# Patient Record
Sex: MALE | Race: OTHER | NOT HISPANIC OR LATINO
[De-identification: names, ages, dates, MRNs, and addresses within clinical notes are randomized per-mention and may not be internally consistent; named-entity substitution may affect disease eponyms.]

---

## 2017-01-12 ENCOUNTER — OTHER (OUTPATIENT)
Age: 57
End: 2017-01-12

## 2017-01-14 ENCOUNTER — LABORATORY RESULT (OUTPATIENT)
Age: 57
End: 2017-01-14

## 2017-01-18 ENCOUNTER — APPOINTMENT (OUTPATIENT)
Dept: NEPHROLOGY | Facility: CLINIC | Age: 57
End: 2017-01-18

## 2017-01-18 VITALS
HEART RATE: 72 BPM | DIASTOLIC BLOOD PRESSURE: 82 MMHG | SYSTOLIC BLOOD PRESSURE: 124 MMHG | BODY MASS INDEX: 30.42 KG/M2 | RESPIRATION RATE: 16 BRPM | WEIGHT: 206 LBS

## 2017-01-18 RX ORDER — LOSARTAN POTASSIUM 50 MG/1
50 TABLET, FILM COATED ORAL
Qty: 90 | Refills: 0 | Status: DISCONTINUED | COMMUNITY
Start: 2016-03-10

## 2017-01-30 ENCOUNTER — RX RENEWAL (OUTPATIENT)
Age: 57
End: 2017-01-30

## 2017-02-01 LAB
ALBUMIN SERPL ELPH-MCNC: 3.8 G/DL
ALP BLD-CCNC: 67 U/L
ALT SERPL-CCNC: 14 U/L
ANION GAP SERPL CALC-SCNC: 15 MMOL/L
APPEARANCE: CLEAR
AST SERPL-CCNC: 17 U/L
BASOPHILS # BLD AUTO: 0.02 K/UL
BASOPHILS NFR BLD AUTO: 0.2 %
BILIRUB SERPL-MCNC: 0.4 MG/DL
BILIRUBIN URINE: NEGATIVE
BLOOD URINE: ABNORMAL
BUN SERPL-MCNC: 77 MG/DL
CALCIUM SERPL-MCNC: 8.8 MG/DL
CALCIUM SERPL-MCNC: 8.8 MG/DL
CHLORIDE SERPL-SCNC: 106 MMOL/L
CHOLEST SERPL-MCNC: 126 MG/DL
CHOLEST/HDLC SERPL: 3 RATIO
CO2 SERPL-SCNC: 20 MMOL/L
COLOR: YELLOW
CREAT SERPL-MCNC: 3.42 MG/DL
EOSINOPHIL # BLD AUTO: 0.29 K/UL
EOSINOPHIL NFR BLD AUTO: 3.6 %
GLUCOSE QUALITATIVE U: NORMAL MG/DL
GLUCOSE SERPL-MCNC: 111 MG/DL
HBA1C MFR BLD HPLC: 6.1 %
HCT VFR BLD CALC: 35.5 %
HDLC SERPL-MCNC: 42 MG/DL
HGB BLD-MCNC: 11.5 G/DL
IMM GRANULOCYTES NFR BLD AUTO: 0.2 %
KETONES URINE: NEGATIVE
LDLC SERPL CALC-MCNC: 60 MG/DL
LEUKOCYTE ESTERASE URINE: NEGATIVE
LYMPHOCYTES # BLD AUTO: 1.94 K/UL
LYMPHOCYTES NFR BLD AUTO: 23.8 %
MAGNESIUM SERPL-MCNC: 2 MG/DL
MAN DIFF?: NORMAL
MCHC RBC-ENTMCNC: 29.8 PG
MCHC RBC-ENTMCNC: 32.4 GM/DL
MCV RBC AUTO: 92 FL
MONOCYTES # BLD AUTO: 0.66 K/UL
MONOCYTES NFR BLD AUTO: 8.1 %
NEUTROPHILS # BLD AUTO: 5.23 K/UL
NEUTROPHILS NFR BLD AUTO: 64.1 %
NITRITE URINE: NEGATIVE
PARATHYROID HORMONE INTACT: 172 PG/ML
PH URINE: 5.5
PHOSPHATE SERPL-MCNC: 4.1 MG/DL
PLATELET # BLD AUTO: 187 K/UL
POTASSIUM SERPL-SCNC: 4.6 MMOL/L
PROT SERPL-MCNC: 6.7 G/DL
PROTEIN URINE: 100 MG/DL
RBC # BLD: 3.86 M/UL
RBC # FLD: 13.6 %
SODIUM SERPL-SCNC: 141 MMOL/L
SPECIFIC GRAVITY URINE: 1.01
TRIGL SERPL-MCNC: 118 MG/DL
URATE SERPL-MCNC: 5.8 MG/DL
UROBILINOGEN URINE: NORMAL MG/DL
WBC # FLD AUTO: 8.16 K/UL

## 2017-02-21 ENCOUNTER — LABORATORY RESULT (OUTPATIENT)
Age: 57
End: 2017-02-21

## 2017-02-21 ENCOUNTER — APPOINTMENT (OUTPATIENT)
Dept: TRANSPLANT | Facility: CLINIC | Age: 57
End: 2017-02-21

## 2017-02-21 ENCOUNTER — APPOINTMENT (OUTPATIENT)
Dept: NEPHROLOGY | Facility: CLINIC | Age: 57
End: 2017-02-21

## 2017-02-21 ENCOUNTER — OUTPATIENT (OUTPATIENT)
Dept: OUTPATIENT SERVICES | Facility: HOSPITAL | Age: 57
LOS: 1 days | End: 2017-02-21
Payer: COMMERCIAL

## 2017-02-21 VITALS
HEART RATE: 77 BPM | HEIGHT: 67.25 IN | WEIGHT: 201.2 LBS | OXYGEN SATURATION: 98 % | TEMPERATURE: 98.1 F | BODY MASS INDEX: 31.21 KG/M2 | DIASTOLIC BLOOD PRESSURE: 72 MMHG | SYSTOLIC BLOOD PRESSURE: 118 MMHG | RESPIRATION RATE: 16 BRPM

## 2017-02-21 DIAGNOSIS — N18.6 END STAGE RENAL DISEASE: ICD-10-CM

## 2017-02-21 LAB
ALBUMIN SERPL ELPH-MCNC: 4.3 G/DL — SIGNIFICANT CHANGE UP (ref 3.3–5)
ALP SERPL-CCNC: 71 U/L — SIGNIFICANT CHANGE UP (ref 40–120)
ALT FLD-CCNC: 16 U/L — SIGNIFICANT CHANGE UP (ref 10–45)
ANION GAP SERPL CALC-SCNC: 17 MMOL/L — SIGNIFICANT CHANGE UP (ref 5–17)
APPEARANCE UR: CLEAR — SIGNIFICANT CHANGE UP
AST SERPL-CCNC: 23 U/L — SIGNIFICANT CHANGE UP (ref 10–40)
BACTERIA # UR AUTO: NEGATIVE — SIGNIFICANT CHANGE UP
BASOPHILS # BLD AUTO: 0.04 K/UL — SIGNIFICANT CHANGE UP (ref 0–0.2)
BASOPHILS NFR BLD AUTO: 0.5 % — SIGNIFICANT CHANGE UP (ref 0–2)
BILIRUB SERPL-MCNC: 0.3 MG/DL — SIGNIFICANT CHANGE UP (ref 0.2–1.2)
BILIRUB UR-MCNC: NEGATIVE — SIGNIFICANT CHANGE UP
BUN SERPL-MCNC: 79 MG/DL — HIGH (ref 7–23)
CALCIUM SERPL-MCNC: 9.5 MG/DL — SIGNIFICANT CHANGE UP (ref 8.4–10.5)
CHLORIDE SERPL-SCNC: 104 MMOL/L — SIGNIFICANT CHANGE UP (ref 96–108)
CMV IGG FLD QL: 7.3 U/ML — HIGH
CMV IGG SERPL-IMP: POSITIVE
CO2 SERPL-SCNC: 21 MMOL/L — LOW (ref 22–31)
COLOR SPEC: YELLOW — SIGNIFICANT CHANGE UP
CREAT SERPL-MCNC: 4.05 MG/DL — HIGH (ref 0.5–1.3)
DIFF PNL FLD: NEGATIVE — SIGNIFICANT CHANGE UP
EBV EA AB SER IA-ACNC: 93.2 U/ML — HIGH
EBV EA AB TITR SER IF: POSITIVE
EBV EA IGG SER-ACNC: POSITIVE
EBV NA IGG SER IA-ACNC: 110 U/ML — HIGH
EBV PATRN SPEC IB-IMP: SIGNIFICANT CHANGE UP
EBV VCA IGG AVIDITY SER QL IA: POSITIVE
EBV VCA IGM SER IA-ACNC: <10 U/ML — SIGNIFICANT CHANGE UP
EBV VCA IGM SER IA-ACNC: >750 U/ML — HIGH
EBV VCA IGM TITR FLD: NEGATIVE — SIGNIFICANT CHANGE UP
EOSINOPHIL # BLD AUTO: 0.23 K/UL — SIGNIFICANT CHANGE UP (ref 0–0.5)
EOSINOPHIL NFR BLD AUTO: 2.7 % — SIGNIFICANT CHANGE UP (ref 0–6)
EPI CELLS # UR: 0 /HPF — SIGNIFICANT CHANGE UP (ref 0–5)
GLUCOSE SERPL-MCNC: 96 MG/DL — SIGNIFICANT CHANGE UP (ref 70–99)
GLUCOSE UR QL: NEGATIVE MG/DL — SIGNIFICANT CHANGE UP
HAV IGG+IGM SER QL: REACTIVE
HBA1C BLD-MCNC: 6 % — HIGH (ref 4–5.6)
HBV CORE AB SER-ACNC: SIGNIFICANT CHANGE UP
HBV SURFACE AB SER-ACNC: 317.7 MIU/ML — SIGNIFICANT CHANGE UP
HBV SURFACE AB SER-ACNC: REACTIVE
HBV SURFACE AG SER-ACNC: SIGNIFICANT CHANGE UP
HCT VFR BLD CALC: 35.7 % — LOW (ref 39–50)
HCV AB S/CO SERPL IA: 0.12 S/CO — SIGNIFICANT CHANGE UP
HCV AB SERPL-IMP: SIGNIFICANT CHANGE UP
HGB BLD-MCNC: 11.9 G/DL — LOW (ref 13–17)
HIV 1+2 AB+HIV1 P24 AG SERPL QL IA: SIGNIFICANT CHANGE UP
HSV1 IGG SER-ACNC: 56.4 INDEX — HIGH
HSV1 IGG SERPL QL IA: POSITIVE
HSV2 IGG FLD-ACNC: 0.3 INDEX — SIGNIFICANT CHANGE UP
HSV2 IGG SERPL QL IA: NEGATIVE — SIGNIFICANT CHANGE UP
HYALINE CASTS # UR AUTO: 0 /LPF — SIGNIFICANT CHANGE UP (ref 0–7)
IMM GRANULOCYTES NFR BLD AUTO: 0.1 % — SIGNIFICANT CHANGE UP (ref 0–1.5)
KETONES UR-MCNC: NEGATIVE — SIGNIFICANT CHANGE UP
LDH SERPL L TO P-CCNC: 238 U/L — SIGNIFICANT CHANGE UP (ref 50–242)
LEUKOCYTE ESTERASE UR-ACNC: NEGATIVE — SIGNIFICANT CHANGE UP
LYMPHOCYTES # BLD AUTO: 1.82 K/UL — SIGNIFICANT CHANGE UP (ref 1–3.3)
LYMPHOCYTES # BLD AUTO: 21.3 % — SIGNIFICANT CHANGE UP (ref 13–44)
MAGNESIUM SERPL-MCNC: 1.9 MG/DL — SIGNIFICANT CHANGE UP (ref 1.6–2.6)
MCHC RBC-ENTMCNC: 30 PG — SIGNIFICANT CHANGE UP (ref 27–34)
MCHC RBC-ENTMCNC: 33.3 GM/DL — SIGNIFICANT CHANGE UP (ref 32–36)
MCV RBC AUTO: 89.9 FL — SIGNIFICANT CHANGE UP (ref 80–100)
MONOCYTES # BLD AUTO: 0.59 K/UL — SIGNIFICANT CHANGE UP (ref 0–0.9)
MONOCYTES NFR BLD AUTO: 6.9 % — SIGNIFICANT CHANGE UP (ref 2–14)
NEUTROPHILS # BLD AUTO: 5.86 K/UL — SIGNIFICANT CHANGE UP (ref 1.8–7.4)
NEUTROPHILS NFR BLD AUTO: 68.5 % — SIGNIFICANT CHANGE UP (ref 43–77)
NITRITE UR-MCNC: NEGATIVE — SIGNIFICANT CHANGE UP
PH UR: 6 — SIGNIFICANT CHANGE UP (ref 5–8)
PHOSPHATE SERPL-MCNC: 4.5 MG/DL — SIGNIFICANT CHANGE UP (ref 2.5–4.5)
PLATELET # BLD AUTO: 197 K/UL — SIGNIFICANT CHANGE UP (ref 150–400)
POTASSIUM SERPL-MCNC: 5.9 MMOL/L — HIGH (ref 3.5–5.3)
POTASSIUM SERPL-SCNC: 5.9 MMOL/L — HIGH (ref 3.5–5.3)
PROT SERPL-MCNC: 6.9 G/DL — SIGNIFICANT CHANGE UP (ref 6–8.3)
PROT UR-MCNC: 100 MG/DL
PSA FLD-MCNC: 0.92 NG/ML — SIGNIFICANT CHANGE UP (ref 0–4)
RBC # BLD: 3.97 M/UL — LOW (ref 4.2–5.8)
RBC # FLD: 13.5 % — SIGNIFICANT CHANGE UP (ref 10.3–14.5)
RBC CASTS # UR COMP ASSIST: 3 /HPF — SIGNIFICANT CHANGE UP (ref 0–4)
RUBV IGG SER-ACNC: 13.1 INDEX — SIGNIFICANT CHANGE UP
RUBV IGG SER-IMP: POSITIVE — SIGNIFICANT CHANGE UP
SODIUM SERPL-SCNC: 142 MMOL/L — SIGNIFICANT CHANGE UP (ref 135–145)
SP GR SPEC: 1.01 — SIGNIFICANT CHANGE UP (ref 1.01–1.02)
T GONDII IGG SER QL: 49.9 IU/ML — HIGH
T GONDII IGG SER QL: POSITIVE
UROBILINOGEN FLD QL: NEGATIVE MG/DL — SIGNIFICANT CHANGE UP
VZV IGG FLD QL IA: >4000 INDEX — SIGNIFICANT CHANGE UP
VZV IGG FLD QL IA: POSITIVE — SIGNIFICANT CHANGE UP
WBC # BLD: 8.55 K/UL — SIGNIFICANT CHANGE UP (ref 3.8–10.5)
WBC # FLD AUTO: 8.55 K/UL — SIGNIFICANT CHANGE UP (ref 3.8–10.5)
WBC UR QL: 1 /HPF — SIGNIFICANT CHANGE UP (ref 0–5)

## 2017-02-21 PROCEDURE — 84100 ASSAY OF PHOSPHORUS: CPT

## 2017-02-21 PROCEDURE — 83615 LACTATE (LD) (LDH) ENZYME: CPT

## 2017-02-21 PROCEDURE — 86664 EPSTEIN-BARR NUCLEAR ANTIGEN: CPT

## 2017-02-21 PROCEDURE — 86480 TB TEST CELL IMMUN MEASURE: CPT

## 2017-02-21 PROCEDURE — 86644 CMV ANTIBODY: CPT

## 2017-02-21 PROCEDURE — 87340 HEPATITIS B SURFACE AG IA: CPT

## 2017-02-21 PROCEDURE — 86696 HERPES SIMPLEX TYPE 2 TEST: CPT

## 2017-02-21 PROCEDURE — 86592 SYPHILIS TEST NON-TREP QUAL: CPT

## 2017-02-21 PROCEDURE — 86787 VARICELLA-ZOSTER ANTIBODY: CPT

## 2017-02-21 PROCEDURE — 85027 COMPLETE CBC AUTOMATED: CPT

## 2017-02-21 PROCEDURE — 86663 EPSTEIN-BARR ANTIBODY: CPT

## 2017-02-21 PROCEDURE — 86762 RUBELLA ANTIBODY: CPT

## 2017-02-21 PROCEDURE — 87389 HIV-1 AG W/HIV-1&-2 AB AG IA: CPT

## 2017-02-21 PROCEDURE — 86695 HERPES SIMPLEX TYPE 1 TEST: CPT

## 2017-02-21 PROCEDURE — 83735 ASSAY OF MAGNESIUM: CPT

## 2017-02-21 PROCEDURE — 81001 URINALYSIS AUTO W/SCOPE: CPT

## 2017-02-21 PROCEDURE — 80053 COMPREHEN METABOLIC PANEL: CPT

## 2017-02-21 PROCEDURE — 86704 HEP B CORE ANTIBODY TOTAL: CPT

## 2017-02-21 PROCEDURE — G0103: CPT

## 2017-02-21 PROCEDURE — 83036 HEMOGLOBIN GLYCOSYLATED A1C: CPT

## 2017-02-21 PROCEDURE — 86708 HEPATITIS A ANTIBODY: CPT

## 2017-02-21 PROCEDURE — 86665 EPSTEIN-BARR CAPSID VCA: CPT

## 2017-02-21 PROCEDURE — 86777 TOXOPLASMA ANTIBODY: CPT

## 2017-02-21 PROCEDURE — 86706 HEP B SURFACE ANTIBODY: CPT

## 2017-02-21 PROCEDURE — 86803 HEPATITIS C AB TEST: CPT

## 2017-02-22 VITALS — HEIGHT: 67.75 IN | BODY MASS INDEX: 30.85 KG/M2 | WEIGHT: 201.2 LBS

## 2017-02-22 LAB — RPR SERPL-ACNC: SIGNIFICANT CHANGE UP

## 2017-02-23 LAB
M TB TUBERC IFN-G BLD QL: 0.01 IU/ML — SIGNIFICANT CHANGE UP
M TB TUBERC IFN-G BLD QL: 0.03 IU/ML — SIGNIFICANT CHANGE UP
M TB TUBERC IFN-G BLD QL: NEGATIVE — SIGNIFICANT CHANGE UP
MITOGEN IGNF BCKGRD COR BLD-ACNC: >10 IU/ML — SIGNIFICANT CHANGE UP

## 2017-02-27 LAB
ALBUMIN SERPL ELPH-MCNC: 4 G/DL
ALP BLD-CCNC: 70 U/L
ALT SERPL-CCNC: 14 U/L
ANION GAP SERPL CALC-SCNC: 17 MMOL/L
AST SERPL-CCNC: 19 U/L
BILIRUB SERPL-MCNC: 0.4 MG/DL
BUN SERPL-MCNC: 71 MG/DL
CALCIUM SERPL-MCNC: 8.8 MG/DL
CHLORIDE SERPL-SCNC: 104 MMOL/L
CO2 SERPL-SCNC: 19 MMOL/L
CREAT SERPL-MCNC: 3.95 MG/DL
GLUCOSE SERPL-MCNC: 142 MG/DL
POTASSIUM SERPL-SCNC: 4.3 MMOL/L
PROT SERPL-MCNC: 6.4 G/DL
SODIUM SERPL-SCNC: 140 MMOL/L

## 2017-03-27 ENCOUNTER — APPOINTMENT (OUTPATIENT)
Dept: ULTRASOUND IMAGING | Facility: CLINIC | Age: 57
End: 2017-03-27

## 2017-05-03 ENCOUNTER — APPOINTMENT (OUTPATIENT)
Dept: NEPHROLOGY | Facility: CLINIC | Age: 57
End: 2017-05-03

## 2017-05-03 VITALS
HEART RATE: 72 BPM | BODY MASS INDEX: 30.94 KG/M2 | RESPIRATION RATE: 16 BRPM | WEIGHT: 202 LBS | SYSTOLIC BLOOD PRESSURE: 120 MMHG | DIASTOLIC BLOOD PRESSURE: 78 MMHG

## 2017-05-04 RX ORDER — SODIUM POLYSTYRENE SULFONATE 4.1 MEQ/G
POWDER, FOR SUSPENSION ORAL; RECTAL DAILY
Qty: 30 | Refills: 0 | Status: DISCONTINUED | COMMUNITY
Start: 2017-02-25 | End: 2017-05-04

## 2017-05-10 ENCOUNTER — APPOINTMENT (OUTPATIENT)
Dept: NEPHROLOGY | Facility: CLINIC | Age: 57
End: 2017-05-10

## 2017-05-10 VITALS
WEIGHT: 203.2 LBS | BODY MASS INDEX: 31.13 KG/M2 | HEART RATE: 72 BPM | RESPIRATION RATE: 16 BRPM | SYSTOLIC BLOOD PRESSURE: 130 MMHG | DIASTOLIC BLOOD PRESSURE: 78 MMHG

## 2017-05-19 ENCOUNTER — FORM ENCOUNTER (OUTPATIENT)
Age: 57
End: 2017-05-19

## 2017-05-20 ENCOUNTER — APPOINTMENT (OUTPATIENT)
Dept: ULTRASOUND IMAGING | Facility: IMAGING CENTER | Age: 57
End: 2017-05-20

## 2017-05-20 ENCOUNTER — APPOINTMENT (OUTPATIENT)
Dept: CT IMAGING | Facility: IMAGING CENTER | Age: 57
End: 2017-05-20

## 2017-05-20 ENCOUNTER — OUTPATIENT (OUTPATIENT)
Dept: OUTPATIENT SERVICES | Facility: HOSPITAL | Age: 57
LOS: 1 days | End: 2017-05-20
Payer: COMMERCIAL

## 2017-05-20 DIAGNOSIS — Z01.818 ENCOUNTER FOR OTHER PREPROCEDURAL EXAMINATION: ICD-10-CM

## 2017-05-20 PROCEDURE — 93979 VASCULAR STUDY: CPT

## 2017-05-20 PROCEDURE — 71250 CT THORAX DX C-: CPT

## 2017-05-20 PROCEDURE — 76700 US EXAM ABDOM COMPLETE: CPT

## 2017-06-14 ENCOUNTER — RX RENEWAL (OUTPATIENT)
Age: 57
End: 2017-06-14

## 2017-06-20 ENCOUNTER — RX RENEWAL (OUTPATIENT)
Age: 57
End: 2017-06-20

## 2017-07-05 ENCOUNTER — APPOINTMENT (OUTPATIENT)
Dept: ULTRASOUND IMAGING | Facility: CLINIC | Age: 57
End: 2017-07-05

## 2017-07-27 ENCOUNTER — RX RENEWAL (OUTPATIENT)
Age: 57
End: 2017-07-27

## 2017-08-12 ENCOUNTER — LABORATORY RESULT (OUTPATIENT)
Age: 57
End: 2017-08-12

## 2017-08-14 ENCOUNTER — APPOINTMENT (OUTPATIENT)
Dept: NEPHROLOGY | Facility: CLINIC | Age: 57
End: 2017-08-14
Payer: COMMERCIAL

## 2017-08-14 VITALS
HEIGHT: 67.75 IN | WEIGHT: 203 LBS | DIASTOLIC BLOOD PRESSURE: 84 MMHG | OXYGEN SATURATION: 99 % | BODY MASS INDEX: 31.12 KG/M2 | SYSTOLIC BLOOD PRESSURE: 143 MMHG | HEART RATE: 78 BPM

## 2017-08-14 VITALS
BODY MASS INDEX: 25.91 KG/M2 | OXYGEN SATURATION: 98 % | HEART RATE: 84 BPM | SYSTOLIC BLOOD PRESSURE: 190 MMHG | HEIGHT: 67.75 IN | DIASTOLIC BLOOD PRESSURE: 92 MMHG | WEIGHT: 169 LBS

## 2017-08-14 VITALS — DIASTOLIC BLOOD PRESSURE: 80 MMHG | SYSTOLIC BLOOD PRESSURE: 130 MMHG

## 2017-08-14 LAB
25(OH)D3 SERPL-MCNC: 31.4 NG/ML
ALBUMIN SERPL ELPH-MCNC: 3.9 G/DL
ALP BLD-CCNC: 67 U/L
ALT SERPL-CCNC: 16 U/L
ANION GAP SERPL CALC-SCNC: 15 MMOL/L
APPEARANCE: CLEAR
AST SERPL-CCNC: 21 U/L
BASOPHILS # BLD AUTO: 0.04 K/UL
BASOPHILS NFR BLD AUTO: 0.5 %
BILIRUB SERPL-MCNC: 0.4 MG/DL
BILIRUBIN URINE: NEGATIVE
BLOOD URINE: ABNORMAL
BUN SERPL-MCNC: 86 MG/DL
CALCIUM SERPL-MCNC: 9.1 MG/DL
CALCIUM SERPL-MCNC: 9.1 MG/DL
CHLORIDE SERPL-SCNC: 106 MMOL/L
CHOLEST SERPL-MCNC: 118 MG/DL
CHOLEST/HDLC SERPL: 3 RATIO
CO2 SERPL-SCNC: 20 MMOL/L
COLOR: YELLOW
CREAT SERPL-MCNC: 4.63 MG/DL
EOSINOPHIL # BLD AUTO: 0.24 K/UL
EOSINOPHIL NFR BLD AUTO: 3.2 %
GLUCOSE QUALITATIVE U: NORMAL MG/DL
GLUCOSE SERPL-MCNC: 107 MG/DL
HBA1C MFR BLD HPLC: 5.8 %
HCT VFR BLD CALC: 34.1 %
HDLC SERPL-MCNC: 39 MG/DL
HGB BLD-MCNC: 11 G/DL
IMM GRANULOCYTES NFR BLD AUTO: 0.4 %
KETONES URINE: NEGATIVE
LDLC SERPL CALC-MCNC: 55 MG/DL
LEUKOCYTE ESTERASE URINE: NEGATIVE
LYMPHOCYTES # BLD AUTO: 1.9 K/UL
LYMPHOCYTES NFR BLD AUTO: 25 %
MAGNESIUM SERPL-MCNC: 1.9 MG/DL
MAN DIFF?: NORMAL
MCHC RBC-ENTMCNC: 29.3 PG
MCHC RBC-ENTMCNC: 32.3 GM/DL
MCV RBC AUTO: 90.7 FL
MONOCYTES # BLD AUTO: 0.58 K/UL
MONOCYTES NFR BLD AUTO: 7.6 %
NEUTROPHILS # BLD AUTO: 4.81 K/UL
NEUTROPHILS NFR BLD AUTO: 63.3 %
NITRITE URINE: NEGATIVE
PARATHYROID HORMONE INTACT: 270 PG/ML
PH URINE: 6
PHOSPHATE SERPL-MCNC: 4.8 MG/DL
PLATELET # BLD AUTO: 168 K/UL
POTASSIUM SERPL-SCNC: 5.5 MMOL/L
PROT SERPL-MCNC: 6.8 G/DL
PROTEIN URINE: 100 MG/DL
RBC # BLD: 3.76 M/UL
RBC # FLD: 13.8 %
SODIUM SERPL-SCNC: 141 MMOL/L
SPECIFIC GRAVITY URINE: 1.01
TRIGL SERPL-MCNC: 118 MG/DL
URATE SERPL-MCNC: 6.1 MG/DL
UROBILINOGEN URINE: NORMAL MG/DL
WBC # FLD AUTO: 7.6 K/UL

## 2017-08-14 PROCEDURE — 99214 OFFICE O/P EST MOD 30 MIN: CPT

## 2017-08-16 LAB
CREAT SPEC-SCNC: 67 MG/DL
CREAT/PROT UR: 1.6 RATIO
PROT UR-MCNC: 108 MG/DL

## 2017-10-09 ENCOUNTER — RX RENEWAL (OUTPATIENT)
Age: 57
End: 2017-10-09

## 2017-11-15 ENCOUNTER — OTHER (OUTPATIENT)
Age: 57
End: 2017-11-15

## 2017-11-20 ENCOUNTER — APPOINTMENT (OUTPATIENT)
Dept: NEPHROLOGY | Facility: CLINIC | Age: 57
End: 2017-11-20
Payer: COMMERCIAL

## 2017-11-20 VITALS — DIASTOLIC BLOOD PRESSURE: 84 MMHG | SYSTOLIC BLOOD PRESSURE: 136 MMHG

## 2017-11-20 VITALS
DIASTOLIC BLOOD PRESSURE: 90 MMHG | WEIGHT: 211.64 LBS | HEART RATE: 80 BPM | OXYGEN SATURATION: 97 % | BODY MASS INDEX: 33.22 KG/M2 | SYSTOLIC BLOOD PRESSURE: 150 MMHG | HEIGHT: 67 IN

## 2017-11-20 LAB
25(OH)D3 SERPL-MCNC: 28.9 NG/ML
ALBUMIN SERPL ELPH-MCNC: 3.6 G/DL
ALP BLD-CCNC: 60 U/L
ALT SERPL-CCNC: 13 U/L
ANION GAP SERPL CALC-SCNC: 15 MMOL/L
AST SERPL-CCNC: 20 U/L
BASOPHILS # BLD AUTO: 0.03 K/UL
BASOPHILS NFR BLD AUTO: 0.4 %
BILIRUB SERPL-MCNC: 0.4 MG/DL
BUN SERPL-MCNC: 81 MG/DL
CALCIUM SERPL-MCNC: 8.9 MG/DL
CALCIUM SERPL-MCNC: 8.9 MG/DL
CHLORIDE SERPL-SCNC: 104 MMOL/L
CHOLEST SERPL-MCNC: 130 MG/DL
CHOLEST/HDLC SERPL: 2.7 RATIO
CO2 SERPL-SCNC: 19 MMOL/L
CREAT SERPL-MCNC: 4.99 MG/DL
EOSINOPHIL # BLD AUTO: 0.25 K/UL
EOSINOPHIL NFR BLD AUTO: 3.3 %
GLUCOSE SERPL-MCNC: 93 MG/DL
HBA1C MFR BLD HPLC: 5.7 %
HCT VFR BLD CALC: 34.6 %
HDLC SERPL-MCNC: 48 MG/DL
HGB BLD-MCNC: 11.2 G/DL
IMM GRANULOCYTES NFR BLD AUTO: 0.3 %
LDLC SERPL CALC-MCNC: 56 MG/DL
LYMPHOCYTES # BLD AUTO: 1.83 K/UL
LYMPHOCYTES NFR BLD AUTO: 24.2 %
MAGNESIUM SERPL-MCNC: 1.9 MG/DL
MAN DIFF?: NORMAL
MCHC RBC-ENTMCNC: 30.2 PG
MCHC RBC-ENTMCNC: 32.4 GM/DL
MCV RBC AUTO: 93.3 FL
MONOCYTES # BLD AUTO: 0.58 K/UL
MONOCYTES NFR BLD AUTO: 7.7 %
NEUTROPHILS # BLD AUTO: 4.85 K/UL
NEUTROPHILS NFR BLD AUTO: 64.1 %
PARATHYROID HORMONE INTACT: 418 PG/ML
PHOSPHATE SERPL-MCNC: 5.5 MG/DL
PLATELET # BLD AUTO: 177 K/UL
POTASSIUM SERPL-SCNC: 5.5 MMOL/L
PROT SERPL-MCNC: 6.7 G/DL
RBC # BLD: 3.71 M/UL
RBC # FLD: 13.4 %
SODIUM SERPL-SCNC: 138 MMOL/L
TRIGL SERPL-MCNC: 131 MG/DL
URATE SERPL-MCNC: 6.2 MG/DL
WBC # FLD AUTO: 7.56 K/UL

## 2017-11-20 PROCEDURE — 99214 OFFICE O/P EST MOD 30 MIN: CPT

## 2018-02-05 ENCOUNTER — APPOINTMENT (OUTPATIENT)
Dept: NEPHROLOGY | Facility: CLINIC | Age: 58
End: 2018-02-05
Payer: COMMERCIAL

## 2018-02-05 VITALS
DIASTOLIC BLOOD PRESSURE: 85 MMHG | SYSTOLIC BLOOD PRESSURE: 139 MMHG | HEIGHT: 67 IN | OXYGEN SATURATION: 99 % | WEIGHT: 207.23 LBS | HEART RATE: 91 BPM | BODY MASS INDEX: 32.53 KG/M2

## 2018-02-05 LAB
25(OH)D3 SERPL-MCNC: 45.4 NG/ML
ALBUMIN SERPL ELPH-MCNC: 4.1 G/DL
ALP BLD-CCNC: 52 U/L
ALT SERPL-CCNC: 16 U/L
ANION GAP SERPL CALC-SCNC: 15 MMOL/L
AST SERPL-CCNC: 21 U/L
BASOPHILS # BLD AUTO: 0.03 K/UL
BASOPHILS NFR BLD AUTO: 0.4 %
BILIRUB SERPL-MCNC: 0.5 MG/DL
BUN SERPL-MCNC: 80 MG/DL
CALCIUM SERPL-MCNC: 11.9 MG/DL
CALCIUM SERPL-MCNC: 11.9 MG/DL
CHLORIDE SERPL-SCNC: 101 MMOL/L
CHOLEST SERPL-MCNC: 126 MG/DL
CHOLEST/HDLC SERPL: 3.1 RATIO
CO2 SERPL-SCNC: 25 MMOL/L
CREAT SERPL-MCNC: 7.51 MG/DL
EOSINOPHIL # BLD AUTO: 0.28 K/UL
EOSINOPHIL NFR BLD AUTO: 3.8 %
ESTIMATED AVERAGE GLUCOSE: 114 MG/DL
GLUCOSE SERPL-MCNC: 125 MG/DL
HBA1C MFR BLD HPLC: 5.6 %
HCT VFR BLD CALC: 32 %
HDLC SERPL-MCNC: 41 MG/DL
HGB BLD-MCNC: 10.5 G/DL
IMM GRANULOCYTES NFR BLD AUTO: 0.1 %
LDLC SERPL CALC-MCNC: 60 MG/DL
LYMPHOCYTES # BLD AUTO: 1.56 K/UL
LYMPHOCYTES NFR BLD AUTO: 20.9 %
MAGNESIUM SERPL-MCNC: 2 MG/DL
MAN DIFF?: NORMAL
MCHC RBC-ENTMCNC: 30.1 PG
MCHC RBC-ENTMCNC: 32.8 GM/DL
MCV RBC AUTO: 91.7 FL
MONOCYTES # BLD AUTO: 0.63 K/UL
MONOCYTES NFR BLD AUTO: 8.4 %
NEUTROPHILS # BLD AUTO: 4.95 K/UL
NEUTROPHILS NFR BLD AUTO: 66.4 %
PARATHYROID HORMONE INTACT: 21 PG/ML
PHOSPHATE SERPL-MCNC: 6.3 MG/DL
PLATELET # BLD AUTO: 165 K/UL
POTASSIUM SERPL-SCNC: 5.3 MMOL/L
PROT SERPL-MCNC: 7 G/DL
RBC # BLD: 3.49 M/UL
RBC # FLD: 13.3 %
SODIUM SERPL-SCNC: 141 MMOL/L
TRIGL SERPL-MCNC: 125 MG/DL
URATE SERPL-MCNC: 6.4 MG/DL
WBC # FLD AUTO: 7.46 K/UL

## 2018-02-05 PROCEDURE — 99214 OFFICE O/P EST MOD 30 MIN: CPT

## 2018-02-11 ENCOUNTER — FORM ENCOUNTER (OUTPATIENT)
Age: 58
End: 2018-02-11

## 2018-02-12 ENCOUNTER — OUTPATIENT (OUTPATIENT)
Dept: OUTPATIENT SERVICES | Facility: HOSPITAL | Age: 58
LOS: 1 days | End: 2018-02-12
Payer: COMMERCIAL

## 2018-02-12 ENCOUNTER — APPOINTMENT (OUTPATIENT)
Dept: ULTRASOUND IMAGING | Facility: IMAGING CENTER | Age: 58
End: 2018-02-12

## 2018-02-12 DIAGNOSIS — N18.9 CHRONIC KIDNEY DISEASE, UNSPECIFIED: ICD-10-CM

## 2018-02-12 PROCEDURE — 76770 US EXAM ABDO BACK WALL COMP: CPT

## 2018-02-12 PROCEDURE — 76770 US EXAM ABDO BACK WALL COMP: CPT | Mod: 26

## 2018-03-24 ENCOUNTER — OTHER (OUTPATIENT)
Age: 58
End: 2018-03-24

## 2018-03-30 LAB
25(OH)D3 SERPL-MCNC: 57.7 NG/ML
ALBUMIN SERPL ELPH-MCNC: 3.8 G/DL
ALP BLD-CCNC: 51 U/L
ALT SERPL-CCNC: 12 U/L
ANION GAP SERPL CALC-SCNC: 17 MMOL/L
AST SERPL-CCNC: 20 U/L
BASOPHILS # BLD AUTO: 0.01 K/UL
BASOPHILS NFR BLD AUTO: 0.1 %
BILIRUB SERPL-MCNC: 0.4 MG/DL
BUN SERPL-MCNC: 74 MG/DL
CALCIUM SERPL-MCNC: 12.2 MG/DL
CHLORIDE SERPL-SCNC: 101 MMOL/L
CHOLEST SERPL-MCNC: 105 MG/DL
CO2 SERPL-SCNC: 22 MMOL/L
CREAT SERPL-MCNC: 7.59 MG/DL
EOSINOPHIL # BLD AUTO: 0.26 K/UL
EOSINOPHIL NFR BLD AUTO: 3.5 %
GLUCOSE SERPL-MCNC: 109 MG/DL
HCT VFR BLD CALC: 30.2 %
HGB BLD-MCNC: 9.5 G/DL
IMM GRANULOCYTES NFR BLD AUTO: 0.4 %
LYMPHOCYTES # BLD AUTO: 1.8 K/UL
LYMPHOCYTES NFR BLD AUTO: 24.3 %
MAGNESIUM SERPL-MCNC: 2.1 MG/DL
MAN DIFF?: NORMAL
MCHC RBC-ENTMCNC: 30 PG
MCHC RBC-ENTMCNC: 31.5 GM/DL
MCV RBC AUTO: 95.3 FL
MONOCYTES # BLD AUTO: 0.74 K/UL
MONOCYTES NFR BLD AUTO: 10 %
NEUTROPHILS # BLD AUTO: 4.57 K/UL
NEUTROPHILS NFR BLD AUTO: 61.7 %
PHOSPHATE SERPL-MCNC: 5.1 MG/DL
PLATELET # BLD AUTO: 181 K/UL
POTASSIUM SERPL-SCNC: 4.8 MMOL/L
PROT SERPL-MCNC: 6.4 G/DL
RBC # BLD: 3.17 M/UL
RBC # FLD: 13.2 %
SODIUM SERPL-SCNC: 140 MMOL/L
URATE SERPL-MCNC: 5.7 MG/DL
WBC # FLD AUTO: 7.41 K/UL

## 2018-04-07 ENCOUNTER — EMERGENCY (EMERGENCY)
Facility: HOSPITAL | Age: 58
LOS: 1 days | Discharge: HOME | End: 2018-04-07
Attending: EMERGENCY MEDICINE
Payer: COMMERCIAL

## 2018-04-07 VITALS
WEIGHT: 198.42 LBS | SYSTOLIC BLOOD PRESSURE: 135 MMHG | TEMPERATURE: 98 F | DIASTOLIC BLOOD PRESSURE: 78 MMHG | HEART RATE: 88 BPM | RESPIRATION RATE: 18 BRPM | OXYGEN SATURATION: 99 % | HEIGHT: 68 IN

## 2018-04-07 DIAGNOSIS — Z98.890 OTHER SPECIFIED POSTPROCEDURAL STATES: Chronic | ICD-10-CM

## 2018-04-07 LAB
ALBUMIN SERPL ELPH-MCNC: 4.3 G/DL — SIGNIFICANT CHANGE UP (ref 3.3–5)
ALP SERPL-CCNC: 46 U/L — SIGNIFICANT CHANGE UP (ref 40–120)
ALT FLD-CCNC: 12 U/L RC — SIGNIFICANT CHANGE UP (ref 10–45)
ANION GAP SERPL CALC-SCNC: 15 MMOL/L — SIGNIFICANT CHANGE UP (ref 5–17)
APTT BLD: 28 SEC — SIGNIFICANT CHANGE UP (ref 27.5–37.4)
AST SERPL-CCNC: 16 U/L — SIGNIFICANT CHANGE UP (ref 10–40)
BASOPHILS # BLD AUTO: 0 K/UL — SIGNIFICANT CHANGE UP (ref 0–0.2)
BASOPHILS NFR BLD AUTO: 0.4 % — SIGNIFICANT CHANGE UP (ref 0–2)
BILIRUB SERPL-MCNC: 0.4 MG/DL — SIGNIFICANT CHANGE UP (ref 0.2–1.2)
BUN SERPL-MCNC: 76 MG/DL — HIGH (ref 7–23)
CALCIUM SERPL-MCNC: 13.6 MG/DL — CRITICAL HIGH (ref 8.4–10.5)
CHLORIDE SERPL-SCNC: 97 MMOL/L — SIGNIFICANT CHANGE UP (ref 96–108)
CK MB BLD-MCNC: 1.1 % — SIGNIFICANT CHANGE UP (ref 0–3.5)
CK MB CFR SERPL CALC: 2.1 NG/ML — SIGNIFICANT CHANGE UP (ref 0–6.7)
CK SERPL-CCNC: 184 U/L — SIGNIFICANT CHANGE UP (ref 30–200)
CO2 SERPL-SCNC: 28 MMOL/L — SIGNIFICANT CHANGE UP (ref 22–31)
CREAT SERPL-MCNC: 8.08 MG/DL — HIGH (ref 0.5–1.3)
EOSINOPHIL # BLD AUTO: 0.2 K/UL — SIGNIFICANT CHANGE UP (ref 0–0.5)
EOSINOPHIL NFR BLD AUTO: 3 % — SIGNIFICANT CHANGE UP (ref 0–6)
GLUCOSE SERPL-MCNC: 109 MG/DL — HIGH (ref 70–99)
HCT VFR BLD CALC: 29.7 % — LOW (ref 39–50)
HGB BLD-MCNC: 10 G/DL — LOW (ref 13–17)
INR BLD: 0.97 RATIO — SIGNIFICANT CHANGE UP (ref 0.88–1.16)
LYMPHOCYTES # BLD AUTO: 1.8 K/UL — SIGNIFICANT CHANGE UP (ref 1–3.3)
LYMPHOCYTES # BLD AUTO: 22.4 % — SIGNIFICANT CHANGE UP (ref 13–44)
MCHC RBC-ENTMCNC: 31.9 PG — SIGNIFICANT CHANGE UP (ref 27–34)
MCHC RBC-ENTMCNC: 33.7 GM/DL — SIGNIFICANT CHANGE UP (ref 32–36)
MCV RBC AUTO: 94.8 FL — SIGNIFICANT CHANGE UP (ref 80–100)
MONOCYTES # BLD AUTO: 0.7 K/UL — SIGNIFICANT CHANGE UP (ref 0–0.9)
MONOCYTES NFR BLD AUTO: 9 % — SIGNIFICANT CHANGE UP (ref 2–14)
NEUTROPHILS # BLD AUTO: 5.2 K/UL — SIGNIFICANT CHANGE UP (ref 1.8–7.4)
NEUTROPHILS NFR BLD AUTO: 65.3 % — SIGNIFICANT CHANGE UP (ref 43–77)
PLATELET # BLD AUTO: 167 K/UL — SIGNIFICANT CHANGE UP (ref 150–400)
POTASSIUM SERPL-MCNC: 4.3 MMOL/L — SIGNIFICANT CHANGE UP (ref 3.5–5.3)
POTASSIUM SERPL-SCNC: 4.3 MMOL/L — SIGNIFICANT CHANGE UP (ref 3.5–5.3)
PROT SERPL-MCNC: 7.4 G/DL — SIGNIFICANT CHANGE UP (ref 6–8.3)
PROTHROM AB SERPL-ACNC: 10.5 SEC — SIGNIFICANT CHANGE UP (ref 9.8–12.7)
RBC # BLD: 3.13 M/UL — LOW (ref 4.2–5.8)
RBC # FLD: 11.8 % — SIGNIFICANT CHANGE UP (ref 10.3–14.5)
SODIUM SERPL-SCNC: 140 MMOL/L — SIGNIFICANT CHANGE UP (ref 135–145)
TROPONIN T SERPL-MCNC: <0.01 NG/ML — SIGNIFICANT CHANGE UP (ref 0–0.06)
TROPONIN T SERPL-MCNC: <0.01 NG/ML — SIGNIFICANT CHANGE UP (ref 0–0.06)
WBC # BLD: 7.9 K/UL — SIGNIFICANT CHANGE UP (ref 3.8–10.5)
WBC # FLD AUTO: 7.9 K/UL — SIGNIFICANT CHANGE UP (ref 3.8–10.5)

## 2018-04-07 PROCEDURE — 99220: CPT

## 2018-04-07 PROCEDURE — 71045 X-RAY EXAM CHEST 1 VIEW: CPT | Mod: 26

## 2018-04-07 RX ORDER — SIMVASTATIN 20 MG/1
20 TABLET, FILM COATED ORAL AT BEDTIME
Qty: 0 | Refills: 0 | Status: DISCONTINUED | OUTPATIENT
Start: 2018-04-07 | End: 2018-04-11

## 2018-04-07 RX ORDER — PARICALCITOL 2 UG/1
2 CAPSULE, GELATIN COATED ORAL ONCE
Qty: 0 | Refills: 0 | Status: COMPLETED | OUTPATIENT
Start: 2018-04-07 | End: 2018-04-08

## 2018-04-07 RX ORDER — LOSARTAN POTASSIUM 100 MG/1
25 TABLET, FILM COATED ORAL DAILY
Qty: 0 | Refills: 0 | Status: DISCONTINUED | OUTPATIENT
Start: 2018-04-07 | End: 2018-04-11

## 2018-04-07 RX ORDER — ASPIRIN/CALCIUM CARB/MAGNESIUM 324 MG
81 TABLET ORAL DAILY
Qty: 0 | Refills: 0 | Status: DISCONTINUED | OUTPATIENT
Start: 2018-04-07 | End: 2018-04-11

## 2018-04-07 RX ORDER — SODIUM CHLORIDE 9 MG/ML
3 INJECTION INTRAMUSCULAR; INTRAVENOUS; SUBCUTANEOUS EVERY 8 HOURS
Qty: 0 | Refills: 0 | Status: DISCONTINUED | OUTPATIENT
Start: 2018-04-07 | End: 2018-04-11

## 2018-04-07 RX ORDER — SODIUM CHLORIDE 9 MG/ML
1000 INJECTION INTRAMUSCULAR; INTRAVENOUS; SUBCUTANEOUS
Qty: 0 | Refills: 0 | Status: DISCONTINUED | OUTPATIENT
Start: 2018-04-07 | End: 2018-04-11

## 2018-04-07 RX ORDER — ALLOPURINOL 300 MG
200 TABLET ORAL DAILY
Qty: 0 | Refills: 0 | Status: DISCONTINUED | OUTPATIENT
Start: 2018-04-07 | End: 2018-04-11

## 2018-04-07 RX ORDER — TAMSULOSIN HYDROCHLORIDE 0.4 MG/1
0.4 CAPSULE ORAL AT BEDTIME
Qty: 0 | Refills: 0 | Status: DISCONTINUED | OUTPATIENT
Start: 2018-04-07 | End: 2018-04-11

## 2018-04-07 RX ORDER — SODIUM BICARBONATE 1 MEQ/ML
1000 SYRINGE (ML) INTRAVENOUS DAILY
Qty: 0 | Refills: 0 | Status: DISCONTINUED | OUTPATIENT
Start: 2018-04-07 | End: 2018-04-07

## 2018-04-07 RX ORDER — SODIUM BICARBONATE 1 MEQ/ML
650 SYRINGE (ML) INTRAVENOUS DAILY
Qty: 0 | Refills: 0 | Status: DISCONTINUED | OUTPATIENT
Start: 2018-04-07 | End: 2018-04-11

## 2018-04-07 RX ORDER — HYDRALAZINE HCL 50 MG
100 TABLET ORAL ONCE
Qty: 0 | Refills: 0 | Status: COMPLETED | OUTPATIENT
Start: 2018-04-07 | End: 2018-04-07

## 2018-04-07 RX ORDER — ASPIRIN/CALCIUM CARB/MAGNESIUM 324 MG
324 TABLET ORAL DAILY
Qty: 0 | Refills: 0 | Status: DISCONTINUED | OUTPATIENT
Start: 2018-04-07 | End: 2018-04-11

## 2018-04-07 RX ADMIN — SIMVASTATIN 20 MILLIGRAM(S): 20 TABLET, FILM COATED ORAL at 21:02

## 2018-04-07 RX ADMIN — Medication 650 MILLIGRAM(S): at 20:56

## 2018-04-07 RX ADMIN — Medication 5 MILLIGRAM(S): at 20:56

## 2018-04-07 RX ADMIN — LOSARTAN POTASSIUM 25 MILLIGRAM(S): 100 TABLET, FILM COATED ORAL at 20:56

## 2018-04-07 RX ADMIN — Medication 324 MILLIGRAM(S): at 16:15

## 2018-04-07 RX ADMIN — SODIUM CHLORIDE 3 MILLILITER(S): 9 INJECTION INTRAMUSCULAR; INTRAVENOUS; SUBCUTANEOUS at 21:00

## 2018-04-07 RX ADMIN — Medication 100 MILLIGRAM(S): at 20:56

## 2018-04-07 RX ADMIN — SODIUM CHLORIDE 125 MILLILITER(S): 9 INJECTION INTRAMUSCULAR; INTRAVENOUS; SUBCUTANEOUS at 22:33

## 2018-04-07 NOTE — ED PROVIDER NOTE - ATTENDING CONTRIBUTION TO CARE
57M PMH CKD (awaiting renal transplant), BPH, GERD, gout, HTN, HLD who presents with CP. Pt states onset of sx this AM initially substernal. Described as burning constant non radiating. States pain then traveled to his upper mid back where it remains. Describes as mild to mod severity. No associated nv, diaphoresis, sob. No weakness, numbness, tingling.   Gen: WNWD NAD  HEENT: NCAT PERRL EOMI normal pharynx  Neck: supple no jvd  CV: RRR, no murmur  Lung: CTA BL  Abd: +BS soft NTND  Ext: wwp, palp pulses, FROMx4, trace BL pedal edema  Neuro: A&Ox3, CN grossly intact, sensation intact, motor 5/5 throughout  AP: 57M PMH CKD (awaiting renal transplant), BPH, GERD, gout, HTN, HLD who presents with CP r/o ACS. Labs, CXR, EKG, lipase.

## 2018-04-07 NOTE — ED ADULT NURSE REASSESSMENT NOTE - NS ED NURSE REASSESS COMMENT FT1
Pt report received from Timo HARRIS. Pt transferred to cdu bed 3 for further cardiac evaluation. Pt a/ox3 denies respiratory distress, sob, dyspnea, C/P, palpitations, n/v/d at this time.  VSS, afebrile, IV clean/dry/intact. Pt aware of plan of care, call bell within reach ,safety maintained. Will monitor and assess.
Received pt from STEVEN Fairchild, received pt alert and responsive, oriented x4, denies any respiratory distress, SOB, or difficulty breathing. Pt transferred to CDU for chest pain, pt is currently chest pain free but c/o mild posterior neck pain, declined pain medication at this time, will reassess and monitor. Pt denies any other cardiac symptoms.  Pending stress test aware not to consume caffeine, verbalized understanding. 2nd CE at 2100 with EKG.  IV in place, patent and free of signs of infiltration, on continuous cardiac monitoring as ordered, NSR HR 79.  V/S stable, pt afebrile,  Pt educated on unit and unit rules, instructed patient to notify RN of any needed assistance, Pt verbalizes understanding, Call bell placed within reach. Safety maintained. Will continue to monitor. Meal and beverage provided. Family at bedside.

## 2018-04-07 NOTE — ED ADULT NURSE REASSESSMENT NOTE - COMFORT CARE
meal provided/po fluids offered/warm blanket provided/ambulated to bathroom/plan of care explained/darkened lights/repositioned

## 2018-04-07 NOTE — ED CDU PROVIDER INITIAL DAY NOTE - DETAILS
58 y/o M presenting with chest pain, complicated medical history   - continuous monitoring and frequent reevaluations   - telemetry monitoring   - serial EKGs and troponin  - exercise nuclear stress test   - d/w Dr. Montoya

## 2018-04-07 NOTE — ED ADULT NURSE NOTE - CHPI ED SYMPTOMS NEG
no fever/no syncope/no vomiting/no cough/no chills/no diaphoresis/no nausea/no dizziness/no shortness of breath

## 2018-04-07 NOTE — ED PROVIDER NOTE - PMH
HTN (hypertension)    Hypercholesteremia    Kidney disease BPH (benign prostatic hyperplasia)    GERD (gastroesophageal reflux disease)    Gout    HTN (hypertension)    Hypercholesteremia    Kidney disease    Solitary fibrous tumor

## 2018-04-07 NOTE — ED CDU PROVIDER INITIAL DAY NOTE - OBJECTIVE STATEMENT
57M PMH CKD (awaiting renal transplant), BPH, GERD, gout, HTN, HLD who presents with CP. Patient states that he notes sternal CP that feels like GERD this morning when he woke up. He had associated burping. It gradually got better throughout the day, but he notes it came back a bit higher near the throat later, and also noted pain at the back of his neck at that time. The neck pain comes and goes, not tender, associated with movement. He states he came in because his family was concerned and wanted to rule out cardiac cause. He took omeprazole daily until 3 months ago and stopped 2/2 insurance, re-started w/Nexium 3-4 days ago. Denies N/V, diaphoresis, SOB, palpitations, dizziness. Endorses slight LE swelling unchanged from baseline which he attributes to his renal disease.

## 2018-04-07 NOTE — ED CDU PROVIDER INITIAL DAY NOTE - PROGRESS NOTE DETAILS
Patient seen at bedside in NAD.  VSS.  Patient resting comfortably without complaints. No events on tele. Pt states he feels well currently. Denies any current cp, sob, dizziness, weakness, n/v. Appears well. Second trop/repeat EKG pending. Will continue to monitor. - Steven Lincoln PA-C Repeat EKG shows new TWI in lead III not on prior EKGs and same TWI in V1 from prior. ED FT attending Dr. Figueroa made aware. Pt asymptomatic currently and feels well. Will follow troponin. - Steven Lincoln PA-C twi  in IIIm flattening on prior, otherwise no new changes.  no contiguous findings.  patient asymptomatic.  delta troponin negative, awaiting exercise stress in AM, will continue to telemonitor.  additionally pt found to by hypercalcemic on initial ED workup.  apparent prior hx of hypercalcemia.  CKD awaiting renal transplant with b/l LE edema.  will given gently ivf, with frequent reassessment for fluid overload.  to be trended.

## 2018-04-07 NOTE — ED PROVIDER NOTE - OBJECTIVE STATEMENT
57M PMH CKD (awaiting renal transplant), BPH, GERD, gout, HTN, HLD who presents with CP. Patient states that he notes sternal CP that feels like GERD this morning when he woke up. It gradually got better throughout the day, but he notes it came back a bit higher near the throat later, and also noted pain at the back of his neck at that time. The neck pain comes and goes, not tender, associated with movement. He states he came in because his family was concerned and wanted to rule out cardiac cause. He took omeprazole daily until 3 months ago and stopped 2/2 insurance, re-started w/Nexium 3-4 days ago. Denies N/V, diaphoresis, SOB, palpitations, dizziness. Endorses slight LE swelling unchanged from baseline which he attributes to his renal disease. 57M PMH CKD (awaiting renal transplant), BPH, GERD, gout, HTN, HLD who presents with CP. Patient states that he notes sternal CP that feels like GERD this morning when he woke up. He had associated burping. It gradually got better throughout the day, but he notes it came back a bit higher near the throat later, and also noted pain at the back of his neck at that time. The neck pain comes and goes, not tender, associated with movement. He states he came in because his family was concerned and wanted to rule out cardiac cause. He took omeprazole daily until 3 months ago and stopped 2/2 insurance, re-started w/Nexium 3-4 days ago. Denies N/V, diaphoresis, SOB, palpitations, dizziness. Endorses slight LE swelling unchanged from baseline which he attributes to his renal disease.

## 2018-04-07 NOTE — ED CDU PROVIDER INITIAL DAY NOTE - PMH
BPH (benign prostatic hyperplasia)    GERD (gastroesophageal reflux disease)    Gout    HTN (hypertension)    Hypercholesteremia    Kidney disease    Solitary fibrous tumor

## 2018-04-07 NOTE — ED PROVIDER NOTE - PROGRESS NOTE DETAILS
Will gather blood work to r/o ACS. EKG unchanged from baseline. Patient did not take baby ASA today, will give 324 mg chewable ASA.  CMP demonstrates Ca 13.6, and Cr 8.08. Of note, last month per outpatient records Ca 12.2 and Cr 7.59.  Cardiac enzymes negative. Called office of Dr. Rosalino Bazan, patient's cardiologist. Reached answering service, with plan for return call.

## 2018-04-07 NOTE — ED ADULT NURSE NOTE - OBJECTIVE STATEMENT
Pt notes he felt a burning sensation over the upper sternal area.  He also c/o pain in the upper thoracic/lower cervical spine area which he feels is separate from the chest pain.  Denies n/v, dyspnea, or diaphoresis.  He had breakfast today and the chest pain subsided but returned.  No peripheral edema noted.

## 2018-04-07 NOTE — ED CDU PROVIDER INITIAL DAY NOTE - ST/T WAVE
No ST elevations. TWI only in V1, c/w old EKG No ST elevations. TWI noted in V1 c/w old EKG and now III not seen on old EKG

## 2018-04-07 NOTE — ED CDU PROVIDER INITIAL DAY NOTE - ATTENDING CONTRIBUTION TO CARE
57M PMH CKD (awaiting renal transplant), BPH, GERD, gout, HTN, HLD who presents with CP. Pt states onset of sx this AM initially substernal. Described as burning constant non radiating. States pain then traveled to his upper mid back where it remains. Describes as mild to mod severity. No associated nv, diaphoresis, sob. No weakness, numbness, tingling.   Gen: WNWD NAD  HEENT: NCAT PERRL EOMI normal pharynx  Neck: supple no jvd  CV: RRR, no murmur  Lung: CTA BL  Abd: +BS soft NTND  Ext: wwp, palp pulses, FROMx4, trace BL pedal edema  Neuro: A&Ox3, CN grossly intact, sensation intact, motor 5/5 throughout  AP: 57M PMH CKD (awaiting renal transplant), BPH, GERD, gout, HTN, HLD who presents with CP r/o ACS. Labs with elevated Ca which is chronic, trop neg x1. CXR WNL. EKG no evidence of STEMI. Plan for dispo to obs for stress testing, serial cardiac monitoring, delta trop.

## 2018-04-08 VITALS
SYSTOLIC BLOOD PRESSURE: 119 MMHG | OXYGEN SATURATION: 97 % | DIASTOLIC BLOOD PRESSURE: 71 MMHG | HEART RATE: 94 BPM | TEMPERATURE: 98 F | RESPIRATION RATE: 17 BRPM

## 2018-04-08 LAB
ANION GAP SERPL CALC-SCNC: 14 MMOL/L — SIGNIFICANT CHANGE UP (ref 5–17)
BUN SERPL-MCNC: 75 MG/DL — HIGH (ref 7–23)
CALCIUM SERPL-MCNC: 12.9 MG/DL — HIGH (ref 8.4–10.5)
CHLORIDE SERPL-SCNC: 102 MMOL/L — SIGNIFICANT CHANGE UP (ref 96–108)
CHOLEST SERPL-MCNC: 94 MG/DL — SIGNIFICANT CHANGE UP (ref 10–199)
CO2 SERPL-SCNC: 24 MMOL/L — SIGNIFICANT CHANGE UP (ref 22–31)
CREAT SERPL-MCNC: 7.66 MG/DL — HIGH (ref 0.5–1.3)
GLUCOSE SERPL-MCNC: 127 MG/DL — HIGH (ref 70–99)
HBA1C BLD-MCNC: 5.9 % — HIGH (ref 4–5.6)
HDLC SERPL-MCNC: 29 MG/DL — LOW (ref 40–125)
LIPID PNL WITH DIRECT LDL SERPL: 48 MG/DL — SIGNIFICANT CHANGE UP
POTASSIUM SERPL-MCNC: 4.8 MMOL/L — SIGNIFICANT CHANGE UP (ref 3.5–5.3)
POTASSIUM SERPL-SCNC: 4.8 MMOL/L — SIGNIFICANT CHANGE UP (ref 3.5–5.3)
SODIUM SERPL-SCNC: 140 MMOL/L — SIGNIFICANT CHANGE UP (ref 135–145)
TOTAL CHOLESTEROL/HDL RATIO MEASUREMENT: 3.2 RATIO — LOW (ref 3.4–9.6)
TRIGL SERPL-MCNC: 84 MG/DL — SIGNIFICANT CHANGE UP (ref 10–149)

## 2018-04-08 PROCEDURE — 78452 HT MUSCLE IMAGE SPECT MULT: CPT | Mod: 26

## 2018-04-08 PROCEDURE — G0378: CPT

## 2018-04-08 PROCEDURE — 85610 PROTHROMBIN TIME: CPT

## 2018-04-08 PROCEDURE — 85027 COMPLETE CBC AUTOMATED: CPT

## 2018-04-08 PROCEDURE — 80061 LIPID PANEL: CPT

## 2018-04-08 PROCEDURE — 80048 BASIC METABOLIC PNL TOTAL CA: CPT

## 2018-04-08 PROCEDURE — 71045 X-RAY EXAM CHEST 1 VIEW: CPT

## 2018-04-08 PROCEDURE — 82553 CREATINE MB FRACTION: CPT

## 2018-04-08 PROCEDURE — 99284 EMERGENCY DEPT VISIT MOD MDM: CPT | Mod: 25

## 2018-04-08 PROCEDURE — 93018 CV STRESS TEST I&R ONLY: CPT

## 2018-04-08 PROCEDURE — 83690 ASSAY OF LIPASE: CPT

## 2018-04-08 PROCEDURE — 78452 HT MUSCLE IMAGE SPECT MULT: CPT

## 2018-04-08 PROCEDURE — 99217: CPT

## 2018-04-08 PROCEDURE — 84484 ASSAY OF TROPONIN QUANT: CPT

## 2018-04-08 PROCEDURE — A9500: CPT

## 2018-04-08 PROCEDURE — 93005 ELECTROCARDIOGRAM TRACING: CPT

## 2018-04-08 PROCEDURE — 93017 CV STRESS TEST TRACING ONLY: CPT

## 2018-04-08 PROCEDURE — 85730 THROMBOPLASTIN TIME PARTIAL: CPT

## 2018-04-08 PROCEDURE — 93016 CV STRESS TEST SUPVJ ONLY: CPT

## 2018-04-08 PROCEDURE — 83036 HEMOGLOBIN GLYCOSYLATED A1C: CPT

## 2018-04-08 PROCEDURE — 80053 COMPREHEN METABOLIC PANEL: CPT

## 2018-04-08 PROCEDURE — 96374 THER/PROPH/DIAG INJ IV PUSH: CPT | Mod: XU

## 2018-04-08 PROCEDURE — 82550 ASSAY OF CK (CPK): CPT

## 2018-04-08 RX ORDER — HYDRALAZINE HCL 50 MG
100 TABLET ORAL ONCE
Qty: 0 | Refills: 0 | Status: COMPLETED | OUTPATIENT
Start: 2018-04-08 | End: 2018-04-08

## 2018-04-08 RX ORDER — FAMOTIDINE 10 MG/ML
20 INJECTION INTRAVENOUS ONCE
Qty: 0 | Refills: 0 | Status: COMPLETED | OUTPATIENT
Start: 2018-04-08 | End: 2018-04-08

## 2018-04-08 RX ORDER — ACETAMINOPHEN 500 MG
975 TABLET ORAL ONCE
Qty: 0 | Refills: 0 | Status: COMPLETED | OUTPATIENT
Start: 2018-04-08 | End: 2018-04-08

## 2018-04-08 RX ADMIN — FAMOTIDINE 20 MILLIGRAM(S): 10 INJECTION INTRAVENOUS at 07:51

## 2018-04-08 RX ADMIN — Medication 200 MILLIGRAM(S): at 05:24

## 2018-04-08 RX ADMIN — TAMSULOSIN HYDROCHLORIDE 0.4 MILLIGRAM(S): 0.4 CAPSULE ORAL at 05:24

## 2018-04-08 RX ADMIN — SODIUM CHLORIDE 3 MILLILITER(S): 9 INJECTION INTRAMUSCULAR; INTRAVENOUS; SUBCUTANEOUS at 05:11

## 2018-04-08 RX ADMIN — Medication 650 MILLIGRAM(S): at 12:40

## 2018-04-08 RX ADMIN — PARICALCITOL 2 MICROGRAM(S): 2 CAPSULE, GELATIN COATED ORAL at 05:24

## 2018-04-08 RX ADMIN — Medication 100 MILLIGRAM(S): at 10:55

## 2018-04-08 RX ADMIN — Medication 81 MILLIGRAM(S): at 12:40

## 2018-04-08 RX ADMIN — Medication 5 MILLIGRAM(S): at 12:40

## 2018-04-08 RX ADMIN — SODIUM CHLORIDE 125 MILLILITER(S): 9 INJECTION INTRAMUSCULAR; INTRAVENOUS; SUBCUTANEOUS at 05:57

## 2018-04-08 RX ADMIN — Medication 30 MILLILITER(S): at 07:51

## 2018-04-08 NOTE — ED CDU PROVIDER DISPOSITION NOTE - CLINICAL COURSE
56 y/o male pmhx of CKD (awaiting renal transplant), BPH, GERD, gout, HTN/HLD presented with sternal burning cp that began in AM when woke up w/ associated burping, and felt like GERD with some neck pain noted as well. Patient was sent to CDU for continuous telemetry monitoring, repeat cardiac enzymes and EKGs and a exercise nuc stress test. Trop was negative x2. EKG initially showed twi in lead V1 noted on prior EKG. Repeat EKG showed new twi in lead III now and flattening on prior. Pt was started on maintenance IVF 2/2 hypercalcemia noted on labwork. Patient did not have any acute events on telemetry overnight while in the CDU. Stress test resulted as _________________. 56 y/o male pmhx of CKD (awaiting renal transplant), BPH, GERD, gout, HTN/HLD presented with sternal burning cp that began in AM when woke up w/ associated burping, and felt like GERD with some neck pain noted as well. Patient was sent to CDU for continuous telemetry monitoring, repeat cardiac enzymes and EKGs and a exercise nuc stress test. Trop was negative x2. EKG initially showed twi in lead V1 noted on prior EKG. Repeat EKG showed new twi in lead III now and flattening on prior. Pt was started on maintenance IVF 2/2 hypercalcemia noted on labwork. Hypercalcemia improved from 13.6 to 12.9 and Cr improved from 8.08 to 7.66 on repeat AM labs. Patient did not have any acute events on telemetry overnight while in the CDU. Stress test resulted as _________________. 58 y/o male pmhx of CKD (awaiting renal transplant), BPH, GERD, gout, HTN/HLD presented with sternal burning cp that began in AM when woke up w/ associated burping, and felt like GERD with some neck pain noted as well. Patient was sent to CDU for continuous telemetry monitoring, repeat cardiac enzymes and EKGs and a exercise nuc stress test. Trop was negative x2. EKG initially showed twi in lead V1 noted on prior EKG. Repeat EKG showed new twi in lead III now and flattening on prior. Pt was started on maintenance IVF 2/2 hypercalcemia noted on labwork. Hypercalcemia improved from 13.6 to 12.9 and Cr improved from 8.08 to 7.66 on repeat AM labs. Patient did not have any acute events on telemetry overnight while in the CDU. Stress test resulted with EKG changes on exertion with no evidence of ischemia or infarct with nuclear imaging. Patient has been chest pain free and understands importance of follow-up with cardiology, continuing home medications and return precautions. PAtient discharged stable home.

## 2018-04-08 NOTE — ED CDU PROVIDER DISPOSITION NOTE - PLAN OF CARE
1. Follow up with your PMD in next 1-2 days. Additionally please follow up with your cardiologist Dr. Rosalino Bazan in next 2-3 days for further management/evaluation.  2. Show copies of your reports given to you. Recommend Aspirin 81mg over the counter daily until further evaluation.  Take all of your other medications as previously prescribed.   3. If you develop any worsening or continued chest pain, shortness of breath, weakness, return to ED.

## 2018-04-08 NOTE — ED CDU PROVIDER SUBSEQUENT DAY NOTE - ATTENDING CONTRIBUTION TO CARE
Patient in CDU for workup of chest pains.  Serial cardiac enzymes negative, renal function at baseline, exercise nuclear stress testing with EKG changes but no ischemia noted on nuclear testing.  Patient comfortable with outpatient follow up with his cardiologist.  James Galindo M.D.

## 2018-04-08 NOTE — ED CDU PROVIDER SUBSEQUENT DAY NOTE - PROGRESS NOTE DETAILS
Patient seen at bedside in NAD.  VSS.  Patient resting comfortably without complaints. No events on tele Patient sleeping. NAD. No complaints. VSS. No events on tele. Will continue to monitor. - Steven Lincoln PA-C CDU PROGRESS NOTE SUMMER GONZALES: Pt resting comfortably, stating he still has some posterior neck pain that is unchanged and baseline for him, as well as burning in his chest that feels like his GERD. NAD, VSS. No events on telemetry. Pending stress test at this time. CDU PROGRESS NOTE SUMMER GONZALES: Pt resting comfortably, feeling well without complaint. NAD, VSS. No events on telemetry. Pending stress test results. Stress test results show some EKG changes with exertion but no ischemic or infarct with nuclear imaging. Results discussed with patient and family with Dr. Galindo. Patient understands importance of follow-up with his cardiologist, continuing medication as directed and return precautions were provided. Patient discharged stable home at this time. -Maria Fernanda Sevilla PA-C

## 2018-04-08 NOTE — ED CDU PROVIDER SUBSEQUENT DAY NOTE - HISTORY
Patient seen at bedside in NAD.  VSS.  Patient resting comfortably without complaints. no events on tele. Pt reports he feels well currently. No return of cp. No sob, no dizziness, no weakness, no numbness/tingling. He appears well has been ambulating around the unit without difficulty. IVF currently hanging for hypercalcemia on labwork. No signs of fluid over load on exam. Lungs CTA bilaterally, no wheezing, rales, rhonchi. Unchanged LE edema. Repeat troponin negative. Will continue to monitor frequently.

## 2018-04-16 ENCOUNTER — APPOINTMENT (OUTPATIENT)
Dept: NEPHROLOGY | Facility: CLINIC | Age: 58
End: 2018-04-16
Payer: COMMERCIAL

## 2018-04-16 ENCOUNTER — APPOINTMENT (OUTPATIENT)
Dept: ULTRASOUND IMAGING | Facility: IMAGING CENTER | Age: 58
End: 2018-04-16
Payer: COMMERCIAL

## 2018-04-16 ENCOUNTER — OUTPATIENT (OUTPATIENT)
Dept: OUTPATIENT SERVICES | Facility: HOSPITAL | Age: 58
LOS: 1 days | End: 2018-04-16
Payer: COMMERCIAL

## 2018-04-16 VITALS
DIASTOLIC BLOOD PRESSURE: 70 MMHG | SYSTOLIC BLOOD PRESSURE: 117 MMHG | BODY MASS INDEX: 32.49 KG/M2 | OXYGEN SATURATION: 98 % | WEIGHT: 207 LBS | HEIGHT: 67 IN | HEART RATE: 88 BPM

## 2018-04-16 DIAGNOSIS — N25.81 SECONDARY HYPERPARATHYROIDISM OF RENAL ORIGIN: ICD-10-CM

## 2018-04-16 DIAGNOSIS — Z98.890 OTHER SPECIFIED POSTPROCEDURAL STATES: Chronic | ICD-10-CM

## 2018-04-16 DIAGNOSIS — N40.0 BENIGN PROSTATIC HYPERPLASIA WITHOUT LOWER URINARY TRACT SYMPMS: ICD-10-CM

## 2018-04-16 DIAGNOSIS — Z00.8 ENCOUNTER FOR OTHER GENERAL EXAMINATION: ICD-10-CM

## 2018-04-16 PROCEDURE — 76536 US EXAM OF HEAD AND NECK: CPT

## 2018-04-16 PROCEDURE — 99214 OFFICE O/P EST MOD 30 MIN: CPT

## 2018-04-16 PROCEDURE — 76536 US EXAM OF HEAD AND NECK: CPT | Mod: 26

## 2018-05-09 ENCOUNTER — NON-APPOINTMENT (OUTPATIENT)
Age: 58
End: 2018-05-09

## 2018-05-09 ENCOUNTER — APPOINTMENT (OUTPATIENT)
Dept: CARDIOLOGY | Facility: CLINIC | Age: 58
End: 2018-05-09
Payer: COMMERCIAL

## 2018-05-09 ENCOUNTER — OTHER (OUTPATIENT)
Age: 58
End: 2018-05-09

## 2018-05-09 VITALS
HEIGHT: 67 IN | SYSTOLIC BLOOD PRESSURE: 120 MMHG | DIASTOLIC BLOOD PRESSURE: 74 MMHG | WEIGHT: 202 LBS | BODY MASS INDEX: 31.71 KG/M2 | OXYGEN SATURATION: 99 % | HEART RATE: 85 BPM

## 2018-05-09 PROCEDURE — 99214 OFFICE O/P EST MOD 30 MIN: CPT

## 2018-05-09 PROCEDURE — 93000 ELECTROCARDIOGRAM COMPLETE: CPT

## 2018-05-14 ENCOUNTER — APPOINTMENT (OUTPATIENT)
Dept: NEPHROLOGY | Facility: CLINIC | Age: 58
End: 2018-05-14
Payer: COMMERCIAL

## 2018-05-14 VITALS
OXYGEN SATURATION: 97 % | SYSTOLIC BLOOD PRESSURE: 134 MMHG | WEIGHT: 206.13 LBS | DIASTOLIC BLOOD PRESSURE: 81 MMHG | HEART RATE: 78 BPM | BODY MASS INDEX: 32.35 KG/M2 | HEIGHT: 67 IN

## 2018-05-14 LAB
25(OH)D3 SERPL-MCNC: 48.2 NG/ML
ALBUMIN SERPL ELPH-MCNC: 4 G/DL
ALP BLD-CCNC: 53 U/L
ALT SERPL-CCNC: 13 U/L
ANION GAP SERPL CALC-SCNC: 17 MMOL/L
AST SERPL-CCNC: 15 U/L
BASOPHILS # BLD AUTO: 0.02 K/UL
BASOPHILS NFR BLD AUTO: 0.3 %
BILIRUB SERPL-MCNC: 0.4 MG/DL
BUN SERPL-MCNC: 72 MG/DL
CALCIUM SERPL-MCNC: 8.5 MG/DL
CHLORIDE SERPL-SCNC: 110 MMOL/L
CO2 SERPL-SCNC: 18 MMOL/L
CREAT SERPL-MCNC: 6.01 MG/DL
EOSINOPHIL # BLD AUTO: 0.2 K/UL
EOSINOPHIL NFR BLD AUTO: 2.9 %
GLUCOSE SERPL-MCNC: 95 MG/DL
HCT VFR BLD CALC: 29.1 %
HGB BLD-MCNC: 9.5 G/DL
IMM GRANULOCYTES NFR BLD AUTO: 0.1 %
LYMPHOCYTES # BLD AUTO: 1.49 K/UL
LYMPHOCYTES NFR BLD AUTO: 21.4 %
MAGNESIUM SERPL-MCNC: 2 MG/DL
MAN DIFF?: NORMAL
MCHC RBC-ENTMCNC: 30.2 PG
MCHC RBC-ENTMCNC: 32.6 GM/DL
MCV RBC AUTO: 92.4 FL
MONOCYTES # BLD AUTO: 0.63 K/UL
MONOCYTES NFR BLD AUTO: 9 %
NEUTROPHILS # BLD AUTO: 4.62 K/UL
NEUTROPHILS NFR BLD AUTO: 66.3 %
PHOSPHATE SERPL-MCNC: 5.9 MG/DL
PLATELET # BLD AUTO: 172 K/UL
POTASSIUM SERPL-SCNC: 5.8 MMOL/L
PROT SERPL-MCNC: 6.6 G/DL
RBC # BLD: 3.15 M/UL
RBC # FLD: 13.9 %
SODIUM SERPL-SCNC: 145 MMOL/L
URATE SERPL-MCNC: 7.3 MG/DL
WBC # FLD AUTO: 6.97 K/UL

## 2018-05-14 PROCEDURE — 99214 OFFICE O/P EST MOD 30 MIN: CPT

## 2018-06-14 ENCOUNTER — OTHER (OUTPATIENT)
Age: 58
End: 2018-06-14

## 2018-06-25 ENCOUNTER — APPOINTMENT (OUTPATIENT)
Dept: NEPHROLOGY | Facility: CLINIC | Age: 58
End: 2018-06-25
Payer: COMMERCIAL

## 2018-06-25 VITALS
OXYGEN SATURATION: 98 % | WEIGHT: 204.37 LBS | HEART RATE: 80 BPM | HEIGHT: 67 IN | BODY MASS INDEX: 32.08 KG/M2 | DIASTOLIC BLOOD PRESSURE: 80 MMHG | SYSTOLIC BLOOD PRESSURE: 136 MMHG

## 2018-06-25 DIAGNOSIS — M25.569 PAIN IN UNSPECIFIED KNEE: ICD-10-CM

## 2018-06-25 PROCEDURE — 99214 OFFICE O/P EST MOD 30 MIN: CPT

## 2018-07-30 ENCOUNTER — OTHER (OUTPATIENT)
Age: 58
End: 2018-07-30

## 2018-08-03 ENCOUNTER — RX RENEWAL (OUTPATIENT)
Age: 58
End: 2018-08-03

## 2018-09-19 LAB
25(OH)D3 SERPL-MCNC: 43.1 NG/ML
ALBUMIN SERPL ELPH-MCNC: 4.1 G/DL
ALP BLD-CCNC: 60 U/L
ALT SERPL-CCNC: 12 U/L
ANION GAP SERPL CALC-SCNC: 17 MMOL/L
AST SERPL-CCNC: 21 U/L
BASOPHILS # BLD AUTO: 0.02 K/UL
BASOPHILS NFR BLD AUTO: 0.3 %
BILIRUB SERPL-MCNC: 0.4 MG/DL
BUN SERPL-MCNC: 97 MG/DL
CALCIUM SERPL-MCNC: 9 MG/DL
CALCIUM SERPL-MCNC: 9 MG/DL
CHLORIDE SERPL-SCNC: 105 MMOL/L
CHOLEST SERPL-MCNC: 105 MG/DL
CHOLEST/HDLC SERPL: 2.4 RATIO
CO2 SERPL-SCNC: 18 MMOL/L
CREAT SERPL-MCNC: 7.18 MG/DL
EOSINOPHIL # BLD AUTO: 0.2 K/UL
EOSINOPHIL NFR BLD AUTO: 3.1 %
GLUCOSE SERPL-MCNC: 100 MG/DL
HBA1C MFR BLD HPLC: 5.6 %
HBV SURFACE AB SERPL IA-ACNC: 292.6 MIU/ML
HBV SURFACE AG SER QL: NONREACTIVE
HCT VFR BLD CALC: 29.9 %
HDLC SERPL-MCNC: 43 MG/DL
HGB BLD-MCNC: 9.8 G/DL
IMM GRANULOCYTES NFR BLD AUTO: 0.3 %
LDLC SERPL CALC-MCNC: 44 MG/DL
LYMPHOCYTES # BLD AUTO: 1.41 K/UL
LYMPHOCYTES NFR BLD AUTO: 21.8 %
MAGNESIUM SERPL-MCNC: 2.1 MG/DL
MAN DIFF?: NORMAL
MCHC RBC-ENTMCNC: 30.2 PG
MCHC RBC-ENTMCNC: 32.8 GM/DL
MCV RBC AUTO: 92.3 FL
MONOCYTES # BLD AUTO: 0.47 K/UL
MONOCYTES NFR BLD AUTO: 7.3 %
NEUTROPHILS # BLD AUTO: 4.35 K/UL
NEUTROPHILS NFR BLD AUTO: 67.2 %
PARATHYROID HORMONE INTACT: 337 PG/ML
PHOSPHATE SERPL-MCNC: 6 MG/DL
PLATELET # BLD AUTO: 154 K/UL
POTASSIUM SERPL-SCNC: 5.5 MMOL/L
PROT SERPL-MCNC: 7 G/DL
RBC # BLD: 3.24 M/UL
RBC # FLD: 13.3 %
SODIUM SERPL-SCNC: 140 MMOL/L
TRIGL SERPL-MCNC: 89 MG/DL
URATE SERPL-MCNC: 5.9 MG/DL
WBC # FLD AUTO: 6.47 K/UL

## 2018-10-05 ENCOUNTER — OTHER (OUTPATIENT)
Age: 58
End: 2018-10-05

## 2018-10-08 ENCOUNTER — APPOINTMENT (OUTPATIENT)
Dept: NEPHROLOGY | Facility: CLINIC | Age: 58
End: 2018-10-08
Payer: COMMERCIAL

## 2018-10-08 VITALS
HEIGHT: 67 IN | WEIGHT: 207.23 LBS | HEART RATE: 82 BPM | BODY MASS INDEX: 32.53 KG/M2 | SYSTOLIC BLOOD PRESSURE: 137 MMHG | DIASTOLIC BLOOD PRESSURE: 82 MMHG | OXYGEN SATURATION: 98 %

## 2018-10-08 PROBLEM — N40.0 BENIGN PROSTATIC HYPERPLASIA WITHOUT LOWER URINARY TRACT SYMPTOMS: Chronic | Status: ACTIVE | Noted: 2018-04-07

## 2018-10-08 PROBLEM — N28.9 DISORDER OF KIDNEY AND URETER, UNSPECIFIED: Chronic | Status: ACTIVE | Noted: 2018-04-07

## 2018-10-08 PROBLEM — I10 ESSENTIAL (PRIMARY) HYPERTENSION: Chronic | Status: ACTIVE | Noted: 2018-04-07

## 2018-10-08 PROBLEM — M10.9 GOUT, UNSPECIFIED: Chronic | Status: ACTIVE | Noted: 2018-04-07

## 2018-10-08 PROBLEM — E78.00 PURE HYPERCHOLESTEROLEMIA, UNSPECIFIED: Chronic | Status: ACTIVE | Noted: 2018-04-07

## 2018-10-08 PROBLEM — K21.9 GASTRO-ESOPHAGEAL REFLUX DISEASE WITHOUT ESOPHAGITIS: Chronic | Status: ACTIVE | Noted: 2018-04-07

## 2018-10-08 PROCEDURE — 99214 OFFICE O/P EST MOD 30 MIN: CPT

## 2018-11-02 ENCOUNTER — RX RENEWAL (OUTPATIENT)
Age: 58
End: 2018-11-02

## 2018-11-16 ENCOUNTER — OTHER (OUTPATIENT)
Age: 58
End: 2018-11-16

## 2018-11-19 ENCOUNTER — APPOINTMENT (OUTPATIENT)
Dept: NEPHROLOGY | Facility: CLINIC | Age: 58
End: 2018-11-19
Payer: COMMERCIAL

## 2018-11-19 VITALS
OXYGEN SATURATION: 98 % | BODY MASS INDEX: 32.35 KG/M2 | SYSTOLIC BLOOD PRESSURE: 151 MMHG | HEART RATE: 79 BPM | HEIGHT: 67 IN | WEIGHT: 206.13 LBS | DIASTOLIC BLOOD PRESSURE: 84 MMHG

## 2018-11-19 VITALS — DIASTOLIC BLOOD PRESSURE: 70 MMHG | SYSTOLIC BLOOD PRESSURE: 130 MMHG

## 2018-11-19 LAB
25(OH)D3 SERPL-MCNC: 45 NG/ML
25(OH)D3 SERPL-MCNC: 46.5 NG/ML
ALBUMIN SERPL ELPH-MCNC: 3.9 G/DL
ALBUMIN SERPL ELPH-MCNC: 4.1 G/DL
ALP BLD-CCNC: 72 U/L
ALP BLD-CCNC: 76 U/L
ALT SERPL-CCNC: 12 U/L
ALT SERPL-CCNC: 9 U/L
ANION GAP SERPL CALC-SCNC: 14 MMOL/L
ANION GAP SERPL CALC-SCNC: 14 MMOL/L
AST SERPL-CCNC: 14 U/L
AST SERPL-CCNC: 16 U/L
BASOPHILS # BLD AUTO: 0.03 K/UL
BASOPHILS # BLD AUTO: 0.04 K/UL
BASOPHILS NFR BLD AUTO: 0.5 %
BASOPHILS NFR BLD AUTO: 0.6 %
BILIRUB SERPL-MCNC: 0.3 MG/DL
BILIRUB SERPL-MCNC: 0.3 MG/DL
BUN SERPL-MCNC: 104 MG/DL
BUN SERPL-MCNC: 81 MG/DL
CALCIUM SERPL-MCNC: 8.6 MG/DL
CALCIUM SERPL-MCNC: 8.6 MG/DL
CALCIUM SERPL-MCNC: 8.8 MG/DL
CALCIUM SERPL-MCNC: 8.8 MG/DL
CHLORIDE SERPL-SCNC: 110 MMOL/L
CHLORIDE SERPL-SCNC: 111 MMOL/L
CHOLEST SERPL-MCNC: 100 MG/DL
CHOLEST SERPL-MCNC: 102 MG/DL
CHOLEST/HDLC SERPL: 2.4 RATIO
CHOLEST/HDLC SERPL: 2.5 RATIO
CO2 SERPL-SCNC: 17 MMOL/L
CO2 SERPL-SCNC: 20 MMOL/L
CREAT SERPL-MCNC: 6.68 MG/DL
CREAT SERPL-MCNC: 7.22 MG/DL
EOSINOPHIL # BLD AUTO: 0.22 K/UL
EOSINOPHIL # BLD AUTO: 0.26 K/UL
EOSINOPHIL NFR BLD AUTO: 3.6 %
EOSINOPHIL NFR BLD AUTO: 3.9 %
GLUCOSE SERPL-MCNC: 105 MG/DL
GLUCOSE SERPL-MCNC: 92 MG/DL
HBA1C MFR BLD HPLC: 5.2 %
HBA1C MFR BLD HPLC: 5.5 %
HCT VFR BLD CALC: 28.3 %
HCT VFR BLD CALC: 30.2 %
HDLC SERPL-MCNC: 41 MG/DL
HDLC SERPL-MCNC: 41 MG/DL
HGB BLD-MCNC: 9.2 G/DL
HGB BLD-MCNC: 9.3 G/DL
IMM GRANULOCYTES NFR BLD AUTO: 0.2 %
IMM GRANULOCYTES NFR BLD AUTO: 0.3 %
LDLC SERPL CALC-MCNC: 43 MG/DL
LDLC SERPL CALC-MCNC: 44 MG/DL
LYMPHOCYTES # BLD AUTO: 1.23 K/UL
LYMPHOCYTES # BLD AUTO: 1.56 K/UL
LYMPHOCYTES NFR BLD AUTO: 21.5 %
LYMPHOCYTES NFR BLD AUTO: 22 %
MAGNESIUM SERPL-MCNC: 2 MG/DL
MAGNESIUM SERPL-MCNC: 2.1 MG/DL
MAN DIFF?: NORMAL
MAN DIFF?: NORMAL
MCHC RBC-ENTMCNC: 28.8 PG
MCHC RBC-ENTMCNC: 29.9 PG
MCHC RBC-ENTMCNC: 30.8 GM/DL
MCHC RBC-ENTMCNC: 32.5 GM/DL
MCV RBC AUTO: 91.9 FL
MCV RBC AUTO: 93.5 FL
MONOCYTES # BLD AUTO: 0.42 K/UL
MONOCYTES # BLD AUTO: 0.59 K/UL
MONOCYTES NFR BLD AUTO: 7.5 %
MONOCYTES NFR BLD AUTO: 8.1 %
NEUTROPHILS # BLD AUTO: 3.69 K/UL
NEUTROPHILS # BLD AUTO: 4.77 K/UL
NEUTROPHILS NFR BLD AUTO: 65.9 %
NEUTROPHILS NFR BLD AUTO: 65.9 %
PARATHYROID HORMONE INTACT: 428 PG/ML
PARATHYROID HORMONE INTACT: 487 PG/ML
PHOSPHATE SERPL-MCNC: 6.3 MG/DL
PHOSPHATE SERPL-MCNC: 7.3 MG/DL
PLATELET # BLD AUTO: 136 K/UL
PLATELET # BLD AUTO: 159 K/UL
POTASSIUM SERPL-SCNC: 5.7 MMOL/L
POTASSIUM SERPL-SCNC: 5.8 MMOL/L
PROT SERPL-MCNC: 6.5 G/DL
PROT SERPL-MCNC: 6.7 G/DL
RBC # BLD: 3.08 M/UL
RBC # BLD: 3.23 M/UL
RBC # FLD: 13.9 %
RBC # FLD: 14.3 %
SODIUM SERPL-SCNC: 141 MMOL/L
SODIUM SERPL-SCNC: 145 MMOL/L
TRIGL SERPL-MCNC: 82 MG/DL
TRIGL SERPL-MCNC: 87 MG/DL
URATE SERPL-MCNC: 4.8 MG/DL
URATE SERPL-MCNC: 5.2 MG/DL
WBC # FLD AUTO: 5.6 K/UL
WBC # FLD AUTO: 7.24 K/UL

## 2018-11-19 PROCEDURE — 99214 OFFICE O/P EST MOD 30 MIN: CPT

## 2018-12-22 ENCOUNTER — INPATIENT (INPATIENT)
Facility: HOSPITAL | Age: 58
LOS: 3 days | Discharge: HOME | DRG: 392 | End: 2018-12-26
Attending: SURGERY | Admitting: SURGERY
Payer: COMMERCIAL

## 2018-12-22 VITALS
RESPIRATION RATE: 20 BRPM | TEMPERATURE: 101 F | DIASTOLIC BLOOD PRESSURE: 67 MMHG | WEIGHT: 190.04 LBS | SYSTOLIC BLOOD PRESSURE: 116 MMHG | HEIGHT: 68 IN | HEART RATE: 96 BPM | OXYGEN SATURATION: 99 %

## 2018-12-22 DIAGNOSIS — K57.20 DIVERTICULITIS OF LARGE INTESTINE WITH PERFORATION AND ABSCESS WITHOUT BLEEDING: ICD-10-CM

## 2018-12-22 DIAGNOSIS — Z98.890 OTHER SPECIFIED POSTPROCEDURAL STATES: Chronic | ICD-10-CM

## 2018-12-22 DIAGNOSIS — R10.9 UNSPECIFIED ABDOMINAL PAIN: ICD-10-CM

## 2018-12-22 DIAGNOSIS — D64.9 ANEMIA, UNSPECIFIED: ICD-10-CM

## 2018-12-22 DIAGNOSIS — I10 ESSENTIAL (PRIMARY) HYPERTENSION: ICD-10-CM

## 2018-12-22 DIAGNOSIS — N18.5 CHRONIC KIDNEY DISEASE, STAGE 5: ICD-10-CM

## 2018-12-22 LAB
ALBUMIN SERPL ELPH-MCNC: 3.7 G/DL — SIGNIFICANT CHANGE UP (ref 3.3–5)
ALP SERPL-CCNC: 71 U/L — SIGNIFICANT CHANGE UP (ref 40–120)
ALT FLD-CCNC: 8 U/L — LOW (ref 10–45)
ANION GAP SERPL CALC-SCNC: 15 MMOL/L — SIGNIFICANT CHANGE UP (ref 5–17)
APPEARANCE UR: CLEAR — SIGNIFICANT CHANGE UP
AST SERPL-CCNC: 15 U/L — SIGNIFICANT CHANGE UP (ref 10–40)
BACTERIA # UR AUTO: NEGATIVE — SIGNIFICANT CHANGE UP
BASE EXCESS BLDV CALC-SCNC: -4.7 MMOL/L — LOW (ref -2–2)
BASOPHILS # BLD AUTO: 0 K/UL — SIGNIFICANT CHANGE UP (ref 0–0.2)
BASOPHILS NFR BLD AUTO: 0.1 % — SIGNIFICANT CHANGE UP (ref 0–2)
BILIRUB SERPL-MCNC: 0.4 MG/DL — SIGNIFICANT CHANGE UP (ref 0.2–1.2)
BILIRUB UR-MCNC: NEGATIVE — SIGNIFICANT CHANGE UP
BUN SERPL-MCNC: 82 MG/DL — HIGH (ref 7–23)
CA-I SERPL-SCNC: 1.11 MMOL/L — LOW (ref 1.12–1.3)
CALCIUM SERPL-MCNC: 8.8 MG/DL — SIGNIFICANT CHANGE UP (ref 8.4–10.5)
CHLORIDE BLDV-SCNC: 110 MMOL/L — HIGH (ref 96–108)
CHLORIDE SERPL-SCNC: 101 MMOL/L — SIGNIFICANT CHANGE UP (ref 96–108)
CO2 BLDV-SCNC: 21 MMOL/L — LOW (ref 22–30)
CO2 SERPL-SCNC: 17 MMOL/L — LOW (ref 22–31)
COLOR SPEC: COLORLESS — SIGNIFICANT CHANGE UP
CREAT SERPL-MCNC: 7.56 MG/DL — HIGH (ref 0.5–1.3)
DIFF PNL FLD: ABNORMAL
EOSINOPHIL # BLD AUTO: 0.1 K/UL — SIGNIFICANT CHANGE UP (ref 0–0.5)
EOSINOPHIL NFR BLD AUTO: 1.2 % — SIGNIFICANT CHANGE UP (ref 0–6)
EPI CELLS # UR: 0 /HPF — SIGNIFICANT CHANGE UP
GAS PNL BLDV: 129 MMOL/L — LOW (ref 136–145)
GAS PNL BLDV: SIGNIFICANT CHANGE UP
GAS PNL BLDV: SIGNIFICANT CHANGE UP
GLUCOSE BLDV-MCNC: 156 MG/DL — HIGH (ref 70–99)
GLUCOSE SERPL-MCNC: 171 MG/DL — HIGH (ref 70–99)
GLUCOSE UR QL: ABNORMAL
HCO3 BLDV-SCNC: 20 MMOL/L — LOW (ref 21–29)
HCT VFR BLD CALC: 26.8 % — LOW (ref 39–50)
HCT VFR BLDA CALC: 38 % — LOW (ref 39–50)
HGB BLD CALC-MCNC: 12.4 G/DL — LOW (ref 13–17)
HGB BLD-MCNC: 9.1 G/DL — LOW (ref 13–17)
HYALINE CASTS # UR AUTO: 0 /LPF — SIGNIFICANT CHANGE UP (ref 0–2)
KETONES UR-MCNC: NEGATIVE — SIGNIFICANT CHANGE UP
LACTATE BLDV-MCNC: 1.7 MMOL/L — SIGNIFICANT CHANGE UP (ref 0.7–2)
LEUKOCYTE ESTERASE UR-ACNC: NEGATIVE — SIGNIFICANT CHANGE UP
LYMPHOCYTES # BLD AUTO: 0.6 K/UL — LOW (ref 1–3.3)
LYMPHOCYTES # BLD AUTO: 7.1 % — LOW (ref 13–44)
MAGNESIUM SERPL-MCNC: 1.7 MG/DL — SIGNIFICANT CHANGE UP (ref 1.6–2.6)
MCHC RBC-ENTMCNC: 31 PG — SIGNIFICANT CHANGE UP (ref 27–34)
MCHC RBC-ENTMCNC: 34 GM/DL — SIGNIFICANT CHANGE UP (ref 32–36)
MCV RBC AUTO: 91.1 FL — SIGNIFICANT CHANGE UP (ref 80–100)
MONOCYTES # BLD AUTO: 0.7 K/UL — SIGNIFICANT CHANGE UP (ref 0–0.9)
MONOCYTES NFR BLD AUTO: 8.3 % — SIGNIFICANT CHANGE UP (ref 2–14)
NEUTROPHILS # BLD AUTO: 6.6 K/UL — SIGNIFICANT CHANGE UP (ref 1.8–7.4)
NEUTROPHILS NFR BLD AUTO: 83.2 % — HIGH (ref 43–77)
NITRITE UR-MCNC: NEGATIVE — SIGNIFICANT CHANGE UP
PCO2 BLDV: 36 MMHG — SIGNIFICANT CHANGE UP (ref 35–50)
PH BLDV: 7.36 — SIGNIFICANT CHANGE UP (ref 7.35–7.45)
PH UR: 6 — SIGNIFICANT CHANGE UP (ref 5–8)
PHOSPHATE SERPL-MCNC: 3.1 MG/DL — SIGNIFICANT CHANGE UP (ref 2.5–4.5)
PLATELET # BLD AUTO: 109 K/UL — LOW (ref 150–400)
PO2 BLDV: 33 MMHG — SIGNIFICANT CHANGE UP (ref 25–45)
POTASSIUM BLDV-SCNC: 5.3 MMOL/L — SIGNIFICANT CHANGE UP (ref 3.5–5.3)
POTASSIUM SERPL-MCNC: 4.6 MMOL/L — SIGNIFICANT CHANGE UP (ref 3.5–5.3)
POTASSIUM SERPL-SCNC: 4.6 MMOL/L — SIGNIFICANT CHANGE UP (ref 3.5–5.3)
PROT SERPL-MCNC: 6.6 G/DL — SIGNIFICANT CHANGE UP (ref 6–8.3)
PROT UR-MCNC: ABNORMAL
RBC # BLD: 2.94 M/UL — LOW (ref 4.2–5.8)
RBC # FLD: 12.1 % — SIGNIFICANT CHANGE UP (ref 10.3–14.5)
RBC CASTS # UR COMP ASSIST: 29 /HPF — HIGH (ref 0–4)
SAO2 % BLDV: 56 % — LOW (ref 67–88)
SODIUM SERPL-SCNC: 133 MMOL/L — LOW (ref 135–145)
SP GR SPEC: 1.01 — SIGNIFICANT CHANGE UP (ref 1.01–1.02)
UROBILINOGEN FLD QL: NEGATIVE — SIGNIFICANT CHANGE UP
WBC # BLD: 8 K/UL — SIGNIFICANT CHANGE UP (ref 3.8–10.5)
WBC # FLD AUTO: 8 K/UL — SIGNIFICANT CHANGE UP (ref 3.8–10.5)
WBC UR QL: 1 /HPF — SIGNIFICANT CHANGE UP (ref 0–5)

## 2018-12-22 PROCEDURE — 99285 EMERGENCY DEPT VISIT HI MDM: CPT | Mod: 25

## 2018-12-22 PROCEDURE — 93010 ELECTROCARDIOGRAM REPORT: CPT

## 2018-12-22 PROCEDURE — 99254 IP/OBS CNSLTJ NEW/EST MOD 60: CPT | Mod: GC

## 2018-12-22 PROCEDURE — 74176 CT ABD & PELVIS W/O CONTRAST: CPT | Mod: 26

## 2018-12-22 PROCEDURE — 71046 X-RAY EXAM CHEST 2 VIEWS: CPT | Mod: 26

## 2018-12-22 RX ORDER — CIPROFLOXACIN LACTATE 400MG/40ML
VIAL (ML) INTRAVENOUS
Qty: 0 | Refills: 0 | Status: DISCONTINUED | OUTPATIENT
Start: 2018-12-22 | End: 2018-12-26

## 2018-12-22 RX ORDER — CALCIUM ACETATE 667 MG
1334 TABLET ORAL
Qty: 0 | Refills: 0 | Status: DISCONTINUED | OUTPATIENT
Start: 2018-12-22 | End: 2018-12-26

## 2018-12-22 RX ORDER — EZETIMIBE AND SIMVASTATIN 10; 80 MG/1; MG/1
0 TABLET, FILM COATED ORAL
Qty: 0 | Refills: 0 | COMMUNITY

## 2018-12-22 RX ORDER — LOSARTAN POTASSIUM 100 MG/1
25 TABLET, FILM COATED ORAL DAILY
Qty: 0 | Refills: 0 | Status: DISCONTINUED | OUTPATIENT
Start: 2018-12-22 | End: 2018-12-25

## 2018-12-22 RX ORDER — PARICALCITOL 2 UG/1
0 CAPSULE, GELATIN COATED ORAL
Qty: 0 | Refills: 0 | COMMUNITY

## 2018-12-22 RX ORDER — ALLOPURINOL 300 MG
0 TABLET ORAL
Qty: 0 | Refills: 0 | COMMUNITY

## 2018-12-22 RX ORDER — TAMSULOSIN HYDROCHLORIDE 0.4 MG/1
0 CAPSULE ORAL
Qty: 0 | Refills: 0 | COMMUNITY

## 2018-12-22 RX ORDER — HYDRALAZINE HCL 50 MG
0 TABLET ORAL
Qty: 0 | Refills: 0 | COMMUNITY

## 2018-12-22 RX ORDER — ACETAMINOPHEN 500 MG
1000 TABLET ORAL ONCE
Qty: 0 | Refills: 0 | Status: COMPLETED | OUTPATIENT
Start: 2018-12-22 | End: 2018-12-22

## 2018-12-22 RX ORDER — SODIUM BICARBONATE 1 MEQ/ML
0 SYRINGE (ML) INTRAVENOUS
Qty: 0 | Refills: 0 | COMMUNITY

## 2018-12-22 RX ORDER — INFLUENZA VIRUS VACCINE 15; 15; 15; 15 UG/.5ML; UG/.5ML; UG/.5ML; UG/.5ML
0.5 SUSPENSION INTRAMUSCULAR ONCE
Qty: 0 | Refills: 0 | Status: COMPLETED | OUTPATIENT
Start: 2018-12-22 | End: 2018-12-22

## 2018-12-22 RX ORDER — CIPROFLOXACIN LACTATE 400MG/40ML
400 VIAL (ML) INTRAVENOUS EVERY 24 HOURS
Qty: 0 | Refills: 0 | Status: DISCONTINUED | OUTPATIENT
Start: 2018-12-23 | End: 2018-12-26

## 2018-12-22 RX ORDER — SODIUM CHLORIDE 9 MG/ML
1000 INJECTION, SOLUTION INTRAVENOUS
Qty: 0 | Refills: 0 | Status: DISCONTINUED | OUTPATIENT
Start: 2018-12-22 | End: 2018-12-23

## 2018-12-22 RX ORDER — SODIUM BICARBONATE 1 MEQ/ML
1950 SYRINGE (ML) INTRAVENOUS
Qty: 0 | Refills: 0 | Status: DISCONTINUED | OUTPATIENT
Start: 2018-12-22 | End: 2018-12-26

## 2018-12-22 RX ORDER — SIMVASTATIN 20 MG/1
20 TABLET, FILM COATED ORAL AT BEDTIME
Qty: 0 | Refills: 0 | Status: DISCONTINUED | OUTPATIENT
Start: 2018-12-22 | End: 2018-12-26

## 2018-12-22 RX ORDER — METRONIDAZOLE 500 MG
TABLET ORAL
Qty: 0 | Refills: 0 | Status: DISCONTINUED | OUTPATIENT
Start: 2018-12-22 | End: 2018-12-26

## 2018-12-22 RX ORDER — HEPARIN SODIUM 5000 [USP'U]/ML
5000 INJECTION INTRAVENOUS; SUBCUTANEOUS EVERY 8 HOURS
Qty: 0 | Refills: 0 | Status: DISCONTINUED | OUTPATIENT
Start: 2018-12-22 | End: 2018-12-26

## 2018-12-22 RX ORDER — METOPROLOL TARTRATE 50 MG
50 TABLET ORAL DAILY
Qty: 0 | Refills: 0 | Status: DISCONTINUED | OUTPATIENT
Start: 2018-12-22 | End: 2018-12-26

## 2018-12-22 RX ORDER — TAMSULOSIN HYDROCHLORIDE 0.4 MG/1
0.4 CAPSULE ORAL AT BEDTIME
Qty: 0 | Refills: 0 | Status: DISCONTINUED | OUTPATIENT
Start: 2018-12-22 | End: 2018-12-26

## 2018-12-22 RX ORDER — CIPROFLOXACIN LACTATE 400MG/40ML
400 VIAL (ML) INTRAVENOUS ONCE
Qty: 0 | Refills: 0 | Status: COMPLETED | OUTPATIENT
Start: 2018-12-22 | End: 2018-12-22

## 2018-12-22 RX ORDER — LOSARTAN POTASSIUM 100 MG/1
0 TABLET, FILM COATED ORAL
Qty: 0 | Refills: 0 | COMMUNITY

## 2018-12-22 RX ORDER — METRONIDAZOLE 500 MG
500 TABLET ORAL EVERY 8 HOURS
Qty: 0 | Refills: 0 | Status: DISCONTINUED | OUTPATIENT
Start: 2018-12-23 | End: 2018-12-26

## 2018-12-22 RX ORDER — HYDRALAZINE HCL 50 MG
100 TABLET ORAL
Qty: 0 | Refills: 0 | Status: DISCONTINUED | OUTPATIENT
Start: 2018-12-22 | End: 2018-12-23

## 2018-12-22 RX ORDER — METRONIDAZOLE 500 MG
500 TABLET ORAL ONCE
Qty: 0 | Refills: 0 | Status: COMPLETED | OUTPATIENT
Start: 2018-12-22 | End: 2018-12-22

## 2018-12-22 RX ORDER — METRONIDAZOLE 500 MG
500 TABLET ORAL ONCE
Qty: 0 | Refills: 0 | Status: DISCONTINUED | OUTPATIENT
Start: 2018-12-22 | End: 2018-12-22

## 2018-12-22 RX ORDER — CIPROFLOXACIN LACTATE 400MG/40ML
400 VIAL (ML) INTRAVENOUS ONCE
Qty: 0 | Refills: 0 | Status: DISCONTINUED | OUTPATIENT
Start: 2018-12-22 | End: 2018-12-22

## 2018-12-22 RX ORDER — METOPROLOL TARTRATE 50 MG
0 TABLET ORAL
Qty: 0 | Refills: 0 | COMMUNITY

## 2018-12-22 RX ADMIN — Medication 200 MILLIGRAM(S): at 19:21

## 2018-12-22 RX ADMIN — Medication 400 MILLIGRAM(S): at 15:07

## 2018-12-22 RX ADMIN — SODIUM CHLORIDE 75 MILLILITER(S): 9 INJECTION, SOLUTION INTRAVENOUS at 18:53

## 2018-12-22 RX ADMIN — Medication 1000 MILLIGRAM(S): at 15:38

## 2018-12-22 RX ADMIN — SIMVASTATIN 20 MILLIGRAM(S): 20 TABLET, FILM COATED ORAL at 21:56

## 2018-12-22 RX ADMIN — Medication 100 MILLIGRAM(S): at 18:53

## 2018-12-22 RX ADMIN — TAMSULOSIN HYDROCHLORIDE 0.4 MILLIGRAM(S): 0.4 CAPSULE ORAL at 21:56

## 2018-12-22 RX ADMIN — HEPARIN SODIUM 5000 UNIT(S): 5000 INJECTION INTRAVENOUS; SUBCUTANEOUS at 21:55

## 2018-12-22 NOTE — CONSULT NOTE ADULT - SUBJECTIVE AND OBJECTIVE BOX
[facility] DIVISION OF KIDNEY DISEASES AND HYPERTENSION -- INITIAL CONSULT NOTE  --------------------------------------------------------------------------------  Estephanie Arnold  [id number]  --------------------------------------------------------------------------------  HPI: 58 Y O M with PMH of BPH, HTN, CKD on transplant list presents to ED with  c/o fevers, chills associated w/ abdominal pain and decreased urinary output. States fever w/ tmax 102 at home for which he was taking tylenol. Abdominal pain reported as suprapubic radiating to testicles. He reports that he always has trouble urinating but has much decreased output from his baseline and has also had pain w/ urinating.   Nephrology consulted for CKD   Seen and evaluated at the bedside. c/o pain in right lumbar region. He mentions that his urine out put has gone down for last two days feels feverish with chills.  Denies nausea vomiting, SOB, chest pain, diarrhea, constipation, dysuria, hematuria nocturia, skin rash, leg swelling.  He advanced CKD and follows with Dr Downs(nephrologist)    PAST HISTORY  --------------------------------------------------------------------------------  PAST MEDICAL & SURGICAL HISTORY:  Solitary fibrous tumor  BPH (benign prostatic hyperplasia)  Gout  GERD (gastroesophageal reflux disease)  Hypercholesteremia  Kidney disease  HTN (hypertension)  Lung abnormality  GERD (gastroesophageal reflux disease)  Gout  BPH (benign prostatic hypertrophy)  CKD (chronic kidney disease), stage III  Nephritis  Hypercholesterolemia  Hypertension  History of lung surgery  Vocal cord anomaly: S/P polyp removal 2004    FAMILY HISTORY:  No pertinent family history in first degree relatives    PAST SOCIAL HISTORY:    ALLERGIES & MEDICATIONS  --------------------------------------------------------------------------------  Allergies    No Known Allergies    Intolerances      Standing Inpatient Medications    PRN Inpatient Medications      REVIEW OF SYSTEMS  --------------------------------------------------------------------------------  Constitutional: No Fever,Chills ,  HEENT:No Blurred vision,Eye Pain ,Headache, Runny nose,Sore Throat   Respiratory: No Cough, Wheezing ,Shortness of breath  Cardiovascular: No Chest Pain, Palpitations, POTTER, PND, Orthopnea  Gastrointestinal:c/o Abdominal Pain, No Diarrhea, Constipation, Hemorrhoids, Nausea,  Vomiting  Genitourinary: No Nocturia, Dysuria, Incontinence  Extremities: No Swelling, Joint Pain  Neurologic:No Focal deficit, Paresthesia,  Syncope  Lymphatic: No Swelling, Lymphadenopathy   Skin: No Rash, Ecchymoses, Wounds, Lesions  Psychiatry: No Depression ,  Suicidal/Homicidal Ideation, Anxiety, Sleep Disturbances    All other systems were reviewed and are negative, except as noted.    VITALS/PHYSICAL EXAM  --------------------------------------------------------------------------------  T(C): 37.8 (12-22-18 @ 14:20), Max: 38.1 (12-22-18 @ 09:57)  HR: 104 (12-22-18 @ 14:20) (92 - 104)  BP: 124/67 (12-22-18 @ 14:20) (116/67 - 144/70)  RR: 17 (12-22-18 @ 14:20) (16 - 20)  SpO2: 99% (12-22-18 @ 14:20) (99% - 100%)  Wt(kg): --    Height (cm): 172.72 (12-22-18 @ 09:57)  Weight (kg): 86.2 (12-22-18 @ 09:57)  BMI (kg/m2): 28.9 (12-22-18 @ 09:57)  BSA (m2): 2 (12-22-18 @ 09:57)  Daily Height in cm: 172.72 (22 Dec 2018 09:57)    Daily   I&O's Summary          Physical Exam:  Gen: NAD   HEENT: anicteric  Pulm: CTA B/L  CV: RRR, Normal S1 S2  Abd: tenderness in RIF  CNS: AAO x 3, No asterixis  : No Subhash  LE: Warm, no edema  Skin: Warm, without rashes  Vascular access: none        LABS/STUDIES  --------------------------------------------------------------------------------              9.1    8.0   >-----------<  109      [12-22-18 @ 11:31]              26.8     133  |  101  |  82  ----------------------------<  171      [12-22-18 @ 11:31]  4.6   |  17  |  7.56        Ca     8.8     [12-22-18 @ 11:31]      Mg     1.7     [12-22-18 @ 11:31]      Phos  3.1     [12-22-18 @ 11:31]    TPro  6.6  /  Alb  3.7  /  TBili  0.4  /  DBili  x   /  AST  15  /  ALT  8   /  AlkPhos  71  [12-22-18 @ 11:31]          Creatinine Trend:  SCr 7.56 [12-22 @ 11:31]    Urinalysis - [12-22-18 @ 12:38]      Color Colorless / Appearance Clear / SG 1.011 / pH 6.0      Gluc Trace / Ketone Negative  / Bili Negative / Urobili Negative       Blood Trace / Protein 100 mg/dL / Leuk Est Negative / Nitrite Negative      RBC 29 / WBC 1 / Hyaline 0 / Gran  / Sq Epi  / Non Sq Epi 0 / Bacteria Negative      HbA1c 5.9      [04-08-18 @ 07:47]  Lipid: chol 94, TG 84, HDL 29, LDL 48      [04-08-18 @ 07:50]    HBsAb 317.7      [02-21-17 @ 17:48]  HBsAb Reactive      [02-21-17 @ 17:48]  HBsAg Nonreact      [02-21-17 @ 17:48]  HBcAb Nonreact      [02-21-17 @ 17:48]  HCV 0.12, Nonreact      [02-21-17 @ 17:48]  HIV Nonreact      [02-21-17 @ 17:48]        Radiology  --------------------------------------------------------------------------------    --------------------------------------------------------------------------------  Estephanie Arnold  [id number]

## 2018-12-22 NOTE — ED PROVIDER NOTE - CARE PLAN
Principal Discharge DX:	Diverticulitis of large intestine with perforation without abscess or bleeding

## 2018-12-22 NOTE — H&P ADULT - NSHPPHYSICALEXAM_GEN_ALL_CORE
Gen: AAOx3, NAD, mentating normally  Neuro: Cranial nerves II-XII grossly intact  HEENT: Atraumatic, normocephalic  CV: RRR, normal S1/S2, no audible m/r/g  Pulm: Breathing comfortably on RA. Equal chest rise b/l. Lung fields CTAB  Abd: Soft, non-distended, tender to palpation in the RLQ. No rebound or guarding.  Back/flank: No CVA tenderness  Extremities: WWP. Moving all 4 extremities spontaneously. Strength 5/5. Sensation intact  Skin: No rashes or suspicious lesions

## 2018-12-22 NOTE — ED ADULT NURSE REASSESSMENT NOTE - NS ED NURSE REASSESS COMMENT FT1
CT pending. Pt drank PO contrast. Bladder scan showed >56 cc. MD performed US at bedside.
Pt remains NPO. Close monitoring in place. Pending disposition.
Pt back from CT scan, close monitoring in place. Denies severe pain at this time.

## 2018-12-22 NOTE — H&P ADULT - HISTORY OF PRESENT ILLNESS
58M pmhx HTN, HLD, BPH, CKD 5 (not on HD; on transplant list), who is presenting with 1-2 days of lower abdominal pain and fevers. The patient reports that that his pain began early yesterday morning, relatively sudden in onset, and spread throughout the lower abdomen. He says it became severe quickly, and remained constant. He reports associated fevers and some loose stool. Denies any nausea or vomiting. He denies blood in his stool, and any changes in his urine. His last colonoscopy was 4 years ago (GI - Dr. Balderrama), which reportedly was normal.     In the ED the patient had a CT that is demonstrating perforated sigmoid diverticulitis.

## 2018-12-22 NOTE — ED CLERICAL - NS ED CLERK NOTE PRE-ARRIVAL INFORMATION; ADDITIONAL PRE-ARRIVAL INFORMATION
CC/Reason For referral:  fever  Preferred Consultant(if applicable):  Who admits for you (if needed):  DR CAMEJO/ Dr ROBLES on call for neurology  Do you have documents you would like to fax over?  NO  Would you still like to speak to an ED attending?  YES    pt with history of advanced kidney disease - awaiting kidney transplant

## 2018-12-22 NOTE — ED ADULT NURSE NOTE - NSIMPLEMENTINTERV_GEN_ALL_ED
Implemented All Universal Safety Interventions:  [place] to call system. Call bell, personal items and telephone within reach. Instruct patient to call for assistance. Room bathroom lighting operational. Non-slip footwear when patient is off stretcher. Physically safe environment: no spills, clutter or unnecessary equipment. Stretcher in lowest position, wheels locked, appropriate side rails in place.

## 2018-12-22 NOTE — H&P ADULT - NSHPLABSRESULTS_GEN_ALL_CORE
CBC (12-22 @ 11:31)                              9.1<L>                         8.0     )----------------(  109<L>     83.2<H>% Neutrophils, 7.1<L>% Lymphocytes, ANC: 6.6                                 26.8<L>    BMP (12-22 @ 11:31)             133<L>  |  101     |  82<H> 		Ca++ --      Ca 8.8                ---------------------------------( 171<H>		Mg 1.7                4.6     |  17<L>   |  7.56<H>			Ph 3.1       LFTs (12-22 @ 11:31)      TPro 6.6 / Alb 3.7 / TBili 0.4 / DBili -- / AST 15 / ALT 8<L> / AlkPhos 71          VBG (12-22 @ 11:31)     7.36 / 36 / 33 / 20<L> / -4.7<L> / 56<L>%     Lactate: 1.7      < from: CT Abdomen and Pelvis w/ Oral Cont (12.22.18 @ 15:38) >    FINDINGS:    LOWER CHEST: Within normal limits.    LIVER: Within normal limits.  BILE DUCTS: Normal caliber.  GALLBLADDER: Within normal limits.  SPLEEN: Within normal limits.  PANCREAS: Within normal limits.  ADRENALS: Within normal limits.  KIDNEYS/URETERS: Bilateral renal cysts.     BLADDER: Within normal limits.  REPRODUCTIVE ORGANS: The prostate is within normal limits.    BOWEL: Wall thickening of the sigmoid colon with inflammatory fat   stranding surrounding sigmoid diverticula compatible with diverticulitis.   Adjacent focus of extraluminal gas (2:99). No fluid collections. No bowel   obstruction. Appendix is normal.  PERITONEUM: No ascites.  VESSELS:  Atheromatous disease of the thoracic aorta.  RETROPERITONEUM: Scattered subcentimeter retroperitoneal lymph nodes,   unchanged compared to prior CT chest of 5/20/2017.    ABDOMINAL WALL: Small bilateral fat-containing inguinal hernia.  BONES: Within normal limits.    IMPRESSION:  Perforated sigmoid diverticulitis. No fluid collection.    < end of copied text >

## 2018-12-22 NOTE — CONSULT NOTE ADULT - PROBLEM SELECTOR RECOMMENDATION 3
? UTI/Pyelonephritis vs Intraabdominal pathology  ER planning to do CT abdomen  Abx as per Primary team, please dose all meds as per eGFR

## 2018-12-22 NOTE — ED ADULT NURSE NOTE - OBJECTIVE STATEMENT
58 male c c/o urinary retention/ suprapubic pain/ fever x 2 days. Fever/ chills. Small amounts of urine when attempting to urinate. Pt has renal disease currently not on dialysis but on donor list. Hx of HTN on antihypertensives. Denies CP/SOB. Currently on Flomax. Denies hematuria. family at bedside.

## 2018-12-22 NOTE — ED PROVIDER NOTE - OBJECTIVE STATEMENT
58 year old male w/ pmhx BPH, HTN, CKD on transplant list presents to ED c/o fevers, chills associated w/ abdominal pain and decreased urinary output. States fever w/ tmax 102 at home for which he was taking tylenol. Abdominal pain reported as suprapubic radiating to testicles. He reports that he always has trouble urinating but has much decreased output from his baseline and has also had pain w/ urinating. Spoke to nephrologist this am who advised him to come to ED. Denies ha, dizziness, uri symptoms, chest pain, sob, edema, n/v/d.

## 2018-12-22 NOTE — ED PROVIDER NOTE - MEDICAL DECISION MAKING DETAILS
DDx: UTI, viral illness. Will order labs, pain medication, reassess.  ATTG: Dr. Varela DDx: UTI, viral illness, appendicitis. Will order labs, A/P CT, pain medication, reassess.  ATTG: Dr. Varela

## 2018-12-22 NOTE — CONSULT NOTE ADULT - PROBLEM SELECTOR RECOMMENDATION 9
CKD V, not on hemodialysis, Scr at baseline today  reduced u/o in the setting of Fever and possible infection  Monitor i/o, BMP  Avoid nephrotoxic meds and contrast study CKD V, not on hemodialysis, Scr at baseline today  reduced u/o in the setting of Fever, check UA and urine culture to rule out UTI  Not volume overloaded, with stable electrolytes, no urgent need for HD at this time  Monitor i/o, BMP  Avoid nephrotoxic meds and contrast study

## 2018-12-22 NOTE — H&P ADULT - ASSESSMENT
ASSESSMENT:  58M pmhx HTN, HLD, BPH, CKD5 (not on HD) who presents with 1 day of abd pain, found to have first episode of sigmoid diverticulitis, perforated.     PLAN:  - Admit to Red team surgery under Dr. LYDIA Little  - NPO / IVF  - Cipro & flagyl  - Serial abdominal exams    Discussed with Dr. Anabel Rajan, PGY-2  Red Surgery [id number] ASSESSMENT:  58M pmhx HTN, HLD, BPH, CKD5 (not on HD) who presents with 1 day of abd pain, found to have first episode of sigmoid diverticulitis, perforated.     PLAN:  - Admit to Red team surgery under Dr. LYDIA Little  - NPO / IVF  - Cipro & flagyl  - Serial abdominal exams  - appreciate renal input    Discussed with Dr. Anabel Rajan, PGY-2  Red Surgery [id number]

## 2018-12-22 NOTE — ED ADULT NURSE NOTE - PMH
BPH (benign prostatic hyperplasia)    BPH (benign prostatic hypertrophy)    CKD (chronic kidney disease), stage III    GERD (gastroesophageal reflux disease)    GERD (gastroesophageal reflux disease)    Gout    Gout    HTN (hypertension)    Hypercholesteremia    Hypercholesterolemia    Hypertension    Kidney disease    Lung abnormality    Nephritis    Solitary fibrous tumor

## 2018-12-22 NOTE — ED ADULT TRIAGE NOTE - CHIEF COMPLAINT QUOTE
Urinary dribbling, urgency, suprapubic pain, fevers, chills since yesterday.  Pt has CKD, no dialysis, awaiting transplant.  No flank pain.

## 2018-12-22 NOTE — CONSULT NOTE ADULT - ASSESSMENT
8 Y O M with PMH of BPH, HTN, CKD on transplant list presents to ED with  c/o fevers, chills associated w/ abdominal pain and decreased urinary output. Suspected UTI, Pyelonephritis, appendicitis. Nephrology consulted for CKD.

## 2018-12-23 DIAGNOSIS — K57.92 DIVERTICULITIS OF INTESTINE, PART UNSPECIFIED, WITHOUT PERFORATION OR ABSCESS WITHOUT BLEEDING: ICD-10-CM

## 2018-12-23 DIAGNOSIS — K57.20 DIVERTICULITIS OF LARGE INTESTINE WITH PERFORATION AND ABSCESS WITHOUT BLEEDING: ICD-10-CM

## 2018-12-23 DIAGNOSIS — E78.00 PURE HYPERCHOLESTEROLEMIA, UNSPECIFIED: ICD-10-CM

## 2018-12-23 DIAGNOSIS — M10.9 GOUT, UNSPECIFIED: ICD-10-CM

## 2018-12-23 LAB
ANION GAP SERPL CALC-SCNC: 18 MMOL/L — HIGH (ref 5–17)
BUN SERPL-MCNC: 87 MG/DL — HIGH (ref 7–23)
CALCIUM SERPL-MCNC: 8.9 MG/DL — SIGNIFICANT CHANGE UP (ref 8.4–10.5)
CHLORIDE SERPL-SCNC: 102 MMOL/L — SIGNIFICANT CHANGE UP (ref 96–108)
CO2 SERPL-SCNC: 16 MMOL/L — LOW (ref 22–31)
CREAT SERPL-MCNC: 7.83 MG/DL — HIGH (ref 0.5–1.3)
CULTURE RESULTS: NO GROWTH — SIGNIFICANT CHANGE UP
GLUCOSE SERPL-MCNC: 93 MG/DL — SIGNIFICANT CHANGE UP (ref 70–99)
HCT VFR BLD CALC: 28.3 % — LOW (ref 39–50)
HGB BLD-MCNC: 8.7 G/DL — LOW (ref 13–17)
MAGNESIUM SERPL-MCNC: 1.8 MG/DL — SIGNIFICANT CHANGE UP (ref 1.6–2.6)
MCHC RBC-ENTMCNC: 29.1 PG — SIGNIFICANT CHANGE UP (ref 27–34)
MCHC RBC-ENTMCNC: 30.7 GM/DL — LOW (ref 32–36)
MCV RBC AUTO: 94.6 FL — SIGNIFICANT CHANGE UP (ref 80–100)
PHOSPHATE SERPL-MCNC: 4.6 MG/DL — HIGH (ref 2.5–4.5)
PLATELET # BLD AUTO: 129 K/UL — LOW (ref 150–400)
POTASSIUM SERPL-MCNC: 4.9 MMOL/L — SIGNIFICANT CHANGE UP (ref 3.5–5.3)
POTASSIUM SERPL-SCNC: 4.9 MMOL/L — SIGNIFICANT CHANGE UP (ref 3.5–5.3)
RBC # BLD: 2.99 M/UL — LOW (ref 4.2–5.8)
RBC # FLD: 13.3 % — SIGNIFICANT CHANGE UP (ref 10.3–14.5)
SODIUM SERPL-SCNC: 136 MMOL/L — SIGNIFICANT CHANGE UP (ref 135–145)
SPECIMEN SOURCE: SIGNIFICANT CHANGE UP
WBC # BLD: 7.65 K/UL — SIGNIFICANT CHANGE UP (ref 3.8–10.5)
WBC # FLD AUTO: 7.65 K/UL — SIGNIFICANT CHANGE UP (ref 3.8–10.5)

## 2018-12-23 RX ORDER — DEXTROSE MONOHYDRATE, SODIUM CHLORIDE, AND POTASSIUM CHLORIDE 50; .745; 4.5 G/1000ML; G/1000ML; G/1000ML
1000 INJECTION, SOLUTION INTRAVENOUS
Qty: 0 | Refills: 0 | Status: DISCONTINUED | OUTPATIENT
Start: 2018-12-23 | End: 2018-12-23

## 2018-12-23 RX ORDER — SODIUM CHLORIDE 9 MG/ML
1000 INJECTION, SOLUTION INTRAVENOUS
Qty: 0 | Refills: 0 | Status: DISCONTINUED | OUTPATIENT
Start: 2018-12-23 | End: 2018-12-26

## 2018-12-23 RX ORDER — HYDRALAZINE HCL 50 MG
100 TABLET ORAL
Qty: 0 | Refills: 0 | Status: DISCONTINUED | OUTPATIENT
Start: 2018-12-23 | End: 2018-12-26

## 2018-12-23 RX ADMIN — Medication 1334 MILLIGRAM(S): at 09:59

## 2018-12-23 RX ADMIN — TAMSULOSIN HYDROCHLORIDE 0.4 MILLIGRAM(S): 0.4 CAPSULE ORAL at 21:29

## 2018-12-23 RX ADMIN — Medication 5 MILLIGRAM(S): at 05:51

## 2018-12-23 RX ADMIN — Medication 100 MILLIGRAM(S): at 21:29

## 2018-12-23 RX ADMIN — SIMVASTATIN 20 MILLIGRAM(S): 20 TABLET, FILM COATED ORAL at 21:29

## 2018-12-23 RX ADMIN — Medication 5 MILLIGRAM(S): at 17:48

## 2018-12-23 RX ADMIN — Medication 1950 MILLIGRAM(S): at 17:48

## 2018-12-23 RX ADMIN — DEXTROSE MONOHYDRATE, SODIUM CHLORIDE, AND POTASSIUM CHLORIDE 125 MILLILITER(S): 50; .745; 4.5 INJECTION, SOLUTION INTRAVENOUS at 09:59

## 2018-12-23 RX ADMIN — Medication 100 MILLIGRAM(S): at 17:55

## 2018-12-23 RX ADMIN — HEPARIN SODIUM 5000 UNIT(S): 5000 INJECTION INTRAVENOUS; SUBCUTANEOUS at 21:29

## 2018-12-23 RX ADMIN — Medication 50 MILLIGRAM(S): at 05:53

## 2018-12-23 RX ADMIN — Medication 200 MILLIGRAM(S): at 17:47

## 2018-12-23 RX ADMIN — SODIUM CHLORIDE 75 MILLILITER(S): 9 INJECTION, SOLUTION INTRAVENOUS at 05:53

## 2018-12-23 RX ADMIN — LOSARTAN POTASSIUM 25 MILLIGRAM(S): 100 TABLET, FILM COATED ORAL at 05:53

## 2018-12-23 RX ADMIN — Medication 100 MILLIGRAM(S): at 05:52

## 2018-12-23 RX ADMIN — Medication 1950 MILLIGRAM(S): at 05:51

## 2018-12-23 RX ADMIN — SODIUM CHLORIDE 125 MILLILITER(S): 9 INJECTION, SOLUTION INTRAVENOUS at 10:49

## 2018-12-23 RX ADMIN — HEPARIN SODIUM 5000 UNIT(S): 5000 INJECTION INTRAVENOUS; SUBCUTANEOUS at 13:59

## 2018-12-23 RX ADMIN — Medication 100 MILLIGRAM(S): at 13:57

## 2018-12-23 RX ADMIN — HEPARIN SODIUM 5000 UNIT(S): 5000 INJECTION INTRAVENOUS; SUBCUTANEOUS at 05:51

## 2018-12-23 RX ADMIN — Medication 100 MILLIGRAM(S): at 05:51

## 2018-12-23 NOTE — CONSULT NOTE ADULT - SUBJECTIVE AND OBJECTIVE BOX
[facility]  Gigi Kearns PA-C  [address] [place] [address]  [phone]      Chief Complaint:  Patient is a 58y old  Male who presents with a chief complaint of Pain Abdomen (22 Dec 2018 14:28)      HPI: 58M pmhx HTN, HLD, BPH, CKD 5 (not on HD; on transplant list), who is presenting with 1-2 days of lower abdominal pain and fevers. The patient reports that that his pain began early yesterday morning, relatively sudden in onset, and spread throughout the lower abdomen. He says it became severe quickly, and remained constant. He reports associated fevers and some loose stool. Denies any nausea or vomiting. He denies blood in his stool, and any changes in his urine. His last colonoscopy was 4 years ago (GI - Dr. Balderrama), which reportedly was normal.     Allergies:  No Known Allergies      Medications:  calcium acetate 1334 milliGRAM(s) Oral three times a day with meals  ciprofloxacin   IVPB      ciprofloxacin   IVPB 400 milliGRAM(s) IV Intermittent every 24 hours  dextrose 5% + sodium chloride 0.45%. 1000 milliLiter(s) IV Continuous <Continuous>  heparin  Injectable 5000 Unit(s) SubCutaneous every 8 hours  hydrALAZINE 100 milliGRAM(s) Oral two times a day  influenza   Vaccine 0.5 milliLiter(s) IntraMuscular once  losartan 25 milliGRAM(s) Oral daily  metoprolol succinate ER 50 milliGRAM(s) Oral daily  metroNIDAZOLE  IVPB      metroNIDAZOLE  IVPB 500 milliGRAM(s) IV Intermittent every 8 hours  simvastatin 20 milliGRAM(s) Oral at bedtime  sodium bicarbonate 1950 milliGRAM(s) Oral two times a day  tamsulosin 0.4 milliGRAM(s) Oral at bedtime  torsemide 5 milliGRAM(s) Oral two times a day      PMHX/PSHX:  Solitary fibrous tumor  BPH (benign prostatic hyperplasia)  Gout  GERD (gastroesophageal reflux disease)  Hypercholesteremia  Kidney disease  HTN (hypertension)  Lung abnormality  GERD (gastroesophageal reflux disease)  Gout  BPH (benign prostatic hypertrophy)  CKD (chronic kidney disease), stage III  Nephritis  Hypercholesterolemia  Hypertension  Nephritis  Hypercholesterolemia  Hypertension  History of lung surgery  Vocal cord anomaly  No Past Surgical History      Family history:  No pertinent family history in first degree relatives      Social History: no toxic habits     ROS:     General:  No wt loss,+ fevers, chills, night sweats, fatigue,   Eyes:  Good vision, no reported pain  ENT:  No sore throat, pain, runny nose, dysphagia  CV:  No pain, palpitations, hypo/hypertension  Resp:  No dyspnea, cough, tachypnea, wheezing  GI:  No pain, No nausea, No vomiting, No diarrhea, No constipation, No weight loss, No fever, No pruritis, No rectal bleeding, No tarry stools, No dysphagia,  :  No pain, bleeding, incontinence, nocturia  Muscle:  No pain, weakness  Neuro:  No weakness, tingling, memory problems  Psych:  No fatigue, insomnia, mood problems, depression  Endocrine:  No polyuria, polydipsia, cold/heat intolerance  Heme:  No petechiae, ecchymosis, easy bruisability  Skin:  No rash, tattoos, scars, edema      PHYSICAL EXAM:   Vital Signs:  Vital Signs Last 24 Hrs  T(C): 36.8 (23 Dec 2018 13:34), Max: 38.3 (22 Dec 2018 21:27)  T(F): 98.3 (23 Dec 2018 13:34), Max: 100.9 (22 Dec 2018 21:27)  HR: 96 (23 Dec 2018 13:34) (88 - 96)  BP: 143/78 (23 Dec 2018 13:34) (110/67 - 143/78)  BP(mean): --  RR: 18 (23 Dec 2018 13:34) (17 - 20)  SpO2: 96% (23 Dec 2018 13:34) (95% - 100%)  Daily     Daily     GENERAL:  Appears stated age, well-groomed, well-nourished, no distress  HEENT:  NC/AT,  conjunctivae clear and pink, no thyromegaly, nodules, adenopathy, no JVD, sclera -anicteric  CHEST:  Full & symmetric excursion, no increased effort, breath sounds clear  HEART:  Regular rhythm, S1, S2, no murmur/rub/S3/S4, no abdominal bruit, no edema  ABDOMEN:  Soft, non-tender, non-distended, normoactive bowel sounds,  no masses ,no hepato-splenomegaly, no signs of chronic liver disease  EXTEREMITIES:  no cyanosis,clubbing or edema  SKIN:  No rash/erythema/ecchymoses/petechiae/wounds/abscess/warm/dry  NEURO:  Alert, oriented, no asterixis, no tremor, no encephalopathy    LABS:                        8.7    7.65  )-----------( 129      ( 23 Dec 2018 12:50 )             28.3         136  |  102  |  87<H>  ----------------------------<  93  4.9   |  16<L>  |  7.83<H>    Ca    8.9      23 Dec 2018 09:04  Phos  4.6       Mg     1.8         TPro  6.6  /  Alb  3.7  /  TBili  0.4  /  DBili  x   /  AST  15  /  ALT  8<L>  /  AlkPhos  71      LIVER FUNCTIONS - ( 22 Dec 2018 11:31 )  Alb: 3.7 g/dL / Pro: 6.6 g/dL / ALK PHOS: 71 U/L / ALT: 8 U/L / AST: 15 U/L / GGT: x             Urinalysis Basic - ( 22 Dec 2018 12:38 )    Color: Colorless / Appearance: Clear / S.011 / pH: x  Gluc: x / Ketone: Negative  / Bili: Negative / Urobili: Negative   Blood: x / Protein: 100 mg/dL / Nitrite: Negative   Leuk Esterase: Negative / RBC: 29 /hpf / WBC 1 /hpf   Sq Epi: x / Non Sq Epi: 0 /hpf / Bacteria: Negative          Imaging:

## 2018-12-23 NOTE — CONSULT NOTE ADULT - PROBLEM SELECTOR RECOMMENDATION 9
npo  cont iv antibiotics  pain control  iv fluids  cultures negative so far  will need outpatient colonoscopy in 8 weeks  d/w patient and family

## 2018-12-23 NOTE — PROGRESS NOTE ADULT - SUBJECTIVE AND OBJECTIVE BOX
General Surgery Progress Note     Subjective: Patient was seen and examined during morning rounds. +/+ bowel function.  Had two episodes of non-bloody diarrhea. Pt denies f/c, n/v, SOB, CP/ABP, lightheadedness.      Objective:    Physical Exam:  Gen: NAD, awake and alert  Head/Neck: NC/AT  Resp: CTABL, nonlabored  Abdomen: soft, TTP in R christopher-abdomen, ND  Extremeties: no edema, gross sensation intact, gross motor function intact      MEDICATIONS  (STANDING):  calcium acetate 1334 milliGRAM(s) Oral three times a day with meals  ciprofloxacin   IVPB      ciprofloxacin   IVPB 400 milliGRAM(s) IV Intermittent every 24 hours  heparin  Injectable 5000 Unit(s) SubCutaneous every 8 hours  hydrALAZINE 100 milliGRAM(s) Oral two times a day  influenza   Vaccine 0.5 milliLiter(s) IntraMuscular once  lactated ringers. 1000 milliLiter(s) (75 mL/Hr) IV Continuous <Continuous>  losartan 25 milliGRAM(s) Oral daily  metoprolol succinate ER 50 milliGRAM(s) Oral daily  metroNIDAZOLE  IVPB      metroNIDAZOLE  IVPB 500 milliGRAM(s) IV Intermittent every 8 hours  simvastatin 20 milliGRAM(s) Oral at bedtime  sodium bicarbonate 1950 milliGRAM(s) Oral two times a day  tamsulosin 0.4 milliGRAM(s) Oral at bedtime  torsemide 5 milliGRAM(s) Oral two times a day    MEDICATIONS  (PRN):      Vital Signs Last 24 Hrs  T(C): 37.2 (23 Dec 2018 05:44), Max: 38.3 (22 Dec 2018 21:27)  T(F): 98.9 (23 Dec 2018 05:44), Max: 100.9 (22 Dec 2018 21:27)  HR: 96 (23 Dec 2018 05:44) (88 - 104)  BP: 117/74 (23 Dec 2018 05:44) (110/67 - 144/70)  BP(mean): --  RR: 18 (23 Dec 2018 05:44) (16 - 20)  SpO2: 97% (23 Dec 2018 05:44) (96% - 100%)      12-22-18 @ 07:01  -  12-23-18 @ 07:00  --------------------------------------------------------  IN: 1000 mL / OUT: 800 mL / NET: 200 mL        LABS:                        9.1    8.0   )-----------( 109      ( 22 Dec 2018 11:31 )             26.8     12-22    133<L>  |  101  |  82<H>  ----------------------------<  171<H>  4.6   |  17<L>  |  7.56<H>    Ca    8.8      22 Dec 2018 11:31  Phos  3.1     12-22  Mg     1.7     12-22    TPro  6.6  /  Alb  3.7  /  TBili  0.4  /  DBili  x   /  AST  15  /  ALT  8<L>  /  AlkPhos  71  12-22 General Surgery Progress Note     Subjective: Patient was seen and examined during morning rounds. +/+ bowel function.  Had two episodes of non-bloody diarrhea. Pt denies f/c, n/v, SOB, CP/ABP, lightheadedness.      Objective:    Physical Exam:  Gen: NAD, awake and alert  Head/Neck: NC/AT  Resp: CTABL, nonlabored  Abdomen: soft, TTP in RLQ, ND  Extremeties: no edema, gross sensation intact, gross motor function intact      MEDICATIONS  (STANDING):  calcium acetate 1334 milliGRAM(s) Oral three times a day with meals  ciprofloxacin   IVPB      ciprofloxacin   IVPB 400 milliGRAM(s) IV Intermittent every 24 hours  heparin  Injectable 5000 Unit(s) SubCutaneous every 8 hours  hydrALAZINE 100 milliGRAM(s) Oral two times a day  influenza   Vaccine 0.5 milliLiter(s) IntraMuscular once  lactated ringers. 1000 milliLiter(s) (75 mL/Hr) IV Continuous <Continuous>  losartan 25 milliGRAM(s) Oral daily  metoprolol succinate ER 50 milliGRAM(s) Oral daily  metroNIDAZOLE  IVPB      metroNIDAZOLE  IVPB 500 milliGRAM(s) IV Intermittent every 8 hours  simvastatin 20 milliGRAM(s) Oral at bedtime  sodium bicarbonate 1950 milliGRAM(s) Oral two times a day  tamsulosin 0.4 milliGRAM(s) Oral at bedtime  torsemide 5 milliGRAM(s) Oral two times a day    MEDICATIONS  (PRN):      Vital Signs Last 24 Hrs  T(C): 37.2 (23 Dec 2018 05:44), Max: 38.3 (22 Dec 2018 21:27)  T(F): 98.9 (23 Dec 2018 05:44), Max: 100.9 (22 Dec 2018 21:27)  HR: 96 (23 Dec 2018 05:44) (88 - 104)  BP: 117/74 (23 Dec 2018 05:44) (110/67 - 144/70)  BP(mean): --  RR: 18 (23 Dec 2018 05:44) (16 - 20)  SpO2: 97% (23 Dec 2018 05:44) (96% - 100%)      12-22-18 @ 07:01  -  12-23-18 @ 07:00  --------------------------------------------------------  IN: 1000 mL / OUT: 800 mL / NET: 200 mL        LABS:                        9.1    8.0   )-----------( 109      ( 22 Dec 2018 11:31 )             26.8     12-22    133<L>  |  101  |  82<H>  ----------------------------<  171<H>  4.6   |  17<L>  |  7.56<H>    Ca    8.8      22 Dec 2018 11:31  Phos  3.1     12-22  Mg     1.7     12-22    TPro  6.6  /  Alb  3.7  /  TBili  0.4  /  DBili  x   /  AST  15  /  ALT  8<L>  /  AlkPhos  71  12-22 General Surgery Progress Note     Subjective: Patient was seen and examined during morning rounds. +/+ bowel function.  Had two episodes of non-bloody diarrhea. C/o of pain in the lower abdomen. Pt denies f/c, n/v, SOB, CP/ABP, lightheadedness.      Objective:    Physical Exam:  Gen: NAD, awake and alert  Head/Neck: NC/AT  Resp: CTABL, nonlabored  Abdomen: soft, TTP in RLQ, ND  Extremeties: no edema, gross sensation intact, gross motor function intact      MEDICATIONS  (STANDING):  calcium acetate 1334 milliGRAM(s) Oral three times a day with meals  ciprofloxacin   IVPB      ciprofloxacin   IVPB 400 milliGRAM(s) IV Intermittent every 24 hours  heparin  Injectable 5000 Unit(s) SubCutaneous every 8 hours  hydrALAZINE 100 milliGRAM(s) Oral two times a day  influenza   Vaccine 0.5 milliLiter(s) IntraMuscular once  lactated ringers. 1000 milliLiter(s) (75 mL/Hr) IV Continuous <Continuous>  losartan 25 milliGRAM(s) Oral daily  metoprolol succinate ER 50 milliGRAM(s) Oral daily  metroNIDAZOLE  IVPB      metroNIDAZOLE  IVPB 500 milliGRAM(s) IV Intermittent every 8 hours  simvastatin 20 milliGRAM(s) Oral at bedtime  sodium bicarbonate 1950 milliGRAM(s) Oral two times a day  tamsulosin 0.4 milliGRAM(s) Oral at bedtime  torsemide 5 milliGRAM(s) Oral two times a day    MEDICATIONS  (PRN):      Vital Signs Last 24 Hrs  T(C): 37.2 (23 Dec 2018 05:44), Max: 38.3 (22 Dec 2018 21:27)  T(F): 98.9 (23 Dec 2018 05:44), Max: 100.9 (22 Dec 2018 21:27)  HR: 96 (23 Dec 2018 05:44) (88 - 104)  BP: 117/74 (23 Dec 2018 05:44) (110/67 - 144/70)  BP(mean): --  RR: 18 (23 Dec 2018 05:44) (16 - 20)  SpO2: 97% (23 Dec 2018 05:44) (96% - 100%)      12-22-18 @ 07:01  -  12-23-18 @ 07:00  --------------------------------------------------------  IN: 1000 mL / OUT: 800 mL / NET: 200 mL        LABS:                        9.1    8.0   )-----------( 109      ( 22 Dec 2018 11:31 )             26.8     12-22    133<L>  |  101  |  82<H>  ----------------------------<  171<H>  4.6   |  17<L>  |  7.56<H>    Ca    8.8      22 Dec 2018 11:31  Phos  3.1     12-22  Mg     1.7     12-22    TPro  6.6  /  Alb  3.7  /  TBili  0.4  /  DBili  x   /  AST  15  /  ALT  8<L>  /  AlkPhos  71  12-22

## 2018-12-23 NOTE — PROGRESS NOTE ADULT - ASSESSMENT
58M pmhx HTN, HLD, BPH, CKD5 (not on HD) who presents with 1 day of abd pain, found to have first episode of sigmoid diverticulitis, perforated.     PLAN:  - NPO / IVF  - Cipro & flagyl  - Serial abdominal exams  - appreciate renal input 58M pmhx HTN, HLD, BPH, CKD5 (not on HD) who presents with 1 day of abd pain, found to have first episode of sigmoid diverticulitis, perforated.     PLAN:  - NPO / IVF  - Cipro & flagyl (renally dosed)  - Serial abdominal exams  - appreciate renal input 58M pmhx HTN, HLD, BPH, CKD5 (not on HD) who presents with 1 day of abd pain, found to have first episode of sigmoid diverticulitis, perforated.     PLAN:  - NPO / IVF  - Cipro & flagyl (renally dosed)  - Serial abdominal exams  - appreciate renal/GI input 58M pmhx HTN, HLD, BPH, CKD5 (not on HD) who presents with 1 day of abd pain, found to have first episode of sigmoid diverticulitis, perforated.     PLAN:  - Advance to [facility]  - Cipro & flagyl (renally dosed)  - Serial abdominal exams  - appreciate renal/GI input

## 2018-12-23 NOTE — CONSULT NOTE ADULT - PROBLEM SELECTOR RECOMMENDATION 4
BP medications with holding parameters.
in the setting of CKD  Check iron studies  Will consider TIARA if iron repleted  Monitor CBC

## 2018-12-23 NOTE — CONSULT NOTE ADULT - SUBJECTIVE AND OBJECTIVE BOX
Patient is a 58y old  Male who presents with a chief complaint of Pain Abdomen      HPI:  58M pmhx HTN, HLD, BPH, CKD 5 (not on HD; on transplant list), who is presenting with 1-2 days of lower abdominal pain and fevers. The patient reports that that his pain began early yesterday morning, relatively sudden in onset, and spread throughout the lower abdomen. He says it became severe quickly, and remained constant. He reports associated fevers and some loose stool. Denies any nausea or vomiting. He denies blood in his stool, and any changes in his urine. His last colonoscopy was 4 years ago (GI - Dr. Balderrama), which reportedly was normal.     In the ED the patient had a CT that is demonstrating perforated sigmoid diverticulitis.      PAST MEDICAL & SURGICAL HISTORY:  Solitary fibrous tumor  BPH (benign prostatic hyperplasia)  Gout  GERD (gastroesophageal reflux disease)  Hypercholesteremia  Kidney disease  HTN (hypertension)  Lung abnormality  GERD (gastroesophageal reflux disease)  Gout  BPH (benign prostatic hypertrophy)  CKD (chronic kidney disease), stage III  Nephritis  Hypercholesterolemia  Hypertension  History of lung surgery  Vocal cord anomaly: S/P polyp removal       Social History: Quit smoking 12 years back and drinking also same time     FAMILY HISTORY:  No pertinent family history in first degree relatives      Allergies    No Known Allergies    Intolerances        REVIEW OF SYSTEMS:    CONSTITUTIONAL: No fever, weight loss, or fatigue  EYES: No eye pain, visual disturbances, or discharge  RESPIRATORY: No cough, wheezing, chills or hemoptysis; No shortness of breath  CARDIOVASCULAR: No chest pain, palpitations, dizziness, or leg swelling  GASTROINTESTINAL:  abdominal  pain. No nausea, vomiting, or hematemesis; No diarrhea or constipation. No melena or hematochezia.  GENITOURINARY: No dysuria, frequency, hematuria, or incontinence  NEUROLOGICAL: No headaches, memory loss, loss of strength, numbness, or tremors  SKIN: No itching, burning, rashes, or lesions   ENDOCRINE: No heat or cold intolerance; No hair loss  MUSCULOSKELETAL: No joint pain or swelling; No muscle, back, or extremity pain  PSYCHIATRIC: No depression, anxiety, mood swings, or difficulty sleeping      MEDICATIONS  (STANDING):  calcium acetate 1334 milliGRAM(s) Oral three times a day with meals  ciprofloxacin   IVPB      ciprofloxacin   IVPB 400 milliGRAM(s) IV Intermittent every 24 hours  dextrose 5% + sodium chloride 0.45%. 1000 milliLiter(s) (125 mL/Hr) IV Continuous <Continuous>  heparin  Injectable 5000 Unit(s) SubCutaneous every 8 hours  hydrALAZINE 100 milliGRAM(s) Oral two times a day  influenza   Vaccine 0.5 milliLiter(s) IntraMuscular once  losartan 25 milliGRAM(s) Oral daily  metoprolol succinate ER 50 milliGRAM(s) Oral daily  metroNIDAZOLE  IVPB      metroNIDAZOLE  IVPB 500 milliGRAM(s) IV Intermittent every 8 hours  simvastatin 20 milliGRAM(s) Oral at bedtime  sodium bicarbonate 1950 milliGRAM(s) Oral two times a day  tamsulosin 0.4 milliGRAM(s) Oral at bedtime  torsemide 5 milliGRAM(s) Oral two times a day    MEDICATIONS  (PRN):      Vital Signs Last 24 Hrs  T(C): 36.8 (23 Dec 2018 17:20), Max: 38.3 (22 Dec 2018 21:27)  T(F): 98.2 (23 Dec 2018 17:20), Max: 100.9 (22 Dec 2018 21:27)  HR: 86 (23 Dec 2018 17:20) (86 - 96)  BP: 128/70 (23 Dec 2018 17:20) (110/67 - 143/78)  BP(mean): --  RR: 18 (23 Dec 2018 17:20) (17 - 20)  SpO2: 96% (23 Dec 2018 17:20) (95% - 100%)    PHYSICAL EXAM:    GENERAL: NAD, well-groomed, well-developed  HEAD:  Atraumatic, Normocephalic  EYES: EOMI, PERRLA, conjunctiva and sclera clear  ENMT: No tonsillar erythema, exudates, or enlargement; Moist mucous membranes, Good dentition, No lesions  NECK: Supple, No JVD, Normal thyroid  NERVOUS SYSTEM:  Alert & Oriented X3, Good concentration; Motor Strength 5/5 B/L upper and lower extremities; DTRs 2+ intact and symmetric  CHEST/LUNG: Clear to percussion bilaterally; No rales, rhonchi, wheezing, or rubs  HEART: Regular rate and rhythm; No murmurs, rubs, or gallops  ABDOMEN: Soft, Nontender, Nondistended; Bowel sounds present, tenderness LLQ  EXTREMITIES:  2+ Peripheral Pulses, No clubbing, cyanosis, or edema  LABS:                        8.7    7.65  )-----------( 129      ( 23 Dec 2018 12:50 )             28.3         136  |  102  |  87<H>  ----------------------------<  93  4.9   |  16<L>  |  7.83<H>    Ca    8.9      23 Dec 2018 09:04  Phos  4.6       Mg     1.8         TPro  6.6  /  Alb  3.7  /  TBili  0.4  /  DBili  x   /  AST  15  /  ALT  8<L>  /  AlkPhos  71        Urinalysis Basic - ( 22 Dec 2018 12:38 )    Color: Colorless / Appearance: Clear / S.011 / pH: x  Gluc: x / Ketone: Negative  / Bili: Negative / Urobili: Negative   Blood: x / Protein: 100 mg/dL / Nitrite: Negative   Leuk Esterase: Negative / RBC: 29 /hpf / WBC 1 /hpf   Sq Epi: x / Non Sq Epi: 0 /hpf / Bacteria: Negative          RADIOLOGY & ADDITIONAL STUDIES:

## 2018-12-24 ENCOUNTER — OTHER (OUTPATIENT)
Age: 58
End: 2018-12-24

## 2018-12-24 DIAGNOSIS — Z71.89 OTHER SPECIFIED COUNSELING: ICD-10-CM

## 2018-12-24 LAB
ANION GAP SERPL CALC-SCNC: 15 MMOL/L — SIGNIFICANT CHANGE UP (ref 5–17)
BUN SERPL-MCNC: 84 MG/DL — HIGH (ref 7–23)
CALCIUM SERPL-MCNC: 8.4 MG/DL — SIGNIFICANT CHANGE UP (ref 8.4–10.5)
CHLORIDE SERPL-SCNC: 103 MMOL/L — SIGNIFICANT CHANGE UP (ref 96–108)
CO2 SERPL-SCNC: 20 MMOL/L — LOW (ref 22–31)
CREAT SERPL-MCNC: 7.72 MG/DL — HIGH (ref 0.5–1.3)
GLUCOSE SERPL-MCNC: 105 MG/DL — HIGH (ref 70–99)
HCT VFR BLD CALC: 25.8 % — LOW (ref 39–50)
HGB BLD-MCNC: 8.1 G/DL — LOW (ref 13–17)
IRON SATN MFR SERPL: 11 % — LOW (ref 16–55)
IRON SATN MFR SERPL: 23 UG/DL — LOW (ref 45–165)
MAGNESIUM SERPL-MCNC: 1.8 MG/DL — SIGNIFICANT CHANGE UP (ref 1.6–2.6)
MCHC RBC-ENTMCNC: 28.8 PG — SIGNIFICANT CHANGE UP (ref 27–34)
MCHC RBC-ENTMCNC: 31.4 GM/DL — LOW (ref 32–36)
MCV RBC AUTO: 91.8 FL — SIGNIFICANT CHANGE UP (ref 80–100)
PHOSPHATE SERPL-MCNC: 4.8 MG/DL — HIGH (ref 2.5–4.5)
PLATELET # BLD AUTO: 118 K/UL — LOW (ref 150–400)
POTASSIUM SERPL-MCNC: 4.7 MMOL/L — SIGNIFICANT CHANGE UP (ref 3.5–5.3)
POTASSIUM SERPL-SCNC: 4.7 MMOL/L — SIGNIFICANT CHANGE UP (ref 3.5–5.3)
RBC # BLD: 2.81 M/UL — LOW (ref 4.2–5.8)
RBC # FLD: 13.3 % — SIGNIFICANT CHANGE UP (ref 10.3–14.5)
SODIUM SERPL-SCNC: 138 MMOL/L — SIGNIFICANT CHANGE UP (ref 135–145)
TIBC SERPL-MCNC: 219 UG/DL — LOW (ref 220–430)
UIBC SERPL-MCNC: 196 UG/DL — SIGNIFICANT CHANGE UP (ref 110–370)
WBC # BLD: 4.93 K/UL — SIGNIFICANT CHANGE UP (ref 3.8–10.5)
WBC # FLD AUTO: 4.93 K/UL — SIGNIFICANT CHANGE UP (ref 3.8–10.5)

## 2018-12-24 PROCEDURE — 99232 SBSQ HOSP IP/OBS MODERATE 35: CPT | Mod: GC

## 2018-12-24 RX ADMIN — Medication 100 MILLIGRAM(S): at 17:17

## 2018-12-24 RX ADMIN — Medication 1334 MILLIGRAM(S): at 09:19

## 2018-12-24 RX ADMIN — Medication 100 MILLIGRAM(S): at 13:30

## 2018-12-24 RX ADMIN — HEPARIN SODIUM 5000 UNIT(S): 5000 INJECTION INTRAVENOUS; SUBCUTANEOUS at 13:30

## 2018-12-24 RX ADMIN — HEPARIN SODIUM 5000 UNIT(S): 5000 INJECTION INTRAVENOUS; SUBCUTANEOUS at 21:30

## 2018-12-24 RX ADMIN — HEPARIN SODIUM 5000 UNIT(S): 5000 INJECTION INTRAVENOUS; SUBCUTANEOUS at 05:29

## 2018-12-24 RX ADMIN — SODIUM CHLORIDE 125 MILLILITER(S): 9 INJECTION, SOLUTION INTRAVENOUS at 13:31

## 2018-12-24 RX ADMIN — Medication 1950 MILLIGRAM(S): at 17:17

## 2018-12-24 RX ADMIN — Medication 100 MILLIGRAM(S): at 21:30

## 2018-12-24 RX ADMIN — Medication 5 MILLIGRAM(S): at 17:23

## 2018-12-24 RX ADMIN — Medication 50 MILLIGRAM(S): at 05:27

## 2018-12-24 RX ADMIN — Medication 1950 MILLIGRAM(S): at 06:01

## 2018-12-24 RX ADMIN — SODIUM CHLORIDE 125 MILLILITER(S): 9 INJECTION, SOLUTION INTRAVENOUS at 21:30

## 2018-12-24 RX ADMIN — SIMVASTATIN 20 MILLIGRAM(S): 20 TABLET, FILM COATED ORAL at 21:30

## 2018-12-24 RX ADMIN — SODIUM CHLORIDE 125 MILLILITER(S): 9 INJECTION, SOLUTION INTRAVENOUS at 12:14

## 2018-12-24 RX ADMIN — Medication 100 MILLIGRAM(S): at 05:38

## 2018-12-24 RX ADMIN — Medication 200 MILLIGRAM(S): at 17:23

## 2018-12-24 RX ADMIN — Medication 5 MILLIGRAM(S): at 06:01

## 2018-12-24 RX ADMIN — Medication 100 MILLIGRAM(S): at 05:29

## 2018-12-24 RX ADMIN — TAMSULOSIN HYDROCHLORIDE 0.4 MILLIGRAM(S): 0.4 CAPSULE ORAL at 21:30

## 2018-12-24 RX ADMIN — LOSARTAN POTASSIUM 25 MILLIGRAM(S): 100 TABLET, FILM COATED ORAL at 21:30

## 2018-12-24 RX ADMIN — SODIUM CHLORIDE 125 MILLILITER(S): 9 INJECTION, SOLUTION INTRAVENOUS at 05:30

## 2018-12-24 RX ADMIN — Medication 1334 MILLIGRAM(S): at 17:17

## 2018-12-24 RX ADMIN — Medication 1334 MILLIGRAM(S): at 13:30

## 2018-12-24 NOTE — CONSULT NOTE ADULT - SUBJECTIVE AND OBJECTIVE BOX
CHIEF COMPLAINT: Abdominal pain     HISTORY OF PRESENT ILLNESS:  58M pmhx HTN, HLD, BPH, CKD 5 (not on HD; on transplant list), who is presenting with 1-2 days of lower abdominal pain and fevers. The patient reports that that his pain began early yesterday morning, relatively sudden in onset, and spread throughout the lower abdomen. He says it became severe quickly, and remained constant. He reports associated fevers and some loose stool. Denies any nausea or vomiting. He denies blood in his stool, and any changes in his urine. His last colonoscopy was 4 years ago  previously had some chest discomfort and underwent stress test which was apparently positive but did not have a follow up angiogram due t o unclear reasons.   Currently denies chest pain, shortness of breath or palpitations.   PAST MEDICAL & SURGICAL HISTORY:  Solitary fibrous tumor  BPH (benign prostatic hyperplasia)  Gout  GERD (gastroesophageal reflux disease)  Hypercholesteremia  Kidney disease  HTN (hypertension)  Lung abnormality  GERD (gastroesophageal reflux disease)  Gout  BPH (benign prostatic hypertrophy)  CKD (chronic kidney disease), stage III  Nephritis  Hypercholesterolemia  Hypertension  History of lung surgery  Vocal cord anomaly: S/P polyp removal 2004          MEDICATIONS:  heparin  Injectable 5000 Unit(s) SubCutaneous every 8 hours  hydrALAZINE 100 milliGRAM(s) Oral two times a day  losartan 25 milliGRAM(s) Oral daily  metoprolol succinate ER 50 milliGRAM(s) Oral daily  tamsulosin 0.4 milliGRAM(s) Oral at bedtime  torsemide 5 milliGRAM(s) Oral two times a day    ciprofloxacin   IVPB      ciprofloxacin   IVPB 400 milliGRAM(s) IV Intermittent every 24 hours  metroNIDAZOLE  IVPB      metroNIDAZOLE  IVPB 500 milliGRAM(s) IV Intermittent every 8 hours          simvastatin 20 milliGRAM(s) Oral at bedtime    calcium acetate 1334 milliGRAM(s) Oral three times a day with meals  dextrose 5% + sodium chloride 0.45%. 1000 milliLiter(s) IV Continuous <Continuous>  influenza   Vaccine 0.5 milliLiter(s) IntraMuscular once  sodium bicarbonate 1950 milliGRAM(s) Oral two times a day      FAMILY HISTORY:  No pertinent family history in first degree relatives      SOCIAL HISTORY:    [ ] Non-smoker  [ ] Smoker  [ ] Alcohol    Allergies    No Known Allergies    Intolerances    	    REVIEW OF SYSTEMS:  CONSTITUTIONAL: No fever, weight loss, + fatigue  EYES: No eye pain, visual disturbances, or discharge  ENMT:  No difficulty hearing, tinnitus, vertigo; No sinus or throat pain  NECK: No pain or stiffness  RESPIRATORY: No cough, wheezing, chills or hemoptysis; No Shortness of Breath  CARDIOVASCULAR: No chest pain, palpitations, passing out, dizziness, or leg swelling  GASTROINTESTINAL: + abdominal or epigastric pain. No nausea, vomiting, or hematemesis; No diarrhea or constipation. No melena or hematochezia.  GENITOURINARY: No dysuria, frequency, hematuria, or incontinence  NEUROLOGICAL: No headaches, memory loss, loss of strength, numbness, or tremors  SKIN: No itching, burning, rashes, or lesions   LYMPH Nodes: No enlarged glands  ENDOCRINE: No heat or cold intolerance; No hair loss  MUSCULOSKELETAL: No joint pain or swelling; No muscle, back, or extremity pain  PSYCHIATRIC: No depression, anxiety, mood swings, or difficulty sleeping  HEME/LYMPH: No easy bruising, or bleeding gums  ALLERY AND IMMUNOLOGIC: No hives or eczema	    [ ] All others negative	  [ ] Unable to obtain    PHYSICAL EXAM:  T(C): 36.3 (12-24-18 @ 09:59), Max: 37.3 (12-23-18 @ 20:43)  HR: 87 (12-24-18 @ 09:59) (80 - 96)  BP: 128/70 (12-24-18 @ 09:59) (110/62 - 143/78)  RR: 18 (12-24-18 @ 09:59) (18 - 18)  SpO2: 98% (12-24-18 @ 09:59) (95% - 98%)  Wt(kg): --  I&O's Summary    23 Dec 2018 07:01  -  24 Dec 2018 07:00  --------------------------------------------------------  IN: 2650 mL / OUT: 0 mL / NET: 2650 mL    24 Dec 2018 07:01  -  24 Dec 2018 11:08  --------------------------------------------------------  IN: 480 mL / OUT: 0 mL / NET: 480 mL        Appearance: Normal	  HEENT:   Normal oral mucosa, PERRL, EOMI	  Lymphatic: No lymphadenopathy  Cardiovascular: Normal S1 S2, No JVD, No murmurs, No edema  Respiratory: Lungs clear to auscultation	  Psychiatry: A & O x 3, Mood & affect appropriate  Gastrointestinal:  Soft, RLE quadrant tenderness, + BS	  Skin: No rashes, No ecchymoses, No cyanosis	  Neurologic: Non-focal  Extremities: Normal range of motion, No clubbing, cyanosis or edema  Vascular: Peripheral pulses palpable 2+ bilaterally    TELEMETRY: 	    ECG:  	NSR, no acute ischemic stt changes   RADIOLOGY:  < from: CT Abdomen and Pelvis w/ Oral Cont (12.22.18 @ 15:38) >    EXAM:  CT ABDOMEN AND PELVIS OC                            PROCEDURE DATE:  12/22/2018            INTERPRETATION:  CLINICAL INFORMATION: Right lower quadrant abdominal   pain.    COMPARISON: Renal sonogram 2/12/2018. CT chest 5/20/2017    PROCEDURE:   CT of the Abdomen and Pelvis was performed without intravenous contrast.   Intravenous contrast: None.  Oral contrast: positive contrast was administered.  Sagittal and coronal reformats were performed.    FINDINGS:    LOWER CHEST: Within normal limits.    LIVER: Within normal limits.  BILE DUCTS: Normal caliber.  GALLBLADDER: Within normal limits.  SPLEEN: Within normal limits.  PANCREAS: Within normal limits.  ADRENALS: Within normal limits.  KIDNEYS/URETERS: Bilateral renal cysts.     BLADDER: Within normal limits.  REPRODUCTIVE ORGANS: The prostate is within normal limits.    BOWEL: Wall thickening of the sigmoid colon with inflammatory fat   stranding surrounding sigmoid diverticula compatible with diverticulitis.   Adjacent focus of extraluminal gas (2:99). No fluid collections. No bowel   obstruction. Appendix is normal.  PERITONEUM: No ascites.  VESSELS:  Atheromatous disease of the thoracic aorta.  RETROPERITONEUM: Scattered subcentimeter retroperitoneal lymph nodes,   unchanged compared to prior CT chest of 5/20/2017.    ABDOMINAL WALL: Small bilateral fat-containing inguinal hernia.  BONES: Within normal limits.    IMPRESSION:  Perforated sigmoid diverticulitis. No fluid collection.    These findings were discussed with SUMMER IBRAHIM  at 12/22/2018 3:54 PM by   Dr. Fontenot with read back confirmation.                        ARNIE FONTENOT M.D., RADIOLOGY RESIDENT  This document has been electronically signed.  SANTOSH MULTANI M.D., ATTENDING RADIOLOGIST  This document has been electronically signed. Dec 22 2018  4:06PM                < end of copied text >    OTHER: 	  	  LABS:	 	    CARDIAC MARKERS:                                  8.1    4.93  )-----------( 118      ( 24 Dec 2018 08:20 )             25.8     12-24    138  |  103  |  84<H>  ----------------------------<  105<H>  4.7   |  20<L>  |  7.72<H>    Ca    8.4      24 Dec 2018 07:25  Phos  4.8     12-24  Mg     1.8     12-24    TPro  6.6  /  Alb  3.7  /  TBili  0.4  /  DBili  x   /  AST  15  /  ALT  8<L>  /  AlkPhos  71  12-22    proBNP:   Lipid Profile:   HgA1c:   TSH:

## 2018-12-24 NOTE — PROGRESS NOTE ADULT - ASSESSMENT
58M pmhx HTN, HLD, BPH, CKD 5 (not on HD; on transplant list), who is presenting with 1-2 days of lower abdominal pain and fevers. The patient reports that that his pain began early yesterday morning, relatively sudden in onset, and spread throughout the lower abdomen. He says it became severe quickly, and remained constant. He reports associated fevers and some loose stool. Denies any nausea or vomiting. He denies blood in his stool, and any changes in his urine. His last colonoscopy was 4 years ago (GI - Dr. Balderrama), which reportedly was normal.     In the ED the patient had a CT that is demonstrating perforated sigmoid diverticulitis.     Problem/Recommendation - 1:  Problem: Diverticulitis of large intestine with perforation without abscess or bleeding. Recommendation: IV Abxs,CLD  and Pain meds. Surgery and GI following . Afebrile since 24 hrs.     Problem/Recommendation - 2:  ·  Problem: CKD (chronic kidney disease) stage 5, GFR less than 15 ml/min.  Recommendation: Not on HD but on transplant List . Renal helping.      Problem/Recommendation - 3:  ·  Problem: Anemia, unspecified type.  Recommendation: Sec top CKD .. Epogen??. Drop in Hgb Dilutional      Problem/Recommendation - 4:  ·  Problem: HTN (hypertension).  Recommendation: BP medications with holding parameters.      Problem/Recommendation - 5:  ·  Problem: Hypercholesteremia.  Recommendation: Statin.      Problem/Recommendation - 6:  Problem: Gout. Recommendation: Asymptomatic.

## 2018-12-24 NOTE — PROGRESS NOTE ADULT - SUBJECTIVE AND OBJECTIVE BOX
General Surgery Progress Note    SUBJECTIVE:  The patient was seen and examined. No acute events overnight. Pain well controlled.  Denies n/v, cp, sob.    OBJECTIVE:     ** VITAL SIGNS / I&O's **    Vital Signs Last 24 Hrs  T(C): 37.1 (24 Dec 2018 05:18), Max: 37.3 (23 Dec 2018 20:43)  T(F): 98.7 (24 Dec 2018 05:18), Max: 99.2 (23 Dec 2018 20:43)  HR: 80 (24 Dec 2018 05:18) (80 - 96)  BP: 128/69 (24 Dec 2018 05:18) (110/62 - 143/78)  BP(mean): --  RR: 18 (24 Dec 2018 05:18) (18 - 18)  SpO2: 95% (24 Dec 2018 05:18) (95% - 96%)      23 Dec 2018 07:01  -  24 Dec 2018 07:00  --------------------------------------------------------  IN:    dextrose 5% + sodium chloride 0.45%.: 2150 mL    IV PiggyBack: 200 mL    Solution: 300 mL  Total IN: 2650 mL    OUT:  Total OUT: 0 mL    Total NET: 2650 mL          ** PHYSICAL EXAM **    -- CONSTITUTIONAL: Alert, NAD  -- PULMONARY: non-labored respirations  -- ABDOMEN: soft, non-distended, mildly tender  -- NEURO: A&Ox3    ** LABS **                          8.7    7.65  )-----------( 129      ( 23 Dec 2018 12:50 )             28.3     23 Dec 2018 09:04    136    |  102    |  87     ----------------------------<  93     4.9     |  16     |  7.83     Ca    8.9        23 Dec 2018 09:04  Phos  4.6       23 Dec 2018 09:04  Mg     1.8       23 Dec 2018 09:04    TPro  6.6    /  Alb  3.7    /  TBili  0.4    /  DBili  x      /  AST  15     /  ALT  8      /  AlkPhos  71     22 Dec 2018 11:31      CAPILLARY BLOOD GLUCOSE            LIVER FUNCTIONS - ( 22 Dec 2018 11:31 )  Alb: 3.7 g/dL / Pro: 6.6 g/dL / ALK PHOS: 71 U/L / ALT: 8 U/L / AST: 15 U/L / GGT: x             Culture - Urine (collected 22 Dec 2018 15:06)  Source: .Urine Clean Catch (Midstream)  Final Report (23 Dec 2018 11:44):    No growth    Culture - Blood (collected 22 Dec 2018 14:56)  Source: .Blood Blood  Preliminary Report (23 Dec 2018 15:01):    No growth to date.    Culture - Blood (collected 22 Dec 2018 14:56)  Source: .Blood Blood-Peripheral  Preliminary Report (23 Dec 2018 15:01):    No growth to date.      Urinalysis Basic - ( 22 Dec 2018 12:38 )    Color: Colorless / Appearance: Clear / S.011 / pH: x  Gluc: x / Ketone: Negative  / Bili: Negative / Urobili: Negative   Blood: x / Protein: 100 mg/dL / Nitrite: Negative   Leuk Esterase: Negative / RBC: 29 /hpf / WBC 1 /hpf   Sq Epi: x / Non Sq Epi: 0 /hpf / Bacteria: Negative        MEDICATIONS  (STANDING):  calcium acetate 1334 milliGRAM(s) Oral three times a day with meals  ciprofloxacin   IVPB      ciprofloxacin   IVPB 400 milliGRAM(s) IV Intermittent every 24 hours  dextrose 5% + sodium chloride 0.45%. 1000 milliLiter(s) (125 mL/Hr) IV Continuous <Continuous>  heparin  Injectable 5000 Unit(s) SubCutaneous every 8 hours  hydrALAZINE 100 milliGRAM(s) Oral two times a day  influenza   Vaccine 0.5 milliLiter(s) IntraMuscular once  losartan 25 milliGRAM(s) Oral daily  metoprolol succinate ER 50 milliGRAM(s) Oral daily  metroNIDAZOLE  IVPB      metroNIDAZOLE  IVPB 500 milliGRAM(s) IV Intermittent every 8 hours  simvastatin 20 milliGRAM(s) Oral at bedtime  sodium bicarbonate 1950 milliGRAM(s) Oral two times a day  tamsulosin 0.4 milliGRAM(s) Oral at bedtime  torsemide 5 milliGRAM(s) Oral two times a day    MEDICATIONS  (PRN):

## 2018-12-24 NOTE — CONSULT NOTE ADULT - PROBLEM SELECTOR PROBLEM 1
Diverticulitis
CKD (chronic kidney disease) stage 5, GFR less than 15 ml/min
Diverticulitis of large intestine with perforation without abscess or bleeding
Diverticulitis of large intestine with perforation without abscess or bleeding

## 2018-12-24 NOTE — CONSULT NOTE ADULT - ASSESSMENT
59 yo male with advanced CKD awaiting renal transplant admitted with perforated sigmoid diverticulitis

## 2018-12-24 NOTE — PROGRESS NOTE ADULT - SUBJECTIVE AND OBJECTIVE BOX
[facility] DIVISION OF KIDNEY DISEASES AND HYPERTENSION -- FOLLOW UP NOTE  --------------------------------------------------------------------------------  Chief Complaint:  CKD    24 hour events/subjective:  Abdominal pain still present but improved. Diarrhea improved. No n/v.       PAST HISTORY  --------------------------------------------------------------------------------  No significant changes to PMH, PSH, FHx, SHx, unless otherwise noted    ALLERGIES & MEDICATIONS  --------------------------------------------------------------------------------  Allergies    No Known Allergies    Intolerances      Standing Inpatient Medications  calcium acetate 1334 milliGRAM(s) Oral three times a day with meals  ciprofloxacin   IVPB      ciprofloxacin   IVPB 400 milliGRAM(s) IV Intermittent every 24 hours  dextrose 5% + sodium chloride 0.45%. 1000 milliLiter(s) IV Continuous <Continuous>  heparin  Injectable 5000 Unit(s) SubCutaneous every 8 hours  hydrALAZINE 100 milliGRAM(s) Oral two times a day  influenza   Vaccine 0.5 milliLiter(s) IntraMuscular once  losartan 25 milliGRAM(s) Oral daily  metoprolol succinate ER 50 milliGRAM(s) Oral daily  metroNIDAZOLE  IVPB      metroNIDAZOLE  IVPB 500 milliGRAM(s) IV Intermittent every 8 hours  simvastatin 20 milliGRAM(s) Oral at bedtime  sodium bicarbonate 1950 milliGRAM(s) Oral two times a day  tamsulosin 0.4 milliGRAM(s) Oral at bedtime  torsemide 5 milliGRAM(s) Oral two times a day    PRN Inpatient Medications      REVIEW OF SYSTEMS  --------------------------------------------------------------------------------  Gen: no fatigue  Respiratory: No dyspnea, cough  CV: No chest pain  GI: + abdominal pain  MSK: No edema  Neuro: no confusion    VITALS/PHYSICAL EXAM  --------------------------------------------------------------------------------  T(C): 36.4 (12-24-18 @ 13:42), Max: 37.3 (12-23-18 @ 20:43)  HR: 86 (12-24-18 @ 13:42) (80 - 90)  BP: 114/67 (12-24-18 @ 13:42) (110/62 - 128/70)  RR: 18 (12-24-18 @ 13:42) (18 - 18)  SpO2: 97% (12-24-18 @ 13:42) (95% - 98%)  Wt(kg): --        12-23-18 @ 07:01  -  12-24-18 @ 07:00  --------------------------------------------------------  IN: 2650 mL / OUT: 0 mL / NET: 2650 mL    12-24-18 @ 07:01  -  12-24-18 @ 15:11  --------------------------------------------------------  IN: 1000 mL / OUT: 0 mL / NET: 1000 mL      Physical Exam:  	Gen: NAD, well-appearing  	Pulm: CTA B/L  	CV: RRR, S1S2; no rub  	Abd: +BS, soft, non-distended  	LE: Warm, no edema  	Neuro: follows commands  	Psych: Normal affect and mood  	Skin: Warm, without rashes    LABS/STUDIES  --------------------------------------------------------------------------------              8.1    4.93  >-----------<  118      [12-24-18 @ 08:20]              25.8     138  |  103  |  84  ----------------------------<  105      [12-24-18 @ 07:25]  4.7   |  20  |  7.72        Ca     8.4     [12-24-18 @ 07:25]      Mg     1.8     [12-24-18 @ 07:25]      Phos  4.8     [12-24-18 @ 07:25]            Creatinine Trend:  SCr 7.72 [12-24 @ 07:25]  SCr 7.83 [12-23 @ 09:04]  SCr 7.56 [12-22 @ 11:31]    Urinalysis - [12-22-18 @ 12:38]      Color Colorless / Appearance Clear / SG 1.011 / pH 6.0      Gluc Trace / Ketone Negative  / Bili Negative / Urobili Negative       Blood Trace / Protein 100 mg/dL / Leuk Est Negative / Nitrite Negative      RBC 29 / WBC 1 / Hyaline 0 / Gran  / Sq Epi  / Non Sq Epi 0 / Bacteria Negative      HbA1c 5.9      [04-08-18 @ 07:47]  Lipid: chol 94, TG 84, HDL 29, LDL 48      [04-08-18 @ 07:50]

## 2018-12-24 NOTE — PROGRESS NOTE ADULT - PROBLEM SELECTOR PLAN 3
in the setting of CKD.  - Check iron studies  - Will consider TIARA if iron repleted  - Monitor CBC.

## 2018-12-24 NOTE — PROGRESS NOTE ADULT - SUBJECTIVE AND OBJECTIVE BOX
Interval Events: Pt seen and examined.   he admits to mild abdominal discomfort, however it is overall improving  diet advanced to clears    MEDICATIONS  (STANDING):  calcium acetate 1334 milliGRAM(s) Oral three times a day with meals  ciprofloxacin   IVPB      ciprofloxacin   IVPB 400 milliGRAM(s) IV Intermittent every 24 hours  dextrose 5% + sodium chloride 0.45%. 1000 milliLiter(s) (125 mL/Hr) IV Continuous <Continuous>  heparin  Injectable 5000 Unit(s) SubCutaneous every 8 hours  hydrALAZINE 100 milliGRAM(s) Oral two times a day  influenza   Vaccine 0.5 milliLiter(s) IntraMuscular once  losartan 25 milliGRAM(s) Oral daily  metoprolol succinate ER 50 milliGRAM(s) Oral daily  metroNIDAZOLE  IVPB      metroNIDAZOLE  IVPB 500 milliGRAM(s) IV Intermittent every 8 hours  simvastatin 20 milliGRAM(s) Oral at bedtime  sodium bicarbonate 1950 milliGRAM(s) Oral two times a day  tamsulosin 0.4 milliGRAM(s) Oral at bedtime  torsemide 5 milliGRAM(s) Oral two times a day    MEDICATIONS  (PRN):      Allergies    No Known Allergies    Intolerances        Review of Systems:    General:  No wt loss, fevers, chills, night sweats,fatigue,   Eyes:  Good vision, no reported pain  ENT:  No sore throat, pain, runny nose, dysphagia  CV:  No pain, palpitations, hypo/hypertension  Resp:  No dyspnea, cough, tachypnea, wheezing  GI:  No pain, No nausea, No vomiting, No diarrhea, No constipation, No weight loss, No fever, No pruritis, No rectal bleeding, No melena, No dysphagia  :  No pain, bleeding, incontinence, nocturia  Muscle:  No pain, weakness  Neuro:  No weakness, tingling, memory problems  Psych:  No fatigue, insomnia, mood problems, depression  Endocrine:  No polyuria, polydypsia, cold/heat intolerance  Heme:  No petechiae, ecchymosis, easy bruisability  Skin:  No rash, tattoos, scars, edema      Vital Signs Last 24 Hrs  T(C): 36.3 (24 Dec 2018 09:59), Max: 37.3 (23 Dec 2018 20:43)  T(F): 97.3 (24 Dec 2018 09:59), Max: 99.2 (23 Dec 2018 20:43)  HR: 87 (24 Dec 2018 09:59) (80 - 96)  BP: 128/70 (24 Dec 2018 09:59) (110/62 - 143/78)  BP(mean): --  RR: 18 (24 Dec 2018 09:59) (18 - 18)  SpO2: 98% (24 Dec 2018 09:59) (95% - 98%)    PHYSICAL EXAM:    Constitutional: NAD, well-developed  HEENT: EOMI, throat clear  Neck: No LAD, supple  Respiratory: CTA and P  Cardiovascular: S1 and S2, RRR, no M  Gastrointestinal: BS+, soft, NT/ND, neg HSM,  Extremities: No peripheral edema, neg clubing, cyanosis  Vascular: 2+ peripheral pulses  Neurological: A/O x 3, no focal deficits  Psychiatric: Normal mood, normal affect  Skin: No rashes      LABS:                        8.1    4.93  )-----------( 118      ( 24 Dec 2018 08:20 )             25.8     12-24    138  |  103  |  84<H>  ----------------------------<  105<H>  4.7   |  20<L>  |  7.72<H>    Ca    8.4      24 Dec 2018 07:25  Phos  4.8     12-24  Mg     1.8     12-24        Urinalysis Basic - ( 22 Dec 2018 12:38 )    Color: Colorless / Appearance: Clear / S.011 / pH: x  Gluc: x / Ketone: Negative  / Bili: Negative / Urobili: Negative   Blood: x / Protein: 100 mg/dL / Nitrite: Negative   Leuk Esterase: Negative / RBC: 29 /hpf / WBC 1 /hpf   Sq Epi: x / Non Sq Epi: 0 /hpf / Bacteria: Negative        RADIOLOGY & ADDITIONAL TESTS:

## 2018-12-24 NOTE — PROGRESS NOTE ADULT - ASSESSMENT
58yoM w/ BPH, HTN, CKDV on transplant list, p/w fevers, chills and abdominal pain. Found to have perforated diverticulitis. Nephrology consulted for CKD.

## 2018-12-24 NOTE — PROGRESS NOTE ADULT - SUBJECTIVE AND OBJECTIVE BOX
INTERVAL HPI/OVERNIGHT EVENTS: I feel better .   Vital Signs Last 24 Hrs  T(C): 36.3 (24 Dec 2018 09:59), Max: 37.3 (23 Dec 2018 20:43)  T(F): 97.3 (24 Dec 2018 09:59), Max: 99.2 (23 Dec 2018 20:43)  HR: 87 (24 Dec 2018 09:59) (80 - 96)  BP: 128/70 (24 Dec 2018 09:59) (110/62 - 143/78)  BP(mean): --  RR: 18 (24 Dec 2018 09:59) (18 - 18)  SpO2: 98% (24 Dec 2018 09:59) (95% - 98%)  I&O's Summary    23 Dec 2018 07:01  -  24 Dec 2018 07:00  --------------------------------------------------------  IN: 2650 mL / OUT: 0 mL / NET: 2650 mL    24 Dec 2018 07:01  -  24 Dec 2018 11:47  --------------------------------------------------------  IN: 480 mL / OUT: 0 mL / NET: 480 mL      MEDICATIONS  (STANDING):  calcium acetate 1334 milliGRAM(s) Oral three times a day with meals  ciprofloxacin   IVPB      ciprofloxacin   IVPB 400 milliGRAM(s) IV Intermittent every 24 hours  dextrose 5% + sodium chloride 0.45%. 1000 milliLiter(s) (125 mL/Hr) IV Continuous <Continuous>  heparin  Injectable 5000 Unit(s) SubCutaneous every 8 hours  hydrALAZINE 100 milliGRAM(s) Oral two times a day  influenza   Vaccine 0.5 milliLiter(s) IntraMuscular once  losartan 25 milliGRAM(s) Oral daily  metoprolol succinate ER 50 milliGRAM(s) Oral daily  metroNIDAZOLE  IVPB      metroNIDAZOLE  IVPB 500 milliGRAM(s) IV Intermittent every 8 hours  simvastatin 20 milliGRAM(s) Oral at bedtime  sodium bicarbonate 1950 milliGRAM(s) Oral two times a day  tamsulosin 0.4 milliGRAM(s) Oral at bedtime  torsemide 5 milliGRAM(s) Oral two times a day    MEDICATIONS  (PRN):    LABS:                        8.1    4.93  )-----------( 118      ( 24 Dec 2018 08:20 )             25.8     12-24    138  |  103  |  84<H>  ----------------------------<  105<H>  4.7   |  20<L>  |  7.72<H>    Ca    8.4      24 Dec 2018 07:25  Phos  4.8     12-24  Mg     1.8     12-24        Urinalysis Basic - ( 22 Dec 2018 12:38 )    Color: Colorless / Appearance: Clear / S.011 / pH: x  Gluc: x / Ketone: Negative  / Bili: Negative / Urobili: Negative   Blood: x / Protein: 100 mg/dL / Nitrite: Negative   Leuk Esterase: Negative / RBC: 29 /hpf / WBC 1 /hpf   Sq Epi: x / Non Sq Epi: 0 /hpf / Bacteria: Negative      CAPILLARY BLOOD GLUCOSE            Urinalysis Basic - ( 22 Dec 2018 12:38 )    Color: Colorless / Appearance: Clear / S.011 / pH: x  Gluc: x / Ketone: Negative  / Bili: Negative / Urobili: Negative   Blood: x / Protein: 100 mg/dL / Nitrite: Negative   Leuk Esterase: Negative / RBC: 29 /hpf / WBC 1 /hpf   Sq Epi: x / Non Sq Epi: 0 /hpf / Bacteria: Negative      REVIEW OF SYSTEMS:  CONSTITUTIONAL: No fever, weight loss, or fatigue  EYES: No eye pain, visual disturbances, or discharge  ENMT:  No difficulty hearing, tinnitus, vertigo; No sinus or throat pain    RESPIRATORY: No cough, wheezing, chills or hemoptysis; No shortness of breath  CARDIOVASCULAR: No chest pain, palpitations, dizziness, or leg swelling  GASTROINTESTINAL: No abdominal or epigastric pain. No nausea, vomiting, or hematemesis; No diarrhea or constipation. No melena or hematochezia.      Consultant(s) Notes Reviewed:  [x ] YES  [ ] NO    PHYSICAL EXAM:  GENERAL: NAD, well-groomed, well-developed ,not in any distress ,  HEAD:  Atraumatic, Normocephalic  EYES: EOMI, PERRLA, conjunctiva and sclera clear  ENMT: No tonsillar erythema, exudates, or enlargement; Moist mucous membranes, Good dentition, No lesions  NECK: Supple, No JVD, Normal thyroid  NERVOUS SYSTEM:  Alert & Oriented X3, No focal deficit   CHEST/LUNG: Good air entry bilateral with no  rales, rhonchi, wheezing, or rubs  HEART: Regular rate and rhythm; No murmurs, rubs, or gallops  ABDOMEN: Soft, mild tenderness lower abdomen with deep palpation , Nondistended; Bowel sounds present  EXTREMITIES:  2+ Peripheral Pulses, No clubbing, cyanosis, or edema  =    Care Discussed with Consultants/Other Providers [ x] YES  [ ] NO

## 2018-12-25 ENCOUNTER — TRANSCRIPTION ENCOUNTER (OUTPATIENT)
Age: 58
End: 2018-12-25

## 2018-12-25 VITALS
OXYGEN SATURATION: 98 % | DIASTOLIC BLOOD PRESSURE: 74 MMHG | TEMPERATURE: 98 F | HEART RATE: 86 BPM | SYSTOLIC BLOOD PRESSURE: 131 MMHG | RESPIRATION RATE: 18 BRPM

## 2018-12-25 LAB
ANION GAP SERPL CALC-SCNC: 14 MMOL/L — SIGNIFICANT CHANGE UP (ref 5–17)
BUN SERPL-MCNC: 71 MG/DL — HIGH (ref 7–23)
CALCIUM SERPL-MCNC: 8.3 MG/DL — LOW (ref 8.4–10.5)
CHLORIDE SERPL-SCNC: 103 MMOL/L — SIGNIFICANT CHANGE UP (ref 96–108)
CO2 SERPL-SCNC: 21 MMOL/L — LOW (ref 22–31)
CREAT SERPL-MCNC: 7.25 MG/DL — HIGH (ref 0.5–1.3)
GLUCOSE SERPL-MCNC: 132 MG/DL — HIGH (ref 70–99)
HCT VFR BLD CALC: 25.7 % — LOW (ref 39–50)
HGB BLD-MCNC: 8.3 G/DL — LOW (ref 13–17)
MAGNESIUM SERPL-MCNC: 1.6 MG/DL — SIGNIFICANT CHANGE UP (ref 1.6–2.6)
MCHC RBC-ENTMCNC: 29 PG — SIGNIFICANT CHANGE UP (ref 27–34)
MCHC RBC-ENTMCNC: 32.3 GM/DL — SIGNIFICANT CHANGE UP (ref 32–36)
MCV RBC AUTO: 89.9 FL — SIGNIFICANT CHANGE UP (ref 80–100)
PHOSPHATE SERPL-MCNC: 4.7 MG/DL — HIGH (ref 2.5–4.5)
PLATELET # BLD AUTO: 134 K/UL — LOW (ref 150–400)
POTASSIUM SERPL-MCNC: 4.5 MMOL/L — SIGNIFICANT CHANGE UP (ref 3.5–5.3)
POTASSIUM SERPL-SCNC: 4.5 MMOL/L — SIGNIFICANT CHANGE UP (ref 3.5–5.3)
RBC # BLD: 2.86 M/UL — LOW (ref 4.2–5.8)
RBC # FLD: 12.8 % — SIGNIFICANT CHANGE UP (ref 10.3–14.5)
SODIUM SERPL-SCNC: 138 MMOL/L — SIGNIFICANT CHANGE UP (ref 135–145)
WBC # BLD: 4.93 K/UL — SIGNIFICANT CHANGE UP (ref 3.8–10.5)
WBC # FLD AUTO: 4.93 K/UL — SIGNIFICANT CHANGE UP (ref 3.8–10.5)

## 2018-12-25 PROCEDURE — 83735 ASSAY OF MAGNESIUM: CPT

## 2018-12-25 PROCEDURE — 74176 CT ABD & PELVIS W/O CONTRAST: CPT

## 2018-12-25 PROCEDURE — 81001 URINALYSIS AUTO W/SCOPE: CPT

## 2018-12-25 PROCEDURE — 80053 COMPREHEN METABOLIC PANEL: CPT

## 2018-12-25 PROCEDURE — 82330 ASSAY OF CALCIUM: CPT

## 2018-12-25 PROCEDURE — 85027 COMPLETE CBC AUTOMATED: CPT

## 2018-12-25 PROCEDURE — 80048 BASIC METABOLIC PNL TOTAL CA: CPT

## 2018-12-25 PROCEDURE — 84132 ASSAY OF SERUM POTASSIUM: CPT

## 2018-12-25 PROCEDURE — 83540 ASSAY OF IRON: CPT

## 2018-12-25 PROCEDURE — 84100 ASSAY OF PHOSPHORUS: CPT

## 2018-12-25 PROCEDURE — 71046 X-RAY EXAM CHEST 2 VIEWS: CPT

## 2018-12-25 PROCEDURE — 82435 ASSAY OF BLOOD CHLORIDE: CPT

## 2018-12-25 PROCEDURE — 87086 URINE CULTURE/COLONY COUNT: CPT

## 2018-12-25 PROCEDURE — 83605 ASSAY OF LACTIC ACID: CPT

## 2018-12-25 PROCEDURE — 85014 HEMATOCRIT: CPT

## 2018-12-25 PROCEDURE — 87040 BLOOD CULTURE FOR BACTERIA: CPT

## 2018-12-25 PROCEDURE — 96374 THER/PROPH/DIAG INJ IV PUSH: CPT

## 2018-12-25 PROCEDURE — 99285 EMERGENCY DEPT VISIT HI MDM: CPT | Mod: 25

## 2018-12-25 PROCEDURE — 82803 BLOOD GASES ANY COMBINATION: CPT

## 2018-12-25 PROCEDURE — 93005 ELECTROCARDIOGRAM TRACING: CPT

## 2018-12-25 PROCEDURE — 83550 IRON BINDING TEST: CPT

## 2018-12-25 PROCEDURE — 84295 ASSAY OF SERUM SODIUM: CPT

## 2018-12-25 PROCEDURE — 82947 ASSAY GLUCOSE BLOOD QUANT: CPT

## 2018-12-25 RX ORDER — METRONIDAZOLE 500 MG
500 TABLET ORAL ONCE
Qty: 0 | Refills: 0 | Status: COMPLETED | OUTPATIENT
Start: 2018-12-25 | End: 2018-12-25

## 2018-12-25 RX ORDER — LOSARTAN POTASSIUM 100 MG/1
50 TABLET, FILM COATED ORAL DAILY
Qty: 0 | Refills: 0 | Status: DISCONTINUED | OUTPATIENT
Start: 2018-12-25 | End: 2018-12-26

## 2018-12-25 RX ORDER — METRONIDAZOLE 500 MG
1 TABLET ORAL
Qty: 21 | Refills: 0
Start: 2018-12-25 | End: 2018-12-31

## 2018-12-25 RX ORDER — LOSARTAN POTASSIUM 100 MG/1
1 TABLET, FILM COATED ORAL
Qty: 30 | Refills: 0
Start: 2018-12-25 | End: 2019-01-23

## 2018-12-25 RX ORDER — CIPROFLOXACIN LACTATE 400MG/40ML
500 VIAL (ML) INTRAVENOUS ONCE
Qty: 0 | Refills: 0 | Status: DISCONTINUED | OUTPATIENT
Start: 2018-12-25 | End: 2018-12-25

## 2018-12-25 RX ORDER — CIPROFLOXACIN LACTATE 400MG/40ML
1 VIAL (ML) INTRAVENOUS
Qty: 7 | Refills: 0
Start: 2018-12-25 | End: 2018-12-31

## 2018-12-25 RX ORDER — LOSARTAN POTASSIUM 100 MG/1
1 TABLET, FILM COATED ORAL
Qty: 0 | Refills: 0 | COMMUNITY

## 2018-12-25 RX ORDER — LOSARTAN POTASSIUM 100 MG/1
1 TABLET, FILM COATED ORAL
Qty: 0 | Refills: 0 | COMMUNITY
Start: 2018-12-25

## 2018-12-25 RX ORDER — MAGNESIUM SULFATE 500 MG/ML
2 VIAL (ML) INJECTION ONCE
Qty: 0 | Refills: 0 | Status: COMPLETED | OUTPATIENT
Start: 2018-12-25 | End: 2018-12-25

## 2018-12-25 RX ADMIN — Medication 100 MILLIGRAM(S): at 13:35

## 2018-12-25 RX ADMIN — Medication 5 MILLIGRAM(S): at 06:31

## 2018-12-25 RX ADMIN — Medication 100 MILLIGRAM(S): at 06:30

## 2018-12-25 RX ADMIN — Medication 1950 MILLIGRAM(S): at 06:31

## 2018-12-25 RX ADMIN — Medication 1334 MILLIGRAM(S): at 08:59

## 2018-12-25 RX ADMIN — HEPARIN SODIUM 5000 UNIT(S): 5000 INJECTION INTRAVENOUS; SUBCUTANEOUS at 06:29

## 2018-12-25 RX ADMIN — Medication 100 MILLIGRAM(S): at 06:29

## 2018-12-25 RX ADMIN — Medication 50 GRAM(S): at 13:35

## 2018-12-25 RX ADMIN — Medication 1950 MILLIGRAM(S): at 17:26

## 2018-12-25 RX ADMIN — Medication 200 MILLIGRAM(S): at 17:34

## 2018-12-25 RX ADMIN — HEPARIN SODIUM 5000 UNIT(S): 5000 INJECTION INTRAVENOUS; SUBCUTANEOUS at 13:24

## 2018-12-25 RX ADMIN — Medication 5 MILLIGRAM(S): at 17:26

## 2018-12-25 RX ADMIN — SODIUM CHLORIDE 125 MILLILITER(S): 9 INJECTION, SOLUTION INTRAVENOUS at 13:24

## 2018-12-25 RX ADMIN — LOSARTAN POTASSIUM 50 MILLIGRAM(S): 100 TABLET, FILM COATED ORAL at 13:25

## 2018-12-25 RX ADMIN — Medication 50 MILLIGRAM(S): at 06:31

## 2018-12-25 RX ADMIN — Medication 500 MILLIGRAM(S): at 21:29

## 2018-12-25 RX ADMIN — Medication 1334 MILLIGRAM(S): at 13:24

## 2018-12-25 RX ADMIN — Medication 1334 MILLIGRAM(S): at 17:26

## 2018-12-25 RX ADMIN — Medication 100 MILLIGRAM(S): at 17:26

## 2018-12-25 NOTE — PROGRESS NOTE ADULT - ASSESSMENT
58M pmhx HTN, HLD, BPH, CKD5 (not on HD) who presents with 1 day of abd pain, found to have first episode of sigmoid diverticulitis, perforated.     PLAN:  - cont clears; advance diet as appropriate; IVF  - cont IV Abx: Cipro & flagyl (renally dosed)  - Serial abdominal exams; pain control after abdominal exams  - cont to appreciate renal/GI recs  - dvt ppx: heparin gtt; oob/ambulate as tolerated  - home meds 58M pmhx HTN, HLD, BPH, CKD5 (not on HD) who presents with 1 day of abd pain, found to have first episode of sigmoid diverticulitis, perforated.     PLAN:  - advance to LRD; IVF  - cont IV Abx: Cipro & flagyl (renally dosed)  - Serial abdominal exams; pain control after abdominal exams  - cont to appreciate renal/GI recs  - dvt ppx: heparin gtt; oob/ambulate as tolerated  - home meds  - dc planning for later today vs lisette

## 2018-12-25 NOTE — DISCHARGE NOTE ADULT - HOSPITAL COURSE
58M pmhx HTN, HLD, BPH, CKD 5 (not on HD; on transplant list), who presented on 12/22 with 1-2 days of lower abdominal pain and fevers of relatively sudden onset which was associated fevers and some loose stool. He denied any nausea or vomiting. bloody stool and urinary changes. His last colonoscopy was 4 years ago (GI - Dr. Balderrama), which reportedly was normal. A CT obtained in the ED revealed sigmoid diverticulitis. He was managed conservatively with bowel rest, IV fluids, and IV antibiotics (Cipro/Flagyl) with serial abdominal examination. He continued to remain stable and recover appropriately. His diet was advanced appropriately with patient tolerating diet. On day of discharge, patient remained hemodynamically stable with pain well controlled, tolerating low fiber diet without nausea or vomiting, ambulating and voiding appropriately, with positive bowel function.  He is expected to follow up with Dr. Little in office in 1-2 weeks. He is also expected to follow up with GI in 8 weeks for colonoscopy, vascular surgery (Dr. Chapin- [phone]) for AVF placement planning, and nephrology.

## 2018-12-25 NOTE — PROGRESS NOTE ADULT - PROBLEM SELECTOR PROBLEM 1
Diverticulitis
CKD (chronic kidney disease) stage 5, GFR less than 15 ml/min
Diverticulitis of large intestine with perforation without abscess or bleeding
Diverticulitis
(0) No drift; leg holds 30 degree position for full 5 secs

## 2018-12-25 NOTE — PROGRESS NOTE ADULT - SUBJECTIVE AND OBJECTIVE BOX
Patient is a 58y old  Male who presents with a chief complaint of ABD PAIN (24 Dec 2018 11:47)      SUBJECTIVE / OVERNIGHT EVENTS:    Events noted.  Ambulates  CONSTITUTIONAL: No fever,  or fatigue  NECK: No pain or stiffness  RESPIRATORY: No cough, wheezing, chills or hemoptysis; No shortness of breath  CARDIOVASCULAR: No chest pain, palpitations, dizziness, or leg swelling  GASTROINTESTINAL: Mild abd discomfort/ No N/V  NEUROLOGICAL: No headaches,     MEDICATIONS  (STANDING):  calcium acetate 1334 milliGRAM(s) Oral three times a day with meals  ciprofloxacin   IVPB      ciprofloxacin   IVPB 400 milliGRAM(s) IV Intermittent every 24 hours  dextrose 5% + sodium chloride 0.45%. 1000 milliLiter(s) (125 mL/Hr) IV Continuous <Continuous>  heparin  Injectable 5000 Unit(s) SubCutaneous every 8 hours  hydrALAZINE 100 milliGRAM(s) Oral two times a day  losartan 50 milliGRAM(s) Oral daily  metoprolol succinate ER 50 milliGRAM(s) Oral daily  metroNIDAZOLE  IVPB      metroNIDAZOLE  IVPB 500 milliGRAM(s) IV Intermittent every 8 hours  simvastatin 20 milliGRAM(s) Oral at bedtime  sodium bicarbonate 1950 milliGRAM(s) Oral two times a day  tamsulosin 0.4 milliGRAM(s) Oral at bedtime  torsemide 5 milliGRAM(s) Oral two times a day    MEDICATIONS  (PRN):        CAPILLARY BLOOD GLUCOSE        I&O's Summary    24 Dec 2018 07:01  -  25 Dec 2018 07:00  --------------------------------------------------------  IN: 4720 mL / OUT: 0 mL / NET: 4720 mL    25 Dec 2018 07:01  -  25 Dec 2018 15:32  --------------------------------------------------------  IN: 1670 mL / OUT: 600 mL / NET: 1070 mL        PHYSICAL EXAM:  GENERAL: NAD  NECK: Supple, No JVD  CHEST/LUNG: Clear to auscultation bilaterally; No wheezing.  HEART: Regular rate and rhythm; No murmurs, rubs, or gallops  ABDOMEN: Soft, Mild tenderness in LLQ  EXTREMITIES:   No clubbing, cyanosis, or edema  NEUROLOGY: AAO X 3      LABS:                        8.3    4.93  )-----------( 134      ( 25 Dec 2018 08:08 )             25.7     12-25    138  |  103  |  71<H>  ----------------------------<  132<H>  4.5   |  21<L>  |  7.25<H>    Ca    8.3<L>      25 Dec 2018 07:18  Phos  4.7     12-25  Mg     1.6     12-25              CAPILLARY BLOOD GLUCOSE        12-22 @ 15:06  Culture-urine --  Culture results   No growth  method type --  Organism --  Organism Identification --  Specimen source .Urine Clean Catch (Midstream)  12-22 @ 14:56  Culture-urine --  Culture results   No growth to date.  method type --  Organism --  Organism Identification --  Specimen source .Blood Blood-Peripheral           12-22 @ 15:06  Culture blood --  Culture results   No growth  Gram stain --  Gram stain blood --  Method type --  Organism --  Organism identification --  Specimen source .Urine Clean Catch (Midstream)   12-22 @ 14:56  Culture blood --  Culture results   No growth to date.  Gram stain --  Gram stain blood --  Method type --  Organism --  Organism identification --  Specimen source .Blood Blood-Peripheral      RADIOLOGY & ADDITIONAL TESTS:    Imaging Personally Reviewed:    Consultant(s) Notes Reviewed:      Care Discussed with Consultants/Other Providers:

## 2018-12-25 NOTE — PROGRESS NOTE ADULT - ASSESSMENT
58M pmhx HTN, HLD, BPH, CKD5 (not on HD) who presents with 1 day of abd pain, found to have first episode of sigmoid diverticulitis, perforated.     Acute Diverticulitis:    IV Cipro/Flagyl (Renal Dose)  Sx/GI f/up noted.  Tolerates po at present    CKD V:  Pt will f/up with vascular as OP for AVF.  Nephro f/up noted.  No need for HD at present    HTN:  BP stable  Cont hydralazine/Losartan/Metoprolol/Torsemida    Dw family   DC planning  if tolerates po.

## 2018-12-25 NOTE — PROGRESS NOTE ADULT - PROBLEM SELECTOR PLAN 1
diet advanced to clears, tolerating it well  cont iv antibiotics  pain control  iv fluids  cultures negative so far  will need outpatient colonoscopy in 8 weeks  d/w patient and family
CKD V, not on hemodialysis. SCr reamins in 7 range- pts baseline. No uremic symptoms.   - No need for HD at this time.  - Monitor BMP.  - Dose Abx per GFR.   - F/U Vascular for AVF placement for eventual need for dialysis.   - Avoid nephrotoxic meds and contrast studies  - C/w bicarb tabs for metabolic acidosis.  - C/w phos binders- monitor phos and calcium levels.
On IV abx   Surgical team follow up
tolerating clears, diet advanced to low fiber  cont iv antibiotics  pain control  iv fluids  cultures negative so far  will need outpatient colonoscopy in 8 weeks  d/w patient and family

## 2018-12-25 NOTE — DISCHARGE NOTE ADULT - PATIENT PORTAL LINK FT
You can access the [facility] Patient Portal, offered by [facility], by registering with the following website: [email or web address]

## 2018-12-25 NOTE — PROGRESS NOTE ADULT - PROBLEM SELECTOR PLAN 2
Advanced care planning was discussed with patient and family.  Advanced care planning forms were reviewed and discussed.  Risks, benefits and alternatives of gastroenterologic procedures were discussed in detail and all questions were answered.    30 minutes spent.
Advanced care planning was discussed with patient and family.  Advanced care planning forms were reviewed and discussed.  Risks, benefits and alternatives of gastroenterologic procedures were discussed in detail and all questions were answered.    30 minutes spent.
Controlled.  - Monitor BP.
nephrology following

## 2018-12-25 NOTE — PROGRESS NOTE ADULT - SUBJECTIVE AND OBJECTIVE BOX
General Surgery Progress Note    SUBJECTIVE:  The patient was seen and examined. No acute events overnight. Pain well controlled.  Denies n/v, cp, sob. Tolerating clears.    OBJECTIVE:     ** VITAL SIGNS / I&O's **    Vital Signs Last 24 Hrs  T(C): 36.6 (25 Dec 2018 06:20), Max: 36.9 (25 Dec 2018 00:41)  T(F): 97.8 (25 Dec 2018 06:20), Max: 98.5 (25 Dec 2018 00:41)  HR: 84 (25 Dec 2018 06:20) (78 - 89)  BP: 141/76 (25 Dec 2018 06:20) (114/67 - 141/76)  BP(mean): --  RR: 18 (25 Dec 2018 06:20) (18 - 18)  SpO2: 98% (25 Dec 2018 06:20) (96% - 98%)      24 Dec 2018 07:01  -  25 Dec 2018 07:00  --------------------------------------------------------  IN:    dextrose 5% + sodium chloride 0.45%.: 3000 mL    IV PiggyBack: 200 mL    Oral Fluid: 1220 mL    Solution: 300 mL  Total IN: 4720 mL    OUT:  Total OUT: 0 mL    Total NET: 4720 mL          ** PHYSICAL EXAM **    -- CONSTITUTIONAL: Alert, NAD  -- PULMONARY: non-labored respirations  -- ABDOMEN: soft, non-distended, mildly tender  -- NEURO: A&Ox3    ** LABS **                          8.1    4.93  )-----------( 118      ( 24 Dec 2018 08:20 )             25.8     25 Dec 2018 07:18    138    |  103    |  71     ----------------------------<  132    4.5     |  21     |  7.25     Ca    8.3        25 Dec 2018 07:18  Phos  4.7       25 Dec 2018 07:18  Mg     1.6       25 Dec 2018 07:18        CAPILLARY BLOOD GLUCOSE                Culture - Urine (collected 22 Dec 2018 15:06)  Source: .Urine Clean Catch (Midstream)  Final Report (23 Dec 2018 11:44):    No growth    Culture - Blood (collected 22 Dec 2018 14:56)  Source: .Blood Blood  Preliminary Report (23 Dec 2018 15:01):    No growth to date.    Culture - Blood (collected 22 Dec 2018 14:56)  Source: .Blood Blood-Peripheral  Preliminary Report (23 Dec 2018 15:01):    No growth to date.          MEDICATIONS  (STANDING):  calcium acetate 1334 milliGRAM(s) Oral three times a day with meals  ciprofloxacin   IVPB      ciprofloxacin   IVPB 400 milliGRAM(s) IV Intermittent every 24 hours  dextrose 5% + sodium chloride 0.45%. 1000 milliLiter(s) (125 mL/Hr) IV Continuous <Continuous>  heparin  Injectable 5000 Unit(s) SubCutaneous every 8 hours  hydrALAZINE 100 milliGRAM(s) Oral two times a day  influenza   Vaccine 0.5 milliLiter(s) IntraMuscular once  losartan 25 milliGRAM(s) Oral daily  metoprolol succinate ER 50 milliGRAM(s) Oral daily  metroNIDAZOLE  IVPB      metroNIDAZOLE  IVPB 500 milliGRAM(s) IV Intermittent every 8 hours  simvastatin 20 milliGRAM(s) Oral at bedtime  sodium bicarbonate 1950 milliGRAM(s) Oral two times a day  tamsulosin 0.4 milliGRAM(s) Oral at bedtime  torsemide 5 milliGRAM(s) Oral two times a day    MEDICATIONS  (PRN): General Surgery Progress Note    SUBJECTIVE:  The patient was seen and examined. No acute events overnight. Pain well controlled.  Denies n/v, cp, sob. Tolerating clears.    OBJECTIVE:     ** VITAL SIGNS / I&O's **    Vital Signs Last 24 Hrs  T(C): 36.6 (25 Dec 2018 06:20), Max: 36.9 (25 Dec 2018 00:41)  T(F): 97.8 (25 Dec 2018 06:20), Max: 98.5 (25 Dec 2018 00:41)  HR: 84 (25 Dec 2018 06:20) (78 - 89)  BP: 141/76 (25 Dec 2018 06:20) (114/67 - 141/76)  BP(mean): --  RR: 18 (25 Dec 2018 06:20) (18 - 18)  SpO2: 98% (25 Dec 2018 06:20) (96% - 98%)      24 Dec 2018 07:01  -  25 Dec 2018 07:00  --------------------------------------------------------  IN:    dextrose 5% + sodium chloride 0.45%.: 3000 mL    IV PiggyBack: 200 mL    Oral Fluid: 1220 mL    Solution: 300 mL  Total IN: 4720 mL    OUT:  Total OUT: 0 mL    Total NET: 4720 mL          ** PHYSICAL EXAM **    -- CONSTITUTIONAL: Alert, NAD  -- PULMONARY: non-labored respirations  -- ABDOMEN: soft, non-distended, decreased tenderness RLQ  -- NEURO: A&Ox3    ** LABS **                          8.1    4.93  )-----------( 118      ( 24 Dec 2018 08:20 )             25.8     25 Dec 2018 07:18    138    |  103    |  71     ----------------------------<  132    4.5     |  21     |  7.25     Ca    8.3        25 Dec 2018 07:18  Phos  4.7       25 Dec 2018 07:18  Mg     1.6       25 Dec 2018 07:18        CAPILLARY BLOOD GLUCOSE                Culture - Urine (collected 22 Dec 2018 15:06)  Source: .Urine Clean Catch (Midstream)  Final Report (23 Dec 2018 11:44):    No growth    Culture - Blood (collected 22 Dec 2018 14:56)  Source: .Blood Blood  Preliminary Report (23 Dec 2018 15:01):    No growth to date.    Culture - Blood (collected 22 Dec 2018 14:56)  Source: .Blood Blood-Peripheral  Preliminary Report (23 Dec 2018 15:01):    No growth to date.          MEDICATIONS  (STANDING):  calcium acetate 1334 milliGRAM(s) Oral three times a day with meals  ciprofloxacin   IVPB      ciprofloxacin   IVPB 400 milliGRAM(s) IV Intermittent every 24 hours  dextrose 5% + sodium chloride 0.45%. 1000 milliLiter(s) (125 mL/Hr) IV Continuous <Continuous>  heparin  Injectable 5000 Unit(s) SubCutaneous every 8 hours  hydrALAZINE 100 milliGRAM(s) Oral two times a day  influenza   Vaccine 0.5 milliLiter(s) IntraMuscular once  losartan 25 milliGRAM(s) Oral daily  metoprolol succinate ER 50 milliGRAM(s) Oral daily  metroNIDAZOLE  IVPB      metroNIDAZOLE  IVPB 500 milliGRAM(s) IV Intermittent every 8 hours  simvastatin 20 milliGRAM(s) Oral at bedtime  sodium bicarbonate 1950 milliGRAM(s) Oral two times a day  tamsulosin 0.4 milliGRAM(s) Oral at bedtime  torsemide 5 milliGRAM(s) Oral two times a day    MEDICATIONS  (PRN):

## 2018-12-25 NOTE — DISCHARGE NOTE ADULT - ADDITIONAL INSTRUCTIONS
ACTIVITY: No heavy lifting or straining. Otherwise, you may return to your usual level of physical activity.   DIET: low fiber diet  NOTIFY YOUR SURGEON IF: Fever (over 101 F), persistent nausea/vomiting, persistent diarrhea.  FOLLOW-UP VISITS:   Please follow up with Dr. Little in office in 1-2 weeks   Please follow up with Dr. Chapin (vascular surgeon) for AVF placement planning. Please call [phone] to set up appointment.  Please follow up with GI in 8 weeks for colonoscopy  Please follow up with outpatient nephrology  Please follow up with your primary care physician in one week regarding your hospitalization

## 2018-12-25 NOTE — PROGRESS NOTE ADULT - SUBJECTIVE AND OBJECTIVE BOX
Subjective: Patient seen and examined. No new events except as noted.   feeling better     REVIEW OF SYSTEMS:    CONSTITUTIONAL: No weakness, fevers or chills  EYES/ENT: No visual changes;  No vertigo or throat pain   NECK: No pain or stiffness  RESPIRATORY: No cough, wheezing, hemoptysis; No shortness of breath  CARDIOVASCULAR: No chest pain or palpitations  GASTROINTESTINAL: No abdominal or epigastric pain. No nausea, vomiting, or hematemesis; No diarrhea or constipation. No melena or hematochezia.  GENITOURINARY: No dysuria, frequency or hematuria  NEUROLOGICAL: No numbness or weakness  SKIN: No itching, burning, rashes, or lesions   All other review of systems is negative unless indicated above.    MEDICATIONS:  MEDICATIONS  (STANDING):  calcium acetate 1334 milliGRAM(s) Oral three times a day with meals  ciprofloxacin   IVPB      ciprofloxacin   IVPB 400 milliGRAM(s) IV Intermittent every 24 hours  dextrose 5% + sodium chloride 0.45%. 1000 milliLiter(s) (125 mL/Hr) IV Continuous <Continuous>  heparin  Injectable 5000 Unit(s) SubCutaneous every 8 hours  hydrALAZINE 100 milliGRAM(s) Oral two times a day  losartan 25 milliGRAM(s) Oral daily  metoprolol succinate ER 50 milliGRAM(s) Oral daily  metroNIDAZOLE  IVPB      metroNIDAZOLE  IVPB 500 milliGRAM(s) IV Intermittent every 8 hours  simvastatin 20 milliGRAM(s) Oral at bedtime  sodium bicarbonate 1950 milliGRAM(s) Oral two times a day  tamsulosin 0.4 milliGRAM(s) Oral at bedtime  torsemide 5 milliGRAM(s) Oral two times a day      PHYSICAL EXAM:  T(C): 36.5 (12-25-18 @ 08:40), Max: 36.9 (12-25-18 @ 00:41)  HR: 81 (12-25-18 @ 08:40) (78 - 89)  BP: 151/77 (12-25-18 @ 08:40) (114/67 - 151/77)  RR: 18 (12-25-18 @ 08:40) (18 - 18)  SpO2: 98% (12-25-18 @ 08:40) (96% - 98%)  Wt(kg): --  I&O's Summary    24 Dec 2018 07:01  -  25 Dec 2018 07:00  --------------------------------------------------------  IN: 4720 mL / OUT: 0 mL / NET: 4720 mL    25 Dec 2018 07:01  -  25 Dec 2018 12:37  --------------------------------------------------------  IN: 400 mL / OUT: 0 mL / NET: 400 mL          Appearance: Normal	  HEENT:   Normal oral mucosa, PERRL, EOMI	  Lymphatic: No lymphadenopathy , no edema  Cardiovascular: Normal S1 S2, No JVD, No murmurs , Peripheral pulses palpable 2+ bilaterally  Respiratory: Lungs clear to auscultation, normal effort 	  Gastrointestinal:  Soft, Non-tender, + BS	  Skin: No rashes, No ecchymoses, No cyanosis, warm to touch  Musculoskeletal: Normal range of motion, normal strength  Psychiatry:  Mood & affect appropriate  Ext: No edema      LABS:    CARDIAC MARKERS:                                8.3    4.93  )-----------( 134      ( 25 Dec 2018 08:08 )             25.7     12-25    138  |  103  |  71<H>  ----------------------------<  132<H>  4.5   |  21<L>  |  7.25<H>    Ca    8.3<L>      25 Dec 2018 07:18  Phos  4.7     12-25  Mg     1.6     12-25      proBNP:   Lipid Profile:   HgA1c:   TSH:     0          TELEMETRY: 	    ECG:  	  RADIOLOGY:   DIAGNOSTIC TESTING:  [ ] Echocardiogram:  [ ]  Catheterization:  [ ] Stress Test:    OTHER:

## 2018-12-25 NOTE — DISCHARGE NOTE ADULT - PLAN OF CARE
treat diverticulitis managed medically on Cipro/flagyl, bowel rest with diet advanced as tolerated. now recovering appropriately and stable for discharge.

## 2018-12-25 NOTE — PROGRESS NOTE ADULT - PROBLEM SELECTOR PROBLEM 2
ACP (advance care planning)
CKD (chronic kidney disease) stage 5, GFR less than 15 ml/min
HTN (hypertension)
ACP (advance care planning)

## 2018-12-25 NOTE — PROGRESS NOTE ADULT - PROVIDER SPECIALTY LIST ADULT
Cardiology
Gastroenterology
Internal Medicine
Internal Medicine
Nephrology
Surgery
Gastroenterology

## 2018-12-25 NOTE — DISCHARGE NOTE ADULT - MEDICATION SUMMARY - MEDICATIONS TO TAKE
I will START or STAY ON the medications listed below when I get home from the hospital:    Flagyl 500 mg oral tablet  -- 1 tab(s) by mouth every 8 hours   -- Do not drink alcoholic beverages when taking this medication.  Finish all this medication unless otherwise directed by prescriber.  May discolor urine or feces.    -- Indication: For Antibiotic    losartan 50 mg oral tablet  -- 1 tab(s) by mouth once a day  -- Indication: For Home med/AntiHTN    sodium bicarbonate  -- 2 gram(s) by mouth 2 times a day  -- Indication: For Home med    tamsulosin 0.4 mg oral capsule  -- 1 cap(s) by mouth once a day  -- Indication: For Home med    simvastatin 20 mg oral tablet  -- 1 tab(s) by mouth once a day (at bedtime)  -- Indication: For Home med    ezetimibe 10 mg oral tablet  -- 1 tab(s) by mouth once a day  -- Indication: For Home med    metoprolol succinate 50 mg oral tablet, extended release  -- 1 tab(s) by mouth once a day  -- Indication: For Home med    torsemide 5 mg oral tablet  -- 1 tab(s) by mouth 2 times a day  -- Indication: For Home med    calcium acetate 667 mg oral tablet  -- 2 tab(s) by mouth 3 times a day  -- Indication: For Home med    ciprofloxacin 500 mg oral tablet  -- 1 tab(s) by mouth once a day   -- Avoid prolonged or excessive exposure to direct and/or artificial sunlight while taking this medication.  Check with your doctor before becoming pregnant.  Do not take dairy products, antacids, or iron preparations within one hour of this medication.  Finish all this medication unless otherwise directed by prescriber.  Medication should be taken with plenty of water.    -- Indication: For Antibiotic    hydrALAZINE 100 mg oral tablet  -- 1 tab(s) by mouth 2 times a day  -- Indication: For Home med

## 2018-12-25 NOTE — DISCHARGE NOTE ADULT - CARE PROVIDER_API CALL
Manny Little (MD), Surgery  [address] [facility] [facility] [address]  [address]  [place] [address]  Phone: [phone]  Fax: [phone]

## 2018-12-25 NOTE — PROGRESS NOTE ADULT - SUBJECTIVE AND OBJECTIVE BOX
Interval Events: pt seen and examined with family at bedside, overall feeling better. pt denies abdominal pain, n/v  passing flatus and stool  tolerating clears, diet advanced to low residue     MEDICATIONS  (STANDING):  calcium acetate 1334 milliGRAM(s) Oral three times a day with meals  ciprofloxacin   IVPB      ciprofloxacin   IVPB 400 milliGRAM(s) IV Intermittent every 24 hours  dextrose 5% + sodium chloride 0.45%. 1000 milliLiter(s) (125 mL/Hr) IV Continuous <Continuous>  heparin  Injectable 5000 Unit(s) SubCutaneous every 8 hours  hydrALAZINE 100 milliGRAM(s) Oral two times a day  losartan 50 milliGRAM(s) Oral daily  metoprolol succinate ER 50 milliGRAM(s) Oral daily  metroNIDAZOLE  IVPB      metroNIDAZOLE  IVPB 500 milliGRAM(s) IV Intermittent every 8 hours  simvastatin 20 milliGRAM(s) Oral at bedtime  sodium bicarbonate 1950 milliGRAM(s) Oral two times a day  tamsulosin 0.4 milliGRAM(s) Oral at bedtime  torsemide 5 milliGRAM(s) Oral two times a day    MEDICATIONS  (PRN):      Allergies    No Known Allergies    Intolerances        Review of Systems:    General:  No wt loss, fevers, chills, night sweats,fatigue,   Eyes:  Good vision, no reported pain  ENT:  No sore throat, pain, runny nose, dysphagia  CV:  No pain, palpitations, hypo/hypertension  Resp:  No dyspnea, cough, tachypnea, wheezing  GI:  No pain, No nausea, No vomiting, No diarrhea, No constipation, No weight loss, No fever, No pruritis, No rectal bleeding, No melena, No dysphagia  :  No pain, bleeding, incontinence, nocturia  Muscle:  No pain, weakness  Neuro:  No weakness, tingling, memory problems  Psych:  No fatigue, insomnia, mood problems, depression  Endocrine:  No polyuria, polydypsia, cold/heat intolerance  Heme:  No petechiae, ecchymosis, easy bruisability  Skin:  No rash, tattoos, scars, edema      Vital Signs Last 24 Hrs  T(C): 36.9 (25 Dec 2018 14:07), Max: 36.9 (25 Dec 2018 00:41)  T(F): 98.5 (25 Dec 2018 14:07), Max: 98.5 (25 Dec 2018 00:41)  HR: 82 (25 Dec 2018 14:07) (78 - 89)  BP: 138/78 (25 Dec 2018 14:07) (123/71 - 151/77)  BP(mean): --  RR: 18 (25 Dec 2018 14:07) (18 - 18)  SpO2: 98% (25 Dec 2018 14:07) (96% - 98%)    PHYSICAL EXAM:    Constitutional: NAD, well-developed  HEENT: EOMI, throat clear  Neck: No LAD, supple  Respiratory: CTA and P  Cardiovascular: S1 and S2, RRR, no M  Gastrointestinal: BS+, soft, NT/ND, neg HSM,  Extremities: No peripheral edema, neg clubing, cyanosis  Vascular: 2+ peripheral pulses  Neurological: A/O x 3, no focal deficits  Psychiatric: Normal mood, normal affect  Skin: No rashes      LABS:                        8.3    4.93  )-----------( 134      ( 25 Dec 2018 08:08 )             25.7     12-25    138  |  103  |  71<H>  ----------------------------<  132<H>  4.5   |  21<L>  |  7.25<H>    Ca    8.3<L>      25 Dec 2018 07:18  Phos  4.7     12-25  Mg     1.6     12-25            RADIOLOGY & ADDITIONAL TESTS:

## 2018-12-27 LAB
CULTURE RESULTS: SIGNIFICANT CHANGE UP
CULTURE RESULTS: SIGNIFICANT CHANGE UP
SPECIMEN SOURCE: SIGNIFICANT CHANGE UP
SPECIMEN SOURCE: SIGNIFICANT CHANGE UP

## 2018-12-31 ENCOUNTER — APPOINTMENT (OUTPATIENT)
Dept: NEPHROLOGY | Facility: CLINIC | Age: 58
End: 2018-12-31

## 2019-01-19 ENCOUNTER — APPOINTMENT (OUTPATIENT)
Dept: CT IMAGING | Facility: IMAGING CENTER | Age: 59
End: 2019-01-19

## 2019-02-02 ENCOUNTER — APPOINTMENT (OUTPATIENT)
Dept: CT IMAGING | Facility: IMAGING CENTER | Age: 59
End: 2019-02-02
Payer: COMMERCIAL

## 2019-02-02 ENCOUNTER — OUTPATIENT (OUTPATIENT)
Dept: OUTPATIENT SERVICES | Facility: HOSPITAL | Age: 59
LOS: 1 days | End: 2019-02-02
Payer: COMMERCIAL

## 2019-02-02 DIAGNOSIS — Z98.890 OTHER SPECIFIED POSTPROCEDURAL STATES: Chronic | ICD-10-CM

## 2019-02-02 DIAGNOSIS — Z00.8 ENCOUNTER FOR OTHER GENERAL EXAMINATION: ICD-10-CM

## 2019-02-02 PROCEDURE — 74176 CT ABD & PELVIS W/O CONTRAST: CPT

## 2019-02-02 PROCEDURE — 74176 CT ABD & PELVIS W/O CONTRAST: CPT | Mod: 26

## 2019-03-06 ENCOUNTER — OTHER (OUTPATIENT)
Age: 59
End: 2019-03-06

## 2019-03-15 ENCOUNTER — APPOINTMENT (OUTPATIENT)
Dept: ULTRASOUND IMAGING | Facility: IMAGING CENTER | Age: 59
End: 2019-03-15
Payer: COMMERCIAL

## 2019-03-15 ENCOUNTER — APPOINTMENT (OUTPATIENT)
Dept: MAMMOGRAPHY | Facility: IMAGING CENTER | Age: 59
End: 2019-03-15
Payer: COMMERCIAL

## 2019-03-15 ENCOUNTER — OUTPATIENT (OUTPATIENT)
Dept: OUTPATIENT SERVICES | Facility: HOSPITAL | Age: 59
LOS: 1 days | End: 2019-03-15
Payer: COMMERCIAL

## 2019-03-15 DIAGNOSIS — Z00.8 ENCOUNTER FOR OTHER GENERAL EXAMINATION: ICD-10-CM

## 2019-03-15 DIAGNOSIS — Z98.890 OTHER SPECIFIED POSTPROCEDURAL STATES: Chronic | ICD-10-CM

## 2019-03-15 PROCEDURE — 76641 ULTRASOUND BREAST COMPLETE: CPT | Mod: 26,50

## 2019-03-15 PROCEDURE — 77066 DX MAMMO INCL CAD BI: CPT | Mod: 26

## 2019-03-15 PROCEDURE — G0279: CPT

## 2019-03-15 PROCEDURE — 77066 DX MAMMO INCL CAD BI: CPT

## 2019-03-15 PROCEDURE — G0279: CPT | Mod: 26

## 2019-03-15 PROCEDURE — 76641 ULTRASOUND BREAST COMPLETE: CPT

## 2019-03-23 ENCOUNTER — MEDICATION RENEWAL (OUTPATIENT)
Age: 59
End: 2019-03-23

## 2019-03-25 ENCOUNTER — APPOINTMENT (OUTPATIENT)
Dept: NEPHROLOGY | Facility: CLINIC | Age: 59
End: 2019-03-25
Payer: COMMERCIAL

## 2019-03-25 VITALS
BODY MASS INDEX: 31.39 KG/M2 | HEIGHT: 67 IN | SYSTOLIC BLOOD PRESSURE: 138 MMHG | HEART RATE: 68 BPM | WEIGHT: 200 LBS | OXYGEN SATURATION: 98 % | DIASTOLIC BLOOD PRESSURE: 83 MMHG

## 2019-03-25 PROCEDURE — 99214 OFFICE O/P EST MOD 30 MIN: CPT

## 2019-03-25 RX ORDER — CALCIUM ACETATE 667 MG
667 TABLET ORAL
Qty: 270 | Refills: 3 | Status: DISCONTINUED | COMMUNITY
Start: 2018-05-14 | End: 2019-03-25

## 2019-03-25 NOTE — HISTORY OF PRESENT ILLNESS
[FreeTextEntry1] : Patient is here for follow up. \par Reviewed lab data\par GFR 7 ml/min\par Noted elevated phosphorus.\par Had admission at [facility] in Dec 2018 for diverticulitis. CT scan from 2/2019- resolution of sigmoid diverticulitis.

## 2019-03-25 NOTE — REASON FOR VISIT
[Follow-Up] : a follow-up visit [Family Member] : family member [FreeTextEntry1] : chronic kidney disease,came for follow up and  lab review, took Kayexalate for for elevated K

## 2019-03-25 NOTE — END OF VISIT
[>50% of Time Spent on Counseling for ____] : Greater than 50% of the encounter time was spent on counseling for [unfilled] [Time Spent: ___ minutes] : I have spent [unfilled] minutes of face to face time with the patient [FreeTextEntry1] : AURELIANO Downs

## 2019-03-25 NOTE — ASSESSMENT
[FreeTextEntry1] : Advanced CKD: Discussed home dialysis, already talked to home dialysis program at [facility].\par He will also look into multi listing.\par Has no live donors.\par Follow up monthly/prn\par Hyperphosphatemia, advised Phoslo 2 tablets three times daily\par Hyperkalemia: Low K diet, Kayexalate now on. On Torsemide. Change to 10 mg once daily.\par BPH: Advised Gu follow up. Dr. Bhatia.\par He is listed for  donor kidney at [facility] transplant program. No potential live donors at present. Discussed multi listing and variability in wait times again.\par Also discussed decreased GFR and possible dialysis option- home hemodialysis with catheter access. He will consider seeing the home program . Discussed consequences of untreated azotemia \par Hypertension: Fairly controlled. Will continue current medications. Advised sodium restriction and daily physical activity.\par Hyperparathyroidism, elevated phosphorus: On Ca acetate.\par Health Maintenance: Discussed. advised to try and increase physical activity. Monitor weight. \par Continue sodium bicarbonate 1300 mg three times daily. (now taking only twice daily)\par I explained that without a live donor he is more likely to get a preemptive transplant if he goes to centers with less wait times.\par He has seen Dr. Bazan to update his cardiac evaluation and planning to make follow up appointment at transplant office for follow up visit.\par Discussed again Hep C pos organ/multi listing as  options.\par Anemia: referred to Dr. Pedraza

## 2019-03-27 ENCOUNTER — APPOINTMENT (OUTPATIENT)
Dept: PHYSICAL MEDICINE AND REHAB | Facility: CLINIC | Age: 59
End: 2019-03-27
Payer: COMMERCIAL

## 2019-03-27 VITALS
DIASTOLIC BLOOD PRESSURE: 77 MMHG | SYSTOLIC BLOOD PRESSURE: 133 MMHG | OXYGEN SATURATION: 100 % | TEMPERATURE: 97.9 F | HEART RATE: 79 BPM

## 2019-03-27 DIAGNOSIS — M25.552 PAIN IN RIGHT HIP: ICD-10-CM

## 2019-03-27 DIAGNOSIS — M25.551 PAIN IN RIGHT HIP: ICD-10-CM

## 2019-03-27 PROCEDURE — 99203 OFFICE O/P NEW LOW 30 MIN: CPT

## 2019-03-27 NOTE — PHYSICAL EXAM
[Normal] : Oriented to person, place, and time, insight and judgement were intact and the affect was normal [de-identified] : trace edema [de-identified] : mild effusion bl knees, tenderness w palpation of bl thighs; full ROM bl hips and knees; Neg Antr/Postr drawer and neg [place] bl.  [de-identified] : nml motor

## 2019-03-27 NOTE — ASSESSMENT
[FreeTextEntry1] : - X-rays of hips and knees to r/o OA\par - PT\par - Unable to treat w meds due to renal failure\par - f/u 6 wks- if no improvement will consider MRI of knee to r/o meniscal tear.

## 2019-03-27 NOTE — HISTORY OF PRESENT ILLNESS
[FreeTextEntry1] : Patient with history of CRF- on wait list for kidney- last creatinine 7.7\par Has had knee pain for several years.\par Of late has developed pain in the thighs as well as the knees.\par Achy in nature.\par Worse when sitting for a long time and gets up and feels knee pain and click when he descends stairs. \par Denies numbness or tingling.\par Non-traumatic onset.\par Pain 7/10 in severity.\par X-ray of knee in August 2015 reviewed and negative.

## 2019-04-01 LAB
25(OH)D3 SERPL-MCNC: 47.2 NG/ML
ALBUMIN SERPL ELPH-MCNC: 3.9 G/DL
ALBUMIN SERPL ELPH-MCNC: 4.1 G/DL
ALP BLD-CCNC: 84 U/L
ALT SERPL-CCNC: 13 U/L
ANION GAP SERPL CALC-SCNC: 15 MMOL/L
ANION GAP SERPL CALC-SCNC: 15 MMOL/L
APPEARANCE: CLEAR
AST SERPL-CCNC: 13 U/L
BACTERIA: NEGATIVE
BASOPHILS # BLD AUTO: 0.01 K/UL
BASOPHILS NFR BLD AUTO: 0.1 %
BILIRUB SERPL-MCNC: 0.2 MG/DL
BILIRUBIN URINE: NEGATIVE
BLOOD URINE: NORMAL
BUN SERPL-MCNC: 102 MG/DL
BUN SERPL-MCNC: 87 MG/DL
CALCIUM SERPL-MCNC: 8.1 MG/DL
CALCIUM SERPL-MCNC: 8.8 MG/DL
CALCIUM SERPL-MCNC: 8.8 MG/DL
CHLORIDE SERPL-SCNC: 108 MMOL/L
CHLORIDE SERPL-SCNC: 109 MMOL/L
CHOLEST SERPL-MCNC: 105 MG/DL
CHOLEST/HDLC SERPL: 2.2 RATIO
CO2 SERPL-SCNC: 17 MMOL/L
CO2 SERPL-SCNC: 20 MMOL/L
COLOR: NORMAL
CREAT SERPL-MCNC: 7.75 MG/DL
CREAT SERPL-MCNC: 7.92 MG/DL
CREAT SPEC-SCNC: 74 MG/DL
CREAT/PROT UR: 1.5 RATIO
EOSINOPHIL # BLD AUTO: 0.27 K/UL
EOSINOPHIL NFR BLD AUTO: 3.7 %
GLUCOSE QUALITATIVE U: NEGATIVE
GLUCOSE SERPL-MCNC: 105 MG/DL
GLUCOSE SERPL-MCNC: 105 MG/DL
HBA1C MFR BLD HPLC: 5.4 %
HBV SURFACE AG SER QL: NONREACTIVE
HCT VFR BLD CALC: 30.1 %
HDLC SERPL-MCNC: 47 MG/DL
HGB BLD-MCNC: 8.8 G/DL
HYALINE CASTS: 1 /LPF
IMM GRANULOCYTES NFR BLD AUTO: 0.3 %
IRON SATN MFR SERPL: 17 %
IRON SERPL-MCNC: 47 UG/DL
KETONES URINE: NEGATIVE
LDLC SERPL CALC-MCNC: 41 MG/DL
LEUKOCYTE ESTERASE URINE: NEGATIVE
LYMPHOCYTES # BLD AUTO: 1.72 K/UL
LYMPHOCYTES NFR BLD AUTO: 23.4 %
MAGNESIUM SERPL-MCNC: 2 MG/DL
MAN DIFF?: NORMAL
MCHC RBC-ENTMCNC: 28.9 PG
MCHC RBC-ENTMCNC: 29.2 GM/DL
MCV RBC AUTO: 98.7 FL
MICROSCOPIC-UA: NORMAL
MONOCYTES # BLD AUTO: 0.55 K/UL
MONOCYTES NFR BLD AUTO: 7.5 %
NEUTROPHILS # BLD AUTO: 4.77 K/UL
NEUTROPHILS NFR BLD AUTO: 65 %
NITRITE URINE: NEGATIVE
PARATHYROID HORMONE INTACT: 559 PG/ML
PH URINE: 6
PHOSPHATE SERPL-MCNC: 7.1 MG/DL
PHOSPHATE SERPL-MCNC: 7.4 MG/DL
PLATELET # BLD AUTO: 140 K/UL
POTASSIUM SERPL-SCNC: 4.8 MMOL/L
POTASSIUM SERPL-SCNC: 6.4 MMOL/L
PROT SERPL-MCNC: 6.6 G/DL
PROT UR-MCNC: 112 MG/DL
PROTEIN URINE: ABNORMAL
RBC # BLD: 3.05 M/UL
RBC # FLD: 13.8 %
RED BLOOD CELLS URINE: 5 /HPF
SODIUM SERPL-SCNC: 141 MMOL/L
SODIUM SERPL-SCNC: 143 MMOL/L
SPECIFIC GRAVITY URINE: 1.01
SQUAMOUS EPITHELIAL CELLS: 1 /HPF
TIBC SERPL-MCNC: 281 UG/DL
TRIGL SERPL-MCNC: 84 MG/DL
UIBC SERPL-MCNC: 234 UG/DL
URATE SERPL-MCNC: 5.3 MG/DL
UROBILINOGEN URINE: NORMAL
WBC # FLD AUTO: 7.34 K/UL
WHITE BLOOD CELLS URINE: 2 /HPF

## 2019-04-03 ENCOUNTER — TRANSCRIPTION ENCOUNTER (OUTPATIENT)
Age: 59
End: 2019-04-03

## 2019-04-12 ENCOUNTER — FORM ENCOUNTER (OUTPATIENT)
Age: 59
End: 2019-04-12

## 2019-04-13 ENCOUNTER — APPOINTMENT (OUTPATIENT)
Dept: RADIOLOGY | Facility: IMAGING CENTER | Age: 59
End: 2019-04-13
Payer: COMMERCIAL

## 2019-04-13 ENCOUNTER — OUTPATIENT (OUTPATIENT)
Dept: OUTPATIENT SERVICES | Facility: HOSPITAL | Age: 59
LOS: 1 days | End: 2019-04-13
Payer: COMMERCIAL

## 2019-04-13 DIAGNOSIS — Z98.890 OTHER SPECIFIED POSTPROCEDURAL STATES: Chronic | ICD-10-CM

## 2019-04-13 DIAGNOSIS — M25.561 PAIN IN RIGHT KNEE: ICD-10-CM

## 2019-04-13 PROCEDURE — 73502 X-RAY EXAM HIP UNI 2-3 VIEWS: CPT

## 2019-04-13 PROCEDURE — 73562 X-RAY EXAM OF KNEE 3: CPT | Mod: 26,50

## 2019-04-13 PROCEDURE — 73522 X-RAY EXAM HIPS BI 3-4 VIEWS: CPT | Mod: 26

## 2019-04-13 PROCEDURE — 73562 X-RAY EXAM OF KNEE 3: CPT

## 2019-05-20 ENCOUNTER — APPOINTMENT (OUTPATIENT)
Dept: NEPHROLOGY | Facility: CLINIC | Age: 59
End: 2019-05-20
Payer: COMMERCIAL

## 2019-05-20 VITALS
DIASTOLIC BLOOD PRESSURE: 72 MMHG | OXYGEN SATURATION: 98 % | HEART RATE: 78 BPM | BODY MASS INDEX: 30.8 KG/M2 | SYSTOLIC BLOOD PRESSURE: 127 MMHG | HEIGHT: 67 IN | WEIGHT: 196.21 LBS

## 2019-05-20 LAB
25(OH)D3 SERPL-MCNC: 52.3 NG/ML
ALBUMIN SERPL ELPH-MCNC: 3.9 G/DL
ALP BLD-CCNC: 87 U/L
ALT SERPL-CCNC: 8 U/L
ANION GAP SERPL CALC-SCNC: 17 MMOL/L
APPEARANCE: CLEAR
AST SERPL-CCNC: 8 U/L
BACTERIA: NEGATIVE
BASOPHILS # BLD AUTO: 0.04 K/UL
BASOPHILS NFR BLD AUTO: 0.7 %
BILIRUB SERPL-MCNC: 0.4 MG/DL
BILIRUBIN URINE: NEGATIVE
BLOOD URINE: NORMAL
BUN SERPL-MCNC: 94 MG/DL
CALCIUM SERPL-MCNC: 8.6 MG/DL
CALCIUM SERPL-MCNC: 8.6 MG/DL
CHLORIDE SERPL-SCNC: 109 MMOL/L
CHOLEST SERPL-MCNC: 107 MG/DL
CHOLEST/HDLC SERPL: 2.6 RATIO
CO2 SERPL-SCNC: 16 MMOL/L
COLOR: NORMAL
CREAT SERPL-MCNC: 9.08 MG/DL
EOSINOPHIL # BLD AUTO: 0.19 K/UL
EOSINOPHIL NFR BLD AUTO: 3.2 %
ESTIMATED AVERAGE GLUCOSE: 105 MG/DL
GLUCOSE QUALITATIVE U: NEGATIVE
GLUCOSE SERPL-MCNC: 106 MG/DL
HBA1C MFR BLD HPLC: 5.3 %
HCT VFR BLD CALC: 29.3 %
HDLC SERPL-MCNC: 41 MG/DL
HGB BLD-MCNC: 8.9 G/DL
HYALINE CASTS: 0 /LPF
IMM GRANULOCYTES NFR BLD AUTO: 0.5 %
KETONES URINE: NEGATIVE
LDLC SERPL CALC-MCNC: 52 MG/DL
LEUKOCYTE ESTERASE URINE: NEGATIVE
LYMPHOCYTES # BLD AUTO: 1.55 K/UL
LYMPHOCYTES NFR BLD AUTO: 26.4 %
MAGNESIUM SERPL-MCNC: 2.4 MG/DL
MAN DIFF?: NORMAL
MCHC RBC-ENTMCNC: 29.4 PG
MCHC RBC-ENTMCNC: 30.4 GM/DL
MCV RBC AUTO: 96.7 FL
MICROSCOPIC-UA: NORMAL
MONOCYTES # BLD AUTO: 0.53 K/UL
MONOCYTES NFR BLD AUTO: 9 %
NEUTROPHILS # BLD AUTO: 3.53 K/UL
NEUTROPHILS NFR BLD AUTO: 60.2 %
NITRITE URINE: NEGATIVE
PARATHYROID HORMONE INTACT: 555 PG/ML
PH URINE: 6
PHOSPHATE SERPL-MCNC: 8.1 MG/DL
PLATELET # BLD AUTO: 134 K/UL
POTASSIUM SERPL-SCNC: 6.1 MMOL/L
PROT SERPL-MCNC: 6.3 G/DL
PROTEIN URINE: ABNORMAL
RBC # BLD: 3.03 M/UL
RBC # FLD: 13 %
RED BLOOD CELLS URINE: 4 /HPF
SODIUM SERPL-SCNC: 142 MMOL/L
SPECIFIC GRAVITY URINE: 1.01
SQUAMOUS EPITHELIAL CELLS: 1 /HPF
TRIGL SERPL-MCNC: 72 MG/DL
URATE SERPL-MCNC: 5.1 MG/DL
UROBILINOGEN URINE: NORMAL
WBC # FLD AUTO: 5.87 K/UL
WHITE BLOOD CELLS URINE: 2 /HPF

## 2019-05-20 PROCEDURE — 99214 OFFICE O/P EST MOD 30 MIN: CPT

## 2019-05-20 NOTE — HISTORY OF PRESENT ILLNESS
[FreeTextEntry1] : Patient is here for follow up. \par Reviewed lab data\par GFR 8 ml/min\par Noted elevated phosphorus and potassium\par Appetite normal. reports leg cramps and easy fatigue.

## 2019-05-20 NOTE — PHYSICAL EXAM
[General Appearance - Alert] : alert [General Appearance - In No Acute Distress] : in no acute distress [Sclera] : the sclera and conjunctiva were normal [PERRL With Normal Accommodation] : pupils were equal in size, round, and reactive to light [Extraocular Movements] : extraocular movements were intact [Outer Ear] : the ears and nose were normal in appearance [Oropharynx] : the oropharynx was normal [Neck Appearance] : the appearance of the neck was normal [Neck Cervical Mass (___cm)] : no neck mass was observed [Jugular Venous Distention Increased] : there was no jugular-venous distention [Thyroid Diffuse Enlargement] : the thyroid was not enlarged [Thyroid Nodule] : there were no palpable thyroid nodules [Auscultation Breath Sounds / Voice Sounds] : lungs were clear to auscultation bilaterally [Heart Rate And Rhythm] : heart rate was normal and rhythm regular [Heart Sounds] : normal S1 and S2 [Heart Sounds Gallop] : no gallops [Heart Sounds Pericardial Friction Rub] : no pericardial rub [Full Pulse] : the pedal pulses are present [Edema] : there was no peripheral edema [Bowel Sounds] : normal bowel sounds [Abdomen Soft] : soft [Abdomen Tenderness] : non-tender [Abdomen Mass (___ Cm)] : no abdominal mass palpated [Normal Sphincter Tone] : normal sphincter tone [No Rectal Mass] : no rectal mass [Cervical Lymph Nodes Enlarged Posterior Bilaterally] : posterior cervical [Cervical Lymph Nodes Enlarged Anterior Bilaterally] : anterior cervical [Supraclavicular Lymph Nodes Enlarged Bilaterally] : supraclavicular [Axillary Lymph Nodes Enlarged Bilaterally] : axillary [Abnormal Walk] : normal gait [Nail Clubbing] : no clubbing  or cyanosis of the fingernails [Musculoskeletal - Swelling] : no joint swelling seen [Motor Tone] : muscle strength and tone were normal [Skin Color & Pigmentation] : normal skin color and pigmentation [Skin Turgor] : normal skin turgor [] : no rash [Deep Tendon Reflexes (DTR)] : deep tendon reflexes were 2+ and symmetric [Sensation] : the sensory exam was normal to light touch and pinprick [No Focal Deficits] : no focal deficits [Oriented To Time, Place, And Person] : oriented to person, place, and time [Impaired Insight] : insight and judgment were intact [Affect] : the affect was normal [FreeTextEntry1] : No rub [Occult Blood Positive] : stool was negative for occult blood

## 2019-05-20 NOTE — ASSESSMENT
[FreeTextEntry1] : Advanced CKD: Discussed home dialysis, already talked to home dialysis program at [facility].\par He is also explained variable wait times and earlier transplant with Hep C kidneys.\par Has no live donors.\par Follow up monthly/prn\par Hyperphosphatemia, advised to continue Phoslo 2 tablets three times daily- has been taking only one.\par Hyperkalemia: Low K diet, Kayexalate now on X 3 days. On Torsemide. Change to 10 mg twice daily.\par BPH: Advised  follow up. Dr. Bhatia.\par He is listed for  donor kidney at [facility] transplant program. No potential live donors at present. Discussed multi listing and variability in wait times again.\par Also discussed decreased GFR and possible dialysis option- home hemodialysis with catheter access. He will consider seeing the home program . Discussed consequences of untreated azotemia \par Hypertension: Fairly controlled. Will continue current medications. Advised sodium restriction and daily physical activity.\par Hyperparathyroidism, elevated phosphorus: On Ca acetate.\par Health Maintenance: Discussed. advised to try and increase physical activity. Monitor weight. \par Continue sodium bicarbonate 1300 mg three times daily. (now taking only twice daily)\par I explained that without a live donor he is more likely to get a preemptive transplant if he goes to centers with less wait times or signs up for Hep c kidney.\par He has seen Dr. Bazan to update his cardiac evaluation and planning to make follow up appointment at transplant office for follow up visit.\par Discussed again Hep C pos organ/multi listing as  options.\par Anemia: referred to Dr. Pedraza, has had one injection. Has follow up appointment.

## 2019-05-20 NOTE — REASON FOR VISIT
[Follow-Up] : a follow-up visit [Family Member] : family member [FreeTextEntry1] : chronic kidney disease,came for follow up and  lab review

## 2019-05-30 ENCOUNTER — APPOINTMENT (OUTPATIENT)
Dept: TRANSPLANT | Facility: CLINIC | Age: 59
End: 2019-05-30
Payer: COMMERCIAL

## 2019-05-30 PROCEDURE — 99215 OFFICE O/P EST HI 40 MIN: CPT

## 2019-05-31 LAB
ABO + RH PNL BLD: NORMAL
ALBUMIN SERPL ELPH-MCNC: 4.4 G/DL
ALP BLD-CCNC: 92 U/L
ALT SERPL-CCNC: 10 U/L
ANION GAP SERPL CALC-SCNC: 11 MMOL/L
APPEARANCE: CLEAR
AST SERPL-CCNC: 10 U/L
BACTERIA: NEGATIVE
BASOPHILS # BLD AUTO: 0.03 K/UL
BASOPHILS NFR BLD AUTO: 0.5 %
BILIRUB SERPL-MCNC: 0.3 MG/DL
BILIRUBIN URINE: NEGATIVE
BLOOD URINE: NEGATIVE
BUN SERPL-MCNC: 73 MG/DL
C PEPTIDE SERPL-MCNC: 10.2 NG/ML
CALCIUM SERPL-MCNC: 9 MG/DL
CHLORIDE SERPL-SCNC: 106 MMOL/L
CO2 SERPL-SCNC: 22 MMOL/L
COLOR: COLORLESS
CREAT SERPL-MCNC: 8.26 MG/DL
CREAT SPEC-SCNC: 55 MG/DL
CREAT/PROT UR: 2.2 RATIO
EBV DNA SERPL NAA+PROBE-ACNC: NOT DETECTED
EBVPCR LOG: NOT DETECTED LOGIU/ML
EOSINOPHIL # BLD AUTO: 0.17 K/UL
EOSINOPHIL NFR BLD AUTO: 2.8 %
ESTIMATED AVERAGE GLUCOSE: 103 MG/DL
GLUCOSE QUALITATIVE U: NEGATIVE
GLUCOSE SERPL-MCNC: 80 MG/DL
HAV IGM SER QL: NONREACTIVE
HBA1C MFR BLD HPLC: 5.2 %
HBV CORE IGG+IGM SER QL: NONREACTIVE
HBV SURFACE AB SER QL: REACTIVE
HBV SURFACE AG SER QL: NONREACTIVE
HCT VFR BLD CALC: 32.4 %
HCV AB SER QL: NONREACTIVE
HCV S/CO RATIO: 0.12 S/CO
HGB BLD-MCNC: 9.7 G/DL
HIV1+2 AB SPEC QL IA.RAPID: NONREACTIVE
HYALINE CASTS: 0 /LPF
IMM GRANULOCYTES NFR BLD AUTO: 0.3 %
KETONES URINE: NEGATIVE
LEUKOCYTE ESTERASE URINE: NEGATIVE
LYMPHOCYTES # BLD AUTO: 1.29 K/UL
LYMPHOCYTES NFR BLD AUTO: 20.9 %
MAGNESIUM SERPL-MCNC: 2.3 MG/DL
MAN DIFF?: NORMAL
MCHC RBC-ENTMCNC: 29.9 GM/DL
MCHC RBC-ENTMCNC: 29.9 PG
MCV RBC AUTO: 100 FL
MICROSCOPIC-UA: NORMAL
MONOCYTES # BLD AUTO: 0.44 K/UL
MONOCYTES NFR BLD AUTO: 7.1 %
NEUTROPHILS # BLD AUTO: 4.22 K/UL
NEUTROPHILS NFR BLD AUTO: 68.4 %
NITRITE URINE: NEGATIVE
PH URINE: 7
PHOSPHATE SERPL-MCNC: 5.3 MG/DL
PLATELET # BLD AUTO: 158 K/UL
POTASSIUM SERPL-SCNC: 6 MMOL/L
PROT SERPL-MCNC: 6.8 G/DL
PROT UR-MCNC: 118 MG/DL
PROTEIN URINE: ABNORMAL
PSA SERPL-MCNC: 0.87 NG/ML
RBC # BLD: 3.24 M/UL
RBC # FLD: 12.9 %
RED BLOOD CELLS URINE: 3 /HPF
SODIUM SERPL-SCNC: 139 MMOL/L
SPECIFIC GRAVITY URINE: 1.01
SQUAMOUS EPITHELIAL CELLS: 0 /HPF
T PALLIDUM AB SER QL IA: NEGATIVE
URATE SERPL-MCNC: 4.4 MG/DL
UROBILINOGEN URINE: NORMAL
WBC # FLD AUTO: 6.17 K/UL
WHITE BLOOD CELLS URINE: 1 /HPF

## 2019-06-02 ENCOUNTER — MOBILE ON CALL (OUTPATIENT)
Age: 59
End: 2019-06-02

## 2019-06-03 LAB
CMV IGG SERPL QL: 9.7 U/ML
CMV IGG SERPL-IMP: POSITIVE
EBV EA AB SER IA-ACNC: 86.1 U/ML
EBV EA AB TITR SER IF: POSITIVE
EBV EA IGG SER QL IA: 150 U/ML
EBV EA IGG SER-ACNC: POSITIVE
EBV EA IGM SER IA-ACNC: NEGATIVE
EBV PATRN SPEC IB-IMP: NORMAL
EBV VCA IGG SER IA-ACNC: >750 U/ML
EBV VCA IGM SER QL IA: <10 U/ML
EPSTEIN-BARR VIRUS CAPSID ANTIGEN IGG: POSITIVE
HSV 1+2 IGG SER IA-IMP: NEGATIVE
HSV 1+2 IGG SER IA-IMP: POSITIVE
HSV1 IGG SER QL: >62.2 INDEX
HSV2 IGG SER QL: 0.22 INDEX
M TB IFN-G BLD-IMP: NEGATIVE
QUANTIFERON TB PLUS MITOGEN MINUS NIL: 9.98 IU/ML
QUANTIFERON TB PLUS NIL: 0.03 IU/ML
QUANTIFERON TB PLUS TB1 MINUS NIL: 0.03 IU/ML
QUANTIFERON TB PLUS TB2 MINUS NIL: 0.02 IU/ML
RUBV IGG FLD-ACNC: 24.4 INDEX
RUBV IGG SER-IMP: POSITIVE
T GONDII AB SER-IMP: POSITIVE
T GONDII IGG SER QL: 63.8 IU/ML
VZV AB TITR SER: POSITIVE
VZV IGG SER IF-ACNC: >4000 INDEX

## 2019-06-05 ENCOUNTER — APPOINTMENT (OUTPATIENT)
Dept: OPHTHALMOLOGY | Facility: CLINIC | Age: 59
End: 2019-06-05
Payer: COMMERCIAL

## 2019-06-05 DIAGNOSIS — H40.003 PREGLAUCOMA, UNSPECIFIED, BILATERAL: ICD-10-CM

## 2019-06-05 PROCEDURE — 92133 CPTRZD OPH DX IMG PST SGM ON: CPT

## 2019-06-05 PROCEDURE — 92004 COMPRE OPH EXAM NEW PT 1/>: CPT

## 2019-06-17 ENCOUNTER — APPOINTMENT (OUTPATIENT)
Dept: RADIOLOGY | Facility: CLINIC | Age: 59
End: 2019-06-17

## 2019-06-17 ENCOUNTER — APPOINTMENT (OUTPATIENT)
Dept: ULTRASOUND IMAGING | Facility: CLINIC | Age: 59
End: 2019-06-17

## 2019-06-19 ENCOUNTER — APPOINTMENT (OUTPATIENT)
Dept: CARDIOLOGY | Facility: CLINIC | Age: 59
End: 2019-06-19
Payer: SUBSIDIZED

## 2019-06-19 ENCOUNTER — NON-APPOINTMENT (OUTPATIENT)
Age: 59
End: 2019-06-19

## 2019-06-19 VITALS
BODY MASS INDEX: 30.31 KG/M2 | DIASTOLIC BLOOD PRESSURE: 87 MMHG | RESPIRATION RATE: 16 BRPM | SYSTOLIC BLOOD PRESSURE: 147 MMHG | HEIGHT: 68 IN | WEIGHT: 200 LBS | OXYGEN SATURATION: 100 % | HEART RATE: 70 BPM

## 2019-06-19 PROCEDURE — 99213 OFFICE O/P EST LOW 20 MIN: CPT

## 2019-06-19 PROCEDURE — 93000 ELECTROCARDIOGRAM COMPLETE: CPT

## 2019-06-21 NOTE — PHYSICAL EXAM
[General Appearance - Well Developed] : well developed [Normal Appearance] : normal appearance [Well Groomed] : well groomed [General Appearance - Well Nourished] : well nourished [General Appearance - In No Acute Distress] : no acute distress [No Deformities] : no deformities [Normal Conjunctiva] : the conjunctiva exhibited no abnormalities [Eyelids - No Xanthelasma] : the eyelids demonstrated no xanthelasmas [No Oral Pallor] : no oral pallor [No Oral Cyanosis] : no oral cyanosis [Normal Oral Mucosa] : normal oral mucosa [Respiration, Rhythm And Depth] : normal respiratory rhythm and effort [Exaggerated Use Of Accessory Muscles For Inspiration] : no accessory muscle use [Auscultation Breath Sounds / Voice Sounds] : lungs were clear to auscultation bilaterally [Heart Rate And Rhythm] : heart rate and rhythm were normal [Murmurs] : no murmurs present [Heart Sounds] : normal S1 and S2 [Edema] : no peripheral edema present [Abdomen Soft] : soft [Abdomen Tenderness] : non-tender [Gait - Sufficient For Exercise Testing] : the gait was sufficient for exercise testing [Abnormal Walk] : normal gait [Cyanosis, Localized] : no localized cyanosis [Skin Color & Pigmentation] : normal skin color and pigmentation [] : no rash [No Venous Stasis] : no venous stasis [Affect] : the affect was normal [Oriented To Time, Place, And Person] : oriented to person, place, and time [No Anxiety] : not feeling anxious [Mood] : the mood was normal [FreeTextEntry1] : no carotid bruits or JVD

## 2019-06-21 NOTE — HISTORY OF PRESENT ILLNESS
[FreeTextEntry1] : Doing okay. Denies chest pain, shortness of breath or palpitations. Complains of fatigue. Can only walk about 1-2 blocks before getting tired.

## 2019-06-21 NOTE — DISCUSSION/SUMMARY
[Hypertension] : hypertension [FreeTextEntry1] : \par Currently stable from a cardiovascular standpoint. Hypertensive. Appears relatively euvolemic. No ischemic or CHF symptoms. ECG completed today and reviewed. Continue current medications. Will schedule a pharmacologic nuclear stress test to rule out any significant CAD. In addition, will schedule an echocardiogram to reassess his cardiac structures and function. At this time, patient is considered an acceptable risk from a cardiac standpoint for renal transplant. Follow up annually pre-transplant.

## 2019-06-24 ENCOUNTER — OTHER (OUTPATIENT)
Age: 59
End: 2019-06-24

## 2019-07-01 ENCOUNTER — APPOINTMENT (OUTPATIENT)
Dept: NEPHROLOGY | Facility: CLINIC | Age: 59
End: 2019-07-01
Payer: COMMERCIAL

## 2019-07-01 VITALS
HEART RATE: 74 BPM | OXYGEN SATURATION: 98 % | DIASTOLIC BLOOD PRESSURE: 87 MMHG | HEIGHT: 68 IN | SYSTOLIC BLOOD PRESSURE: 152 MMHG | WEIGHT: 200.62 LBS | BODY MASS INDEX: 30.41 KG/M2

## 2019-07-01 PROCEDURE — 99214 OFFICE O/P EST MOD 30 MIN: CPT

## 2019-07-02 LAB
ALBUMIN SERPL ELPH-MCNC: 4.2 G/DL
ANION GAP SERPL CALC-SCNC: 16 MMOL/L
ANION GAP SERPL CALC-SCNC: 18 MMOL/L
BASOPHILS # BLD AUTO: 0.03 K/UL
BASOPHILS NFR BLD AUTO: 0.5 %
BUN SERPL-MCNC: 101 MG/DL
BUN SERPL-MCNC: 105 MG/DL
CALCIUM SERPL-MCNC: 7.7 MG/DL
CALCIUM SERPL-MCNC: 8.1 MG/DL
CALCIUM SERPL-MCNC: 8.1 MG/DL
CHLORIDE SERPL-SCNC: 103 MMOL/L
CHLORIDE SERPL-SCNC: 109 MMOL/L
CO2 SERPL-SCNC: 16 MMOL/L
CO2 SERPL-SCNC: 18 MMOL/L
CREAT SERPL-MCNC: 10.15 MG/DL
CREAT SERPL-MCNC: 10.3 MG/DL
EOSINOPHIL # BLD AUTO: 0.27 K/UL
EOSINOPHIL NFR BLD AUTO: 4.1 %
GLUCOSE SERPL-MCNC: 96 MG/DL
GLUCOSE SERPL-MCNC: 99 MG/DL
HCT VFR BLD CALC: 31.2 %
HGB BLD-MCNC: 9.5 G/DL
IMM GRANULOCYTES NFR BLD AUTO: 0.3 %
LYMPHOCYTES # BLD AUTO: 1.53 K/UL
LYMPHOCYTES NFR BLD AUTO: 23.4 %
MAN DIFF?: NORMAL
MCHC RBC-ENTMCNC: 30.2 PG
MCHC RBC-ENTMCNC: 30.4 GM/DL
MCV RBC AUTO: 99 FL
MONOCYTES # BLD AUTO: 0.51 K/UL
MONOCYTES NFR BLD AUTO: 7.8 %
NEUTROPHILS # BLD AUTO: 4.17 K/UL
NEUTROPHILS NFR BLD AUTO: 63.9 %
PARATHYROID HORMONE INTACT: 652 PG/ML
PHOSPHATE SERPL-MCNC: 8.1 MG/DL
PLATELET # BLD AUTO: 156 K/UL
POTASSIUM SERPL-SCNC: 4.9 MMOL/L
POTASSIUM SERPL-SCNC: 6.6 MMOL/L
RBC # BLD: 3.15 M/UL
RBC # FLD: 13.6 %
SODIUM SERPL-SCNC: 139 MMOL/L
SODIUM SERPL-SCNC: 141 MMOL/L
WBC # FLD AUTO: 6.53 K/UL

## 2019-07-02 NOTE — HISTORY OF PRESENT ILLNESS
[FreeTextEntry1] : Patient is here for follow up. Has been updating work up for transplant.\par Reviewed lab data\par Noted elevated phosphorus and potassium\par Appetite normal. reports leg cramps and easy fatigue.\par

## 2019-07-02 NOTE — PHYSICAL EXAM
[General Appearance - In No Acute Distress] : in no acute distress [General Appearance - Alert] : alert [PERRL With Normal Accommodation] : pupils were equal in size, round, and reactive to light [Extraocular Movements] : extraocular movements were intact [Sclera] : the sclera and conjunctiva were normal [Outer Ear] : the ears and nose were normal in appearance [Oropharynx] : the oropharynx was normal [Neck Appearance] : the appearance of the neck was normal [Jugular Venous Distention Increased] : there was no jugular-venous distention [Neck Cervical Mass (___cm)] : no neck mass was observed [Thyroid Nodule] : there were no palpable thyroid nodules [Thyroid Diffuse Enlargement] : the thyroid was not enlarged [Heart Rate And Rhythm] : heart rate was normal and rhythm regular [Auscultation Breath Sounds / Voice Sounds] : lungs were clear to auscultation bilaterally [Heart Sounds] : normal S1 and S2 [Heart Sounds Gallop] : no gallops [FreeTextEntry1] : No rub [Heart Sounds Pericardial Friction Rub] : no pericardial rub [Full Pulse] : the pedal pulses are present [Edema] : there was no peripheral edema [Abdomen Soft] : soft [Bowel Sounds] : normal bowel sounds [Abdomen Tenderness] : non-tender [Abdomen Mass (___ Cm)] : no abdominal mass palpated [No Rectal Mass] : no rectal mass [Normal Sphincter Tone] : normal sphincter tone [Occult Blood Positive] : stool was negative for occult blood [Cervical Lymph Nodes Enlarged Posterior Bilaterally] : posterior cervical [Cervical Lymph Nodes Enlarged Anterior Bilaterally] : anterior cervical [Supraclavicular Lymph Nodes Enlarged Bilaterally] : supraclavicular [Axillary Lymph Nodes Enlarged Bilaterally] : axillary [Musculoskeletal - Swelling] : no joint swelling seen [Abnormal Walk] : normal gait [Nail Clubbing] : no clubbing  or cyanosis of the fingernails [Motor Tone] : muscle strength and tone were normal [Skin Color & Pigmentation] : normal skin color and pigmentation [] : no rash [Skin Turgor] : normal skin turgor [Deep Tendon Reflexes (DTR)] : deep tendon reflexes were 2+ and symmetric [No Focal Deficits] : no focal deficits [Sensation] : the sensory exam was normal to light touch and pinprick [Impaired Insight] : insight and judgment were intact [Oriented To Time, Place, And Person] : oriented to person, place, and time [Affect] : the affect was normal

## 2019-07-02 NOTE — ASSESSMENT
[FreeTextEntry1] : Advanced CKD: Discussed home dialysis, already talked to home dialysis program at [facility].\par He is also explained variable wait times and earlier transplant with Hep C kidneys. He does not feel comfortable with [facility] high risk of Hep C kidney donors\par Has no live donors.\par Hyperphosphatemia, advised to continue Phoslo 2 tablets three times daily- has been taking only one.\par Hyperkalemia: Low K diet, Kayexalate now on X 2 days. On Torsemide. Change to 10 mg twice daily.\par BPH: Advised  follow up. Dr. Bhatia.\par He is listed for  donor kidney at [facility] transplant program. No potential live donors at present. Discussed multi listing and variability in wait times again.\par Also discussed decreased GFR and possible dialysis option- home hemodialysis with catheter access. He will consider seeing the home program . Discussed consequences of untreated azotemia \par Hypertension: Fairly controlled. Will continue current medications. Advised sodium restriction and daily physical activity.\par Hyperparathyroidism, elevated phosphorus: On Ca acetate.\par Health Maintenance: Discussed. advised to try and increase physical activity. Monitor weight. \par Continue sodium bicarbonate 1300 mg three times daily. (now taking only twice daily)\par I explained that without a live donor he is more likely to get a preemptive transplant if he goes to centers with less wait times or signs up for Hep c kidney.\par He has seen Dr. Bazan to update his cardiac evaluation and planning to make follow up appointment at transplant office for follow up visit.\par Discussed again Hep C pos organ/multi listing as  options.\par Anemia: referred to Dr. Pedraza, has had one injection. Has follow up appointment.\par Discussed multi listing and low wait times.\par Explained the life threatening dangers of not initiating dialysis with very low GFR. Discussed uremic symptoms and need for emergency dialysis if uremic symptoms start with temporary access.

## 2019-07-02 NOTE — END OF VISIT
[>50% of Time Spent on Counseling for ____] : Greater than 50% of the encounter time was spent on counseling for [unfilled] [FreeTextEntry1] : AURELIANO Downs [Time Spent: ___ minutes] : I have spent [unfilled] minutes of face to face time with the patient

## 2019-07-02 NOTE — ADDENDUM
[FreeTextEntry1] : Noted repeat labs K 4.9. Continue low K diet. Discontinue losartan. Initiate dialysis when patient is willing to have access and start dialysis.

## 2019-07-02 NOTE — REASON FOR VISIT
[Family Member] : family member [Follow-Up] : a follow-up visit [FreeTextEntry1] : chronic kidney disease,came for follow up and  lab review

## 2019-08-02 ENCOUNTER — APPOINTMENT (OUTPATIENT)
Dept: CV DIAGNOSITCS | Facility: HOSPITAL | Age: 59
End: 2019-08-02

## 2019-08-02 ENCOUNTER — OUTPATIENT (OUTPATIENT)
Dept: OUTPATIENT SERVICES | Facility: HOSPITAL | Age: 59
LOS: 1 days | End: 2019-08-02
Payer: COMMERCIAL

## 2019-08-02 ENCOUNTER — APPOINTMENT (OUTPATIENT)
Dept: CV DIAGNOSTICS | Facility: HOSPITAL | Age: 59
End: 2019-08-02

## 2019-08-02 DIAGNOSIS — Z98.890 OTHER SPECIFIED POSTPROCEDURAL STATES: Chronic | ICD-10-CM

## 2019-08-02 DIAGNOSIS — I10 ESSENTIAL (PRIMARY) HYPERTENSION: ICD-10-CM

## 2019-08-02 DIAGNOSIS — N18.5 CHRONIC KIDNEY DISEASE, STAGE 5: ICD-10-CM

## 2019-08-02 PROCEDURE — 93016 CV STRESS TEST SUPVJ ONLY: CPT | Mod: GC

## 2019-08-02 PROCEDURE — 93306 TTE W/DOPPLER COMPLETE: CPT | Mod: 26

## 2019-08-02 PROCEDURE — 93018 CV STRESS TEST I&R ONLY: CPT | Mod: GC

## 2019-08-02 PROCEDURE — 78452 HT MUSCLE IMAGE SPECT MULT: CPT | Mod: 26

## 2019-08-06 ENCOUNTER — RX RENEWAL (OUTPATIENT)
Age: 59
End: 2019-08-06

## 2019-08-12 ENCOUNTER — APPOINTMENT (OUTPATIENT)
Dept: NEPHROLOGY | Facility: CLINIC | Age: 59
End: 2019-08-12
Payer: COMMERCIAL

## 2019-08-12 VITALS
SYSTOLIC BLOOD PRESSURE: 153 MMHG | DIASTOLIC BLOOD PRESSURE: 93 MMHG | HEIGHT: 68 IN | HEART RATE: 74 BPM | OXYGEN SATURATION: 97 % | BODY MASS INDEX: 30.07 KG/M2 | WEIGHT: 198.41 LBS

## 2019-08-12 PROCEDURE — 99214 OFFICE O/P EST MOD 30 MIN: CPT

## 2019-08-15 NOTE — END OF VISIT
[Time Spent: ___ minutes] : I have spent [unfilled] minutes of face to face time with the patient [>50% of Time Spent on Counseling for ____] : Greater than 50% of the encounter time was spent on counseling for [unfilled] [FreeTextEntry1] : AURELIANO Downs

## 2019-08-15 NOTE — HISTORY OF PRESENT ILLNESS
[FreeTextEntry1] : Patient is here for follow up. Has been updating work up for transplant. Considering multi listing with [place] [facility]. No potential live donor at present.\par Reviewed lab data. He is working full time.\par Noted elevated phosphorus and potassium.\par Appetite normal. reports leg cramps and easy fatigue.\par

## 2019-08-15 NOTE — ASSESSMENT
[FreeTextEntry1] : Advanced CKD: Discussed home dialysis, already talked to home dialysis program at [facility]. Discussed need for AV access, referred to vascular surgeon for evaluation, Dr. Chapin\par Has no potential live donors.\par Hyperphosphatemia, advised to continue Phoslo.\par Hyperkalemia: Low K diet, Kayexalate . On Torsemide. 10 mg twice daily.\par BPH: Advised  follow up. Dr. Bhatia.\par He is listed for  donor kidney at [facility] transplant program. No potential live donors at present. Discussed multi listing and variability in wait times again.\par Also discussed decreased GFR and possible dialysis option- home hemodialysis with catheter access. He has met with the home program  team . Discussed consequences of untreated azotemia \par Hypertension: Fairly controlled. Will continue current medications. Advised sodium restriction and daily physical activity.\par Hyperparathyroidism, elevated phosphorus: On Ca acetate.\par Health Maintenance: Discussed. advised to try and increase physical activity. Monitor weight. \par Continue sodium bicarbonate 1300 mg three times daily. (now taking only twice daily)\par I explained that without a live donor he is more likely to get a preemptive transplant if he goes to centers with less wait times or signs up for Hep c kidney.\par He has seen Dr. Bazan to update his cardiac evaluation and planning to make follow up appointment at transplant office for follow up visit.\par Anemia: referred to Dr. Pedraza, has had Epo injections. Advised follow up appointment.\par Explained the life threatening dangers of not initiating dialysis with very low GFR. Discussed uremic symptoms and need for emergency dialysis if uremic symptoms start with temporary access.\par Advised to consult one of my colleagues, Dr. Martha Varela or Dr. Fabi Richey who will follow him for dialysis care.

## 2019-08-15 NOTE — PHYSICAL EXAM
[General Appearance - Alert] : alert [General Appearance - In No Acute Distress] : in no acute distress [Sclera] : the sclera and conjunctiva were normal [PERRL With Normal Accommodation] : pupils were equal in size, round, and reactive to light [Extraocular Movements] : extraocular movements were intact [Outer Ear] : the ears and nose were normal in appearance [Oropharynx] : the oropharynx was normal [Neck Appearance] : the appearance of the neck was normal [Neck Cervical Mass (___cm)] : no neck mass was observed [Jugular Venous Distention Increased] : there was no jugular-venous distention [Thyroid Diffuse Enlargement] : the thyroid was not enlarged [Thyroid Nodule] : there were no palpable thyroid nodules [Auscultation Breath Sounds / Voice Sounds] : lungs were clear to auscultation bilaterally [Heart Rate And Rhythm] : heart rate was normal and rhythm regular [Heart Sounds] : normal S1 and S2 [Heart Sounds Gallop] : no gallops [Heart Sounds Pericardial Friction Rub] : no pericardial rub [Full Pulse] : the pedal pulses are present [Bowel Sounds] : normal bowel sounds [Edema] : there was no peripheral edema [Abdomen Soft] : soft [Abdomen Tenderness] : non-tender [Abdomen Mass (___ Cm)] : no abdominal mass palpated [Normal Sphincter Tone] : normal sphincter tone [No Rectal Mass] : no rectal mass [Cervical Lymph Nodes Enlarged Posterior Bilaterally] : posterior cervical [Cervical Lymph Nodes Enlarged Anterior Bilaterally] : anterior cervical [Axillary Lymph Nodes Enlarged Bilaterally] : axillary [Supraclavicular Lymph Nodes Enlarged Bilaterally] : supraclavicular [Abnormal Walk] : normal gait [Nail Clubbing] : no clubbing  or cyanosis of the fingernails [Skin Color & Pigmentation] : normal skin color and pigmentation [Musculoskeletal - Swelling] : no joint swelling seen [Motor Tone] : muscle strength and tone were normal [] : no rash [Skin Turgor] : normal skin turgor [Sensation] : the sensory exam was normal to light touch and pinprick [Deep Tendon Reflexes (DTR)] : deep tendon reflexes were 2+ and symmetric [No Focal Deficits] : no focal deficits [Impaired Insight] : insight and judgment were intact [Oriented To Time, Place, And Person] : oriented to person, place, and time [Affect] : the affect was normal [FreeTextEntry1] : No rub [Occult Blood Positive] : stool was negative for occult blood

## 2019-09-06 ENCOUNTER — APPOINTMENT (OUTPATIENT)
Dept: NEPHROLOGY | Facility: CLINIC | Age: 59
End: 2019-09-06
Payer: COMMERCIAL

## 2019-09-06 VITALS — RESPIRATION RATE: 14 BRPM | DIASTOLIC BLOOD PRESSURE: 80 MMHG | SYSTOLIC BLOOD PRESSURE: 138 MMHG | HEART RATE: 76 BPM

## 2019-09-06 DIAGNOSIS — E83.39 OTHER DISORDERS OF PHOSPHORUS METABOLISM: ICD-10-CM

## 2019-09-06 DIAGNOSIS — E87.5 HYPERKALEMIA: ICD-10-CM

## 2019-09-06 PROCEDURE — 99215 OFFICE O/P EST HI 40 MIN: CPT

## 2019-09-06 NOTE — HISTORY OF PRESENT ILLNESS
[FreeTextEntry1] : CKD Stage V- here for follow up for dialysis care\par c/o feeling tired, susan LE weakness/sometimes cramps\par denies weight changes; has same appetite\par urinating same \par last week once he had abnormal taste in mouth ?metallic but that has gone away   \par reports a big bruise 3 days ago to his left arm\par denies SOB, POTTER, no LE edema\par denies nausea, vomiting\par Other ROS neg\par Meds:\par torsemide 10mg bid\par phoslo- forgets lunch dose bc he eats with friends\par takes kayexalate as needed\par has not been getting TIARA injections\par on sodium bicarbonate tablets ( 2tablets bid) supposed to be tid

## 2019-09-06 NOTE — PHYSICAL EXAM
[General Appearance - In No Acute Distress] : in no acute distress [General Appearance - Alert] : alert [General Appearance - Well Nourished] : well nourished [General Appearance - Well Developed] : well developed [Sclera] : the sclera and conjunctiva were normal [Outer Ear] : the ears and nose were normal in appearance [Neck Appearance] : the appearance of the neck was normal [Neck Cervical Mass (___cm)] : no neck mass was observed [Jugular Venous Distention Increased] : there was no jugular-venous distention [Respiration, Rhythm And Depth] : normal respiratory rhythm and effort [Auscultation Breath Sounds / Voice Sounds] : lungs were clear to auscultation bilaterally [Apical Impulse] : the apical impulse was normal [Heart Rate And Rhythm] : heart rate was normal and rhythm regular [Heart Sounds] : normal S1 and S2 [Edema] : there was no peripheral edema [FreeTextEntry1] : large ecchymosis left upper arm [Abdomen Soft] : soft [Bowel Sounds] : normal bowel sounds [Abnormal Walk] : normal gait [Skin Color & Pigmentation] : normal skin color and pigmentation [No CVA Tenderness] : no ~M costovertebral angle tenderness [Skin Turgor] : normal skin turgor [] : no rash [Oriented To Time, Place, And Person] : oriented to person, place, and time [Impaired Insight] : insight and judgment were intact [Cranial Nerves] : cranial nerves 2-12 were intact

## 2019-09-06 NOTE — REASON FOR VISIT
[Follow-Up] : a follow-up visit [Family Member] : family member [Other: _____] : [unfilled] [FreeTextEntry1] : ckd stage V

## 2019-09-06 NOTE — ASSESSMENT
[FreeTextEntry1] : 59 year old Here for follow up for CKD- dialysis initiation/ follow up care\par \par #CKD STage V-  Chronic GN ?hereditary nephritis; progressing- in need to initiate dialysis; pt has been putting it off for a long time; advised by Dr Downs to start HD and to get AVF but he has also put this off for awhile; Went over with him his creatinine trends and the need to make a decision and start dialysis as soon as possible. Explained to him that the hyperkalemia can be detrimental and he can have a cardiac arrest.  He understands.  He is mildly uremic at this time. \par Went over in detail options which include PD, in center HD, and home hemodialysis.  He works with [employer] and wants to continue to work.  He has an active lifestyle.  He prefers a home modality.  Went over PD in detail and home hemo.  He seems very interested in home hemodialysis or PD.  I explained that his options at this time are PD or catheter and AVF and start in center hemodialysis and convert to home hemodialysis once the AVF is mature.  His family members (daughter and wife) who were also present had questions which were all answered.\par  He and his family met with Nikkie Laboy RN for PD who educated him.  He seems to be interested in PD.  I have provided brochures and videos for him to watch this weekend. He will speak to his family and make a decision with Dr Downs by Monday\par \par #HTN- BP stable; continue hydralazine 100bid and metoprolol, 50mg daily \par \par #Hyperkalemia- takes kayexalate prn; check k today\par \par #Hyperphosphatemia- cont phoslo; check phos/ca today\par \par #HM- check HBV, quantiferon for dialysis admission; \par Dec 2018 = perforated sigmoid diverticulitis, colonic diverticulosis; Feb 2019 CT scan resolved\par 2013 He is s/p left VATS, resection of pleural based mass with wedge resections of left upper and left lower lobe. Pathology consistent with solitary fibrous tumor. \par \par #transplant- listed six years at [facility]; no live donors; will multilist; rehected from [facility] they don’t take his insurance

## 2019-09-09 ENCOUNTER — CHART COPY (OUTPATIENT)
Age: 59
End: 2019-09-09

## 2019-09-09 ENCOUNTER — APPOINTMENT (OUTPATIENT)
Dept: NEPHROLOGY | Facility: CLINIC | Age: 59
End: 2019-09-09
Payer: COMMERCIAL

## 2019-09-09 VITALS
BODY MASS INDEX: 29.74 KG/M2 | HEART RATE: 83 BPM | SYSTOLIC BLOOD PRESSURE: 152 MMHG | WEIGHT: 196.21 LBS | DIASTOLIC BLOOD PRESSURE: 82 MMHG | OXYGEN SATURATION: 97 % | HEIGHT: 68 IN

## 2019-09-09 DIAGNOSIS — E87.8 OTHER DISORDERS OF ELECTROLYTE AND FLUID BALANCE, NOT ELSEWHERE CLASSIFIED: ICD-10-CM

## 2019-09-09 PROCEDURE — 99214 OFFICE O/P EST MOD 30 MIN: CPT

## 2019-09-09 NOTE — ASSESSMENT
[FreeTextEntry1] : Advanced CKD: Discussed home dialysis, already talked to home dialysis program at [facility] and with PD program. Discussed need for   access, has met Dr. Richey\par Has no potential live donors.\par Hyperphosphatemia, advised to continue Phoslo.\par Ecchymosis: advised to hold aspirin.\par He is listed for  donor kidney at [facility] transplant program. No potential live donors at present. \par  Discussed again consequences of untreated azotemia \par Hypertension: Fairly controlled. Will continue current medications.\par Hyperparathyroidism, elevated phosphorus: On Ca acetate.\par Health Maintenance: Discussed. advised to try and increase physical activity. Monitor weight. \par Continue sodium bicarbonate 1300 mg three times daily. (now taking only twice daily)\par I explained that without a live donor he is more likely to get a preemptive transplant if he goes to centers with less wait times or signs up for Hep c kidney.\par He has seen Dr. Bazan to update his cardiac evaluation and planning to make follow up appointment at transplant office for follow up visit.\par Anemia: Has been referred to Dr. Pedraza, has had Epo injections. Advised follow up  \par Explained the life threatening dangers of not initiating dialysis with very low GFR. Discussed uremic symptoms and need for emergency dialysis if uremic symptoms start with temporary access.\par Advised to continue follow up with Dr. Fabi Richey who will follow him for peritoneal dialysis care.

## 2019-09-09 NOTE — PHYSICAL EXAM
[General Appearance - Alert] : alert [General Appearance - In No Acute Distress] : in no acute distress [Sclera] : the sclera and conjunctiva were normal [PERRL With Normal Accommodation] : pupils were equal in size, round, and reactive to light [Extraocular Movements] : extraocular movements were intact [Outer Ear] : the ears and nose were normal in appearance [Oropharynx] : the oropharynx was normal [Neck Cervical Mass (___cm)] : no neck mass was observed [Neck Appearance] : the appearance of the neck was normal [Jugular Venous Distention Increased] : there was no jugular-venous distention [Thyroid Diffuse Enlargement] : the thyroid was not enlarged [Thyroid Nodule] : there were no palpable thyroid nodules [Auscultation Breath Sounds / Voice Sounds] : lungs were clear to auscultation bilaterally [Heart Rate And Rhythm] : heart rate was normal and rhythm regular [Heart Sounds] : normal S1 and S2 [Heart Sounds Gallop] : no gallops [Heart Sounds Pericardial Friction Rub] : no pericardial rub [Edema] : there was no peripheral edema [Full Pulse] : the pedal pulses are present [Abdomen Soft] : soft [Bowel Sounds] : normal bowel sounds [Abdomen Tenderness] : non-tender [] : no hepato-splenomegaly [Abdomen Mass (___ Cm)] : no abdominal mass palpated [Normal Sphincter Tone] : normal sphincter tone [No Rectal Mass] : no rectal mass [Occult Blood Positive] : stool was negative for occult blood [Cervical Lymph Nodes Enlarged Posterior Bilaterally] : posterior cervical [Cervical Lymph Nodes Enlarged Anterior Bilaterally] : anterior cervical [Supraclavicular Lymph Nodes Enlarged Bilaterally] : supraclavicular [Axillary Lymph Nodes Enlarged Bilaterally] : axillary [Abnormal Walk] : normal gait [Nail Clubbing] : no clubbing  or cyanosis of the fingernails [Musculoskeletal - Swelling] : no joint swelling seen [Motor Tone] : muscle strength and tone were normal [Skin Color & Pigmentation] : normal skin color and pigmentation [Skin Turgor] : normal skin turgor [Deep Tendon Reflexes (DTR)] : deep tendon reflexes were 2+ and symmetric [FreeTextEntry1] : noted echymosis over forearm [Sensation] : the sensory exam was normal to light touch and pinprick [No Focal Deficits] : no focal deficits [Impaired Insight] : insight and judgment were intact [Oriented To Time, Place, And Person] : oriented to person, place, and time [Affect] : the affect was normal

## 2019-09-09 NOTE — HISTORY OF PRESENT ILLNESS
[FreeTextEntry1] : Patient is here for follow up. Has been updating work up for transplant.Could not list at [place] due to insurance reasons\par Reviewed lab data. He is working full time.\par Appetite normal. reports leg cramps and easy fatigue.\par H/o bruising over forearm.\sachin Has met with Dr. Richey and Narda Fontana\par

## 2019-09-09 NOTE — REASON FOR VISIT
[Follow-Up] : a follow-up visit [Family Member] : family member [FreeTextEntry1] : chronic kidney disease, came for follow up and  lab review, has met Dr. Richey

## 2019-09-10 LAB
ALBUMIN SERPL ELPH-MCNC: 4.5 G/DL
ANION GAP SERPL CALC-SCNC: 17 MMOL/L
BASOPHILS # BLD AUTO: 0.04 K/UL
BASOPHILS NFR BLD AUTO: 0.7 %
BUN SERPL-MCNC: 96 MG/DL
CALCIUM SERPL-MCNC: 8.8 MG/DL
CHLORIDE SERPL-SCNC: 97 MMOL/L
CO2 SERPL-SCNC: 22 MMOL/L
CREAT SERPL-MCNC: 11.76 MG/DL
EOSINOPHIL # BLD AUTO: 0.17 K/UL
EOSINOPHIL NFR BLD AUTO: 2.8 %
FERRITIN SERPL-MCNC: 144 NG/ML
GLUCOSE SERPL-MCNC: 128 MG/DL
HBV CORE IGG+IGM SER QL: NONREACTIVE
HBV CORE IGM SER QL: NONREACTIVE
HBV SURFACE AB SER QL: REACTIVE
HBV SURFACE AB SERPL IA-ACNC: 284.9 MIU/ML
HBV SURFACE AG SER QL: NONREACTIVE
HCT VFR BLD CALC: 30 %
HCV AB SER QL: NONREACTIVE
HCV S/CO RATIO: 0.13 S/CO
HGB BLD-MCNC: 9.1 G/DL
IMM GRANULOCYTES NFR BLD AUTO: 0.3 %
IRON SATN MFR SERPL: 30 %
IRON SERPL-MCNC: 91 UG/DL
LYMPHOCYTES # BLD AUTO: 0.96 K/UL
LYMPHOCYTES NFR BLD AUTO: 15.7 %
M TB IFN-G BLD-IMP: NEGATIVE
MAN DIFF?: NORMAL
MCHC RBC-ENTMCNC: 29.4 PG
MCHC RBC-ENTMCNC: 30.3 GM/DL
MCV RBC AUTO: 96.8 FL
MONOCYTES # BLD AUTO: 0.41 K/UL
MONOCYTES NFR BLD AUTO: 6.7 %
NEUTROPHILS # BLD AUTO: 4.5 K/UL
NEUTROPHILS NFR BLD AUTO: 73.8 %
PHOSPHATE SERPL-MCNC: 8.1 MG/DL
PLATELET # BLD AUTO: 143 K/UL
POTASSIUM SERPL-SCNC: 5.3 MMOL/L
QUANTIFERON TB PLUS MITOGEN MINUS NIL: 1.38 IU/ML
QUANTIFERON TB PLUS NIL: 0.03 IU/ML
QUANTIFERON TB PLUS TB1 MINUS NIL: 0.01 IU/ML
QUANTIFERON TB PLUS TB2 MINUS NIL: 0.02 IU/ML
RBC # BLD: 3.1 M/UL
RBC # FLD: 13.8 %
SODIUM SERPL-SCNC: 136 MMOL/L
TIBC SERPL-MCNC: 302 UG/DL
UIBC SERPL-MCNC: 211 UG/DL
WBC # FLD AUTO: 6.1 K/UL

## 2019-09-11 ENCOUNTER — APPOINTMENT (OUTPATIENT)
Dept: SURGERY | Facility: CLINIC | Age: 59
End: 2019-09-11
Payer: COMMERCIAL

## 2019-09-11 VITALS
TEMPERATURE: 98.2 F | HEART RATE: 80 BPM | OXYGEN SATURATION: 98 % | WEIGHT: 196 LBS | BODY MASS INDEX: 29.7 KG/M2 | SYSTOLIC BLOOD PRESSURE: 137 MMHG | DIASTOLIC BLOOD PRESSURE: 79 MMHG | HEIGHT: 68 IN

## 2019-09-11 PROCEDURE — 99243 OFF/OP CNSLTJ NEW/EST LOW 30: CPT

## 2019-09-13 ENCOUNTER — OUTPATIENT (OUTPATIENT)
Dept: OUTPATIENT SERVICES | Facility: HOSPITAL | Age: 59
LOS: 1 days | Discharge: HOME | End: 2019-09-13
Payer: COMMERCIAL

## 2019-09-13 VITALS
TEMPERATURE: 98 F | HEART RATE: 75 BPM | OXYGEN SATURATION: 99 % | WEIGHT: 196.65 LBS | DIASTOLIC BLOOD PRESSURE: 89 MMHG | RESPIRATION RATE: 17 BRPM | HEIGHT: 69 IN | SYSTOLIC BLOOD PRESSURE: 148 MMHG

## 2019-09-13 DIAGNOSIS — Z98.890 OTHER SPECIFIED POSTPROCEDURAL STATES: Chronic | ICD-10-CM

## 2019-09-13 DIAGNOSIS — Z01.818 ENCOUNTER FOR OTHER PREPROCEDURAL EXAMINATION: ICD-10-CM

## 2019-09-13 DIAGNOSIS — I10 ESSENTIAL (PRIMARY) HYPERTENSION: ICD-10-CM

## 2019-09-13 DIAGNOSIS — E78.5 HYPERLIPIDEMIA, UNSPECIFIED: ICD-10-CM

## 2019-09-13 DIAGNOSIS — N18.9 CHRONIC KIDNEY DISEASE, UNSPECIFIED: ICD-10-CM

## 2019-09-13 LAB
ALBUMIN SERPL ELPH-MCNC: 3.6 G/DL — SIGNIFICANT CHANGE UP (ref 3.3–5)
ALP SERPL-CCNC: 76 U/L — SIGNIFICANT CHANGE UP (ref 40–120)
ALT FLD-CCNC: 14 U/L — SIGNIFICANT CHANGE UP (ref 12–78)
ANION GAP SERPL CALC-SCNC: 12 MMOL/L — SIGNIFICANT CHANGE UP (ref 5–17)
APTT BLD: 30.9 SEC — SIGNIFICANT CHANGE UP (ref 27.5–36.3)
AST SERPL-CCNC: 13 U/L — LOW (ref 15–37)
BASOPHILS # BLD AUTO: 0.03 K/UL — SIGNIFICANT CHANGE UP (ref 0–0.2)
BASOPHILS NFR BLD AUTO: 0.5 % — SIGNIFICANT CHANGE UP (ref 0–2)
BILIRUB SERPL-MCNC: 0.4 MG/DL — SIGNIFICANT CHANGE UP (ref 0.2–1.2)
BLD GP AB SCN SERPL QL: SIGNIFICANT CHANGE UP
BUN SERPL-MCNC: 108 MG/DL — HIGH (ref 7–23)
CALCIUM SERPL-MCNC: 8.3 MG/DL — LOW (ref 8.5–10.1)
CHLORIDE SERPL-SCNC: 103 MMOL/L — SIGNIFICANT CHANGE UP (ref 96–108)
CO2 SERPL-SCNC: 22 MMOL/L — SIGNIFICANT CHANGE UP (ref 22–31)
CREAT SERPL-MCNC: 12.7 MG/DL — HIGH (ref 0.5–1.3)
EOSINOPHIL # BLD AUTO: 0.21 K/UL — SIGNIFICANT CHANGE UP (ref 0–0.5)
EOSINOPHIL NFR BLD AUTO: 3.5 % — SIGNIFICANT CHANGE UP (ref 0–6)
GLUCOSE SERPL-MCNC: 110 MG/DL — HIGH (ref 70–99)
HCT VFR BLD CALC: 28.4 % — LOW (ref 39–50)
HGB BLD-MCNC: 8.9 G/DL — LOW (ref 13–17)
IMM GRANULOCYTES NFR BLD AUTO: 0.3 % — SIGNIFICANT CHANGE UP (ref 0–1.5)
INR BLD: 0.91 RATIO — SIGNIFICANT CHANGE UP (ref 0.88–1.16)
LYMPHOCYTES # BLD AUTO: 1.41 K/UL — SIGNIFICANT CHANGE UP (ref 1–3.3)
LYMPHOCYTES # BLD AUTO: 23.2 % — SIGNIFICANT CHANGE UP (ref 13–44)
MCHC RBC-ENTMCNC: 29.8 PG — SIGNIFICANT CHANGE UP (ref 27–34)
MCHC RBC-ENTMCNC: 31.3 GM/DL — LOW (ref 32–36)
MCV RBC AUTO: 95 FL — SIGNIFICANT CHANGE UP (ref 80–100)
MONOCYTES # BLD AUTO: 0.48 K/UL — SIGNIFICANT CHANGE UP (ref 0–0.9)
MONOCYTES NFR BLD AUTO: 7.9 % — SIGNIFICANT CHANGE UP (ref 2–14)
NEUTROPHILS # BLD AUTO: 3.92 K/UL — SIGNIFICANT CHANGE UP (ref 1.8–7.4)
NEUTROPHILS NFR BLD AUTO: 64.6 % — SIGNIFICANT CHANGE UP (ref 43–77)
NRBC # BLD: 0 /100 WBCS — SIGNIFICANT CHANGE UP (ref 0–0)
PLATELET # BLD AUTO: 143 K/UL — LOW (ref 150–400)
POTASSIUM SERPL-MCNC: 5.3 MMOL/L — SIGNIFICANT CHANGE UP (ref 3.5–5.3)
POTASSIUM SERPL-SCNC: 5.3 MMOL/L — SIGNIFICANT CHANGE UP (ref 3.5–5.3)
PROT SERPL-MCNC: 7.2 GM/DL — SIGNIFICANT CHANGE UP (ref 6–8.3)
PROTHROM AB SERPL-ACNC: 10.2 SEC — SIGNIFICANT CHANGE UP (ref 10–12.9)
RBC # BLD: 2.99 M/UL — LOW (ref 4.2–5.8)
RBC # FLD: 13.9 % — SIGNIFICANT CHANGE UP (ref 10.3–14.5)
SODIUM SERPL-SCNC: 137 MMOL/L — SIGNIFICANT CHANGE UP (ref 135–145)
WBC # BLD: 6.07 K/UL — SIGNIFICANT CHANGE UP (ref 3.8–10.5)
WBC # FLD AUTO: 6.07 K/UL — SIGNIFICANT CHANGE UP (ref 3.8–10.5)

## 2019-09-13 PROCEDURE — 93010 ELECTROCARDIOGRAM REPORT: CPT

## 2019-09-13 RX ORDER — SODIUM CHLORIDE 9 MG/ML
3 INJECTION INTRAMUSCULAR; INTRAVENOUS; SUBCUTANEOUS EVERY 8 HOURS
Refills: 0 | Status: DISCONTINUED | OUTPATIENT
Start: 2019-09-19 | End: 2019-09-19

## 2019-09-13 RX ORDER — SIMVASTATIN 20 MG/1
1 TABLET, FILM COATED ORAL
Qty: 0 | Refills: 0 | DISCHARGE

## 2019-09-13 RX ORDER — EZETIMIBE 10 MG/1
1 TABLET ORAL
Qty: 0 | Refills: 0 | DISCHARGE

## 2019-09-13 NOTE — H&P PST ADULT - NSICDXPROBLEM_GEN_ALL_CORE_FT
PROBLEM DIAGNOSES  Problem: Chronic kidney disease, unspecified  Assessment and Plan: Laparoscopic insertion of peritoneal dialysis catheter  Pre-op instructions given patient verbalized understanding  Chlorhexidine wash instructions given   Pending: Medical clearance    Problem: HLD (hyperlipidemia)  Assessment and Plan: Continue current regimen and medications.     Problem: HTN (hypertension)  Assessment and Plan: Continue current regimen and medications.

## 2019-09-13 NOTE — H&P PST ADULT - ASSESSMENT
59M pmh htn, hl, diverticulosis, bph, chronic kidney disease here for PST for scheduled Laparoscopic insertion of peritoneal dialysis catheter  CAPRINI SCORE    AGE RELATED RISK FACTORS                                                       MOBILITY RELATED FACTORS  [ x] Age 41-60 years                                            (1 Point)                  [ ] Bed rest                                                        (1 Point)  [ ] Age: 61-74 years                                           (2 Points)                [ ] Plaster cast                                                   (2 Points)  [ ] Age= 75 years                                              (3 Points)                 [ ] Bed bound for more than 72 hours                   (2 Points)    DISEASE RELATED RISK FACTORS                                               GENDER SPECIFIC FACTORS  [x ] Edema in the lower extremities                       (1 Point)                  [ ] Pregnancy                                                     (1 Point)  [ ] Varicose veins                                               (1 Point)                  [ ] Post-partum < 6 weeks                                   (1 Point)             [ x] BMI > 25 Kg/m2                                            (1 Point)                  [ ] Hormonal therapy  or oral contraception            (1 Point)                 [ ] Sepsis (in the previous month)                        (1 Point)                  [ ] History of pregnancy complications  [ ] Pneumonia or serious lung disease                                               [ ] Unexplained or recurrent                       (1 Point)           (in the previous month)                               (1 Point)  [ ] Abnormal pulmonary function test                     (1 Point)                 SURGERY RELATED RISK FACTORS  [ ] Acute myocardial infarction                              (1 Point)                 [ ]  Section                                            (1 Point)  [ ] Congestive heart failure (in the previous month)  (1 Point)                 [ ] Minor surgery                                                 (1 Point)   [ ] Inflammatory bowel disease                             (1 Point)                 [ ] Arthroscopic surgery                                        (2 Points)  [ ] Central venous access                                    (2 Points)                [x ] General surgery lasting more than 45 minutes   (2 Points)       [ ] Stroke (in the previous month)                          (5 Points)               [ ] Elective arthroplasty                                        (5 Points)                                                                                                                                               HEMATOLOGY RELATED FACTORS                                                 TRAUMA RELATED RISK FACTORS  [ ] Prior episodes of VTE                                     (3 Points)                 [ ] Fracture of the hip, pelvis, or leg                       (5 Points)  [ ] Positive family history for VTE                         (3 Points)                 [ ] Acute spinal cord injury (in the previous month)  (5 Points)  [ ] Prothrombin 91178 A                                      (3 Points)                 [ ] Paralysis  (less than 1 month)                          (5 Points)  [ ] Factor V Leiden                                             (3 Points)                 [ ] Multiple Trauma within 1 month                         (5 Points)  [ ] Lupus anticoagulants                                     (3 Points)                                                           [ ] Anticardiolipin antibodies                                (3 Points)                                                       [ ] High homocysteine in the blood                      (3 Points)                                             [ ] Other congenital or acquired thrombophilia       (3 Points)                                                [ ] Heparin induced thrombocytopenia                  (3 Points)                                          Total Score [    5      ]

## 2019-09-13 NOTE — H&P PST ADULT - NSANTHOSAYNRD_GEN_A_CORE
No. DEVON screening performed.  STOP BANG Legend: 0-2 = LOW Risk; 3-4 = INTERMEDIATE Risk; 5-8 = HIGH Risk

## 2019-09-13 NOTE — H&P PST ADULT - NSICDXPASTMEDICALHX_GEN_ALL_CORE_FT
PAST MEDICAL HISTORY:  BPH (benign prostatic hyperplasia)     BPH (benign prostatic hypertrophy)     CKD (chronic kidney disease), stage III     GERD (gastroesophageal reflux disease)     GERD (gastroesophageal reflux disease)     Gout     Gout     HTN (hypertension)     Hypercholesteremia     Hypercholesterolemia     Hypertension     Kidney disease     Lung abnormality     Nephritis     Solitary fibrous tumor removed in 2013 PAST MEDICAL HISTORY:  BPH (benign prostatic hyperplasia)     BPH (benign prostatic hypertrophy)     CKD (chronic kidney disease), stage III     GERD (gastroesophageal reflux disease)     Gout     HTN (hypertension)     Hypercholesteremia     Kidney disease     Lung abnormality     Nephritis     Solitary fibrous tumor removed in 2013

## 2019-09-13 NOTE — H&P PST ADULT - HISTORY OF PRESENT ILLNESS
59M pmh htn, hl, diverticulosis, bph, chronic kidney disease here for PST for scheduled Laparoscopic insertion of peritoneal dialysis catheter

## 2019-09-16 ENCOUNTER — APPOINTMENT (OUTPATIENT)
Dept: SURGERY | Facility: CLINIC | Age: 59
End: 2019-09-16

## 2019-09-16 PROBLEM — D49.2 NEOPLASM OF UNSPECIFIED BEHAVIOR OF BONE, SOFT TISSUE, AND SKIN: Chronic | Status: ACTIVE | Noted: 2018-04-07

## 2019-09-16 LAB
HBV E AB SER QL: NEGATIVE
HBV E AG SER QL: NEGATIVE

## 2019-09-18 ENCOUNTER — TRANSCRIPTION ENCOUNTER (OUTPATIENT)
Age: 59
End: 2019-09-18

## 2019-09-19 ENCOUNTER — OUTPATIENT (OUTPATIENT)
Dept: OUTPATIENT SERVICES | Facility: HOSPITAL | Age: 59
LOS: 1 days | Discharge: HOME | End: 2019-09-19
Payer: COMMERCIAL

## 2019-09-19 ENCOUNTER — APPOINTMENT (OUTPATIENT)
Dept: SURGERY | Facility: HOSPITAL | Age: 59
End: 2019-09-19

## 2019-09-19 VITALS
OXYGEN SATURATION: 98 % | HEART RATE: 80 BPM | RESPIRATION RATE: 14 BRPM | DIASTOLIC BLOOD PRESSURE: 82 MMHG | SYSTOLIC BLOOD PRESSURE: 140 MMHG

## 2019-09-19 VITALS
OXYGEN SATURATION: 99 % | DIASTOLIC BLOOD PRESSURE: 69 MMHG | SYSTOLIC BLOOD PRESSURE: 137 MMHG | TEMPERATURE: 98 F | HEART RATE: 69 BPM | HEIGHT: 69 IN | WEIGHT: 195.11 LBS | RESPIRATION RATE: 18 BRPM

## 2019-09-19 DIAGNOSIS — Z98.890 OTHER SPECIFIED POSTPROCEDURAL STATES: Chronic | ICD-10-CM

## 2019-09-19 PROCEDURE — 49324 LAP INSERT TUNNEL IP CATH: CPT | Mod: AS

## 2019-09-19 PROCEDURE — 49324 LAP INSERT TUNNEL IP CATH: CPT

## 2019-09-19 RX ORDER — SODIUM CHLORIDE 9 MG/ML
1000 INJECTION INTRAMUSCULAR; INTRAVENOUS; SUBCUTANEOUS
Refills: 0 | Status: DISCONTINUED | OUTPATIENT
Start: 2019-09-19 | End: 2019-09-19

## 2019-09-19 RX ORDER — FENTANYL CITRATE 50 UG/ML
50 INJECTION INTRAVENOUS ONCE
Refills: 0 | Status: DISCONTINUED | OUTPATIENT
Start: 2019-09-19 | End: 2019-09-19

## 2019-09-19 RX ORDER — ACETAMINOPHEN 500 MG
1000 TABLET ORAL ONCE
Refills: 0 | Status: COMPLETED | OUTPATIENT
Start: 2019-09-19 | End: 2019-09-19

## 2019-09-19 RX ORDER — FENTANYL CITRATE 50 UG/ML
25 INJECTION INTRAVENOUS
Refills: 0 | Status: DISCONTINUED | OUTPATIENT
Start: 2019-09-19 | End: 2019-09-19

## 2019-09-19 RX ADMIN — SODIUM CHLORIDE 75 MILLILITER(S): 9 INJECTION INTRAMUSCULAR; INTRAVENOUS; SUBCUTANEOUS at 18:05

## 2019-09-19 RX ADMIN — Medication 400 MILLIGRAM(S): at 18:15

## 2019-09-19 RX ADMIN — Medication 1000 MILLIGRAM(S): at 18:35

## 2019-09-19 RX ADMIN — FENTANYL CITRATE 50 MICROGRAM(S): 50 INJECTION INTRAVENOUS at 18:15

## 2019-09-19 RX ADMIN — FENTANYL CITRATE 50 MICROGRAM(S): 50 INJECTION INTRAVENOUS at 18:35

## 2019-09-19 NOTE — ASU PATIENT PROFILE, ADULT - PMH
BPH (benign prostatic hyperplasia)    BPH (benign prostatic hypertrophy)    CKD (chronic kidney disease), stage III    GERD (gastroesophageal reflux disease)    Gout    HTN (hypertension)    Hypercholesteremia    Kidney disease    Lung abnormality    Nephritis    Solitary fibrous tumor  removed in 2013

## 2019-09-19 NOTE — ASU DISCHARGE PLAN (ADULT/PEDIATRIC) - CARE PROVIDER_API CALL
Hayder Eisenberg)  ColonRectal Surgery; Surgery  [address] [place] [address]  Phone: [phone]  Fax: [phone]  Follow Up Time:

## 2019-09-19 NOTE — ASU DISCHARGE PLAN (ADULT/PEDIATRIC) - CALL YOUR DOCTOR IF YOU HAVE ANY OF THE FOLLOWING:
Inability to tolerate liquids or foods/Pain not relieved by Medications/Numbness, tingling, color or temperature change to extremity/Fever greater than (need to indicate Fahrenheit or Celsius)/Bleeding that does not stop/Swelling that gets worse/Unable to urinate/Nausea and vomiting that does not stop/Wound/Surgical Site with redness, or foul smelling discharge or pus/Excessive diarrhea/Increased irritability or sluggishness

## 2019-09-19 NOTE — BRIEF OPERATIVE NOTE - NSICDXBRIEFPROCEDURE_GEN_ALL_CORE_FT
PROCEDURES:  Laparoscopic insertion of peritoneal dialysis catheter 19-Sep-2019 18:07:20  Bruce Mjeia

## 2019-09-24 DIAGNOSIS — N40.0 BENIGN PROSTATIC HYPERPLASIA WITHOUT LOWER URINARY TRACT SYMPTOMS: ICD-10-CM

## 2019-09-24 DIAGNOSIS — K21.9 GASTRO-ESOPHAGEAL REFLUX DISEASE WITHOUT ESOPHAGITIS: ICD-10-CM

## 2019-09-24 DIAGNOSIS — N18.3 CHRONIC KIDNEY DISEASE, STAGE 3 (MODERATE): ICD-10-CM

## 2019-09-24 DIAGNOSIS — M10.9 GOUT, UNSPECIFIED: ICD-10-CM

## 2019-09-24 DIAGNOSIS — Z87.891 PERSONAL HISTORY OF NICOTINE DEPENDENCE: ICD-10-CM

## 2019-09-24 DIAGNOSIS — I12.9 HYPERTENSIVE CHRONIC KIDNEY DISEASE WITH STAGE 1 THROUGH STAGE 4 CHRONIC KIDNEY DISEASE, OR UNSPECIFIED CHRONIC KIDNEY DISEASE: ICD-10-CM

## 2019-09-24 DIAGNOSIS — E78.00 PURE HYPERCHOLESTEROLEMIA, UNSPECIFIED: ICD-10-CM

## 2019-10-01 PROCEDURE — 90966 ESRD HOME PT SERV P MO 20+: CPT

## 2019-10-08 ENCOUNTER — APPOINTMENT (OUTPATIENT)
Dept: SURGERY | Facility: HOSPITAL | Age: 59
End: 2019-10-08

## 2019-10-10 ENCOUNTER — OUTPATIENT (OUTPATIENT)
Dept: OUTPATIENT SERVICES | Facility: HOSPITAL | Age: 59
LOS: 1 days | End: 2019-10-10
Payer: COMMERCIAL

## 2019-10-10 ENCOUNTER — FORM ENCOUNTER (OUTPATIENT)
Age: 59
End: 2019-10-10

## 2019-10-10 ENCOUNTER — APPOINTMENT (OUTPATIENT)
Dept: ULTRASOUND IMAGING | Facility: HOSPITAL | Age: 59
End: 2019-10-10
Payer: COMMERCIAL

## 2019-10-10 ENCOUNTER — RX RENEWAL (OUTPATIENT)
Age: 59
End: 2019-10-10

## 2019-10-10 DIAGNOSIS — N18.5 CHRONIC KIDNEY DISEASE, STAGE 5: ICD-10-CM

## 2019-10-10 DIAGNOSIS — Z98.890 OTHER SPECIFIED POSTPROCEDURAL STATES: Chronic | ICD-10-CM

## 2019-10-10 PROCEDURE — 76770 US EXAM ABDO BACK WALL COMP: CPT

## 2019-10-10 PROCEDURE — 76770 US EXAM ABDO BACK WALL COMP: CPT | Mod: 26

## 2019-10-11 ENCOUNTER — OUTPATIENT (OUTPATIENT)
Dept: OUTPATIENT SERVICES | Facility: HOSPITAL | Age: 59
LOS: 1 days | End: 2019-10-11
Payer: COMMERCIAL

## 2019-10-11 ENCOUNTER — APPOINTMENT (OUTPATIENT)
Dept: RADIOLOGY | Facility: IMAGING CENTER | Age: 59
End: 2019-10-11
Payer: COMMERCIAL

## 2019-10-11 DIAGNOSIS — Z98.890 OTHER SPECIFIED POSTPROCEDURAL STATES: Chronic | ICD-10-CM

## 2019-10-11 DIAGNOSIS — N18.5 CHRONIC KIDNEY DISEASE, STAGE 5: ICD-10-CM

## 2019-10-11 PROCEDURE — 74019 RADEX ABDOMEN 2 VIEWS: CPT

## 2019-10-11 PROCEDURE — 74019 RADEX ABDOMEN 2 VIEWS: CPT | Mod: 26

## 2019-10-17 ENCOUNTER — RX CHANGE (OUTPATIENT)
Age: 59
End: 2019-10-17

## 2019-10-17 LAB
APPEARANCE: CLEAR
BACTERIA: NEGATIVE
BILIRUBIN URINE: NEGATIVE
BLOOD URINE: ABNORMAL
COLOR: NORMAL
GLUCOSE QUALITATIVE U: ABNORMAL
HYALINE CASTS: 0 /LPF
KETONES URINE: NEGATIVE
LEUKOCYTE ESTERASE URINE: NEGATIVE
MICROSCOPIC-UA: NORMAL
NITRITE URINE: NEGATIVE
PH URINE: 7
PROTEIN URINE: ABNORMAL
RED BLOOD CELLS URINE: 10 /HPF
SPECIFIC GRAVITY URINE: 1.01
SQUAMOUS EPITHELIAL CELLS: 1 /HPF
UROBILINOGEN URINE: NORMAL
WHITE BLOOD CELLS URINE: 3 /HPF

## 2019-10-18 ENCOUNTER — OTHER (OUTPATIENT)
Age: 59
End: 2019-10-18

## 2019-10-21 ENCOUNTER — APPOINTMENT (OUTPATIENT)
Dept: NEPHROLOGY | Facility: CLINIC | Age: 59
End: 2019-10-21
Payer: COMMERCIAL

## 2019-11-01 ENCOUNTER — FORM ENCOUNTER (OUTPATIENT)
Age: 59
End: 2019-11-01

## 2019-11-01 PROCEDURE — 90966 ESRD HOME PT SERV P MO 20+: CPT

## 2019-11-02 ENCOUNTER — OUTPATIENT (OUTPATIENT)
Dept: OUTPATIENT SERVICES | Facility: HOSPITAL | Age: 59
LOS: 1 days | End: 2019-11-02
Payer: COMMERCIAL

## 2019-11-02 ENCOUNTER — APPOINTMENT (OUTPATIENT)
Dept: RADIOLOGY | Facility: IMAGING CENTER | Age: 59
End: 2019-11-02
Payer: COMMERCIAL

## 2019-11-02 DIAGNOSIS — N18.5 CHRONIC KIDNEY DISEASE, STAGE 5: ICD-10-CM

## 2019-11-02 DIAGNOSIS — Z98.890 OTHER SPECIFIED POSTPROCEDURAL STATES: Chronic | ICD-10-CM

## 2019-11-02 PROCEDURE — 74019 RADEX ABDOMEN 2 VIEWS: CPT

## 2019-11-02 PROCEDURE — 74019 RADEX ABDOMEN 2 VIEWS: CPT | Mod: 26

## 2019-11-12 ENCOUNTER — APPOINTMENT (OUTPATIENT)
Dept: NEPHROLOGY | Facility: CLINIC | Age: 59
End: 2019-11-12

## 2019-11-12 LAB
ALBUMIN SERPL ELPH-MCNC: 3.4 G/DL
ANION GAP SERPL CALC-SCNC: 18 MMOL/L
BUN SERPL-MCNC: 66 MG/DL
CALCIUM SERPL-MCNC: 7.8 MG/DL
CHLORIDE SERPL-SCNC: 91 MMOL/L
CO2 SERPL-SCNC: 21 MMOL/L
CREAT SERPL-MCNC: 9.25 MG/DL
GLUCOSE SERPL-MCNC: 134 MG/DL
PHOSPHATE SERPL-MCNC: 4.9 MG/DL
POTASSIUM SERPL-SCNC: 4 MMOL/L
SODIUM SERPL-SCNC: 130 MMOL/L

## 2019-11-21 ENCOUNTER — RX RENEWAL (OUTPATIENT)
Age: 59
End: 2019-11-21

## 2019-11-26 ENCOUNTER — RX RENEWAL (OUTPATIENT)
Age: 59
End: 2019-11-26

## 2019-12-01 PROCEDURE — 90966 ESRD HOME PT SERV P MO 20+: CPT

## 2019-12-09 ENCOUNTER — FORM ENCOUNTER (OUTPATIENT)
Age: 59
End: 2019-12-09

## 2019-12-10 ENCOUNTER — OUTPATIENT (OUTPATIENT)
Dept: OUTPATIENT SERVICES | Facility: HOSPITAL | Age: 59
LOS: 1 days | End: 2019-12-10
Payer: COMMERCIAL

## 2019-12-10 ENCOUNTER — APPOINTMENT (OUTPATIENT)
Dept: RADIOLOGY | Facility: IMAGING CENTER | Age: 59
End: 2019-12-10
Payer: COMMERCIAL

## 2019-12-10 ENCOUNTER — APPOINTMENT (OUTPATIENT)
Dept: NEPHROLOGY | Facility: CLINIC | Age: 59
End: 2019-12-10
Payer: COMMERCIAL

## 2019-12-10 DIAGNOSIS — Z98.890 OTHER SPECIFIED POSTPROCEDURAL STATES: Chronic | ICD-10-CM

## 2019-12-10 DIAGNOSIS — R93.89 ABNORMAL FINDINGS ON DIAGNOSTIC IMAGING OF OTHER SPECIFIED BODY STRUCTURES: ICD-10-CM

## 2019-12-10 DIAGNOSIS — Z99.2 DEPENDENCE ON RENAL DIALYSIS: ICD-10-CM

## 2019-12-10 PROCEDURE — 71046 X-RAY EXAM CHEST 2 VIEWS: CPT

## 2019-12-10 PROCEDURE — 74019 RADEX ABDOMEN 2 VIEWS: CPT | Mod: 26

## 2019-12-10 PROCEDURE — 71046 X-RAY EXAM CHEST 2 VIEWS: CPT | Mod: 26

## 2019-12-10 PROCEDURE — 74019 RADEX ABDOMEN 2 VIEWS: CPT

## 2019-12-12 ENCOUNTER — INPATIENT (INPATIENT)
Facility: HOSPITAL | Age: 59
LOS: 7 days | Discharge: HOME | DRG: 919 | End: 2019-12-20
Attending: INTERNAL MEDICINE | Admitting: INTERNAL MEDICINE
Payer: COMMERCIAL

## 2019-12-12 VITALS
WEIGHT: 191.8 LBS | RESPIRATION RATE: 18 BRPM | HEIGHT: 68 IN | OXYGEN SATURATION: 97 % | TEMPERATURE: 98 F | SYSTOLIC BLOOD PRESSURE: 159 MMHG | HEART RATE: 83 BPM | DIASTOLIC BLOOD PRESSURE: 89 MMHG

## 2019-12-12 DIAGNOSIS — E78.00 PURE HYPERCHOLESTEROLEMIA, UNSPECIFIED: ICD-10-CM

## 2019-12-12 DIAGNOSIS — J90 PLEURAL EFFUSION, NOT ELSEWHERE CLASSIFIED: ICD-10-CM

## 2019-12-12 DIAGNOSIS — N25.81 SECONDARY HYPERPARATHYROIDISM OF RENAL ORIGIN: ICD-10-CM

## 2019-12-12 DIAGNOSIS — N40.0 BENIGN PROSTATIC HYPERPLASIA WITHOUT LOWER URINARY TRACT SYMPTOMS: ICD-10-CM

## 2019-12-12 DIAGNOSIS — N18.6 END STAGE RENAL DISEASE: ICD-10-CM

## 2019-12-12 DIAGNOSIS — E04.9 NONTOXIC GOITER, UNSPECIFIED: ICD-10-CM

## 2019-12-12 DIAGNOSIS — K21.9 GASTRO-ESOPHAGEAL REFLUX DISEASE WITHOUT ESOPHAGITIS: ICD-10-CM

## 2019-12-12 DIAGNOSIS — I10 ESSENTIAL (PRIMARY) HYPERTENSION: ICD-10-CM

## 2019-12-12 DIAGNOSIS — Z98.890 OTHER SPECIFIED POSTPROCEDURAL STATES: Chronic | ICD-10-CM

## 2019-12-12 LAB
ALBUMIN SERPL ELPH-MCNC: 3.8 G/DL — SIGNIFICANT CHANGE UP (ref 3.3–5)
ALP SERPL-CCNC: 113 U/L — SIGNIFICANT CHANGE UP (ref 40–120)
ALT FLD-CCNC: 11 U/L — SIGNIFICANT CHANGE UP (ref 10–45)
ANION GAP SERPL CALC-SCNC: 19 MMOL/L — HIGH (ref 5–17)
AST SERPL-CCNC: 13 U/L — SIGNIFICANT CHANGE UP (ref 10–40)
BILIRUB SERPL-MCNC: 0.2 MG/DL — SIGNIFICANT CHANGE UP (ref 0.2–1.2)
BUN SERPL-MCNC: 69 MG/DL — HIGH (ref 7–23)
CALCIUM SERPL-MCNC: 8.7 MG/DL — SIGNIFICANT CHANGE UP (ref 8.4–10.5)
CHLORIDE SERPL-SCNC: 98 MMOL/L — SIGNIFICANT CHANGE UP (ref 96–108)
CO2 SERPL-SCNC: 20 MMOL/L — LOW (ref 22–31)
CREAT SERPL-MCNC: 11.8 MG/DL — HIGH (ref 0.5–1.3)
GLUCOSE SERPL-MCNC: 129 MG/DL — HIGH (ref 70–99)
HCT VFR BLD CALC: 44.8 % — SIGNIFICANT CHANGE UP (ref 39–50)
HGB BLD-MCNC: 14 G/DL — SIGNIFICANT CHANGE UP (ref 13–17)
MAGNESIUM SERPL-MCNC: 2.7 MG/DL — HIGH (ref 1.6–2.6)
MCHC RBC-ENTMCNC: 30.7 PG — SIGNIFICANT CHANGE UP (ref 27–34)
MCHC RBC-ENTMCNC: 31.3 GM/DL — LOW (ref 32–36)
MCV RBC AUTO: 98.2 FL — SIGNIFICANT CHANGE UP (ref 80–100)
NRBC # BLD: 0 /100 WBCS — SIGNIFICANT CHANGE UP (ref 0–0)
NT-PROBNP SERPL-SCNC: 539 PG/ML — HIGH (ref 0–300)
PLATELET # BLD AUTO: 182 K/UL — SIGNIFICANT CHANGE UP (ref 150–400)
POTASSIUM SERPL-MCNC: 4.3 MMOL/L — SIGNIFICANT CHANGE UP (ref 3.5–5.3)
POTASSIUM SERPL-SCNC: 4.3 MMOL/L — SIGNIFICANT CHANGE UP (ref 3.5–5.3)
PROT SERPL-MCNC: 6.8 G/DL — SIGNIFICANT CHANGE UP (ref 6–8.3)
RBC # BLD: 4.56 M/UL — SIGNIFICANT CHANGE UP (ref 4.2–5.8)
RBC # FLD: 15.4 % — HIGH (ref 10.3–14.5)
SODIUM SERPL-SCNC: 137 MMOL/L — SIGNIFICANT CHANGE UP (ref 135–145)
WBC # BLD: 9.62 K/UL — SIGNIFICANT CHANGE UP (ref 3.8–10.5)
WBC # FLD AUTO: 9.62 K/UL — SIGNIFICANT CHANGE UP (ref 3.8–10.5)

## 2019-12-12 PROCEDURE — 71250 CT THORAX DX C-: CPT | Mod: 26

## 2019-12-12 PROCEDURE — 93010 ELECTROCARDIOGRAM REPORT: CPT

## 2019-12-12 PROCEDURE — 99285 EMERGENCY DEPT VISIT HI MDM: CPT

## 2019-12-12 PROCEDURE — 99223 1ST HOSP IP/OBS HIGH 75: CPT | Mod: GC

## 2019-12-12 PROCEDURE — 74019 RADEX ABDOMEN 2 VIEWS: CPT | Mod: 26

## 2019-12-12 RX ORDER — TAMSULOSIN HYDROCHLORIDE 0.4 MG/1
0.4 CAPSULE ORAL AT BEDTIME
Refills: 0 | Status: DISCONTINUED | OUTPATIENT
Start: 2019-12-12 | End: 2019-12-20

## 2019-12-12 RX ORDER — ALLOPURINOL 300 MG
100 TABLET ORAL
Refills: 0 | Status: DISCONTINUED | OUTPATIENT
Start: 2019-12-12 | End: 2019-12-20

## 2019-12-12 RX ORDER — PANTOPRAZOLE SODIUM 20 MG/1
40 TABLET, DELAYED RELEASE ORAL
Refills: 0 | Status: DISCONTINUED | OUTPATIENT
Start: 2019-12-12 | End: 2019-12-20

## 2019-12-12 RX ORDER — ALLOPURINOL 300 MG
200 TABLET ORAL
Qty: 0 | Refills: 0 | DISCHARGE

## 2019-12-12 RX ORDER — SODIUM BICARBONATE 1 MEQ/ML
2 SYRINGE (ML) INTRAVENOUS
Qty: 0 | Refills: 0 | DISCHARGE

## 2019-12-12 RX ORDER — CINACALCET 30 MG/1
30 TABLET, FILM COATED ORAL DAILY
Refills: 0 | Status: DISCONTINUED | OUTPATIENT
Start: 2019-12-12 | End: 2019-12-20

## 2019-12-12 RX ORDER — METOPROLOL TARTRATE 50 MG
50 TABLET ORAL DAILY
Refills: 0 | Status: DISCONTINUED | OUTPATIENT
Start: 2019-12-12 | End: 2019-12-20

## 2019-12-12 RX ORDER — CALCIUM ACETATE 667 MG
2 TABLET ORAL
Qty: 0 | Refills: 0 | DISCHARGE

## 2019-12-12 RX ORDER — HYDRALAZINE HCL 50 MG
100 TABLET ORAL
Refills: 0 | Status: DISCONTINUED | OUTPATIENT
Start: 2019-12-12 | End: 2019-12-20

## 2019-12-12 RX ORDER — CHOLECALCIFEROL (VITAMIN D3) 125 MCG
1000 CAPSULE ORAL DAILY
Refills: 0 | Status: DISCONTINUED | OUTPATIENT
Start: 2019-12-12 | End: 2019-12-20

## 2019-12-12 RX ORDER — EZETIMIBE AND SIMVASTATIN 10; 80 MG/1; MG/1
1 TABLET, FILM COATED ORAL
Qty: 0 | Refills: 0 | DISCHARGE

## 2019-12-12 RX ORDER — ASPIRIN/CALCIUM CARB/MAGNESIUM 324 MG
81 TABLET ORAL DAILY
Refills: 0 | Status: DISCONTINUED | OUTPATIENT
Start: 2019-12-12 | End: 2019-12-20

## 2019-12-12 RX ORDER — CALCIUM ACETATE 667 MG
667 TABLET ORAL
Refills: 0 | Status: DISCONTINUED | OUTPATIENT
Start: 2019-12-12 | End: 2019-12-20

## 2019-12-12 RX ORDER — CALCITRIOL 0.5 UG/1
0.25 CAPSULE ORAL DAILY
Refills: 0 | Status: DISCONTINUED | OUTPATIENT
Start: 2019-12-12 | End: 2019-12-20

## 2019-12-12 RX ORDER — TAMSULOSIN HYDROCHLORIDE 0.4 MG/1
1 CAPSULE ORAL
Qty: 0 | Refills: 0 | DISCHARGE

## 2019-12-12 RX ADMIN — Medication 40 MILLIGRAM(S): at 23:11

## 2019-12-12 RX ADMIN — CALCITRIOL 0.25 MICROGRAM(S): 0.5 CAPSULE ORAL at 23:11

## 2019-12-12 RX ADMIN — Medication 100 MILLIGRAM(S): at 23:11

## 2019-12-12 RX ADMIN — TAMSULOSIN HYDROCHLORIDE 0.4 MILLIGRAM(S): 0.4 CAPSULE ORAL at 23:11

## 2019-12-12 RX ADMIN — CINACALCET 30 MILLIGRAM(S): 30 TABLET, FILM COATED ORAL at 23:11

## 2019-12-12 NOTE — ED PROVIDER NOTE - OBJECTIVE STATEMENT
59 year old male with pmhx HTN, CKD (on peritoneal dialysis) presents to the ED c/o new right-sided pleural effusion found on CXR x2 days ago. States he had a peritoneal dialysis catheter placed in October, at which time his CXR was clear. Went to his nephrologist x2 days ago, CXR showed that there may be communication between his abdominal and chest cavities, subsequently referred to [facility] ED for further evaluation. Denies CP, SOB, fever, cough, abdominal pain, n/v, worsening pedal edema.

## 2019-12-12 NOTE — ED PROVIDER NOTE - CLINICAL SUMMARY MEDICAL DECISION MAKING FREE TEXT BOX
59 year old male presents to the ED with concern for new moderate sized pleural effusion found on CXR x2 days ago. will contact pulmonology for consult. Pt in no respiratory distress and vitals stable, will hold off on treatments or drainage at present time. Will conduct labs, likely conduct CT then reassess.

## 2019-12-12 NOTE — ED PROVIDER NOTE - ATTENDING CONTRIBUTION TO CARE
59 M w/ PMH of peritoneal dialysis presents to the ED w/ a R sided pleural effusion seen on outpt cxr. Pts nephrologist, Dr. Marcelo- sent pt here for pulmonary.   has diminished breath sounds on the R lung w/ no abdominal tenderness, last PD session was today. not hypoxic on ra  plan for CT chest and reassessment. 59 M w/ PMH of peritoneal dialysis presents to the ED w/ a R sided pleural effusion seen on outpt cxr. Pts nephrologist, Dr. Marcelo- sent pt here for pulmonary.   has diminished breath sounds on the R lung w/ no abdominal tenderness, last PD session was today. not hypoxic on ra  plan for imaging and admission for thoracentesis, pt has no signs of respiratory collapse in the ER as a result decision to defer thoracentesis as inpatient. seen by nephrology in the department in agreement w/ plan

## 2019-12-12 NOTE — ED ADULT NURSE REASSESSMENT NOTE - NS ED NURSE REASSESS COMMENT FT1
Patient seen resting comfortably in bed. Has ambulated to the bathroom. Awaiting bed assignment. Safety and comfort provided. Family at bedside.

## 2019-12-12 NOTE — ED ADULT NURSE NOTE - OBJECTIVE STATEMENT
60 yo male presents to the ED ambulatory, via waiting room from home complaining of possible pleural effusion. Patient is a peritoneal dialysis patient and performs dialysis everyday at home. Reports he had a CXR done on Monday after his session, and was contacted by his PD doctor that he a new onset of pleural effusion to the R was found. Last peritoneal dialysis session was this morning, was referred to ED for further evaluation. Dialysate was normal and clear per patient. PMH of CKD, HTN, GERD, nephritis. Denies headache, dizziness, vision changes, chest pain, shortness of breath, abdominal pain, nausea, vomiting, diarrhea, fevers, chills, dysuria, hematuria, recent illness travel or fall. Family is at bedside.

## 2019-12-12 NOTE — CONSULT NOTE ADULT - SUBJECTIVE AND OBJECTIVE BOX
[facility] DIVISION OF KIDNEY DISEASES AND HYPERTENSION -- INITIAL CONSULT NOTE  --------------------------------------------------------------------------------  HPI: Patient is a 59y old M with pmhx of BPH, HTN, ESRD (on peritoneal dialysis) presents to [facility]  ED  for right-sided pleural effusion found on CXR x2 days ago. Patient's outpt Nephrologist is Dr. Richey. He has been on PD since Oct 2019, tolerating well up until recently. At the start of December he began to notice that he was getting less UF with his current PD prescription. Generally he had a UF of ~1L a day. Around Dec 2nd his UF dropped to 300-400 range. Weight began to increase during the same time period. He was seen by Dr. Richey and sent for abdominal xray on 12/10 which revealed a ?malpositioned PD catheter and a CXR with a new R pleural effusion. His regular PD prescription is CCPD, 9 hours, 4 Fills, each 2L, alternating 1.5% & 2.5% Dextrose. For the last two days he has used 1 2L Fill of 4.25% Dextrose with a UF of ~600cc. He denies any CP/SOB/ abdominal pain/fever/chills/nausea/vomiting. States he has been going to bathroom regularly, 2-3 times a day using his bowel regimen. Today he noted diarrhea in the setting of using excessive bowel medications. Nephrology was consulted for ESRD management.     In the ED patient's vital signs were stable. Labs reviewed and notable for BUN of 68, Scr of 11.8. Stable electrolytes        PAST HISTORY  --------------------------------------------------------------------------------  PAST MEDICAL & SURGICAL HISTORY:  Solitary fibrous tumor: removed in 2013  BPH (benign prostatic hyperplasia)  Gout  GERD (gastroesophageal reflux disease)  Hypercholesteremia  Kidney disease  HTN (hypertension)  Lung abnormality  BPH (benign prostatic hypertrophy)  CKD (chronic kidney disease), stage III  Nephritis  History of lung surgery  Vocal cord anomaly: S/P polyp removal 2004    FAMILY HISTORY:  No pertinent family history in first degree relatives    PAST SOCIAL HISTORY: No smoking or ETOH use    ALLERGIES & MEDICATIONS  --------------------------------------------------------------------------------  Allergies    No Known Allergies    Intolerances      Standing Inpatient Medications    PRN Inpatient Medications      REVIEW OF SYSTEMS  --------------------------------------------------------------------------------  Gen: No lethargy  Respiratory: No dyspnea  CV: No chest pain  GI: No abdominal pain/ diarrhea   MSK: + LE edema  Neuro: No dizziness  Heme: No bleeding    All other systems were reviewed and are negative, except as noted.      VITALS/PHYSICAL EXAM  --------------------------------------------------------------------------------  T(C): 36.6 (12-12-19 @ 17:03), Max: 36.8 (12-12-19 @ 13:24)  HR: 68 (12-12-19 @ 17:03) (68 - 86)  BP: 153/91 (12-12-19 @ 17:03) (149/93 - 159/89)  RR: 18 (12-12-19 @ 17:03) (18 - 18)  SpO2: 97% (12-12-19 @ 17:03) (97% - 99%)  Wt(kg): --  Height (cm): 172.72 (12-12-19 @ 13:24)  Weight (kg): 87 (12-12-19 @ 13:24)  BMI (kg/m2): 29.2 (12-12-19 @ 13:24)  BSA (m2): 2.01 (12-12-19 @ 13:24)      Physical Exam:    	Gen: NAD  	HEENT: Anicteric   	Pulm: CTA on L side, diminished BS on R side up to the scapula  	CV: RRR, S1S2; no rub  	Abd: +BS, soft, nontender/nondistended, + PD catheter        	: No suprapubic tenderness  	MSK: Warm, 2+ Edema  	Neuro: No focal deficits, AOX3      	Vascular Access: + PD catheter       LABS/STUDIES  --------------------------------------------------------------------------------              14.0   9.62  >-----------<  182      [12-12-19 @ 15:53]              44.8     137  |  98  |  69  ----------------------------<  129      [12-12-19 @ 15:53]  4.3   |  20  |  11.80        Ca     8.7     [12-12-19 @ 15:53]      Mg     2.7     [12-12-19 @ 15:53]    TPro  6.8  /  Alb  3.8  /  TBili  0.2  /  DBili  x   /  AST  13  /  ALT  11  /  AlkPhos  113  [12-12-19 @ 15:53]          Creatinine Trend:  SCr 11.80 [12-12 @ 15:53]    Urinalysis - [12-22-18 @ 12:38]      Color Colorless / Appearance Clear / SG 1.011 / pH 6.0      Gluc Trace / Ketone Negative  / Bili Negative / Urobili Negative       Blood Trace / Protein 100 mg/dL / Leuk Est Negative / Nitrite Negative      RBC 29 / WBC 1 / Hyaline 0 / Gran  / Sq Epi  / Non Sq Epi 0 / Bacteria Negative      Iron 23, TIBC 219, %sat 11      [12-24-18 @ 18:16]  HbA1c 5.9      [04-08-18 @ 07:47]    HBsAb 317.7      [02-21-17 @ 17:48]  HBsAb Reactive      [02-21-17 @ 17:48]  HBsAg Nonreact      [02-21-17 @ 17:48]  HBcAb Nonreact      [02-21-17 @ 17:48]  HCV 0.12, Nonreact      [02-21-17 @ 17:48]  HIV Nonreact      [02-21-17 @ 17:48]

## 2019-12-12 NOTE — H&P ADULT - HISTORY OF PRESENT ILLNESS
59 year old male with pmhx HTN, CKD (on peritoneal dialysis) presents to the ED c/o new right-sided pleural effusion found on CXR x2 days ago. States he had a peritoneal dialysis catheter placed in October, at which time his CXR was clear. Went to his nephrologist x2 days ago, CXR showed that there may be communication between his abdominal and chest cavities, subsequently referred to [facility] ED for further evaluation. Denies CP, SOB, fever, cough, abdominal pain, n/v, worsening pedal edema. 59 year old male with pmhx HTN, HLD ,ESRD (on peritoneal dialysis) presents to the ED c/o new right-sided pleural effusion found on CXR x2 days ago. States he had a peritoneal dialysis catheter placed in October, at which time his CXR was clear. Went to his nephrologist x2 days ago, CXR showed that there may be communication between his abdominal and chest cavities, subsequently referred to [facility] ED for further evaluation. Only Saturday he felt SOB . Denies CP,  fever, cough, abdominal pain, n/v, worsening pedal edema.

## 2019-12-12 NOTE — ED PROVIDER NOTE - PHYSICAL EXAMINATION
General: No acute distress.  Heart: Regular rate and rhythm. No murmurs, rubs, or gallops.  Lungs: Diminished breath sounds to right base of lungs.  Abdomen: Abdominal catheter site clean, dry, and intact. No discharge or erythema. Abdomen soft, non-tender.   Eyes: PERRL, EOMI.  Neuro: AAOx4, no focal motor or sensory deficits. CN II-XII intact.  Extremities: No lower extremity swelling, 2+ DP and radial pulses.  Skin: No rashes or lesions.

## 2019-12-12 NOTE — H&P ADULT - PROBLEM/PLAN-2
6/5/19 PA submitted thru [employer] for Trulicity 7.08 RR/4.8 ML. Claim pending decision,office should receive outcome within 5 days. Preferred drugs per plan is Byetta,Bydureon or Victoza. DISPLAY PLAN FREE TEXT

## 2019-12-12 NOTE — H&P ADULT - ASSESSMENT
59 year old male with pmhx HTN, CKD (on peritoneal dialysis) presents to the ED c/o new right-sided pleural effusion found on CXR x2 days ago. States he had a peritoneal dialysis catheter placed in October, at which time his CXR was clear. Went to his nephrologist x2 days ago, CXR showed that there may be communication between his abdominal and chest cavities, subsequently referred to [facility] ED for further evaluation. Denies CP, SOB, fever, cough, abdominal pain, n/v, worsening pedal edema. 59 year old male with pmhx HTN, HLD ,ESRD (on peritoneal dialysis) presents to the ED c/o new right-sided pleural effusion found on CXR x2 days ago. States he had a peritoneal dialysis catheter placed in October, at which time his CXR was clear. Went to his nephrologist x2 days ago, CXR showed that there may be communication between his abdominal and chest cavities, subsequently referred to [facility] ED for further evaluation. Only Saturday he felt SOB . Denies CP,  fever, cough, abdominal pain, n/v, worsening pedal edema.

## 2019-12-12 NOTE — H&P ADULT - NSICDXPASTMEDICALHX_GEN_ALL_CORE_FT
PAST MEDICAL HISTORY:  BPH (benign prostatic hyperplasia)     BPH (benign prostatic hypertrophy)     CKD (chronic kidney disease), stage III     GERD (gastroesophageal reflux disease)     Gout     HTN (hypertension)     Hypercholesteremia     Kidney disease     Lung abnormality     Nephritis     Solitary fibrous tumor removed in 2013

## 2019-12-12 NOTE — CONSULT NOTE ADULT - ASSESSMENT
59y old M with pmhx of BPH, HTN, ESRD (on peritoneal dialysis) presents to [facility]  ED  for right-sided pleural effusion, Nephrology consulted for ESRD on PD    #ESRD       -Pt. with ESRD on CCPD, regimen is 9 hours, 4 Fills, each 2L, alternating 1.5% & 2.5% Dextrose.  - Given the concern for a peritoneal leak and R pleural effusion will hold off on further PD at this time  - Electrolytes stable  - Recommend Pulm eval for thoracentesis and fluid sampling  - If pursuing further imaging with CT chest would recommend CT A/P non-contrast as well to evaluate PD catheter location as on abdominal xray it appears to be too high up in the pelvic cavity  - While holding PD would increase torsemide to 40mg BID  - Maintain good bowel regimen    #R Pleural effusion  - Concerning for peritoneal leak   - Would consult pulm for thoracentesis and fluid sampling  - Increase torsemide to 40mg BID for now to aid volume removal    #HTN  BP at target range. Monitor BP on current BP medication. Low salt diet.  Increase torsemide to 40mg BID    #Secondary hyperparathyroidism  - C/w vitamin D 5000U QD  - C/w Sensipar 30mg QD  - C/w Calcitrol .25mcg QD  - C/w Phoslo 667mg TID with meals. Low phosphorus diet and monitor serum phos QD

## 2019-12-12 NOTE — H&P ADULT - PROBLEM SELECTOR PLAN 1
CT Chest .< from: CT Chest No Cont (12.12.19 @ 17:55) >    INTERPRETATION:  Large right pleural effusion with near complete   atelectasis of the right lower lobe. Redemonstrated enlarged   heterogeneous thyroid with tracheal deviation to the right and tracheal   narrowing.    Follow up final report.  House Pulmonary consulted by ED . Awaiting Thoracentesis .

## 2019-12-12 NOTE — ED ADULT NURSE REASSESSMENT NOTE - NS ED NURSE REASSESS COMMENT FT1
Received report from Enrike HARRIS. Patient resting comfortably in stretcher. A&Ox4. Vital signs are stable. Patient in no acute distress. Reports feeling hungry, will speak with MD to ensure patient is able to eat. Denies chest pain, SOB, headache, N/V/D, abdominal pain, fever/chills, burning upon urination or difficulty urinating, hematuria. Patient pending inpatient bed assignment. Plan of care discussed. Safety and comfort measures maintained. Will continue to monitor.

## 2019-12-13 ENCOUNTER — RESULT REVIEW (OUTPATIENT)
Age: 59
End: 2019-12-13

## 2019-12-13 DIAGNOSIS — K57.20 DIVERTICULITIS OF LARGE INTESTINE WITH PERFORATION AND ABSCESS WITHOUT BLEEDING: ICD-10-CM

## 2019-12-13 LAB
ALBUMIN FLD-MCNC: <0.3 G/DL — SIGNIFICANT CHANGE UP
ANION GAP SERPL CALC-SCNC: 19 MMOL/L — HIGH (ref 5–17)
B PERT IGG+IGM PNL SER: CLEAR — SIGNIFICANT CHANGE UP
BUN SERPL-MCNC: 74 MG/DL — HIGH (ref 7–23)
CALCIUM SERPL-MCNC: 7.8 MG/DL — LOW (ref 8.4–10.5)
CHLORIDE SERPL-SCNC: 101 MMOL/L — SIGNIFICANT CHANGE UP (ref 96–108)
CO2 SERPL-SCNC: 17 MMOL/L — LOW (ref 22–31)
COLOR FLD: SIGNIFICANT CHANGE UP
CREAT SERPL-MCNC: 12.03 MG/DL — HIGH (ref 0.5–1.3)
EOSINOPHIL # FLD: 3 % — SIGNIFICANT CHANGE UP
FLUID INTAKE SUBSTANCE CLASS: SIGNIFICANT CHANGE UP
FLUID SEGMENTED GRANULOCYTES: 12 % — SIGNIFICANT CHANGE UP
GLUCOSE FLD-MCNC: 140 MG/DL — SIGNIFICANT CHANGE UP
GLUCOSE SERPL-MCNC: 97 MG/DL — SIGNIFICANT CHANGE UP (ref 70–99)
HCT VFR BLD CALC: 40.1 % — SIGNIFICANT CHANGE UP (ref 39–50)
HGB BLD-MCNC: 12.7 G/DL — LOW (ref 13–17)
LDH SERPL L TO P-CCNC: 35 U/L — SIGNIFICANT CHANGE UP
LYMPHOCYTES # FLD: 48 % — SIGNIFICANT CHANGE UP
MCHC RBC-ENTMCNC: 30.9 PG — SIGNIFICANT CHANGE UP (ref 27–34)
MCHC RBC-ENTMCNC: 31.7 GM/DL — LOW (ref 32–36)
MCV RBC AUTO: 97.6 FL — SIGNIFICANT CHANGE UP (ref 80–100)
MESOTHL CELL # FLD: 11 % — SIGNIFICANT CHANGE UP
MONOS+MACROS # FLD: 24 % — SIGNIFICANT CHANGE UP
OTHER CELLS FLD MANUAL: 2 % — SIGNIFICANT CHANGE UP
PH FLD: 7.81 — SIGNIFICANT CHANGE UP
PLATELET # BLD AUTO: 160 K/UL — SIGNIFICANT CHANGE UP (ref 150–400)
POTASSIUM SERPL-MCNC: 4.4 MMOL/L — SIGNIFICANT CHANGE UP (ref 3.5–5.3)
POTASSIUM SERPL-SCNC: 4.4 MMOL/L — SIGNIFICANT CHANGE UP (ref 3.5–5.3)
PROT FLD-MCNC: <0.6 G/DL — SIGNIFICANT CHANGE UP
RBC # BLD: 4.11 M/UL — LOW (ref 4.2–5.8)
RBC # FLD: 15.1 % — HIGH (ref 10.3–14.5)
RCV VOL RI: 440 /UL — HIGH (ref 0–0)
SODIUM SERPL-SCNC: 137 MMOL/L — SIGNIFICANT CHANGE UP (ref 135–145)
TOTAL NUCLEATED CELL COUNT, BODY FLUID: 47 /UL — SIGNIFICANT CHANGE UP
TUBE TYPE: SIGNIFICANT CHANGE UP
WBC # BLD: 7.93 K/UL — SIGNIFICANT CHANGE UP (ref 3.8–10.5)
WBC # FLD AUTO: 7.93 K/UL — SIGNIFICANT CHANGE UP (ref 3.8–10.5)

## 2019-12-13 PROCEDURE — 99223 1ST HOSP IP/OBS HIGH 75: CPT | Mod: GC,25

## 2019-12-13 PROCEDURE — 32557 INSERT CATH PLEURA W/ IMAGE: CPT | Mod: GC

## 2019-12-13 PROCEDURE — 88108 CYTOPATH CONCENTRATE TECH: CPT | Mod: 26,59

## 2019-12-13 PROCEDURE — 99233 SBSQ HOSP IP/OBS HIGH 50: CPT | Mod: GC

## 2019-12-13 PROCEDURE — 88305 TISSUE EXAM BY PATHOLOGIST: CPT | Mod: 26

## 2019-12-13 PROCEDURE — 88112 CYTOPATH CELL ENHANCE TECH: CPT | Mod: 26

## 2019-12-13 RX ORDER — GENTAMICIN SULFATE 0.1 %
1 OINTMENT (GRAM) TOPICAL DAILY
Refills: 0 | Status: DISCONTINUED | OUTPATIENT
Start: 2019-12-13 | End: 2019-12-20

## 2019-12-13 RX ADMIN — Medication 100 MILLIGRAM(S): at 06:53

## 2019-12-13 RX ADMIN — PANTOPRAZOLE SODIUM 40 MILLIGRAM(S): 20 TABLET, DELAYED RELEASE ORAL at 06:53

## 2019-12-13 RX ADMIN — Medication 667 MILLIGRAM(S): at 13:17

## 2019-12-13 RX ADMIN — Medication 667 MILLIGRAM(S): at 17:58

## 2019-12-13 RX ADMIN — Medication 81 MILLIGRAM(S): at 11:57

## 2019-12-13 RX ADMIN — CALCITRIOL 0.25 MICROGRAM(S): 0.5 CAPSULE ORAL at 11:56

## 2019-12-13 RX ADMIN — Medication 100 MILLIGRAM(S): at 17:58

## 2019-12-13 RX ADMIN — Medication 40 MILLIGRAM(S): at 21:45

## 2019-12-13 RX ADMIN — Medication 50 MILLIGRAM(S): at 06:53

## 2019-12-13 RX ADMIN — TAMSULOSIN HYDROCHLORIDE 0.4 MILLIGRAM(S): 0.4 CAPSULE ORAL at 21:45

## 2019-12-13 RX ADMIN — Medication 1000 UNIT(S): at 11:57

## 2019-12-13 RX ADMIN — Medication 40 MILLIGRAM(S): at 11:56

## 2019-12-13 RX ADMIN — CINACALCET 30 MILLIGRAM(S): 30 TABLET, FILM COATED ORAL at 11:57

## 2019-12-13 NOTE — PROGRESS NOTE ADULT - ASSESSMENT
59 year old male with pmhx HTN, HLD ,ESRD (on peritoneal dialysis) presents to the ED c/o new right-sided pleural effusion found on CXR x2 days ago. States he had a peritoneal dialysis catheter placed in October, at which time his CXR was clear. Went to his nephrologist x2 days ago, CXR showed that there may be communication between his abdominal and chest cavities, subsequently referred to [facility] ED for further evaluation. Only Saturday he felt SOB . Denies CP,  fever, cough, abdominal pain, n/v, worsening pedal edema.     Problem/Plan - 1:  ·  Problem: Pleural effusion.  Plan: CT Chest .< from: CT Chest No Cont (12.12.19 @ 17:55) >    INTERPRETATION:  Large right pleural effusion with near complete   atelectasis of the right lower lobe. Redemonstrated enlarged   heterogeneous thyroid with tracheal deviation to the right and tracheal   narrowing.    Follow up final report.  House Pulmonary consult noted .. Awaiting Thoracentesis .      Problem/Plan - 2:  ·  Problem: ESRD on peritoneal dialysis.  Plan: PD on Hold . Renal consult noted. May need HD .     Problem/Plan - 3:  ·  Problem: Secondary hyperparathyroidism.  Plan: Continue home treatement .      Problem/Plan - 4:  ·  Problem: HTN (hypertension).  Plan: BP meds with hold parameters.      Problem/Plan - 5:  ·  Problem: BPH (benign prostatic hyperplasia).  Plan: Flomax BID  .      Problem/Plan - 6:  Problem: Hypercholesteremia. Plan: Said holding statin since few weeks as lipids were good.     Problem/Plan - 7:  ·  Problem: GERD (gastroesophageal reflux disease).  Plan: PPI.      Problem/Plan - 8:  ·  Problem: Thyroid enlargement.  Plan: Will check TFT as well Thyroid US.

## 2019-12-13 NOTE — PROGRESS NOTE ADULT - SUBJECTIVE AND OBJECTIVE BOX
[facility] DIVISION OF KIDNEY DISEASES AND HYPERTENSION -- FOLLOW UP NOTE  --------------------------------------------------------------------------------  HPI: Patient is a 59y old M with pmhx of BPH, HTN, ESRD (on peritoneal dialysis) presents to [facility]  ED  for right-sided pleural effusion found on CXR x2 days ago. Patient's outpt Nephrologist is Dr. Richey. He has been on PD since Oct 2019. Around Dec 2nd his UF dropped to 300-400 range. Weight began to increase during the same time period. He was seen by Dr. Richey and sent for abdominal xray on 12/10 which revealed a ?malpositioned PD catheter and a CXR with a new R pleural effusion. Now pending thoracocentesis.     Off note PD prescription is CCPD, 9 hours, 4 Fills, each 2L, alternating 1.5% & 2.5% Dextrose. For the last two days he has used 1 2L Fill of 4.25% Dextrose with a UF of ~600cc.    24 hour events/subjective:  Pt examined at bed side,  does not offer any complains, VS stable, breathing comfortable at room air.       PAST HISTORY  --------------------------------------------------------------------------------  No significant changes to PMH, PSH, FHx, SHx, unless otherwise noted    ALLERGIES & MEDICATIONS  --------------------------------------------------------------------------------  Allergies    No Known Allergies    Intolerances      Standing Inpatient Medications  allopurinol 100 milliGRAM(s) Oral two times a day  aspirin enteric coated 81 milliGRAM(s) Oral daily  calcitriol   Capsule 0.25 MICROGram(s) Oral daily  calcium acetate 667 milliGRAM(s) Oral three times a day with meals  cholecalciferol 1000 Unit(s) Oral daily  cinacalcet 30 milliGRAM(s) Oral daily  hydrALAZINE 100 milliGRAM(s) Oral two times a day  metoprolol succinate ER 50 milliGRAM(s) Oral daily  pantoprazole    Tablet 40 milliGRAM(s) Oral before breakfast  tamsulosin 0.4 milliGRAM(s) Oral at bedtime  torsemide 40 milliGRAM(s) Oral two times a day    PRN Inpatient Medications      REVIEW OF SYSTEMS  --------------------------------------------------------------------------------  Gen: No lethargy  Respiratory: No dyspnea  CV: No chest pain  GI: No abdominal pain/ diarrhea   MSK: + LE edema  Neuro: No dizziness  Heme: No bleeding    All other systems were reviewed and are negative, except as noted.      VITALS/PHYSICAL EXAM  --------------------------------------------------------------------------------  T(C): 36.5 (12-13-19 @ 11:36), Max: 36.9 (12-12-19 @ 21:03)  HR: 87 (12-13-19 @ 11:36) (68 - 92)  BP: 133/70 (12-13-19 @ 11:36) (110/66 - 159/89)  RR: 18 (12-13-19 @ 11:36) (18 - 18)  SpO2: 97% (12-13-19 @ 11:36) (96% - 99%)  Wt(kg): --  Height (cm): 172.72 (12-12-19 @ 13:24)  Weight (kg): 87 (12-12-19 @ 13:24)  BMI (kg/m2): 29.2 (12-12-19 @ 13:24)  BSA (m2): 2.01 (12-12-19 @ 13:24)      12-12-19 @ 07:01  -  12-13-19 @ 07:00  --------------------------------------------------------  IN: 250 mL / OUT: 350 mL / NET: -100 mL      Physical Exam:  	Gen: NAD  	HEENT: Anicteric   	Pulm: CTA on L side, diminished BS on R side up to the scapula  	CV: RRR, S1S2; no rub  	Abd: +BS, soft, nontender/nondistended, + PD catheter        	: No suprapubic tenderness  	MSK: Warm, 2+ Edema  	Neuro: No focal deficits, AOX3      	Vascular Access: + PD catheter     LABS/STUDIES  --------------------------------------------------------------------------------              12.7   7.93  >-----------<  160      [12-13-19 @ 08:25]              40.1     137  |  101  |  74  ----------------------------<  97      [12-13-19 @ 06:23]  4.4   |  17  |  12.03        Ca     7.8     [12-13-19 @ 06:23]      Mg     2.7     [12-12-19 @ 15:53]    TPro  6.8  /  Alb  3.8  /  TBili  0.2  /  DBili  x   /  AST  13  /  ALT  11  /  AlkPhos  113  [12-12-19 @ 15:53]          Creatinine Trend:  SCr 12.03 [12-13 @ 06:23]  SCr 11.80 [12-12 @ 15:53]        Iron 23, TIBC 219, %sat 11      [12-24-18 @ 18:16]  HbA1c 5.9      [04-08-18 @ 07:47]

## 2019-12-13 NOTE — CONSULT NOTE ADULT - ATTENDING COMMENTS
Patient seen and examined, data and imaging personally reviewed by me.  Agree with plan as outlined above.  Thoracentesis performed- see procedure note.

## 2019-12-13 NOTE — CONSULT NOTE ADULT - SUBJECTIVE AND OBJECTIVE BOX
CHIEF COMPLAINT:    HPI:  Patient is a 60 y/o M with PMx of HTN, HLD, ESRD on PD (catheter placed in October) who was sent in to ED for evaluation of R pleural effusion.     PAST MEDICAL & SURGICAL HISTORY:  Solitary fibrous tumor: removed in 2013  BPH (benign prostatic hyperplasia)  Gout  GERD (gastroesophageal reflux disease)  Hypercholesteremia  Kidney disease  HTN (hypertension)  Lung abnormality  BPH (benign prostatic hypertrophy)  CKD (chronic kidney disease), stage III  Nephritis  History of lung surgery  Vocal cord anomaly: S/P polyp removal 2004      FAMILY HISTORY:  No pertinent family history in first degree relatives      SOCIAL HISTORY:  Smoking: [ ] Never Smoked [ ] Former Smoker (__ packs x ___ years) [ ] Current Smoker  (__ packs x ___ years)  Substance Use: [ ] Never Used [ ] Used ____  EtOH Use:  Marital Status: [ ] Single [ ]  [ ]  [ ]   Sexual History:   Occupation:  Recent Travel:  Country of Birth:  Advance Directives:    Allergies    No Known Allergies    Intolerances        HOME MEDICATIONS:  Home Medications:  allopurinol 100 mg oral tablet: 1 tab(s) orally 2 times a day (12 Dec 2019 18:21)  aspirin 81 mg oral delayed release tablet: 1 tab(s) orally once a day (12 Dec 2019 18:21)  Auryxia 210 mg oral tablet: 2 tab(s) orally 3 times a day (with meals) (12 Dec 2019 18:21)  calcitriol 0.25 mcg oral capsule: 1 cap(s) orally once a day (12 Dec 2019 18:21)  calcium acetate 667 mg oral tablet: 1 tab(s) orally 3 times a day (12 Dec 2019 18:21)  hydrALAZINE 100 mg oral tablet: 1 tab(s) orally 2 times a day (12 Dec 2019 18:21)  lactulose 10 g/15 mL oral syrup: 15 milliliter(s) orally once a day, As Needed (12 Dec 2019 18:21)  metoprolol succinate 50 mg oral tablet, extended release: 1 tab(s) orally once a day (12 Dec 2019 18:21)  omeprazole 20 mg oral delayed release capsule: 1 cap(s) orally once a day (12 Dec 2019 18:21)  Sensipar 30 mg oral tablet: 1 tab(s) orally once a day (12 Dec 2019 18:19)  tamsulosin 0.4 mg oral capsule: 1 cap(s) orally 2 times a day (12 Dec 2019 18:21)  torsemide 10 mg oral tablet: 1 tab(s) orally 2 times a day (12 Dec 2019 18:21)  Vitamin D3 5000 intl units oral tablet: 1 tab(s) orally once a day (12 Dec 2019 18:21)      REVIEW OF SYSTEMS:  Constitutional: [ ] negative [ ] fevers [ ] chills [ ] weight loss [ ] weight gain  HEENT: [ ] negative [ ] dry eyes [ ] eye irritation [ ] postnasal drip [ ] nasal congestion  CV: [ ] negative  [ ] chest pain [ ] orthopnea [ ] palpitations [ ] murmur  Resp: [ ] negative [ ] cough [ ] shortness of breath [ ] dyspnea [ ] wheezing [ ] sputum [ ] hemoptysis  GI: [ ] negative [ ] nausea [ ] vomiting [ ] diarrhea [ ] constipation [ ] abd pain [ ] dysphagia   : [ ] negative [ ] dysuria [ ] nocturia [ ] hematuria [ ] increased urinary frequency  Musculoskeletal: [ ] negative [ ] back pain [ ] myalgias [ ] arthralgias [ ] fracture  Skin: [ ] negative [ ] rash [ ] itch  Neurological: [ ] negative [ ] headache [ ] dizziness [ ] syncope [ ] weakness [ ] numbness  Psychiatric: [ ] negative [ ] anxiety [ ] depression  Endocrine: [ ] negative [ ] diabetes [ ] thyroid problem  Hematologic/Lymphatic: [ ] negative [ ] anemia [ ] bleeding problem  Allergic/Immunologic: [ ] negative [ ] itchy eyes [ ] nasal discharge [ ] hives [ ] angioedema  [ ] All other systems negative  [ ] Unable to assess ROS because ________    OBJECTIVE:  ICU Vital Signs Last 24 Hrs  T(C): 36.7 (13 Dec 2019 06:02), Max: 36.9 (12 Dec 2019 21:03)  T(F): 98 (13 Dec 2019 06:02), Max: 98.4 (12 Dec 2019 21:03)  HR: 76 (13 Dec 2019 07:14) (68 - 92)  BP: 130/75 (13 Dec 2019 07:14) (110/66 - 159/89)  BP(mean): 111 (12 Dec 2019 17:56) (111 - 111)  ABP: --  ABP(mean): --  RR: 18 (13 Dec 2019 06:02) (18 - 18)  SpO2: 96% (13 Dec 2019 06:02) (96% - 99%)        12-12 @ 07:01  -  12-13 @ 07:00  --------------------------------------------------------  IN: 250 mL / OUT: 350 mL / NET: -100 mL      CAPILLARY BLOOD GLUCOSE          PHYSICAL EXAM:  General:   HEENT:   Lymph Nodes:  Neck:   Respiratory:   Cardiovascular:   Abdomen:   Extremities:   Skin:   Neurological:  Psychiatry:    LINES:     HOSPITAL MEDICATIONS:  Standing Meds:  allopurinol 100 milliGRAM(s) Oral two times a day  aspirin enteric coated 81 milliGRAM(s) Oral daily  calcitriol   Capsule 0.25 MICROGram(s) Oral daily  calcium acetate 667 milliGRAM(s) Oral three times a day with meals  cholecalciferol 1000 Unit(s) Oral daily  cinacalcet 30 milliGRAM(s) Oral daily  hydrALAZINE 100 milliGRAM(s) Oral two times a day  metoprolol succinate ER 50 milliGRAM(s) Oral daily  pantoprazole    Tablet 40 milliGRAM(s) Oral before breakfast  tamsulosin 0.4 milliGRAM(s) Oral at bedtime  torsemide 40 milliGRAM(s) Oral two times a day      PRN Meds:      LABS:                        14.0   9.62  )-----------( 182      ( 12 Dec 2019 15:53 )             44.8     Hgb Trend: 14.0<--  12-13    137  |  101  |  74<H>  ----------------------------<  97  4.4   |  17<L>  |  12.03<H>    Ca    7.8<L>      13 Dec 2019 06:23  Mg     2.7     12-12    TPro  6.8  /  Alb  3.8  /  TBili  0.2  /  DBili  x   /  AST  13  /  ALT  11  /  AlkPhos  113  12-12    Creatinine Trend: 12.03<--, 11.80<--      MICROBIOLOGY:       RADIOLOGY:  < from: CT Chest No Cont (12.12.19 @ 17:55) >  INTERPRETATION:  Large right pleural effusion with near complete   atelectasis of the right lower lobe. Redemonstrated enlarged   heterogeneous thyroid with tracheal deviation to the right and tracheal   narrowing.    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 Patient reports that he had been in his usual state of health up until last week, when he started to feel SOB during a PD session. He was able to empty the fluid, however for the last week he has noticed that the ultrafiltration has decreased. His SOB had improved and he denied any chest pain, chest pressure, POTTER, cough, sputum production. He received a CXR outpatient and was told he had an effusion and to go to the hospital for evaluation. Since admission, patient has been afebrile and labs have been unremarkable. Imaging confirms large R sided pleural effusion and pulmonary consulted for management.     PAST MEDICAL & SURGICAL HISTORY:  Solitary fibrous tumor: removed in 2013  BPH (benign prostatic hyperplasia)  Gout  GERD (gastroesophageal reflux disease)  Hypercholesteremia  Kidney disease  HTN (hypertension)  Lung abnormality  BPH (benign prostatic hypertrophy)  CKD (chronic kidney disease), stage III  Nephritis  History of lung surgery  Vocal cord anomaly: S/P polyp removal 2004      FAMILY HISTORY:  No pertinent family history in first degree relatives      SOCIAL HISTORY:  Smoking: [ ] Never Smoked [x] Former Smoker (1 packs x 20 years) [ ] Current Smoker  (__ packs x ___ years)  Substance Use: [x] Never Used [ ] Used ____  EtOH Use: denies  Marital Status: [ ] Single [x]  [ ]  [ ]   Sexual History:   Occupation:  Recent Travel:  Country of Birth:  Advance Directives:    Allergies    No Known Allergies    Intolerances        HOME MEDICATIONS:  Home Medications:  allopurinol 100 mg oral tablet: 1 tab(s) orally 2 times a day (12 Dec 2019 18:21)  aspirin 81 mg oral delayed release tablet: 1 tab(s) orally once a day (12 Dec 2019 18:21)  Auryxia 210 mg oral tablet: 2 tab(s) orally 3 times a day (with meals) (12 Dec 2019 18:21)  calcitriol 0.25 mcg oral capsule: 1 cap(s) orally once a day (12 Dec 2019 18:21)  calcium acetate 667 mg oral tablet: 1 tab(s) orally 3 times a day (12 Dec 2019 18:21)  hydrALAZINE 100 mg oral tablet: 1 tab(s) orally 2 times a day (12 Dec 2019 18:21)  lactulose 10 g/15 mL oral syrup: 15 milliliter(s) orally once a day, As Needed (12 Dec 2019 18:21)  metoprolol succinate 50 mg oral tablet, extended release: 1 tab(s) orally once a day (12 Dec 2019 18:21)  omeprazole 20 mg oral delayed release capsule: 1 cap(s) orally once a day (12 Dec 2019 18:21)  Sensipar 30 mg oral tablet: 1 tab(s) orally once a day (12 Dec 2019 18:19)  tamsulosin 0.4 mg oral capsule: 1 cap(s) orally 2 times a day (12 Dec 2019 18:21)  torsemide 10 mg oral tablet: 1 tab(s) orally 2 times a day (12 Dec 2019 18:21)  Vitamin D3 5000 intl units oral tablet: 1 tab(s) orally once a day (12 Dec 2019 18:21)      REVIEW OF SYSTEMS:  Constitutional: [x] negative [ ] fevers [ ] chills [ ] weight loss [ ] weight gain  HEENT: [ ] negative [ ] dry eyes [ ] eye irritation [ ] postnasal drip [ ] nasal congestion  CV: [x] negative  [ ] chest pain [ ] orthopnea [ ] palpitations [ ] murmur  Resp: [x] negative [ ] cough [ ] shortness of breath [ ] dyspnea [ ] wheezing [ ] sputum [ ] hemoptysis  GI: [ ] negative [ ] nausea [ ] vomiting [ ] diarrhea [ ] constipation [ ] abd pain [ ] dysphagia   : [ ] negative [ ] dysuria [ ] nocturia [ ] hematuria [ ] increased urinary frequency  Musculoskeletal: [ ] negative [ ] back pain [ ] myalgias [ ] arthralgias [ ] fracture  Skin: [ ] negative [ ] rash [ ] itch  Neurological: [ ] negative [ ] headache [ ] dizziness [ ] syncope [ ] weakness [ ] numbness  Psychiatric: [ ] negative [ ] anxiety [ ] depression  Endocrine: [ ] negative [ ] diabetes [ ] thyroid problem  Hematologic/Lymphatic: [ ] negative [ ] anemia [ ] bleeding problem  Allergic/Immunologic: [ ] negative [ ] itchy eyes [ ] nasal discharge [ ] hives [ ] angioedema  [x] All other systems negative  [ ] Unable to assess ROS because ________    OBJECTIVE:  ICU Vital Signs Last 24 Hrs  T(C): 36.7 (13 Dec 2019 06:02), Max: 36.9 (12 Dec 2019 21:03)  T(F): 98 (13 Dec 2019 06:02), Max: 98.4 (12 Dec 2019 21:03)  HR: 76 (13 Dec 2019 07:14) (68 - 92)  BP: 130/75 (13 Dec 2019 07:14) (110/66 - 159/89)  BP(mean): 111 (12 Dec 2019 17:56) (111 - 111)  ABP: --  ABP(mean): --  RR: 18 (13 Dec 2019 06:02) (18 - 18)  SpO2: 96% (13 Dec 2019 06:02) (96% - 99%)      12-12 @ 07:01  -  12-13 @ 07:00  --------------------------------------------------------  IN: 250 mL / OUT: 350 mL / NET: -100 mL      CAPILLARY BLOOD GLUCOSE      PHYSICAL EXAM:  General: awake and alert, nontoxic appearing male sitting up in bed, NAD  HEENT: NC/AT, EOMI b/l, conjunctiva normal, MMM  Neck: supple  Respiratory: decreased breath sounds in R lung base, L lung CTA, appears comfortable on room air, no conversational dyspnea or accessory muscle use  Cardiovascular: S1 S2 present, RRR, no m/r/g  Abdomen: soft, NT/ND  Extremities: no c/c/e  Skin: no rashes or lesions noted  Neurological: AAOx3, no focal deficits  Psychiatry: calm, cooperative    LINES:     HOSPITAL MEDICATIONS:  Standing Meds:  allopurinol 100 milliGRAM(s) Oral two times a day  aspirin enteric coated 81 milliGRAM(s) Oral daily  calcitriol   Capsule 0.25 MICROGram(s) Oral daily  calcium acetate 667 milliGRAM(s) Oral three times a day with meals  cholecalciferol 1000 Unit(s) Oral daily  cinacalcet 30 milliGRAM(s) Oral daily  hydrALAZINE 100 milliGRAM(s) Oral two times a day  metoprolol succinate ER 50 milliGRAM(s) Oral daily  pantoprazole    Tablet 40 milliGRAM(s) Oral before breakfast  tamsulosin 0.4 milliGRAM(s) Oral at bedtime  torsemide 40 milliGRAM(s) Oral two times a day      PRN Meds:      LABS:                        14.0   9.62  )-----------( 182      ( 12 Dec 2019 15:53 )             44.8     Hgb Trend: 14.0<--  12-13    137  |  101  |  74<H>  ----------------------------<  97  4.4   |  17<L>  |  12.03<H>    Ca    7.8<L>      13 Dec 2019 06:23  Mg     2.7     12-12    TPro  6.8  /  Alb  3.8  /  TBili  0.2  /  DBili  x   /  AST  13  /  ALT  11  /  AlkPhos  113  12-12    Creatinine Trend: 12.03<--, 11.80<--      MICROBIOLOGY:       RADIOLOGY:  < from: CT Chest No Cont (12.12.19 @ 17:55) >  INTERPRETATION:  Large right pleural effusion with near complete   atelectasis of the right lower lobe. Redemonstrated enlarged   heterogeneous thyroid with tracheal deviation to the right and tracheal   narrowing.    < end of copied text >      PULMONARY FUNCTION TESTS: none on file

## 2019-12-13 NOTE — CONSULT NOTE ADULT - ASSESSMENT
This is a 58 y/o M with PMHx of HTN, HLD, ESRD on PD with recent placement of PD catheter for dialysis who presents for evaluation of new R sided pleural effusion.    #Pleural effusion  - thoracentesis today  - f/u labs, micro, cyto  -     Pulmonary will continue to follow.  -----------------------------------------  Bruno Wild PGY-4  Pulmonary/Critical Care Fellow  Pager: [phone] ([facility]) [phone] ([place])  Pulmonary Spectra #[id number] This is a 58 y/o M with PMHx of HTN, HLD, ESRD on PD with recent placement of PD catheter for dialysis who presents for evaluation of new R sided pleural effusion. Effusion is new compared to imaging from October. Will perform thoracentesis to determine etiology of effusion.    #Pleural effusion  - thoracentesis today - fluid appeared very clear, minimal effusion after thoracentesis  - f/u labs, micro, cyto  - currently asymptomatic, saturating well on room air    Pulmonary will continue to follow.  -----------------------------------------  Bruno Wild PGY-4  Pulmonary/Critical Care Fellow  Pager: [phone] ([facility]) [phone] ([place])  Pulmonary Spectra #[id number]

## 2019-12-13 NOTE — PROCEDURE NOTE - SUPERVISORY STATEMENT
I was present throughout the entire procedure and agree with findings as described above.  Patient tolerated procedure well.  There is no pneumothorax post procedure by US.

## 2019-12-13 NOTE — PROGRESS NOTE ADULT - ASSESSMENT
59y old M with pmhx of BPH, HTN, ESRD (on peritoneal dialysis) presents to [facility]  ED  for right-sided pleural effusion, Nephrology consulted for ESRD on PD    #ESRD       -Pt. with ESRD on CCPD, regimen is 9 hours, 4 Fills, each 2L, alternating 1.5% & 2.5% Dextrose.  - Given the concern for a peritoneal leak and R pleural effusion will hold off on further PD at this time  - Electrolytes stable  - Recommend Pulm eval for thoracentesis and fluid sampling. IF glucose level are high, most likely is a leak and pt would need to be transitioned to HD.   - c/w  torsemide to 40mg BID  - Maintain good bowel regimen  -Xray showed catheter is in the pelvis but not at perfect position.     #R Pleural effusion  - Concerning for peritoneal leak   -Pending thoracentesis and fluid sampling    #HTN  BP at target range. Monitor BP on current BP medication. Low salt diet.    #Secondary hyperparathyroidism  - C/w vitamin D 5000U QD  - C/w Sensipar 30mg QD  - C/w Calcitrol .25mcg QD  - C/w Phoslo 667mg TID with meals. Low phosphorus diet and monitor serum phos QD 59y old M with pmhx of BPH, HTN, ESRD (on peritoneal dialysis) presents to [facility]  ED  for right-sided pleural effusion, Nephrology consulted for ESRD on PD    #ESRD       -Pt. with ESRD on CCPD, regimen is 9 hours, 4 Fills, each 2L, alternating 1.5% & 2.5% Dextrose.  - Given the concern for a peritoneal leak and R pleural effusion will hold off on further PD at this time  - Electrolytes stable  - Recommend Pulm eval for thoracentesis and fluid sampling. IF glucose level are high, most likely is a leak and pt would need to be transitioned to HD.   - c/w  torsemide to 40mg BID  - Maintain good bowel regimen  -Xray showed catheter is a little high in the pelvis and  not in a perfect position. Please repeat abdominal/pelvis xray today    #R Pleural effusion  - Concerning for peritoneal leak   -Pending thoracentesis and fluid sampling    #HTN  BP at target range. Monitor BP on current BP medication. Low salt diet.    #Secondary hyperparathyroidism  - C/w vitamin D 5000U QD  - C/w Sensipar 30mg QD  - C/w Calcitrol .25mcg QD  - C/w Phoslo 667mg TID with meals. Low phosphorus diet and monitor serum phos QD 59y old M with pmhx of BPH, HTN, ESRD (on peritoneal dialysis) presents to [facility]  ED  for right-sided pleural effusion, Nephrology consulted for ESRD on PD    #ESRD       -Pt. with ESRD on CCPD, regimen is 9 hours, 4 Fills, each 2L, alternating 1.5% & 2.5% Dextrose.  - Given the concern for a peritoneal leak and R pleural effusion will hold off on further PD at this time  - Electrolytes stable  - Recommend Pulm eval for thoracentesis and fluid sampling. IF glucose level are high, most likely is a leak and pt would need to be transitioned to HD.   - c/w  torsemide to 40mg BID  - Maintain good bowel regimen  -Xray with catheter is in the pelvis, lower than before    #R Pleural effusion  - Concerning for peritoneal leak   -Pending thoracentesis and fluid sampling    #HTN  BP at target range. Monitor BP on current BP medication. Low salt diet.    #Secondary hyperparathyroidism  - C/w vitamin D 5000U QD  - C/w Sensipar 30mg QD  - C/w Calcitrol .25mcg QD  - C/w Phoslo 667mg TID with meals. Low phosphorus diet and monitor serum phos QD

## 2019-12-13 NOTE — PROGRESS NOTE ADULT - SUBJECTIVE AND OBJECTIVE BOX
INTERVAL HPI/OVERNIGHT EVENTS: I feel okay . family in room.   Vital Signs Last 24 Hrs  T(C): 36.5 (13 Dec 2019 11:36), Max: 36.9 (12 Dec 2019 21:03)  T(F): 97.7 (13 Dec 2019 11:36), Max: 98.4 (12 Dec 2019 21:03)  HR: 87 (13 Dec 2019 11:36) (68 - 92)  BP: 133/70 (13 Dec 2019 11:36) (110/66 - 159/89)  BP(mean): 111 (12 Dec 2019 17:56) (111 - 111)  RR: 18 (13 Dec 2019 11:36) (18 - 18)  SpO2: 97% (13 Dec 2019 11:36) (96% - 99%)  I&O's Summary    12 Dec 2019 07:01  -  13 Dec 2019 07:00  --------------------------------------------------------  IN: 250 mL / OUT: 350 mL / NET: -100 mL      MEDICATIONS  (STANDING):  allopurinol 100 milliGRAM(s) Oral two times a day  aspirin enteric coated 81 milliGRAM(s) Oral daily  calcitriol   Capsule 0.25 MICROGram(s) Oral daily  calcium acetate 667 milliGRAM(s) Oral three times a day with meals  cholecalciferol 1000 Unit(s) Oral daily  cinacalcet 30 milliGRAM(s) Oral daily  hydrALAZINE 100 milliGRAM(s) Oral two times a day  metoprolol succinate ER 50 milliGRAM(s) Oral daily  pantoprazole    Tablet 40 milliGRAM(s) Oral before breakfast  tamsulosin 0.4 milliGRAM(s) Oral at bedtime  torsemide 40 milliGRAM(s) Oral two times a day    MEDICATIONS  (PRN):    LABS:                        12.7   7.93  )-----------( 160      ( 13 Dec 2019 08:25 )             40.1     12-13    137  |  101  |  74<H>  ----------------------------<  97  4.4   |  17<L>  |  12.03<H>    Ca    7.8<L>      13 Dec 2019 06:23  Mg     2.7     12-12    TPro  6.8  /  Alb  3.8  /  TBili  0.2  /  DBili  x   /  AST  13  /  ALT  11  /  AlkPhos  113  12-12        CAPILLARY BLOOD GLUCOSE              REVIEW OF SYSTEMS:  CONSTITUTIONAL: No fever, weight loss, or fatigue  EYES: No eye pain, visual disturbances, or discharge  ENMT:  No difficulty hearing, tinnitus, vertigo; No sinus or throat pain  NECK: No pain or stiffness  RESPIRATORY: No cough, wheezing, chills or hemoptysis; No shortness of breath  CARDIOVASCULAR: No chest pain, palpitations, dizziness, or leg swelling  GASTROINTESTINAL: No abdominal or epigastric pain. No nausea, vomiting, or hematemesis; No diarrhea or constipation. No melena or hematochezia.  GENITOURINARY: No dysuria, frequency, hematuria, or incontinence  NEUROLOGICAL: No headaches, memory loss, loss of strength, numbness, or tremors    Consultant(s) Notes Reviewed:  [x ] YES  [ ] NO    PHYSICAL EXAM:  GENERAL: NAD, well-groomed, well-developed ,not in any distress ,  HEAD:  Atraumatic, Normocephalic  EYES: EOMI, PERRLA, conjunctiva and sclera clear  NECK: Supple, No JVD, Normal thyroid  NERVOUS SYSTEM:  Alert & Oriented X3, No focal deficit   CHEST/LUNG: Good air entry except rt lower half   HEART: Regular rate and rhythm; No murmurs, rubs, or gallops  ABDOMEN: Soft, Nontender, Nondistended; Bowel sounds present  EXTREMITIES:  trace  edema    Care Discussed with Consultants/Other Providers [ x] YES  [ ] NO

## 2019-12-14 LAB
ANION GAP SERPL CALC-SCNC: 17 MMOL/L — SIGNIFICANT CHANGE UP (ref 5–17)
BASOPHILS # BLD AUTO: 0.03 K/UL — SIGNIFICANT CHANGE UP (ref 0–0.2)
BASOPHILS NFR BLD AUTO: 0.4 % — SIGNIFICANT CHANGE UP (ref 0–2)
BUN SERPL-MCNC: 77 MG/DL — HIGH (ref 7–23)
CALCIUM SERPL-MCNC: 8.5 MG/DL — SIGNIFICANT CHANGE UP (ref 8.4–10.5)
CHLORIDE SERPL-SCNC: 97 MMOL/L — SIGNIFICANT CHANGE UP (ref 96–108)
CO2 SERPL-SCNC: 19 MMOL/L — LOW (ref 22–31)
CREAT SERPL-MCNC: 12.43 MG/DL — HIGH (ref 0.5–1.3)
EOSINOPHIL # BLD AUTO: 0.25 K/UL — SIGNIFICANT CHANGE UP (ref 0–0.5)
EOSINOPHIL NFR BLD AUTO: 3.1 % — SIGNIFICANT CHANGE UP (ref 0–6)
GLUCOSE SERPL-MCNC: 110 MG/DL — HIGH (ref 70–99)
GRAM STN FLD: SIGNIFICANT CHANGE UP
HCT VFR BLD CALC: 43 % — SIGNIFICANT CHANGE UP (ref 39–50)
HGB BLD-MCNC: 13.3 G/DL — SIGNIFICANT CHANGE UP (ref 13–17)
IMM GRANULOCYTES NFR BLD AUTO: 0.4 % — SIGNIFICANT CHANGE UP (ref 0–1.5)
LYMPHOCYTES # BLD AUTO: 1.84 K/UL — SIGNIFICANT CHANGE UP (ref 1–3.3)
LYMPHOCYTES # BLD AUTO: 22.7 % — SIGNIFICANT CHANGE UP (ref 13–44)
MCHC RBC-ENTMCNC: 30.5 PG — SIGNIFICANT CHANGE UP (ref 27–34)
MCHC RBC-ENTMCNC: 30.9 GM/DL — LOW (ref 32–36)
MCV RBC AUTO: 98.6 FL — SIGNIFICANT CHANGE UP (ref 80–100)
MONOCYTES # BLD AUTO: 0.58 K/UL — SIGNIFICANT CHANGE UP (ref 0–0.9)
MONOCYTES NFR BLD AUTO: 7.2 % — SIGNIFICANT CHANGE UP (ref 2–14)
NEUTROPHILS # BLD AUTO: 5.36 K/UL — SIGNIFICANT CHANGE UP (ref 1.8–7.4)
NEUTROPHILS NFR BLD AUTO: 66.2 % — SIGNIFICANT CHANGE UP (ref 43–77)
PHOSPHATE SERPL-MCNC: 6.5 MG/DL — HIGH (ref 2.5–4.5)
PLATELET # BLD AUTO: 173 K/UL — SIGNIFICANT CHANGE UP (ref 150–400)
POTASSIUM SERPL-MCNC: 4.6 MMOL/L — SIGNIFICANT CHANGE UP (ref 3.5–5.3)
POTASSIUM SERPL-SCNC: 4.6 MMOL/L — SIGNIFICANT CHANGE UP (ref 3.5–5.3)
RBC # BLD: 4.36 M/UL — SIGNIFICANT CHANGE UP (ref 4.2–5.8)
RBC # FLD: 15.1 % — HIGH (ref 10.3–14.5)
SODIUM SERPL-SCNC: 133 MMOL/L — LOW (ref 135–145)
SPECIMEN SOURCE: SIGNIFICANT CHANGE UP
T4 FREE SERPL-MCNC: 1.3 NG/DL — SIGNIFICANT CHANGE UP (ref 0.9–1.8)
TSH SERPL-MCNC: 0.61 UIU/ML — SIGNIFICANT CHANGE UP (ref 0.27–4.2)
WBC # BLD: 8.09 K/UL — SIGNIFICANT CHANGE UP (ref 3.8–10.5)
WBC # FLD AUTO: 8.09 K/UL — SIGNIFICANT CHANGE UP (ref 3.8–10.5)

## 2019-12-14 PROCEDURE — 71045 X-RAY EXAM CHEST 1 VIEW: CPT | Mod: 26

## 2019-12-14 PROCEDURE — 99233 SBSQ HOSP IP/OBS HIGH 50: CPT | Mod: GC

## 2019-12-14 RX ORDER — SENNA PLUS 8.6 MG/1
1 TABLET ORAL ONCE
Refills: 0 | Status: COMPLETED | OUTPATIENT
Start: 2019-12-14 | End: 2019-12-14

## 2019-12-14 RX ORDER — SENNA PLUS 8.6 MG/1
2 TABLET ORAL AT BEDTIME
Refills: 0 | Status: DISCONTINUED | OUTPATIENT
Start: 2019-12-14 | End: 2019-12-20

## 2019-12-14 RX ADMIN — Medication 40 MILLIGRAM(S): at 17:01

## 2019-12-14 RX ADMIN — Medication 1 APPLICATION(S): at 11:40

## 2019-12-14 RX ADMIN — TAMSULOSIN HYDROCHLORIDE 0.4 MILLIGRAM(S): 0.4 CAPSULE ORAL at 21:03

## 2019-12-14 RX ADMIN — Medication 667 MILLIGRAM(S): at 11:39

## 2019-12-14 RX ADMIN — CALCITRIOL 0.25 MICROGRAM(S): 0.5 CAPSULE ORAL at 11:39

## 2019-12-14 RX ADMIN — Medication 100 MILLIGRAM(S): at 05:56

## 2019-12-14 RX ADMIN — Medication 81 MILLIGRAM(S): at 11:39

## 2019-12-14 RX ADMIN — Medication 50 MILLIGRAM(S): at 05:55

## 2019-12-14 RX ADMIN — CINACALCET 30 MILLIGRAM(S): 30 TABLET, FILM COATED ORAL at 11:39

## 2019-12-14 RX ADMIN — Medication 667 MILLIGRAM(S): at 08:52

## 2019-12-14 RX ADMIN — Medication 100 MILLIGRAM(S): at 17:01

## 2019-12-14 RX ADMIN — Medication 100 MILLIGRAM(S): at 05:55

## 2019-12-14 RX ADMIN — PANTOPRAZOLE SODIUM 40 MILLIGRAM(S): 20 TABLET, DELAYED RELEASE ORAL at 05:56

## 2019-12-14 RX ADMIN — SENNA PLUS 1 TABLET(S): 8.6 TABLET ORAL at 09:14

## 2019-12-14 RX ADMIN — SENNA PLUS 2 TABLET(S): 8.6 TABLET ORAL at 21:03

## 2019-12-14 RX ADMIN — Medication 1000 UNIT(S): at 11:39

## 2019-12-14 RX ADMIN — Medication 667 MILLIGRAM(S): at 17:00

## 2019-12-14 RX ADMIN — Medication 40 MILLIGRAM(S): at 05:56

## 2019-12-14 NOTE — PROGRESS NOTE ADULT - ASSESSMENT
60 y/o M with PMHx of HTN, HLD, ESRD on PD with recent placement of PD catheter for dialysis who presents for evaluation of new R sided pleural effusion. S/p thoracentesis, fluid appeared clear, studies consistent with transudate. Suspect diaphragmatic defect with dialysate.    #Pleural effusion  - f/u labs, micro, cyto  - currently asymptomatic, saturating well on room air    Pulmonary will continue to follow.  -----------------------------------------  Wendi Jennings MD PGY-5  Pulmonary/Critical Care Fellow 60 y/o M with PMHx of HTN, HLD, ESRD on PD with recent placement of PD catheter for dialysis who presents for evaluation of new R sided pleural effusion. S/p thoracentesis, fluid appeared clear, studies consistent with transudate. Suspect diaphragmatic defect with dialysate.    #Pleural effusion  - f/u labs, micro, cyto  - currently asymptomatic, saturating well on room air  - further plan re: dialysis as per nephrology    Please call with any further questions.  -----------------------------------------  Wendi Jennings MD PGY-5  Pulmonary/Critical Care Fellow

## 2019-12-14 NOTE — PROGRESS NOTE ADULT - SUBJECTIVE AND OBJECTIVE BOX
INTERVAL HPI/OVERNIGHT EVENTS: I feel fine,.  Vital Signs Last 24 Hrs  T(C): 36.6 (14 Dec 2019 05:00), Max: 36.9 (13 Dec 2019 21:39)  T(F): 97.8 (14 Dec 2019 05:00), Max: 98.4 (13 Dec 2019 21:39)  HR: 74 (14 Dec 2019 05:00) (74 - 88)  BP: 122/63 (14 Dec 2019 05:00) (122/63 - 146/83)  BP(mean): --  RR: 18 (14 Dec 2019 05:00) (18 - 20)  SpO2: 96% (14 Dec 2019 05:00) (94% - 97%)  I&O's Summary    13 Dec 2019 07:01  -  14 Dec 2019 07:00  --------------------------------------------------------  IN: 980 mL / OUT: 1300 mL / NET: -320 mL      MEDICATIONS  (STANDING):  allopurinol 100 milliGRAM(s) Oral two times a day  aspirin enteric coated 81 milliGRAM(s) Oral daily  calcitriol   Capsule 0.25 MICROGram(s) Oral daily  calcium acetate 667 milliGRAM(s) Oral three times a day with meals  cholecalciferol 1000 Unit(s) Oral daily  cinacalcet 30 milliGRAM(s) Oral daily  gentamicin 0.1% Ointment 1 Application(s) Topical daily  hydrALAZINE 100 milliGRAM(s) Oral two times a day  metoprolol succinate ER 50 milliGRAM(s) Oral daily  pantoprazole    Tablet 40 milliGRAM(s) Oral before breakfast  senna 2 Tablet(s) Oral at bedtime  tamsulosin 0.4 milliGRAM(s) Oral at bedtime  torsemide 40 milliGRAM(s) Oral two times a day    MEDICATIONS  (PRN):    LABS:                        13.3   8.09  )-----------( 173      ( 14 Dec 2019 11:00 )             43.0     12-14    133<L>  |  97  |  77<H>  ----------------------------<  110<H>  4.6   |  19<L>  |  12.43<H>    Ca    8.5      14 Dec 2019 06:55  Phos  6.5     12-14  Mg     2.7     12-12    TPro  6.8  /  Alb  3.8  /  TBili  0.2  /  DBili  x   /  AST  13  /  ALT  11  /  AlkPhos  113  12-12        CAPILLARY BLOOD GLUCOSE              REVIEW OF SYSTEMS:  CONSTITUTIONAL: No fever, weight loss, or fatigue  EYES: No eye pain, visual disturbances, or discharge  ENMT:  No difficulty hearing, tinnitus, vertigo; No sinus or throat pain  NECK: No pain or stiffness  BREASTS: No pain, masses, or nipple discharge  RESPIRATORY: No cough, wheezing, chills or hemoptysis; No shortness of breath  CARDIOVASCULAR: No chest pain, palpitations, dizziness, or leg swelling  GASTROINTESTINAL: No abdominal or epigastric pain. No nausea, vomiting, or hematemesis; No diarrhea or constipation. No melena or hematochezia.  GENITOURINARY: No dysuria, frequency, hematuria, or incontinence  NEUROLOGICAL: No headaches, memory loss, loss of strength, numbness, or tremors  SKIN: No itching, burning, rashes, or lesions   LYMPH NODES: No enlarged glands  ENDOCRINE: No heat or cold intolerance; No hair loss  MUSCULOSKELETAL: No joint pain or swelling; No muscle, back, or extremity pain  PSYCHIATRIC: No depression, anxiety, mood swings, or difficulty sleeping  HEME/LYMPH: No easy bruising, or bleeding gums  ALLERY AND IMMUNOLOGIC: No hives or eczema    RADIOLOGY & ADDITIONAL TESTS:    Consultant(s) Notes Reviewed:  [x ] YES  [ ] NO    PHYSICAL EXAM:  GENERAL: NAD, well-groomed, well-developed,not in any distress ,  HEAD:  Atraumatic, Normocephalic  EYES: EOMI, PERRLA, conjunctiva and sclera clear  ENMT: No tonsillar erythema, exudates, or enlargement; Moist mucous membranes, Good dentition, No lesions  NECK: Supple, No JVD, Normal thyroid  NERVOUS SYSTEM:  Alert & Oriented X3, No focal deficit   CHEST/LUNG: Air entry improved Right base   HEART: Regular rate and rhythm; No murmurs, rubs, or gallops  ABDOMEN: Soft, Nontender, Nondistended; Bowel sounds present  EXTREMITIES:  2+ Peripheral Pulses, No clubbing, cyanosis, or edema  SKIN: No rashes or lesions    Care Discussed with Consultants/Other Providers [ x] YES  [ ] NO

## 2019-12-14 NOTE — PROGRESS NOTE ADULT - ATTENDING COMMENTS
#Patient seen and examined, data and imaging reviewed by me personally.  Agree with plan as outlined above.        This is a 60 y/o M with PMHx of HTN, HLD, ESRD on PD with recent placement of PD catheter for dialysis who presents for evaluation of new R  - s/p thoracentesis   - f/u labs, micro, cyto  - currently asymptomatic, saturating well on room air #Patient seen and examined, data and imaging reviewed by me personally.  Agree with plan as outlined above.        This is a 58 y/o M with PMHx of HTN, HLD, ESRD on PD with recent placement of PD catheter for dialysis who presents for evaluation of new R  - s/p thoracentesis  ---pleural fhiis is transudative -  still  fu cyto  --the pleural fluid appears related to  his peritoneal dialysis  - currently asymptomatic, saturating well on room air

## 2019-12-14 NOTE — PROGRESS NOTE ADULT - ASSESSMENT
59 year old male with pmhx HTN, HLD ,ESRD (on peritoneal dialysis) presents to the ED c/o new right-sided pleural effusion found on CXR x2 days ago. States he had a peritoneal dialysis catheter placed in October, at which time his CXR was clear. Went to his nephrologist x2 days ago, CXR showed that there may be communication between his abdominal and chest cavities, subsequently referred to [facility] ED for further evaluation. Only Saturday he felt SOB . Denies CP,  fever, cough, abdominal pain, n/v, worsening pedal edema.     Problem/Plan - 1:  ·  Problem: Pleural effusion.  Plan: CT Chest .< from: CT Chest No Cont (12.12.19 @ 17:55) >    INTERPRETATION:  Large right pleural effusion with near complete   atelectasis of the right lower lobe. Redemonstrated enlarged   heterogeneous thyroid with tracheal deviation to the right and tracheal   narrowing.    Follow up final report.  House Pulmonary consult noted .. S/P  Thoracentesis .      Problem/Plan - 2:  ·  Problem: ESRD on peritoneal dialysis.  Plan: PD on Hold . Renal consult noted. May need HD .     Problem/Plan - 3:  ·  Problem: Secondary hyperparathyroidism.  Plan: Continue home treatement .      Problem/Plan - 4:  ·  Problem: HTN (hypertension).  Plan: BP meds with hold parameters.      Problem/Plan - 5:  ·  Problem: BPH (benign prostatic hyperplasia).  Plan: Flomax BID  .      Problem/Plan - 6:  Problem: Hypercholesteremia. Plan: Said holding statin since few weeks as lipids were good.     Problem/Plan - 7:  ·  Problem: GERD (gastroesophageal reflux disease).  Plan: PPI.      Problem/Plan - 8:  ·  Problem: Thyroid enlargement.  Plan: Will check TFT as well Thyroid US.

## 2019-12-14 NOTE — PROGRESS NOTE ADULT - SUBJECTIVE AND OBJECTIVE BOX
Pulmonary Progress Note     Interval Events:  - s/p thoracentesis    SUBJECTIVE:    OBJECTIVE:  ICU Vital Signs Last 24 Hrs  T(C): 36.6 (14 Dec 2019 05:00), Max: 36.9 (13 Dec 2019 21:39)  T(F): 97.8 (14 Dec 2019 05:00), Max: 98.4 (13 Dec 2019 21:39)  HR: 74 (14 Dec 2019 05:00) (74 - 88)  BP: 122/63 (14 Dec 2019 05:00) (122/63 - 146/83)  RR: 18 (14 Dec 2019 05:00) (18 - 20)  SpO2: 96% (14 Dec 2019 05:00) (94% - 97%)    12-13 @ 07:01  -  12-14 @ 07:00  --------------------------------------------------------  IN: 980 mL / OUT: 1300 mL / NET: -320 mL    PHYSICAL EXAM:  General: awake and alert, nontoxic appearing male sitting up in bed, NAD  HEENT: NC/AT, EOMI b/l, conjunctiva normal, MMM  Neck: supple  Respiratory: decreased breath sounds in R lung base, L lung CTA, appears comfortable on room air, no conversational dyspnea or accessory muscle use  Cardiovascular: S1 S2 present, RRR, no m/r/g  Abdomen: soft, NT/ND  Extremities: no c/c/e  Skin: no rashes or lesions noted  Neurological: AAOx3, no focal deficits  Psychiatry: calm, cooperative    HOSPITAL MEDICATIONS:  MEDICATIONS  (STANDING):  allopurinol 100 milliGRAM(s) Oral two times a day  aspirin enteric coated 81 milliGRAM(s) Oral daily  calcitriol   Capsule 0.25 MICROGram(s) Oral daily  calcium acetate 667 milliGRAM(s) Oral three times a day with meals  cholecalciferol 1000 Unit(s) Oral daily  cinacalcet 30 milliGRAM(s) Oral daily  gentamicin 0.1% Ointment 1 Application(s) Topical daily  hydrALAZINE 100 milliGRAM(s) Oral two times a day  metoprolol succinate ER 50 milliGRAM(s) Oral daily  pantoprazole    Tablet 40 milliGRAM(s) Oral before breakfast  senna 2 Tablet(s) Oral at bedtime  tamsulosin 0.4 milliGRAM(s) Oral at bedtime  torsemide 40 milliGRAM(s) Oral two times a day    MEDICATIONS  (PRN):      LABS:                        12.7   7.93  )-----------( 160      ( 13 Dec 2019 08:25 )             40.1     Hgb Trend: 12.7<--, 14.0<--  12-14    133<L>  |  97  |  77<H>  ----------------------------<  110<H>  4.6   |  19<L>  |  12.43<H>    Ca    8.5      14 Dec 2019 06:55  Phos  6.5     12-14  Mg     2.7     12-12    TPro  6.8  /  Alb  3.8  /  TBili  0.2  /  DBili  x   /  AST  13  /  ALT  11  /  AlkPhos  113  12-12  Creatinine Trend: 12.43<--, 12.03<--, 11.80<--    MICROBIOLOGY:   Culture - Body Fluid with Gram Stain (collected 13 Dec 2019 22:26)  Source: .Body Fluid Pleural Fluid  Gram Stain (14 Dec 2019 03:15):    polymorphonuclear leukocytes seen    No organisms seen    by cytocentrifuge    RADIOLOGY:  [ ] Reviewed and interpreted by me Pulmonary Progress Note     Interval Events:  - s/p thoracentesis    SUBJECTIVE:  Feeling much better    OBJECTIVE:  ICU Vital Signs Last 24 Hrs  T(C): 36.6 (14 Dec 2019 05:00), Max: 36.9 (13 Dec 2019 21:39)  T(F): 97.8 (14 Dec 2019 05:00), Max: 98.4 (13 Dec 2019 21:39)  HR: 74 (14 Dec 2019 05:00) (74 - 88)  BP: 122/63 (14 Dec 2019 05:00) (122/63 - 146/83)  RR: 18 (14 Dec 2019 05:00) (18 - 20)  SpO2: 96% (14 Dec 2019 05:00) (94% - 97%)    12-13 @ 07:01  -  12-14 @ 07:00  --------------------------------------------------------  IN: 980 mL / OUT: 1300 mL / NET: -320 mL    PHYSICAL EXAM:  General: awake and alert, nontoxic appearing male sitting up in bed, NAD  HEENT: NC/AT, EOMI b/l, conjunctiva normal, MMM  Neck: supple  Respiratory: decreased breath sounds in R lung base, L lung CTA, appears comfortable on room air, no conversational dyspnea or accessory muscle use  Cardiovascular: S1 S2 present, RRR, no m/r/g  Abdomen: soft, NT/ND  Extremities: no c/c/e  Skin: no rashes or lesions noted  Neurological: AAOx3, no focal deficits  Psychiatry: calm, cooperative    HOSPITAL MEDICATIONS:  MEDICATIONS  (STANDING):  allopurinol 100 milliGRAM(s) Oral two times a day  aspirin enteric coated 81 milliGRAM(s) Oral daily  calcitriol   Capsule 0.25 MICROGram(s) Oral daily  calcium acetate 667 milliGRAM(s) Oral three times a day with meals  cholecalciferol 1000 Unit(s) Oral daily  cinacalcet 30 milliGRAM(s) Oral daily  gentamicin 0.1% Ointment 1 Application(s) Topical daily  hydrALAZINE 100 milliGRAM(s) Oral two times a day  metoprolol succinate ER 50 milliGRAM(s) Oral daily  pantoprazole    Tablet 40 milliGRAM(s) Oral before breakfast  senna 2 Tablet(s) Oral at bedtime  tamsulosin 0.4 milliGRAM(s) Oral at bedtime  torsemide 40 milliGRAM(s) Oral two times a day    MEDICATIONS  (PRN):      LABS:                        12.7   7.93  )-----------( 160      ( 13 Dec 2019 08:25 )             40.1     Hgb Trend: 12.7<--, 14.0<--  12-14    133<L>  |  97  |  77<H>  ----------------------------<  110<H>  4.6   |  19<L>  |  12.43<H>    Ca    8.5      14 Dec 2019 06:55  Phos  6.5     12-14  Mg     2.7     12-12    TPro  6.8  /  Alb  3.8  /  TBili  0.2  /  DBili  x   /  AST  13  /  ALT  11  /  AlkPhos  113  12-12  Creatinine Trend: 12.43<--, 12.03<--, 11.80<--    MICROBIOLOGY:   Culture - Body Fluid with Gram Stain (collected 13 Dec 2019 22:26)  Source: .Body Fluid Pleural Fluid  Gram Stain (14 Dec 2019 03:15):    polymorphonuclear leukocytes seen    No organisms seen    by cytocentrifuge    RADIOLOGY:  [ ] Reviewed and interpreted by me

## 2019-12-15 LAB
ANION GAP SERPL CALC-SCNC: 20 MMOL/L — HIGH (ref 5–17)
BUN SERPL-MCNC: 92 MG/DL — HIGH (ref 7–23)
CALCIUM SERPL-MCNC: 8 MG/DL — LOW (ref 8.4–10.5)
CHLORIDE SERPL-SCNC: 101 MMOL/L — SIGNIFICANT CHANGE UP (ref 96–108)
CO2 SERPL-SCNC: 16 MMOL/L — LOW (ref 22–31)
CREAT SERPL-MCNC: 13 MG/DL — HIGH (ref 0.5–1.3)
GLUCOSE SERPL-MCNC: 93 MG/DL — SIGNIFICANT CHANGE UP (ref 70–99)
HCT VFR BLD CALC: 40.6 % — SIGNIFICANT CHANGE UP (ref 39–50)
HGB BLD-MCNC: 12.7 G/DL — LOW (ref 13–17)
MAGNESIUM SERPL-MCNC: 2.5 MG/DL — SIGNIFICANT CHANGE UP (ref 1.6–2.6)
MCHC RBC-ENTMCNC: 30.4 PG — SIGNIFICANT CHANGE UP (ref 27–34)
MCHC RBC-ENTMCNC: 31.3 GM/DL — LOW (ref 32–36)
MCV RBC AUTO: 97.1 FL — SIGNIFICANT CHANGE UP (ref 80–100)
PHOSPHATE SERPL-MCNC: 7.7 MG/DL — HIGH (ref 2.5–4.5)
PLATELET # BLD AUTO: 159 K/UL — SIGNIFICANT CHANGE UP (ref 150–400)
POTASSIUM SERPL-MCNC: 4.6 MMOL/L — SIGNIFICANT CHANGE UP (ref 3.5–5.3)
POTASSIUM SERPL-SCNC: 4.6 MMOL/L — SIGNIFICANT CHANGE UP (ref 3.5–5.3)
RBC # BLD: 4.18 M/UL — LOW (ref 4.2–5.8)
RBC # FLD: 14.8 % — HIGH (ref 10.3–14.5)
SODIUM SERPL-SCNC: 137 MMOL/L — SIGNIFICANT CHANGE UP (ref 135–145)
WBC # BLD: 7.14 K/UL — SIGNIFICANT CHANGE UP (ref 3.8–10.5)
WBC # FLD AUTO: 7.14 K/UL — SIGNIFICANT CHANGE UP (ref 3.8–10.5)

## 2019-12-15 RX ADMIN — Medication 81 MILLIGRAM(S): at 11:31

## 2019-12-15 RX ADMIN — CALCITRIOL 0.25 MICROGRAM(S): 0.5 CAPSULE ORAL at 11:31

## 2019-12-15 RX ADMIN — Medication 667 MILLIGRAM(S): at 10:23

## 2019-12-15 RX ADMIN — SENNA PLUS 2 TABLET(S): 8.6 TABLET ORAL at 21:59

## 2019-12-15 RX ADMIN — PANTOPRAZOLE SODIUM 40 MILLIGRAM(S): 20 TABLET, DELAYED RELEASE ORAL at 05:56

## 2019-12-15 RX ADMIN — Medication 40 MILLIGRAM(S): at 05:56

## 2019-12-15 RX ADMIN — Medication 100 MILLIGRAM(S): at 05:56

## 2019-12-15 RX ADMIN — Medication 100 MILLIGRAM(S): at 17:34

## 2019-12-15 RX ADMIN — Medication 50 MILLIGRAM(S): at 05:56

## 2019-12-15 RX ADMIN — Medication 667 MILLIGRAM(S): at 17:33

## 2019-12-15 RX ADMIN — Medication 1000 UNIT(S): at 11:31

## 2019-12-15 RX ADMIN — Medication 667 MILLIGRAM(S): at 13:04

## 2019-12-15 RX ADMIN — Medication 100 MILLIGRAM(S): at 17:33

## 2019-12-15 RX ADMIN — Medication 1 APPLICATION(S): at 17:34

## 2019-12-15 RX ADMIN — CINACALCET 30 MILLIGRAM(S): 30 TABLET, FILM COATED ORAL at 11:31

## 2019-12-15 RX ADMIN — TAMSULOSIN HYDROCHLORIDE 0.4 MILLIGRAM(S): 0.4 CAPSULE ORAL at 21:58

## 2019-12-15 RX ADMIN — Medication 40 MILLIGRAM(S): at 17:33

## 2019-12-15 NOTE — PROGRESS NOTE ADULT - ASSESSMENT
59 year old male with pmhx HTN, HLD ,ESRD (on peritoneal dialysis) presents to the ED c/o new right-sided pleural effusion found on CXR x2 days ago. States he had a peritoneal dialysis catheter placed in October, at which time his CXR was clear. Went to his nephrologist x2 days ago, CXR showed that there may be communication between his abdominal and chest cavities, subsequently referred to [facility] ED for further evaluation. Only Saturday he felt SOB . Denies CP,  fever, cough, abdominal pain, n/v, worsening pedal edema.     Problem/Plan - 1:  ·  Problem: Pleural effusion.  Plan: CT Chest .< from: CT Chest No Cont (12.12.19 @ 17:55) >    INTERPRETATION:  Large right pleural effusion with near complete   atelectasis of the right lower lobe. Redemonstrated enlarged   heterogeneous thyroid with tracheal deviation to the right and tracheal   narrowing.    Follow up final report.  House Pulmonary consult noted . S/P  Thoracentesis .      Problem/Plan - 2:  ·  Problem: ESRD on peritoneal dialysis.  Plan: PD on Hold . Renal consult noted. May need HD .Had lengthy . D/W patient and may try PD today .TTE pending.      Problem/Plan - 3:  ·  Problem: Secondary hyperparathyroidism.  Plan: Continue home treatement .      Problem/Plan - 4:  ·  Problem: HTN (hypertension).  Plan: BP meds with hold parameters.      Problem/Plan - 5:  ·  Problem: BPH (benign prostatic hyperplasia).  Plan: Flomax BID  .      Problem/Plan - 6:  Problem: Hypercholesteremia. Plan: Said holding statin since few weeks as lipids were good.     Problem/Plan - 7:  ·  Problem: GERD (gastroesophageal reflux disease).  Plan: PPI.      Problem/Plan - 8:  ·  Problem: Thyroid enlargement.  Plan: Will check TFT as well Thyroid US.

## 2019-12-15 NOTE — PROGRESS NOTE ADULT - SUBJECTIVE AND OBJECTIVE BOX
INTERVAL HPI/OVERNIGHT EVENTS: I am not sure what to do . Had lengthy D/W pt and family.   Vital Signs Last 24 Hrs  T(C): 36.7 (15 Dec 2019 05:49), Max: 36.8 (14 Dec 2019 13:00)  T(F): 98.1 (15 Dec 2019 05:49), Max: 98.3 (14 Dec 2019 13:00)  HR: 82 (15 Dec 2019 05:49) (73 - 82)  BP: 137/69 (15 Dec 2019 05:49) (111/71 - 137/69)  BP(mean): --  RR: 18 (15 Dec 2019 05:49) (18 - 18)  SpO2: 96% (15 Dec 2019 05:49) (96% - 99%)  I&O's Summary    14 Dec 2019 07:01  -  15 Dec 2019 07:00  --------------------------------------------------------  IN: 840 mL / OUT: 1900 mL / NET: -1060 mL      MEDICATIONS  (STANDING):  allopurinol 100 milliGRAM(s) Oral two times a day  aspirin enteric coated 81 milliGRAM(s) Oral daily  calcitriol   Capsule 0.25 MICROGram(s) Oral daily  calcium acetate 667 milliGRAM(s) Oral three times a day with meals  cholecalciferol 1000 Unit(s) Oral daily  cinacalcet 30 milliGRAM(s) Oral daily  gentamicin 0.1% Ointment 1 Application(s) Topical daily  hydrALAZINE 100 milliGRAM(s) Oral two times a day  metoprolol succinate ER 50 milliGRAM(s) Oral daily  pantoprazole    Tablet 40 milliGRAM(s) Oral before breakfast  senna 2 Tablet(s) Oral at bedtime  tamsulosin 0.4 milliGRAM(s) Oral at bedtime  torsemide 40 milliGRAM(s) Oral two times a day    MEDICATIONS  (PRN):    LABS:                        12.7   7.14  )-----------( 159      ( 15 Dec 2019 09:04 )             40.6     12-15    137  |  101  |  92<H>  ----------------------------<  93  4.6   |  16<L>  |  13.00<H>    Ca    8.0<L>      15 Dec 2019 06:21  Phos  7.7     12-15  Mg     2.5     12-15          CAPILLARY BLOOD GLUCOSE              REVIEW OF SYSTEMS:  CONSTITUTIONAL: No fever, weight loss, or fatigue  EYES: No eye pain, visual disturbances, or discharge  ENMT:  No difficulty hearing, tinnitus, vertigo; No sinus or throat pain  NECK: No pain or stiffness  BREASTS: No pain, masses, or nipple discharge  RESPIRATORY: No cough, wheezing, chills or hemoptysis; No shortness of breath  CARDIOVASCULAR: No chest pain, palpitations, dizziness, or leg swelling  GASTROINTESTINAL: No abdominal or epigastric pain. No nausea, vomiting, or hematemesis; No diarrhea or constipation. No melena or hematochezia.  GENITOURINARY: No dysuria, frequency, hematuria, or incontinence  NEUROLOGICAL: No headaches, memory loss, loss of strength, numbness, or tremors      Consultant(s) Notes Reviewed:  [x ] YES  [ ] NO    PHYSICAL EXAM:  GENERAL: NAD, well-groomed, well-developed,not in any distress ,  HEAD:  Atraumatic, Normocephalic  EYES: EOMI, PERRLA, conjunctiva and sclera clear  ENMT: No tonsillar erythema, exudates, or enlargement; Moist mucous membranes, Good dentition, No lesions  NECK: Supple, No JVD, Normal thyroid  NERVOUS SYSTEM:  Alert & Oriented X3, No focal deficit   CHEST/LUNG: Good air entry bilateral with no  rales, rhonchi, wheezing, or rubs  HEART: Regular rate and rhythm; No murmurs, rubs, or gallops  ABDOMEN: Soft, Nontender, Nondistended; Bowel sounds present  EXTREMITIES:  2+ Peripheral Pulses, No clubbing, cyanosis, or edema  SKIN: No rashes or lesions    Care Discussed with Consultants/Other Providers [ x] YES  [ ] NO

## 2019-12-16 LAB
ANION GAP SERPL CALC-SCNC: 20 MMOL/L — HIGH (ref 5–17)
BUN SERPL-MCNC: 92 MG/DL — HIGH (ref 7–23)
CALCIUM SERPL-MCNC: 8.1 MG/DL — LOW (ref 8.4–10.5)
CHLORIDE SERPL-SCNC: 99 MMOL/L — SIGNIFICANT CHANGE UP (ref 96–108)
CO2 SERPL-SCNC: 18 MMOL/L — LOW (ref 22–31)
COMMENT - FLUIDS: SIGNIFICANT CHANGE UP
CREAT SERPL-MCNC: 12.63 MG/DL — HIGH (ref 0.5–1.3)
GLUCOSE SERPL-MCNC: 203 MG/DL — HIGH (ref 70–99)
HCT VFR BLD CALC: 39.5 % — SIGNIFICANT CHANGE UP (ref 39–50)
HGB BLD-MCNC: 12.4 G/DL — LOW (ref 13–17)
INR BLD: 0.94 RATIO — SIGNIFICANT CHANGE UP (ref 0.88–1.16)
MAGNESIUM SERPL-MCNC: 2.4 MG/DL — SIGNIFICANT CHANGE UP (ref 1.6–2.6)
MCHC RBC-ENTMCNC: 30.1 PG — SIGNIFICANT CHANGE UP (ref 27–34)
MCHC RBC-ENTMCNC: 31.4 GM/DL — LOW (ref 32–36)
MCV RBC AUTO: 95.9 FL — SIGNIFICANT CHANGE UP (ref 80–100)
PHOSPHATE SERPL-MCNC: 6.7 MG/DL — HIGH (ref 2.5–4.5)
PLATELET # BLD AUTO: 158 K/UL — SIGNIFICANT CHANGE UP (ref 150–400)
POTASSIUM SERPL-MCNC: 4 MMOL/L — SIGNIFICANT CHANGE UP (ref 3.5–5.3)
POTASSIUM SERPL-SCNC: 4 MMOL/L — SIGNIFICANT CHANGE UP (ref 3.5–5.3)
PROTHROM AB SERPL-ACNC: 10.8 SEC — SIGNIFICANT CHANGE UP (ref 10–12.9)
RBC # BLD: 4.12 M/UL — LOW (ref 4.2–5.8)
RBC # FLD: 14.7 % — HIGH (ref 10.3–14.5)
SODIUM SERPL-SCNC: 137 MMOL/L — SIGNIFICANT CHANGE UP (ref 135–145)
WBC # BLD: 7.96 K/UL — SIGNIFICANT CHANGE UP (ref 3.8–10.5)
WBC # FLD AUTO: 7.96 K/UL — SIGNIFICANT CHANGE UP (ref 3.8–10.5)

## 2019-12-16 PROCEDURE — 71045 X-RAY EXAM CHEST 1 VIEW: CPT | Mod: 26

## 2019-12-16 PROCEDURE — 99233 SBSQ HOSP IP/OBS HIGH 50: CPT | Mod: GC

## 2019-12-16 RX ADMIN — Medication 100 MILLIGRAM(S): at 18:29

## 2019-12-16 RX ADMIN — PANTOPRAZOLE SODIUM 40 MILLIGRAM(S): 20 TABLET, DELAYED RELEASE ORAL at 06:11

## 2019-12-16 RX ADMIN — Medication 100 MILLIGRAM(S): at 06:11

## 2019-12-16 RX ADMIN — SENNA PLUS 2 TABLET(S): 8.6 TABLET ORAL at 21:34

## 2019-12-16 RX ADMIN — Medication 50 MILLIGRAM(S): at 06:10

## 2019-12-16 RX ADMIN — Medication 1000 UNIT(S): at 15:36

## 2019-12-16 RX ADMIN — Medication 40 MILLIGRAM(S): at 18:29

## 2019-12-16 RX ADMIN — CALCITRIOL 0.25 MICROGRAM(S): 0.5 CAPSULE ORAL at 15:40

## 2019-12-16 RX ADMIN — Medication 667 MILLIGRAM(S): at 18:29

## 2019-12-16 RX ADMIN — TAMSULOSIN HYDROCHLORIDE 0.4 MILLIGRAM(S): 0.4 CAPSULE ORAL at 21:34

## 2019-12-16 RX ADMIN — Medication 667 MILLIGRAM(S): at 15:36

## 2019-12-16 RX ADMIN — Medication 667 MILLIGRAM(S): at 08:13

## 2019-12-16 RX ADMIN — CINACALCET 30 MILLIGRAM(S): 30 TABLET, FILM COATED ORAL at 15:37

## 2019-12-16 RX ADMIN — Medication 100 MILLIGRAM(S): at 06:10

## 2019-12-16 RX ADMIN — Medication 40 MILLIGRAM(S): at 06:10

## 2019-12-16 RX ADMIN — Medication 81 MILLIGRAM(S): at 15:36

## 2019-12-16 NOTE — PROVIDER CONTACT NOTE (OTHER) - SITUATION
Patient started on pd at 22:45 however patient has been in first drain for over an hour and only 1390ml has drained from fill volume of 2000ml.

## 2019-12-16 NOTE — PROGRESS NOTE ADULT - ASSESSMENT
59 year old male with pmhx HTN, HLD ,ESRD (on peritoneal dialysis) presents to the ED c/o new right-sided pleural effusion found on CXR x2 days ago. States he had a peritoneal dialysis catheter placed in October, at which time his CXR was clear. Went to his nephrologist x2 days ago, CXR showed that there may be communication between his abdominal and chest cavities, subsequently referred to [facility] ED for further evaluation. Only Saturday he felt SOB . Denies CP,  fever, cough, abdominal pain, n/v, worsening pedal edema.     Problem/Plan - 1:  ·  Problem: Pleural effusion.  Plan: Likely secondary to PD . CT Chest .< from: CT Chest No Cont (12.12.19 @ 17:55) >    INTERPRETATION:  Large right pleural effusion with near complete   atelectasis of the right lower lobe. Redemonstrated enlarged   heterogeneous thyroid with tracheal deviation to the right and tracheal   narrowing.    Follow up final report.  House Pulmonary consult noted . S/P  Thoracentesis . TTE pending .      Problem/Plan - 2:  ·  Problem: ESRD on peritoneal dialysis.  Plan: PD stopped secondary to leak . Renal helping and planning HD .     Problem/Plan - 3:  ·  Problem: Secondary hyperparathyroidism.  Plan: Continue home treatement .      Problem/Plan - 4:  ·  Problem: HTN (hypertension).  Plan: BP meds with hold parameters.      Problem/Plan - 5:  ·  Problem: BPH (benign prostatic hyperplasia).  Plan: Flomax BID  .      Problem/Plan - 6:  Problem: Hypercholesteremia. Plan: Said holding statin since few weeks as lipids were good.     Problem/Plan - 7:  ·  Problem: GERD (gastroesophageal reflux disease).  Plan: PPI.      Problem/Plan - 8:  ·  Problem: Thyroid enlargement.  Plan: Will check TFT as well Thyroid US.

## 2019-12-16 NOTE — PROGRESS NOTE ADULT - SUBJECTIVE AND OBJECTIVE BOX
INTERVAL HPI/OVERNIGHT EVENTS: Had PD but looks retaining and has developed right side effusion .   Vital Signs Last 24 Hrs  T(C): 36.7 (16 Dec 2019 10:52), Max: 36.7 (16 Dec 2019 03:45)  T(F): 98.1 (16 Dec 2019 10:52), Max: 98.1 (16 Dec 2019 10:52)  HR: 76 (16 Dec 2019 10:52) (71 - 83)  BP: 130/84 (16 Dec 2019 10:52) (129/71 - 158/84)  BP(mean): --  RR: 18 (16 Dec 2019 10:52) (18 - 18)  SpO2: 99% (16 Dec 2019 10:52) (93% - 99%)  I&O's Summary    15 Dec 2019 07:01  -  16 Dec 2019 07:00  --------------------------------------------------------  IN: 780 mL / OUT: 1670 mL / NET: -890 mL      MEDICATIONS  (STANDING):  allopurinol 100 milliGRAM(s) Oral two times a day  aspirin enteric coated 81 milliGRAM(s) Oral daily  calcitriol   Capsule 0.25 MICROGram(s) Oral daily  calcium acetate 667 milliGRAM(s) Oral three times a day with meals  cholecalciferol 1000 Unit(s) Oral daily  cinacalcet 30 milliGRAM(s) Oral daily  gentamicin 0.1% Ointment 1 Application(s) Topical daily  hydrALAZINE 100 milliGRAM(s) Oral two times a day  metoprolol succinate ER 50 milliGRAM(s) Oral daily  pantoprazole    Tablet 40 milliGRAM(s) Oral before breakfast  senna 2 Tablet(s) Oral at bedtime  tamsulosin 0.4 milliGRAM(s) Oral at bedtime  torsemide 40 milliGRAM(s) Oral two times a day    MEDICATIONS  (PRN):    LABS:                        12.4   7.96  )-----------( 158      ( 16 Dec 2019 08:23 )             39.5     12-16    137  |  99  |  92<H>  ----------------------------<  203<H>  4.0   |  18<L>  |  12.63<H>    Ca    8.1<L>      16 Dec 2019 06:01  Phos  6.7     12-16  Mg     2.4     12-16          CAPILLARY BLOOD GLUCOSE              REVIEW OF SYSTEMS:  CONSTITUTIONAL: No fever, weight loss, or fatigue  EYES: No eye pain, visual disturbances, or discharge  ENMT:  No difficulty hearing, tinnitus, vertigo; No sinus or throat pain  NECK: No pain or stiffness  RESPIRATORY: No cough, wheezing, chills or hemoptysis; No shortness of breath  CARDIOVASCULAR: No chest pain, palpitations, dizziness, or leg swelling  GASTROINTESTINAL: No abdominal or epigastric pain. No nausea, vomiting, or hematemesis; No diarrhea or constipation. No melena or hematochezia.  GENITOURINARY: No dysuria, frequency, hematuria, or incontinence  NEUROLOGICAL: No headaches, memory loss, loss of strength, numbness, or tremors  :    Consultant(s) Notes Reviewed:  [x ] YES  [ ] NO    PHYSICAL EXAM:  GENERAL: NAD, well-groomed, well-developed,not in any distress ,  HEAD:  Atraumatic, Normocephalic  EYES: EOMI, PERRLA, conjunctiva and sclera clear  ENMT: No tonsillar erythema, exudates, or enlargement; Moist mucous membranes, Good dentition, No lesions  NECK: Supple, No JVD, Normal thyroid  NERVOUS SYSTEM:  Alert & Oriented X3, No focal deficit   CHEST/LUNG: Good air entry bilateral except right lower 3rd   HEART: Regular rate and rhythm; No murmurs, rubs, or gallops  ABDOMEN: Soft, Nontender, Nondistended; Bowel sounds present  EXTREMITIES:  2+ Peripheral Pulses, No clubbing, cyanosis, or edema    Care Discussed with Consultants/Other Providers [ x] YES  [ ] NO

## 2019-12-16 NOTE — PROGRESS NOTE ADULT - SUBJECTIVE AND OBJECTIVE BOX
[facility] DIVISION OF KIDNEY DISEASES AND HYPERTENSION -- FOLLOW UP NOTE  --------------------------------------------------------------------------------  HPI: Patient is a 59y old M with pmhx of BPH, HTN, ESRD (on peritoneal dialysis) presents to [facility]  ED  for right-sided pleural effusion found on CXR x2 days ago. Patient's outpt Nephrologist is Dr. Richey. He has been on PD since Oct 2019. Around Dec 2nd his UF dropped to 300-400 range. Weight began to increase during the same time period. He was seen by Dr. Richey and sent for abdominal xray on 12/10 which revealed a ?malpositioned PD catheter and a CXR with a new R pleural effusion. Now pending thoracocentesis.     Off note PD prescription is CCPD, 9 hours, 4 Fills, each 2L, alternating 1.5% & 2.5% Dextrose. For the last two days he has used 1 2L Fill of 4.25% Dextrose with a UF of ~600cc.    24 hour events/subjective:  Pt examined at bed side,  attempted PD overnight, had negative UF, claims slightly sob. No fever.       PAST HISTORY  --------------------------------------------------------------------------------  No significant changes to PMH, PSH, FHx, SHx, unless otherwise noted    ALLERGIES & MEDICATIONS  --------------------------------------------------------------------------------  Allergies    No Known Allergies    Intolerances      Standing Inpatient Medications  allopurinol 100 milliGRAM(s) Oral two times a day  aspirin enteric coated 81 milliGRAM(s) Oral daily  calcitriol   Capsule 0.25 MICROGram(s) Oral daily  calcium acetate 667 milliGRAM(s) Oral three times a day with meals  cholecalciferol 1000 Unit(s) Oral daily  cinacalcet 30 milliGRAM(s) Oral daily  gentamicin 0.1% Ointment 1 Application(s) Topical daily  hydrALAZINE 100 milliGRAM(s) Oral two times a day  metoprolol succinate ER 50 milliGRAM(s) Oral daily  pantoprazole    Tablet 40 milliGRAM(s) Oral before breakfast  senna 2 Tablet(s) Oral at bedtime  tamsulosin 0.4 milliGRAM(s) Oral at bedtime  torsemide 40 milliGRAM(s) Oral two times a day    PRN Inpatient Medications      REVIEW OF SYSTEMS  --------------------------------------------------------------------------------  Gen: No lethargy  Respiratory: No dyspnea  CV: No chest pain  GI: No abdominal pain/ diarrhea   MSK: + LE edema  Neuro: No dizziness  Heme: No bleeding      VITALS/PHYSICAL EXAM  --------------------------------------------------------------------------------  T(C): 36.5 (12-16-19 @ 05:00), Max: 36.7 (12-16-19 @ 03:45)  HR: 71 (12-16-19 @ 06:10) (71 - 83)  BP: 139/95 (12-16-19 @ 06:10) (129/71 - 158/84)  RR: 18 (12-16-19 @ 05:00) (18 - 18)  SpO2: 93% (12-16-19 @ 05:00) (93% - 98%)  Wt(kg): --        12-15-19 @ 07:01  -  12-16-19 @ 07:00  --------------------------------------------------------  IN: 780 mL / OUT: 1670 mL / NET: -890 mL      Physical Exam:  	Gen: NAD  	HEENT: Anicteric   	Pulm: CTA on left side, decrease right side.   	CV: RRR, S1S2; no rub  	Abd: +BS, soft, nontender/nondistended, + PD catheter        	: No suprapubic tenderness  	MSK: Warm, 2+ Edema  	Neuro: No focal deficits, AOX3      	Vascular Access: + PD catheter       LABS/STUDIES  --------------------------------------------------------------------------------              12.4   7.96  >-----------<  158      [12-16-19 @ 08:23]              39.5     137  |  99  |  92  ----------------------------<  203      [12-16-19 @ 06:01]  4.0   |  18  |  12.63        Ca     8.1     [12-16-19 @ 06:01]      Mg     2.4     [12-16-19 @ 06:01]      Phos  6.7     [12-16-19 @ 06:01]            Creatinine Trend:  SCr 12.63 [12-16 @ 06:01]  SCr 13.00 [12-15 @ 06:21]  SCr 12.43 [12-14 @ 06:55]  SCr 12.03 [12-13 @ 06:23]  SCr 11.80 [12-12 @ 15:53]        Iron 23, TIBC 219, %sat 11      [12-24-18 @ 18:16]  HbA1c 5.9      [04-08-18 @ 07:47]  TSH 0.61      [12-14-19 @ 11:04]

## 2019-12-16 NOTE — PROGRESS NOTE ADULT - ASSESSMENT
59y old M with pmhx of BPH, HTN, ESRD (on peritoneal dialysis) presents to [facility]  ED  for right-sided pleural effusion, Nephrology consulted for ESRD on PD    #ESRD       -Pt. with ESRD on CCPD, regimen is 9 hours, 4 Fills, each 2L, alternating 1.5% & 2.5% Dextrose.  -PD overnight had issues, repeated CXR showed, pleural effusion, which confirms peritoneal leak,   -IR eval for tunneled catheter, pt cannot due PD for now, will transition to HD.     #R Pleural effusion  - From peritoneal leak. Will start HD.     #HTN  BP at target range. Monitor BP on current BP medication. Low salt diet.    #Secondary hyperparathyroidism  - C/w vitamin D 5000U QD  - C/w Sensipar 30mg QD  - C/w Calcitrol .25mcg QD  - C/w Phoslo 667mg TID with meals. Low phosphorus diet and monitor serum phos QD

## 2019-12-16 NOTE — PROGRESS NOTE ADULT - ATTENDING COMMENTS
SOB, right sided chest dullnes  1.  ESRD--diaphragmatic leak distinct possibility.  Patient amenable to modality switch.  CXR reviewed and demonstrated overnight effusion recurrence.    2.  Pleural effusion--should resorb with PD-->HD switich.  3.  Hypertension--med, volume optimization.  4.  Hyperparathyroid, secondary.  On sensipar.

## 2019-12-17 LAB
ANION GAP SERPL CALC-SCNC: 22 MMOL/L — HIGH (ref 5–17)
BUN SERPL-MCNC: 93 MG/DL — HIGH (ref 7–23)
CALCIUM SERPL-MCNC: 8.4 MG/DL — SIGNIFICANT CHANGE UP (ref 8.4–10.5)
CHLORIDE SERPL-SCNC: 98 MMOL/L — SIGNIFICANT CHANGE UP (ref 96–108)
CO2 SERPL-SCNC: 18 MMOL/L — LOW (ref 22–31)
CREAT SERPL-MCNC: 13.02 MG/DL — HIGH (ref 0.5–1.3)
GLUCOSE SERPL-MCNC: 114 MG/DL — HIGH (ref 70–99)
HBV CORE AB SER-ACNC: SIGNIFICANT CHANGE UP
HBV CORE IGM SER-ACNC: SIGNIFICANT CHANGE UP
HBV SURFACE AB SER-ACNC: 210.9 MIU/ML — SIGNIFICANT CHANGE UP
HBV SURFACE AG SER-ACNC: SIGNIFICANT CHANGE UP
HCT VFR BLD CALC: 41.7 % — SIGNIFICANT CHANGE UP (ref 39–50)
HCV AB S/CO SERPL IA: 0.13 S/CO — SIGNIFICANT CHANGE UP (ref 0–0.99)
HCV AB SERPL-IMP: SIGNIFICANT CHANGE UP
HGB BLD-MCNC: 13.2 G/DL — SIGNIFICANT CHANGE UP (ref 13–17)
MAGNESIUM SERPL-MCNC: 2.5 MG/DL — SIGNIFICANT CHANGE UP (ref 1.6–2.6)
MCHC RBC-ENTMCNC: 30.3 PG — SIGNIFICANT CHANGE UP (ref 27–34)
MCHC RBC-ENTMCNC: 31.7 GM/DL — LOW (ref 32–36)
MCV RBC AUTO: 95.6 FL — SIGNIFICANT CHANGE UP (ref 80–100)
NON-GYNECOLOGICAL CYTOLOGY STUDY: SIGNIFICANT CHANGE UP
PHOSPHATE SERPL-MCNC: 7.4 MG/DL — HIGH (ref 2.5–4.5)
PLATELET # BLD AUTO: 177 K/UL — SIGNIFICANT CHANGE UP (ref 150–400)
POTASSIUM SERPL-MCNC: 4.6 MMOL/L — SIGNIFICANT CHANGE UP (ref 3.5–5.3)
POTASSIUM SERPL-SCNC: 4.6 MMOL/L — SIGNIFICANT CHANGE UP (ref 3.5–5.3)
RBC # BLD: 4.36 M/UL — SIGNIFICANT CHANGE UP (ref 4.2–5.8)
RBC # FLD: 14.7 % — HIGH (ref 10.3–14.5)
SODIUM SERPL-SCNC: 138 MMOL/L — SIGNIFICANT CHANGE UP (ref 135–145)
WBC # BLD: 7.03 K/UL — SIGNIFICANT CHANGE UP (ref 3.8–10.5)
WBC # FLD AUTO: 7.03 K/UL — SIGNIFICANT CHANGE UP (ref 3.8–10.5)

## 2019-12-17 PROCEDURE — 76536 US EXAM OF HEAD AND NECK: CPT | Mod: 26

## 2019-12-17 PROCEDURE — 93306 TTE W/DOPPLER COMPLETE: CPT | Mod: 26

## 2019-12-17 PROCEDURE — 99231 SBSQ HOSP IP/OBS SF/LOW 25: CPT

## 2019-12-17 RX ADMIN — Medication 1 APPLICATION(S): at 12:00

## 2019-12-17 RX ADMIN — Medication 667 MILLIGRAM(S): at 08:47

## 2019-12-17 RX ADMIN — SENNA PLUS 2 TABLET(S): 8.6 TABLET ORAL at 21:51

## 2019-12-17 RX ADMIN — PANTOPRAZOLE SODIUM 40 MILLIGRAM(S): 20 TABLET, DELAYED RELEASE ORAL at 05:50

## 2019-12-17 RX ADMIN — Medication 100 MILLIGRAM(S): at 17:01

## 2019-12-17 RX ADMIN — Medication 81 MILLIGRAM(S): at 12:40

## 2019-12-17 RX ADMIN — Medication 100 MILLIGRAM(S): at 05:49

## 2019-12-17 RX ADMIN — CALCITRIOL 0.25 MICROGRAM(S): 0.5 CAPSULE ORAL at 12:40

## 2019-12-17 RX ADMIN — Medication 1000 UNIT(S): at 12:40

## 2019-12-17 RX ADMIN — Medication 100 MILLIGRAM(S): at 05:50

## 2019-12-17 RX ADMIN — Medication 100 MILLIGRAM(S): at 17:02

## 2019-12-17 RX ADMIN — Medication 50 MILLIGRAM(S): at 05:49

## 2019-12-17 RX ADMIN — Medication 667 MILLIGRAM(S): at 12:41

## 2019-12-17 RX ADMIN — Medication 40 MILLIGRAM(S): at 17:02

## 2019-12-17 RX ADMIN — Medication 667 MILLIGRAM(S): at 17:01

## 2019-12-17 RX ADMIN — Medication 40 MILLIGRAM(S): at 05:50

## 2019-12-17 RX ADMIN — CINACALCET 30 MILLIGRAM(S): 30 TABLET, FILM COATED ORAL at 12:41

## 2019-12-17 RX ADMIN — TAMSULOSIN HYDROCHLORIDE 0.4 MILLIGRAM(S): 0.4 CAPSULE ORAL at 21:51

## 2019-12-17 NOTE — PROGRESS NOTE ADULT - SUBJECTIVE AND OBJECTIVE BOX
INTERVAL HPI/OVERNIGHT EVENTS:  Vital Signs Last 24 Hrs  T(C): 36.7 (17 Dec 2019 14:00), Max: 37.1 (17 Dec 2019 05:00)  T(F): 98.1 (17 Dec 2019 14:00), Max: 98.7 (17 Dec 2019 05:00)  HR: 84 (17 Dec 2019 14:00) (84 - 95)  BP: 123/75 (17 Dec 2019 14:00) (123/75 - 143/84)  BP(mean): --  RR: 20 (17 Dec 2019 14:00) (18 - 20)  SpO2: 96% (17 Dec 2019 14:00) (92% - 98%)  I&O's Summary    16 Dec 2019 07:01  -  17 Dec 2019 07:00  --------------------------------------------------------  IN: 920 mL / OUT: 1925 mL / NET: -1005 mL    17 Dec 2019 07:01  -  17 Dec 2019 14:35  --------------------------------------------------------  IN: 540 mL / OUT: 350 mL / NET: 190 mL      MEDICATIONS  (STANDING):  allopurinol 100 milliGRAM(s) Oral two times a day  aspirin enteric coated 81 milliGRAM(s) Oral daily  calcitriol   Capsule 0.25 MICROGram(s) Oral daily  calcium acetate 667 milliGRAM(s) Oral three times a day with meals  cholecalciferol 1000 Unit(s) Oral daily  cinacalcet 30 milliGRAM(s) Oral daily  gentamicin 0.1% Ointment 1 Application(s) Topical daily  hydrALAZINE 100 milliGRAM(s) Oral two times a day  metoprolol succinate ER 50 milliGRAM(s) Oral daily  pantoprazole    Tablet 40 milliGRAM(s) Oral before breakfast  senna 2 Tablet(s) Oral at bedtime  tamsulosin 0.4 milliGRAM(s) Oral at bedtime  torsemide 40 milliGRAM(s) Oral two times a day    MEDICATIONS  (PRN):    LABS:                        13.2   7.03  )-----------( 177      ( 17 Dec 2019 09:21 )             41.7     12-17    138  |  98  |  93<H>  ----------------------------<  114<H>  4.6   |  18<L>  |  13.02<H>    Ca    8.4      17 Dec 2019 06:58  Phos  7.4     12-17  Mg     2.5     12-17      PT/INR - ( 16 Dec 2019 14:57 )   PT: 10.8 sec;   INR: 0.94 ratio             CAPILLARY BLOOD GLUCOSE              REVIEW OF SYSTEMS:  CONSTITUTIONAL: No fever, weight loss, or fatigue  EYES: No eye pain, visual disturbances, or discharge  ENMT:  No difficulty hearing, tinnitus, vertigo; No sinus or throat pain  NECK: No pain or stiffness  BREASTS: No pain, masses, or nipple discharge  RESPIRATORY: No cough, wheezing, chills or hemoptysis; No shortness of breath  CARDIOVASCULAR: No chest pain, palpitations, dizziness, or leg swelling  GASTROINTESTINAL: No abdominal or epigastric pain. No nausea, vomiting, or hematemesis; No diarrhea or constipation. No melena or hematochezia.  GENITOURINARY: No dysuria, frequency, hematuria, or incontinence  NEUROLOGICAL: No headaches, memory loss, loss of strength, numbness, or tremors      Consultant(s) Notes Reviewed:  [x ] YES  [ ] NO    PHYSICAL EXAM:  GENERAL: NAD, well-groomed, well-developed,not in any distress ,  HEAD:  Atraumatic, Normocephalic  EYES: EOMI, PERRLA, conjunctiva and sclera clear  ENMT: No tonsillar erythema, exudates, or enlargement; Moist mucous membranes, Good dentition, No lesions  NECK: Supple, No JVD, Normal thyroid  NERVOUS SYSTEM:  Alert & Oriented X3, No focal deficit   CHEST/LUNG: Good air entry bilateral except right base   HEART: Regular rate and rhythm; No murmurs, rubs, or gallops  ABDOMEN: Soft, Nontender, Nondistended; Bowel sounds present  EXTREMITIES:  2+ Peripheral Pulses, No clubbing, cyanosis, or edema  SKIN: No rashes or lesions    Care Discussed with Consultants/Other Providers [ x] YES  [ ] NO

## 2019-12-17 NOTE — PROGRESS NOTE ADULT - SUBJECTIVE AND OBJECTIVE BOX
Interventional Radiology Pre-Procedure Note    This is a 59y Male with PMH of HTN, HLD, ESRD (on peritoneal dialysis) who presented with new right-sided pleural effusion 2/2 ? peritoneal leak. Patient planned to transition to HD, IR requested for tunnelled HD catheter placement.     PAST MEDICAL & SURGICAL HISTORY:  Solitary fibrous tumor: removed in 2013  BPH (benign prostatic hyperplasia)  Gout  GERD (gastroesophageal reflux disease)  Hypercholesteremia  Kidney disease  HTN (hypertension)  Lung abnormality  BPH (benign prostatic hypertrophy)  CKD (chronic kidney disease), stage III  Nephritis  History of lung surgery  Vocal cord anomaly: S/P polyp removal 2004     Vital Signs Last 24 Hrs  T(C): 36.7 (17 Dec 2019 14:00), Max: 37.1 (17 Dec 2019 05:00)  T(F): 98.1 (17 Dec 2019 14:00), Max: 98.7 (17 Dec 2019 05:00)  HR: 84 (17 Dec 2019 14:00) (84 - 95)  BP: 123/75 (17 Dec 2019 14:00) (123/75 - 143/84)  RR: 20 (17 Dec 2019 14:00) (18 - 20)  SpO2: 96% (17 Dec 2019 14:00) (92% - 98%)    Allergies: No Known Allergies    Physical Exam: Gen:    Labs:                         13.2   7.03  )-----------( 177      ( 17 Dec 2019 09:21 )             41.7     12-17    138  |  98  |  93<H>  ----------------------------<  114<H>  4.6   |  18<L>  |  13.02<H>    Ca    8.4      17 Dec 2019 06:58  Phos  7.4     12-17  Mg     2.5     12-17      PT/INR - ( 16 Dec 2019 14:57 )   PT: 10.8 sec;   INR: 0.94 ratio         A/P: 58 y/o male with PMH of HTN, HLD, ESRD on peritoneal dialysis now planned to transition to HD, IR requested for tunnelled HD catheter placement.  -Plan is for tunnelled HD catheter placement tomorrow.   -The procedure/ risks/ benefits/ alternatives to be discussed with the pt and Informed consent to be obtained. All questions and concerns have been addressed at this time.   -Keep patient NPO after midnight  -Please contact IR at [phone] with any questions/ concerns regarding above Interventional Radiology Pre-Procedure Note    This is a 59y Male with PMH of HTN, HLD, ESRD (on peritoneal dialysis) who presented with new right-sided pleural effusion 2/2 ? peritoneal leak. Patient planned to transition to HD, IR requested for tunnelled HD catheter placement.     PAST MEDICAL & SURGICAL HISTORY:  Solitary fibrous tumor: removed in 2013  BPH (benign prostatic hyperplasia)  Gout  GERD (gastroesophageal reflux disease)  Hypercholesteremia  Kidney disease  HTN (hypertension)  Lung abnormality  BPH (benign prostatic hypertrophy)  CKD (chronic kidney disease), stage III  Nephritis  History of lung surgery  Vocal cord anomaly: S/P polyp removal 2004     Vital Signs Last 24 Hrs  T(C): 36.7 (17 Dec 2019 14:00), Max: 37.1 (17 Dec 2019 05:00)  T(F): 98.1 (17 Dec 2019 14:00), Max: 98.7 (17 Dec 2019 05:00)  HR: 84 (17 Dec 2019 14:00) (84 - 95)  BP: 123/75 (17 Dec 2019 14:00) (123/75 - 143/84)  RR: 20 (17 Dec 2019 14:00) (18 - 20)  SpO2: 96% (17 Dec 2019 14:00) (92% - 98%)    Allergies: No Known Allergies    Physical Exam: Gen: NAD, A&Ox4    Labs:                         13.2   7.03  )-----------( 177      ( 17 Dec 2019 09:21 )             41.7     12-17    138  |  98  |  93<H>  ----------------------------<  114<H>  4.6   |  18<L>  |  13.02<H>    Ca    8.4      17 Dec 2019 06:58  Phos  7.4     12-17  Mg     2.5     12-17      PT/INR - ( 16 Dec 2019 14:57 )   PT: 10.8 sec;   INR: 0.94 ratio         A/P: 60 y/o male with PMH of HTN, HLD, ESRD on peritoneal dialysis now planned to transition to HD, IR requested for tunnelled HD catheter placement.  -Plan is for tunnelled HD catheter placement tomorrow.   -The procedure/ risks/ benefits/ alternatives were discussed with the pt and Informed consent obtained and attached to requisition. All questions and concerns have been addressed at this time.   -Keep patient NPO after midnight  -Please contact IR at [phone] with any questions/ concerns regarding above

## 2019-12-17 NOTE — PROGRESS NOTE ADULT - ASSESSMENT
59 year old male with pmhx HTN, HLD ,ESRD (on peritoneal dialysis) presents to the ED c/o new right-sided pleural effusion found on CXR x2 days ago. States he had a peritoneal dialysis catheter placed in October, at which time his CXR was clear. Went to his nephrologist x2 days ago, CXR showed that there may be communication between his abdominal and chest cavities, subsequently referred to [facility] ED for further evaluation. Only Saturday he felt SOB . Denies CP,  fever, cough, abdominal pain, n/v, worsening pedal edema.     Problem/Plan - 1:  ·  Problem: Pleural effusion.  Plan: Likely from diaphragm leak secondary to PD . CT Chest .< from: CT Chest No Cont (12.12.19 @ 17:55) >    INTERPRETATION:  Large right pleural effusion with near complete   atelectasis of the right lower lobe. Redemonstrated enlarged   heterogeneous thyroid with tracheal deviation to the right and tracheal   narrowing.    Follow up final report.  House Pulmonary consult noted . S/P  Thoracentesis . TTE pending .      Problem/Plan - 2:  ·  Problem: ESRD on peritoneal dialysis.  Plan: PD stopped secondary to leak . Renal helping and planning HD . Perma cath in AM.      Problem/Plan - 3:  ·  Problem: Secondary hyperparathyroidism.  Plan: Continue home treatement .      Problem/Plan - 4:  ·  Problem: HTN (hypertension).  Plan: BP meds with hold parameters.      Problem/Plan - 5:  ·  Problem: BPH (benign prostatic hyperplasia).  Plan: Flomax BID  .      Problem/Plan - 6:  Problem: Hypercholesteremia. Plan: Said holding statin since few weeks as lipids were good.     Problem/Plan - 7:  ·  Problem: GERD (gastroesophageal reflux disease).  Plan: PPI.      Problem/Plan - 8:  ·  Problem: Thyroid enlargement.  Plan:  TFT and Thyroid US noted.

## 2019-12-18 LAB
ALT FLD-CCNC: 7 U/L — LOW (ref 10–45)
ANION GAP SERPL CALC-SCNC: 24 MMOL/L — HIGH (ref 5–17)
BUN SERPL-MCNC: 100 MG/DL — HIGH (ref 7–23)
CALCIUM SERPL-MCNC: 8.2 MG/DL — LOW (ref 8.4–10.5)
CHLORIDE SERPL-SCNC: 97 MMOL/L — SIGNIFICANT CHANGE UP (ref 96–108)
CO2 SERPL-SCNC: 18 MMOL/L — LOW (ref 22–31)
CREAT SERPL-MCNC: 13.64 MG/DL — HIGH (ref 0.5–1.3)
CULTURE RESULTS: SIGNIFICANT CHANGE UP
GLUCOSE SERPL-MCNC: 108 MG/DL — HIGH (ref 70–99)
POTASSIUM SERPL-MCNC: 4.9 MMOL/L — SIGNIFICANT CHANGE UP (ref 3.5–5.3)
POTASSIUM SERPL-SCNC: 4.9 MMOL/L — SIGNIFICANT CHANGE UP (ref 3.5–5.3)
SODIUM SERPL-SCNC: 139 MMOL/L — SIGNIFICANT CHANGE UP (ref 135–145)
SPECIMEN SOURCE: SIGNIFICANT CHANGE UP

## 2019-12-18 PROCEDURE — 36558 INSERT TUNNELED CV CATH: CPT

## 2019-12-18 PROCEDURE — 76937 US GUIDE VASCULAR ACCESS: CPT | Mod: 26

## 2019-12-18 PROCEDURE — 71250 CT THORAX DX C-: CPT | Mod: 26

## 2019-12-18 PROCEDURE — 99232 SBSQ HOSP IP/OBS MODERATE 35: CPT

## 2019-12-18 PROCEDURE — 77001 FLUOROGUIDE FOR VEIN DEVICE: CPT | Mod: 26

## 2019-12-18 RX ORDER — ACETAMINOPHEN 500 MG
650 TABLET ORAL EVERY 6 HOURS
Refills: 0 | Status: DISCONTINUED | OUTPATIENT
Start: 2019-12-18 | End: 2019-12-20

## 2019-12-18 RX ORDER — SODIUM CHLORIDE 9 MG/ML
10 INJECTION INTRAMUSCULAR; INTRAVENOUS; SUBCUTANEOUS
Refills: 0 | Status: DISCONTINUED | OUTPATIENT
Start: 2019-12-18 | End: 2019-12-20

## 2019-12-18 RX ORDER — CHLORHEXIDINE GLUCONATE 213 G/1000ML
1 SOLUTION TOPICAL
Refills: 0 | Status: DISCONTINUED | OUTPATIENT
Start: 2019-12-18 | End: 2019-12-20

## 2019-12-18 RX ADMIN — Medication 650 MILLIGRAM(S): at 17:02

## 2019-12-18 RX ADMIN — Medication 100 MILLIGRAM(S): at 17:38

## 2019-12-18 RX ADMIN — CINACALCET 30 MILLIGRAM(S): 30 TABLET, FILM COATED ORAL at 11:53

## 2019-12-18 RX ADMIN — Medication 650 MILLIGRAM(S): at 17:32

## 2019-12-18 RX ADMIN — Medication 50 MILLIGRAM(S): at 05:38

## 2019-12-18 RX ADMIN — TAMSULOSIN HYDROCHLORIDE 0.4 MILLIGRAM(S): 0.4 CAPSULE ORAL at 21:34

## 2019-12-18 RX ADMIN — Medication 100 MILLIGRAM(S): at 05:37

## 2019-12-18 RX ADMIN — Medication 1 APPLICATION(S): at 12:28

## 2019-12-18 RX ADMIN — Medication 667 MILLIGRAM(S): at 11:15

## 2019-12-18 RX ADMIN — CALCITRIOL 0.25 MICROGRAM(S): 0.5 CAPSULE ORAL at 11:53

## 2019-12-18 RX ADMIN — SENNA PLUS 2 TABLET(S): 8.6 TABLET ORAL at 21:34

## 2019-12-18 RX ADMIN — Medication 667 MILLIGRAM(S): at 16:47

## 2019-12-18 RX ADMIN — Medication 40 MILLIGRAM(S): at 05:38

## 2019-12-18 RX ADMIN — Medication 81 MILLIGRAM(S): at 11:53

## 2019-12-18 RX ADMIN — Medication 1000 UNIT(S): at 11:53

## 2019-12-18 RX ADMIN — Medication 667 MILLIGRAM(S): at 21:34

## 2019-12-18 RX ADMIN — PANTOPRAZOLE SODIUM 40 MILLIGRAM(S): 20 TABLET, DELAYED RELEASE ORAL at 05:38

## 2019-12-18 RX ADMIN — Medication 40 MILLIGRAM(S): at 17:38

## 2019-12-18 NOTE — PROGRESS NOTE ADULT - SUBJECTIVE AND OBJECTIVE BOX
Vascular & Interventional Radiology Pre-Procedure Note    Procedure Name: Tunneled dialysis catheter placement    HPI: 59y Male with ESRD on PD now needing tunneled dialysis catheter for dialysis access.     Allergies:   Medications (Abx/Cardiac/Anticoagulation/Blood Products)  aspirin enteric coated: 81 milliGRAM(s) Oral (12-17 @ 12:40)  hydrALAZINE: 100 milliGRAM(s) Oral (12-18 @ 05:37)  metoprolol succinate ER: 50 milliGRAM(s) Oral (12-18 @ 05:38)  tamsulosin: 0.4 milliGRAM(s) Oral (12-17 @ 21:51)  torsemide: 40 milliGRAM(s) Oral (12-18 @ 05:38)    Data:    T(C): 36.7  HR: 81  BP: 130/76  RR: 18  SpO2: 95%    -WBC 7.03 / HgB 13.2 / Hct 41.7 / Plt 177  -Na 139 / Cl 97 /  / Glucose 108  -K 4.9 / CO2 18 / Cr 13.64  -ALT -- / Alk Phos -- / T.Bili --  -INR0.94    Plan:   -59y Male presents for tunneled dialysis catheter placement. Procedure discussed with patient and family and they are agreeable to proceed.   -Risks/Benefits/alternatives explained with the patient and/or healthcare proxy and witnessed informed consent obtained.

## 2019-12-18 NOTE — PROGRESS NOTE ADULT - SUBJECTIVE AND OBJECTIVE BOX
INTERVAL HPI/OVERNIGHT EVENTS: Getting HD . I feel fine.   Vital Signs Last 24 Hrs  T(C): 36.6 (18 Dec 2019 13:55), Max: 36.8 (18 Dec 2019 10:22)  T(F): 97.8 (18 Dec 2019 13:55), Max: 98.2 (18 Dec 2019 10:22)  HR: 76 (18 Dec 2019 13:55) (73 - 88)  BP: 119/83 (18 Dec 2019 13:55) (118/75 - 131/76)  BP(mean): --  RR: 18 (18 Dec 2019 13:55) (18 - 18)  SpO2: 97% (18 Dec 2019 13:55) (95% - 97%)  I&O's Summary    17 Dec 2019 07:01  -  18 Dec 2019 07:00  --------------------------------------------------------  IN: 1240 mL / OUT: 1700 mL / NET: -460 mL    18 Dec 2019 07:01  -  18 Dec 2019 15:24  --------------------------------------------------------  IN: 480 mL / OUT: 350 mL / NET: 130 mL      MEDICATIONS  (STANDING):  allopurinol 100 milliGRAM(s) Oral two times a day  aspirin enteric coated 81 milliGRAM(s) Oral daily  calcitriol   Capsule 0.25 MICROGram(s) Oral daily  calcium acetate 667 milliGRAM(s) Oral three times a day with meals  chlorhexidine 4% Liquid 1 Application(s) Topical <User Schedule>  cholecalciferol 1000 Unit(s) Oral daily  cinacalcet 30 milliGRAM(s) Oral daily  gentamicin 0.1% Ointment 1 Application(s) Topical daily  hydrALAZINE 100 milliGRAM(s) Oral two times a day  metoprolol succinate ER 50 milliGRAM(s) Oral daily  pantoprazole    Tablet 40 milliGRAM(s) Oral before breakfast  senna 2 Tablet(s) Oral at bedtime  tamsulosin 0.4 milliGRAM(s) Oral at bedtime  torsemide 40 milliGRAM(s) Oral two times a day    MEDICATIONS  (PRN):  sodium chloride 0.9% lock flush 10 milliLiter(s) IV Push every 1 hour PRN Pre/post blood products, medications, blood draw, and to maintain line patency    LABS:                        13.2   7.03  )-----------( 177      ( 17 Dec 2019 09:21 )             41.7     12-18    139  |  97  |  100<H>  ----------------------------<  108<H>  4.9   |  18<L>  |  13.64<H>    Ca    8.2<L>      18 Dec 2019 06:10  Phos  7.4     12-17  Mg     2.5     12-17    TPro  x   /  Alb  x   /  TBili  x   /  DBili  x   /  AST  x   /  ALT  7<L>  /  AlkPhos  x   12-18        CAPILLARY BLOOD GLUCOSE              REVIEW OF SYSTEMS:  CONSTITUTIONAL: No fever, weight loss, or fatigue  EYES: No eye pain, visual disturbances, or discharge  ENMT:  No difficulty hearing, tinnitus, vertigo; No sinus or throat pain  NECK: No pain or stiffness  RESPIRATORY: No cough, wheezing, chills or hemoptysis; No shortness of breath  CARDIOVASCULAR: No chest pain, palpitations, dizziness, or leg swelling  GASTROINTESTINAL: No abdominal or epigastric pain. No nausea, vomiting, or hematemesis; No diarrhea or constipation. No melena or hematochezia.  GENITOURINARY: No dysuria, frequency, hematuria, or incontinence  NEUROLOGICAL: No headaches, memory loss, loss of strength, numbness, or tremors      Consultant(s) Notes Reviewed:  [x ] YES  [ ] NO    PHYSICAL EXAM:  GENERAL: NAD, well-groomed, well-developed,not in any distress ,  HEAD:  Atraumatic, Normocephalic  EYES: EOMI, PERRLA, conjunctiva and sclera clear  ENMT: No tonsillar erythema, exudates, or enlargement; Moist mucous membranes, Good dentition, No lesions  NECK: Supple, No JVD, Normal thyroid  NERVOUS SYSTEM:  Alert & Oriented X3, No focal deficit   CHEST/LUNG: Good air entry bilateral except rt base   HEART: Regular rate and rhythm; No murmurs, rubs, or gallops  ABDOMEN: Soft, Nontender, Nondistended; Bowel sounds present  EXTREMITIES:  2+ Peripheral Pulses, No clubbing, cyanosis, or edema      Care Discussed with Consultants/Other Providers [ x] YES  [ ] NO

## 2019-12-18 NOTE — PROGRESS NOTE ADULT - SUBJECTIVE AND OBJECTIVE BOX
Interventional Radiology Brief Post-Procedure Note    Procedure: Tunneled dialysis catheter placement    Operators: Manny Roberts MD    Anesthesia (type): Provided by anesthesiology service    Contrast: None.     EBL: Minimal.     Findings/Follow up Plan of Care: Successful tunneled dialysis catheter placement via the right IJ with tip at cavoatrial junction.    Specimens Removed: None.     Implants: 14 [place] 23 cm tip to cuff tunneled dialysis catheter.     Complications: No immediate complications.     Condition/Disposition: To recovery room.     Please call Interventional Radiology x [phone] with any questions, concerns, or issues.

## 2019-12-18 NOTE — PROGRESS NOTE ADULT - SUBJECTIVE AND OBJECTIVE BOX
[facility] Division of Kidney Diseases & Hypertension  ATTENDING FOLLOW UP NOTE  --------------------------------------------------------------------------------  24 hour events/subjective: Dialysis catheter inserted, tolerated well. Feels alright. No complaints.      PAST HISTORY  --------------------------------------------------------------------------------  No significant changes to PMH, PSH, FHx, SHx, unless otherwise noted    ALLERGIES & MEDICATIONS  --------------------------------------------------------------------------------  Allergies: No Known Allergies    Standing Inpatient Medications  allopurinol 100 milliGRAM(s) Oral two times a day  aspirin enteric coated 81 milliGRAM(s) Oral daily  calcitriol   Capsule 0.25 MICROGram(s) Oral daily  calcium acetate 667 milliGRAM(s) Oral three times a day with meals  chlorhexidine 4% Liquid 1 Application(s) Topical <User Schedule>  cholecalciferol 1000 Unit(s) Oral daily  cinacalcet 30 milliGRAM(s) Oral daily  gentamicin 0.1% Ointment 1 Application(s) Topical daily  hydrALAZINE 100 milliGRAM(s) Oral two times a day  metoprolol succinate ER 50 milliGRAM(s) Oral daily  pantoprazole    Tablet 40 milliGRAM(s) Oral before breakfast  senna 2 Tablet(s) Oral at bedtime  tamsulosin 0.4 milliGRAM(s) Oral at bedtime  torsemide 40 milliGRAM(s) Oral two times a day    PRN Inpatient Medications  sodium chloride 0.9% lock flush 10 milliLiter(s) IV Push every 1 hour PRN      REVIEW OF SYSTEMS  --------------------------------------------------------------------------------  All other systems were reviewed and are negative, except as noted.    VITALS/PHYSICAL EXAM  --------------------------------------------------------------------------------  T(C): 36.6 (12-18-19 @ 13:55), Max: 36.8 (12-18-19 @ 10:22)  HR: 76 (12-18-19 @ 13:55) (73 - 88)  BP: 119/83 (12-18-19 @ 13:55) (118/75 - 131/76)  RR: 18 (12-18-19 @ 13:55) (18 - 18)  SpO2: 97% (12-18-19 @ 13:55) (95% - 97%)    Physical Exam:  	Gen: NAD  	HEENT: Supple neck  	Pulm: CTA  	CV: RRR  	Abd: +BS, soft  	: No suprapubic tenderness  	UE: Warm, FROM  	LE: Warm, FROM, no edema  	Neuro: No focal deficits  	Psych: Normal affect and mood  	Skin: Warm, without rashes  	Vascular access: Catheter    LABS/STUDIES  --------------------------------------------------------------------------------              13.2   7.03  >-----------<  177      [12-17-19 @ 09:21]              41.7     139  |  97  |  100  ----------------------------<  108      [12-18-19 @ 06:10]  4.9   |  18  |  13.64        Ca     8.2     [12-18-19 @ 06:10]      Mg     2.5     [12-17-19 @ 06:58]      Phos  7.4     [12-17-19 @ 06:58]    TPro  x   /  Alb  x   /  TBili  x   /  DBili  x   /  AST  x   /  ALT  7   /  AlkPhos  x   [12-18-19 @ 14:46]          Creatinine Trend:  SCr 13.64 [12-18 @ 06:10]  SCr 13.02 [12-17 @ 06:58]  SCr 12.63 [12-16 @ 06:01]  SCr 13.00 [12-15 @ 06:21]  SCr 12.43 [12-14 @ 06:55]        TSH 0.61      [12-14-19 @ 11:04]    HBsAb 210.9      [12-16-19 @ 20:06]  HBsAg Nonreact      [12-16-19 @ 20:06]  HBcAb Nonreact      [12-16-19 @ 20:06]  HCV 0.13, Nonreact      [12-16-19 @ 20:06]

## 2019-12-18 NOTE — PROGRESS NOTE ADULT - ASSESSMENT
59 year old male with pmhx HTN, HLD ,ESRD (on peritoneal dialysis) presents to the ED c/o new right-sided pleural effusion found on CXR x2 days ago. States he had a peritoneal dialysis catheter placed in October, at which time his CXR was clear. Went to his nephrologist x2 days ago, CXR showed that there may be communication between his abdominal and chest cavities, subsequently referred to [facility] ED for further evaluation. Only Saturday he felt SOB . Denies CP,  fever, cough, abdominal pain, n/v, worsening pedal edema.     Problem/Plan - 1:  ·  Problem: Pleural effusion.  Plan: Likely from diaphragm leak secondary to PD . CT Chest .< from: CT Chest No Cont (12.12.19 @ 17:55) >    INTERPRETATION:  Large right pleural effusion with near complete   atelectasis of the right lower lobe. Redemonstrated enlarged   heterogeneous thyroid with tracheal deviation to the right and tracheal   narrowing.    Follow up final report.  House Pulmonary consult noted . S/P  Thoracentesis . TTE noted.      Problem/Plan - 2:  ·  Problem: ESRD on peritoneal dialysis.  Plan: PD stopped secondary to leak . Renal helping . S/P Perma cath and getting HD today .     Problem/Plan - 3:  ·  Problem: Secondary hyperparathyroidism.  Plan: Continue home treatement .      Problem/Plan - 4:  ·  Problem: HTN (hypertension).  Plan: BP meds with hold parameters.      Problem/Plan - 5:  ·  Problem: BPH (benign prostatic hyperplasia).  Plan: Flomax BID  .      Problem/Plan - 6:  Problem: Hypercholesteremia. Plan: Said holding statin since few weeks as lipids were good.     Problem/Plan - 7:  ·  Problem: Pericardial effusion .  Plan: TTE noted . Will check CT chest and cardiology consulted.      Problem/Plan - 8:  ·  Problem: Thyroid enlargement.  Plan:  TFT and Thyroid US noted.      Problem/Plan - 9:  ·  Problem: GERD (gastroesophageal reflux disease).  Plan: PPI.

## 2019-12-18 NOTE — PROGRESS NOTE ADULT - ASSESSMENT
ESRD  Secondary hyperparathyroidism  Anemia of CKD    Dialysis per prescription  Cont. cinacalcet and calcium acetate  Will follow        Hemodialysis Treatment.:     Schedule: Once, Modality: Hemodialysis, Access: Internal Jugular Central Venous Catheter    Dialyzer: Optiflux [id number], Time: 120 Min    Blood Flow: 250 mL/Min , Dialysate Flow: 500 mL/Min, Dialysate Temp: 36.5, Tubinmm (Adult)    Target Fluid Removal: 1L Liters    Dialysate Electrolytes (mEq/L):  Calcium 2.5, Sodium 138, Bicarbonate 35    Potassium Protocol  -->For serum potassium LESS THAN or EQUAL TO 3.4, notify MD/PA/NP       For serum potassium 3.5 - 4, use 3K dialysate bath       For serum potassium 4.1 - 5.9, use 2K dialysate bath       For serum potassium GREATER THAN or EQUAL TO 6, notify MD/PA/NP    Additional Instructions: Keep SBP>90mmHg (19 @ 08:40)

## 2019-12-19 LAB
ANION GAP SERPL CALC-SCNC: 21 MMOL/L — HIGH (ref 5–17)
BUN SERPL-MCNC: 83 MG/DL — HIGH (ref 7–23)
CALCIUM SERPL-MCNC: 8.3 MG/DL — LOW (ref 8.4–10.5)
CHLORIDE SERPL-SCNC: 96 MMOL/L — SIGNIFICANT CHANGE UP (ref 96–108)
CHOLEST SERPL-MCNC: 194 MG/DL — SIGNIFICANT CHANGE UP (ref 10–199)
CO2 SERPL-SCNC: 22 MMOL/L — SIGNIFICANT CHANGE UP (ref 22–31)
CREAT SERPL-MCNC: 11.71 MG/DL — HIGH (ref 0.5–1.3)
GLUCOSE SERPL-MCNC: 106 MG/DL — HIGH (ref 70–99)
HAV IGM SER-ACNC: SIGNIFICANT CHANGE UP
HBV CORE AB SER-ACNC: SIGNIFICANT CHANGE UP
HBV CORE IGM SER-ACNC: SIGNIFICANT CHANGE UP
HBV SURFACE AB SER-ACNC: 212.8 MIU/ML — SIGNIFICANT CHANGE UP
HBV SURFACE AG SER-ACNC: SIGNIFICANT CHANGE UP
HCT VFR BLD CALC: 42.8 % — SIGNIFICANT CHANGE UP (ref 39–50)
HCV AB S/CO SERPL IA: 0.11 S/CO — SIGNIFICANT CHANGE UP (ref 0–0.99)
HCV AB SERPL-IMP: SIGNIFICANT CHANGE UP
HDLC SERPL-MCNC: 45 MG/DL — SIGNIFICANT CHANGE UP
HGB BLD-MCNC: 13.2 G/DL — SIGNIFICANT CHANGE UP (ref 13–17)
LIPID PNL WITH DIRECT LDL SERPL: 123 MG/DL — HIGH
MAGNESIUM SERPL-MCNC: 2.5 MG/DL — SIGNIFICANT CHANGE UP (ref 1.6–2.6)
MCHC RBC-ENTMCNC: 30.1 PG — SIGNIFICANT CHANGE UP (ref 27–34)
MCHC RBC-ENTMCNC: 30.8 GM/DL — LOW (ref 32–36)
MCV RBC AUTO: 97.7 FL — SIGNIFICANT CHANGE UP (ref 80–100)
PHOSPHATE SERPL-MCNC: 7.3 MG/DL — HIGH (ref 2.5–4.5)
PLATELET # BLD AUTO: 114 K/UL — LOW (ref 150–400)
POTASSIUM SERPL-MCNC: 5 MMOL/L — SIGNIFICANT CHANGE UP (ref 3.5–5.3)
POTASSIUM SERPL-SCNC: 5 MMOL/L — SIGNIFICANT CHANGE UP (ref 3.5–5.3)
RBC # BLD: 4.38 M/UL — SIGNIFICANT CHANGE UP (ref 4.2–5.8)
RBC # FLD: 14.5 % — SIGNIFICANT CHANGE UP (ref 10.3–14.5)
SODIUM SERPL-SCNC: 139 MMOL/L — SIGNIFICANT CHANGE UP (ref 135–145)
TOTAL CHOLESTEROL/HDL RATIO MEASUREMENT: 4.3 RATIO — SIGNIFICANT CHANGE UP (ref 3.4–9.6)
TRIGL SERPL-MCNC: 130 MG/DL — SIGNIFICANT CHANGE UP (ref 10–149)
WBC # BLD: 7.8 K/UL — SIGNIFICANT CHANGE UP (ref 3.8–10.5)
WBC # FLD AUTO: 7.8 K/UL — SIGNIFICANT CHANGE UP (ref 3.8–10.5)

## 2019-12-19 PROCEDURE — 90935 HEMODIALYSIS ONE EVALUATION: CPT

## 2019-12-19 RX ADMIN — Medication 667 MILLIGRAM(S): at 19:55

## 2019-12-19 RX ADMIN — TAMSULOSIN HYDROCHLORIDE 0.4 MILLIGRAM(S): 0.4 CAPSULE ORAL at 21:29

## 2019-12-19 RX ADMIN — Medication 100 MILLIGRAM(S): at 21:30

## 2019-12-19 RX ADMIN — Medication 40 MILLIGRAM(S): at 06:56

## 2019-12-19 RX ADMIN — Medication 100 MILLIGRAM(S): at 16:12

## 2019-12-19 RX ADMIN — Medication 1000 UNIT(S): at 16:11

## 2019-12-19 RX ADMIN — Medication 667 MILLIGRAM(S): at 08:08

## 2019-12-19 RX ADMIN — SENNA PLUS 2 TABLET(S): 8.6 TABLET ORAL at 21:30

## 2019-12-19 RX ADMIN — Medication 667 MILLIGRAM(S): at 16:11

## 2019-12-19 RX ADMIN — Medication 50 MILLIGRAM(S): at 16:12

## 2019-12-19 RX ADMIN — Medication 100 MILLIGRAM(S): at 06:56

## 2019-12-19 RX ADMIN — CINACALCET 30 MILLIGRAM(S): 30 TABLET, FILM COATED ORAL at 16:11

## 2019-12-19 RX ADMIN — PANTOPRAZOLE SODIUM 40 MILLIGRAM(S): 20 TABLET, DELAYED RELEASE ORAL at 06:56

## 2019-12-19 RX ADMIN — Medication 1 APPLICATION(S): at 19:56

## 2019-12-19 RX ADMIN — CHLORHEXIDINE GLUCONATE 1 APPLICATION(S): 213 SOLUTION TOPICAL at 16:15

## 2019-12-19 RX ADMIN — Medication 81 MILLIGRAM(S): at 16:11

## 2019-12-19 RX ADMIN — Medication 40 MILLIGRAM(S): at 18:12

## 2019-12-19 RX ADMIN — Medication 100 MILLIGRAM(S): at 18:12

## 2019-12-19 RX ADMIN — CALCITRIOL 0.25 MICROGRAM(S): 0.5 CAPSULE ORAL at 16:14

## 2019-12-19 NOTE — PROGRESS NOTE ADULT - SUBJECTIVE AND OBJECTIVE BOX
INTERVAL HPI/OVERNIGHT EVENTS: I am fine.   Vital Signs Last 24 Hrs  T(C): 37.2 (19 Dec 2019 17:00), Max: 37.2 (19 Dec 2019 17:00)  T(F): 98.9 (19 Dec 2019 17:00), Max: 98.9 (19 Dec 2019 17:00)  HR: 106 (19 Dec 2019 17:00) (76 - 108)  BP: 114/66 (19 Dec 2019 17:00) (114/66 - 133/81)  BP(mean): --  RR: 18 (19 Dec 2019 17:00) (17 - 18)  SpO2: 95% (19 Dec 2019 17:00) (95% - 97%)  I&O's Summary    18 Dec 2019 07:01  -  19 Dec 2019 07:00  --------------------------------------------------------  IN: 830 mL / OUT: 1625 mL / NET: -795 mL      MEDICATIONS  (STANDING):  allopurinol 100 milliGRAM(s) Oral two times a day  aspirin enteric coated 81 milliGRAM(s) Oral daily  calcitriol   Capsule 0.25 MICROGram(s) Oral daily  calcium acetate 667 milliGRAM(s) Oral three times a day with meals  chlorhexidine 4% Liquid 1 Application(s) Topical <User Schedule>  cholecalciferol 1000 Unit(s) Oral daily  cinacalcet 30 milliGRAM(s) Oral daily  gentamicin 0.1% Ointment 1 Application(s) Topical daily  hydrALAZINE 100 milliGRAM(s) Oral two times a day  metoprolol succinate ER 50 milliGRAM(s) Oral daily  pantoprazole    Tablet 40 milliGRAM(s) Oral before breakfast  senna 2 Tablet(s) Oral at bedtime  tamsulosin 0.4 milliGRAM(s) Oral at bedtime  torsemide 40 milliGRAM(s) Oral two times a day    MEDICATIONS  (PRN):  acetaminophen   Tablet .. 650 milliGRAM(s) Oral every 6 hours PRN Temp greater or equal to 38C (100.4F), Mild Pain (1 - 3)  sodium chloride 0.9% lock flush 10 milliLiter(s) IV Push every 1 hour PRN Pre/post blood products, medications, blood draw, and to maintain line patency    LABS:                        13.2   7.80  )-----------( 114      ( 19 Dec 2019 08:04 )             42.8     12-19    139  |  96  |  83<H>  ----------------------------<  106<H>  5.0   |  22  |  11.71<H>    Ca    8.3<L>      19 Dec 2019 06:09  Phos  7.3     12-19  Mg     2.5     12-19    TPro  x   /  Alb  x   /  TBili  x   /  DBili  x   /  AST  x   /  ALT  7<L>  /  AlkPhos  x   12-18        CAPILLARY BLOOD GLUCOSE              REVIEW OF SYSTEMS:  CONSTITUTIONAL: No fever, weight loss, or fatigue  EYES: No eye pain, visual disturbances, or discharge  ENMT:  No difficulty hearing, tinnitus, vertigo; No sinus or throat pain  NECK: No pain or stiffness  RESPIRATORY: No cough, wheezing, chills or hemoptysis; No shortness of breath  CARDIOVASCULAR: No chest pain, palpitations, dizziness, or leg swelling  GASTROINTESTINAL: No abdominal or epigastric pain. No nausea, vomiting, or hematemesis; No diarrhea or constipation. No melena or hematochezia.  GENITOURINARY: No dysuria, frequency, hematuria, or incontinence  NEUROLOGICAL: No headaches, memory loss, loss of strength, numbness, or tremors      Consultant(s) Notes Reviewed:  [x ] YES  [ ] NO    PHYSICAL EXAM:  GENERAL: NAD, well-groomed, well-developed ,not in any distress ,  HEAD:  Atraumatic, Normocephalic  EYES: EOMI, PERRLA, conjunctiva and sclera clear  ENMT: No tonsillar erythema, exudates, or enlargement; Moist mucous membranes, Good dentition, No lesions  NECK: Supple, No JVD, Normal thyroid  NERVOUS SYSTEM:  Alert & Oriented X3, No focal deficit   CHEST/LUNG: Good air entry bilateral except right base   HEART: Regular rate and rhythm; No murmurs, rubs, or gallops  ABDOMEN: Soft, Nontender, Nondistended; Bowel sounds present  EXTREMITIES:  2+ Peripheral Pulses, No clubbing, cyanosis, or edema    Care Discussed with Consultants/Other Providers [ x] YES  [ ] NO

## 2019-12-19 NOTE — CONSULT NOTE ADULT - SUBJECTIVE AND OBJECTIVE BOX
Wilton Rosa MD  Interventional Cardiology / Advance Heart Failure and Cardiac Transplant Specialist  [place] Office : [address] [place] [address] [place] [address]  Tel:   [place] Office : [address] [facility] [place] [address]  Tel: [phone]  Cell : [phone]      HISTORY OF PRESENTING ILLNESS:  HPI:  59 year old male with pmhx HTN, HLD ,ESRD (on peritoneal dialysis) presents to the ED c/o new right-sided pleural effusion found on CXR x2 days ago. States he had a peritoneal dialysis catheter placed in October, at which time his CXR was clear. Went to his nephrologist x2 days ago, CXR showed that there may be communication between his abdominal and chest cavities, subsequently referred to [facility] ED for further evaluation. Only Saturday he felt SOB . Denies CP,  fever, cough, abdominal pain, n/v, worsening pedal edema.  pt s/p thoracentesis, possible pericardial effusion around RA, CT shows no pericardial effusion, no h/o cardiac disease or surgery    PAST MEDICAL & SURGICAL HISTORY:  Solitary fibrous tumor: removed in 2013  BPH (benign prostatic hyperplasia)  Gout  GERD (gastroesophageal reflux disease)  Hypercholesteremia  Kidney disease  HTN (hypertension)  Lung abnormality  BPH (benign prostatic hypertrophy)  CKD (chronic kidney disease), stage III  Nephritis  History of lung surgery  Vocal cord anomaly: S/P polyp removal 2004      SOCIAL HISTORY: Substance Use (street drugs): ( x ) never used  (  ) other:    FAMILY HISTORY:  No pertinent family history in first degree relatives      REVIEW OF SYSTEMS:  CONSTITUTIONAL: No fever, weight loss, or fatigue  EYES: No eye pain, visual disturbances, or discharge  ENMT:  No difficulty hearing, tinnitus, vertigo; No sinus or throat pain  BREASTS: No pain, masses, or nipple discharge  GASTROINTESTINAL: No abdominal or epigastric pain. No nausea, vomiting, or hematemesis; No diarrhea or constipation. No melena or hematochezia.  GENITOURINARY: No dysuria, frequency, hematuria, or incontinence  NEUROLOGICAL: No headaches, memory loss, loss of strength, numbness, or tremors  ENDOCRINE: No heat or cold intolerance; No hair loss  MUSCULOSKELETAL: No joint pain or swelling; No muscle, back, or extremity pain  PSYCHIATRIC: No depression, anxiety, mood swings, or difficulty sleeping  HEME/LYMPH: No easy bruising, or bleeding gums  All others negative    MEDICATIONS:  aspirin enteric coated 81 milliGRAM(s) Oral daily  hydrALAZINE 100 milliGRAM(s) Oral two times a day  metoprolol succinate ER 50 milliGRAM(s) Oral daily  tamsulosin 0.4 milliGRAM(s) Oral at bedtime  torsemide 40 milliGRAM(s) Oral two times a day        acetaminophen   Tablet .. 650 milliGRAM(s) Oral every 6 hours PRN    pantoprazole    Tablet 40 milliGRAM(s) Oral before breakfast  senna 2 Tablet(s) Oral at bedtime    allopurinol 100 milliGRAM(s) Oral two times a day  cinacalcet 30 milliGRAM(s) Oral daily    calcitriol   Capsule 0.25 MICROGram(s) Oral daily  calcium acetate 667 milliGRAM(s) Oral three times a day with meals  chlorhexidine 4% Liquid 1 Application(s) Topical <User Schedule>  cholecalciferol 1000 Unit(s) Oral daily  gentamicin 0.1% Ointment 1 Application(s) Topical daily  sodium chloride 0.9% lock flush 10 milliLiter(s) IV Push every 1 hour PRN      FAMILY HISTORY:  No pertinent family history in first degree relatives        Allergies    No Known Allergies    Intolerances    	      PHYSICAL EXAM:  T(C): 37.2 (12-19-19 @ 17:00), Max: 37.2 (12-19-19 @ 17:00)  HR: 106 (12-19-19 @ 17:00) (76 - 108)  BP: 114/66 (12-19-19 @ 17:00) (114/66 - 133/81)  RR: 18 (12-19-19 @ 17:00) (17 - 18)  SpO2: 95% (12-19-19 @ 17:00) (95% - 97%)  Wt(kg): --  I&O's Summary    18 Dec 2019 07:01  -  19 Dec 2019 07:00  --------------------------------------------------------  IN: 830 mL / OUT: 1625 mL / NET: -795 mL    19 Dec 2019 07:01  -  19 Dec 2019 19:48  --------------------------------------------------------  IN: 1100 mL / OUT: 2100 mL / NET: -1000 mL          EYES: EOMI, PERRLA, conjunctiva and sclera clear  ENMT: No tonsillar erythema, exudates, or enlargement; Moist mucous membranes, Good dentition, No lesions  Cardiovascular: Normal S1 S2, No JVD, No murmurs, No edema s/p permacath  Respiratory: right side decreased breath sounds  Gastrointestinal:  Soft, Non-tender, + BS	  Extremities: Normal range of motion, No clubbing, cyanosis or edema      LABS:	 	    CARDIAC MARKERS:                                  13.2   7.80  )-----------( 114      ( 19 Dec 2019 08:04 )             42.8     12-19    139  |  96  |  83<H>  ----------------------------<  106<H>  5.0   |  22  |  11.71<H>    Ca    8.3<L>      19 Dec 2019 06:09  Phos  7.3     12-19  Mg     2.5     12-19    TPro  x   /  Alb  x   /  TBili  x   /  DBili  x   /  AST  x   /  ALT  7<L>  /  AlkPhos  x   12-18    proBNP:   Lipid Profile:   HgA1c:   TSH:     Consultant(s) Notes Reviewed:  [x ] YES  [ ] NO    Care Discussed with Consultants/Other Providers [ x] YES  [ ] NO    Imaging Personally Reviewed independently:  [x] YES  [ ] NO    All labs, radiologic studies, vitals, orders and medications list reviewed. Patient is seen and examined at bedside. Case discussed with medical team.    ASSESSMENT/PLAN:

## 2019-12-19 NOTE — CONSULT NOTE ADULT - ASSESSMENT
EKG - NSR   Echo - normal lv, possible mod pericardial effusion next to RA    1) Pericardial effusion - likely mistake on echo, no pericardial effusion on CT scan , no h/o cardiac surgery shouldnt have a loculated pericardial effusion     2) HTN - cont lopressor and hydralazine    3) ESRD - now on HD s/p permacath

## 2019-12-19 NOTE — PROGRESS NOTE ADULT - ASSESSMENT
ESRD. Dialysis today.      Hemodialysis Treatment.:     Schedule: Once, Modality: Hemodialysis, Access: Internal Jugular Central Venous Catheter    Dialyzer: Optiflux [id number], Time: 180 Min    Blood Flow: 300 mL/Min , Dialysate Flow: 500 mL/Min, Dialysate Temp: 36.5, Tubinmm (Adult)    Target Fluid Removal: 1L Liters    Dialysate Electrolytes (mEq/L):  Calcium 2.5, Sodium 138, Bicarbonate 35    Potassium Protocol  -->For serum potassium LESS THAN or EQUAL TO 3.4, notify MD/PA/NP       For serum potassium 3.5 - 4, use 3K dialysate bath       For serum potassium 4.1 - 5.9, use 2K dialysate bath       For serum potassium GREATER THAN or EQUAL TO 6, notify MD/PA/NP    Additional Instructions: Keep SBP>90mmHg (19 @ 06:32)

## 2019-12-19 NOTE — PROVIDER CONTACT NOTE (OTHER) - ACTION/TREATMENT ORDERED:
MD notified and aware. Stop current pd therapy. Manually drain patient then restart new pd with total volume 6L and 3 exchanges. Add 1000units heparin per 1L dialysate.
Per NP Shiny push hydralazine to 10 am and metroprolol to noon

## 2019-12-19 NOTE — PROGRESS NOTE ADULT - SUBJECTIVE AND OBJECTIVE BOX
[facility] Division of Kidney Diseases & Hypertension  ATTENDING HEMODIALYSIS NOTE  Seen & examined on hemodialysis  --------------------------------------------------------------------------------  Chief Complaint: ESRD/Ongoing hemodialysis requirement    24 hour events/subjective:  No complaints, tolerating transition to hemodialysis      PAST HISTORY  --------------------------------------------------------------------------------  No significant changes to PMH, PSH, FHx, SHx, unless otherwise noted    ALLERGIES & MEDICATIONS  --------------------------------------------------------------------------------  Allergies    No Known Allergies    Intolerances      Standing Inpatient Medications  allopurinol 100 milliGRAM(s) Oral two times a day  aspirin enteric coated 81 milliGRAM(s) Oral daily  calcitriol   Capsule 0.25 MICROGram(s) Oral daily  calcium acetate 667 milliGRAM(s) Oral three times a day with meals  chlorhexidine 4% Liquid 1 Application(s) Topical <User Schedule>  cholecalciferol 1000 Unit(s) Oral daily  cinacalcet 30 milliGRAM(s) Oral daily  gentamicin 0.1% Ointment 1 Application(s) Topical daily  hydrALAZINE 100 milliGRAM(s) Oral two times a day  metoprolol succinate ER 50 milliGRAM(s) Oral daily  pantoprazole    Tablet 40 milliGRAM(s) Oral before breakfast  senna 2 Tablet(s) Oral at bedtime  tamsulosin 0.4 milliGRAM(s) Oral at bedtime  torsemide 40 milliGRAM(s) Oral two times a day    PRN Inpatient Medications  acetaminophen   Tablet .. 650 milliGRAM(s) Oral every 6 hours PRN  sodium chloride 0.9% lock flush 10 milliLiter(s) IV Push every 1 hour PRN      REVIEW OF SYSTEMS  --------------------------------------------------------------------------------  All other systems were reviewed and are negative, except as noted.    VITALS/PHYSICAL EXAM  --------------------------------------------------------------------------------  T(C): 36.6 (12-19-19 @ 16:09), Max: 36.7 (12-18-19 @ 17:21)  HR: 108 (12-19-19 @ 16:09) (76 - 108)  BP: 133/81 (12-19-19 @ 16:09) (115/66 - 133/81)  RR: 18 (12-19-19 @ 16:09) (17 - 18)  SpO2: 97% (12-19-19 @ 16:09) (95% - 97%)    Physical Exam:  	Gen: NAD  	HEENT: Supple neck  	Pulm: CTA  	CV: RRR  	Abd: +BS, soft  	UE: Warm, FROM  	LE: Warm, FROM,no edema  	Vascular access: catheter    LABS/STUDIES  --------------------------------------------------------------------------------              13.2   7.80  >-----------<  114      [12-19-19 @ 08:04]              42.8     139  |  96  |  83  ----------------------------<  106      [12-19-19 @ 06:09]  5.0   |  22  |  11.71        Ca     8.3     [12-19-19 @ 06:09]      Mg     2.5     [12-19-19 @ 06:09]      Phos  7.3     [12-19-19 @ 06:09]    TPro  x   /  Alb  x   /  TBili  x   /  DBili  x   /  AST  x   /  ALT  7   /  AlkPhos  x   [12-18-19 @ 14:46]          TSH 0.61      [12-14-19 @ 11:04]  Lipid: chol 194, , HDL 45,       [12-19-19 @ 09:18]    HBsAb 212.8      [12-18-19 @ 21:29]  HBsAg Nonreact      [12-18-19 @ 21:29]  HBcAb Nonreact      [12-18-19 @ 21:29]  HCV 0.11, Nonreact      [12-18-19 @ 21:29]

## 2019-12-19 NOTE — PROGRESS NOTE ADULT - ASSESSMENT
59 year old male with pmhx HTN, HLD ,ESRD (on peritoneal dialysis) presents to the ED c/o new right-sided pleural effusion found on CXR x2 days ago. States he had a peritoneal dialysis catheter placed in October, at which time his CXR was clear. Went to his nephrologist x2 days ago, CXR showed that there may be communication between his abdominal and chest cavities, subsequently referred to [facility] ED for further evaluation. Only Saturday he felt SOB . Denies CP,  fever, cough, abdominal pain, n/v, worsening pedal edema.     Problem/Plan - 1:  ·  Problem: Pleural effusion.  Plan: Likely from diaphragm leak secondary to PD . CT Chest .< from: CT Chest No Cont (12.12.19 @ 17:55) >    INTERPRETATION:  Large right pleural effusion with near complete   atelectasis of the right lower lobe. Redemonstrated enlarged   heterogeneous thyroid with tracheal deviation to the right and tracheal   narrowing.    Follow up final report.  House Pulmonary consult noted . S/P  Thoracentesis . TTE noted.      Problem/Plan - 2:  ·  Problem: ESRD on peritoneal dialysis.  Plan: PD stopped secondary to leak . Renal helping . S/P Perma cath and getting HD today .     Problem/Plan - 3:  ·  Problem: Secondary hyperparathyroidism.  Plan: Continue home treatement .      Problem/Plan - 4:  ·  Problem: HTN (hypertension).  Plan: BP meds with hold parameters.      Problem/Plan - 5:  ·  Problem: BPH (benign prostatic hyperplasia).  Plan: Flomax BID  .      Problem/Plan - 6:  Problem: Hypercholesteremia. Plan: Said holding statin since few weeks as lipids were good.     Problem/Plan - 7:  ·  Problem: Pericardial effusion .  Plan: TTE noted .  CT chest noted and no pericardial effusion.      Problem/Plan - 8:  ·  Problem: Thyroid enlargement.  Plan:  TFT and Thyroid US noted.      Problem/Plan - 9:  ·  Problem: GERD (gastroesophageal reflux disease).  Plan: PPI.     Disposition ; DC planning home .

## 2019-12-19 NOTE — PROVIDER CONTACT NOTE (OTHER) - ASSESSMENT
Fibrin noted in effluent. [facility] contacted for trouble shooting however unable to continue draining. Pt not complaining of SOB or discomfort. Patient requesting to have manual drain then restart pd.
Pt A&Ox4, VS as charted

## 2019-12-20 ENCOUNTER — TRANSCRIPTION ENCOUNTER (OUTPATIENT)
Age: 59
End: 2019-12-20

## 2019-12-20 VITALS
TEMPERATURE: 98 F | RESPIRATION RATE: 18 BRPM | DIASTOLIC BLOOD PRESSURE: 75 MMHG | OXYGEN SATURATION: 97 % | HEART RATE: 87 BPM | SYSTOLIC BLOOD PRESSURE: 124 MMHG

## 2019-12-20 LAB
ANION GAP SERPL CALC-SCNC: 16 MMOL/L — SIGNIFICANT CHANGE UP (ref 5–17)
BUN SERPL-MCNC: 56 MG/DL — HIGH (ref 7–23)
CALCIUM SERPL-MCNC: 8 MG/DL — LOW (ref 8.4–10.5)
CHLORIDE SERPL-SCNC: 96 MMOL/L — SIGNIFICANT CHANGE UP (ref 96–108)
CO2 SERPL-SCNC: 25 MMOL/L — SIGNIFICANT CHANGE UP (ref 22–31)
CREAT SERPL-MCNC: 8.79 MG/DL — HIGH (ref 0.5–1.3)
GLUCOSE SERPL-MCNC: 101 MG/DL — HIGH (ref 70–99)
HCT VFR BLD CALC: 38.1 % — LOW (ref 39–50)
HGB BLD-MCNC: 12 G/DL — LOW (ref 13–17)
MANUAL SMEAR VERIFICATION: SIGNIFICANT CHANGE UP
MCHC RBC-ENTMCNC: 30.5 PG — SIGNIFICANT CHANGE UP (ref 27–34)
MCHC RBC-ENTMCNC: 31.5 GM/DL — LOW (ref 32–36)
MCV RBC AUTO: 96.7 FL — SIGNIFICANT CHANGE UP (ref 80–100)
PHOSPHATE SERPL-MCNC: 6.3 MG/DL — HIGH (ref 2.5–4.5)
PLAT MORPH BLD: SIGNIFICANT CHANGE UP
PLATELET # BLD AUTO: 37 K/UL — LOW (ref 150–400)
POTASSIUM SERPL-MCNC: 4.8 MMOL/L — SIGNIFICANT CHANGE UP (ref 3.5–5.3)
POTASSIUM SERPL-SCNC: 4.8 MMOL/L — SIGNIFICANT CHANGE UP (ref 3.5–5.3)
RBC # BLD: 3.94 M/UL — LOW (ref 4.2–5.8)
RBC # FLD: 14.4 % — SIGNIFICANT CHANGE UP (ref 10.3–14.5)
RBC BLD AUTO: SIGNIFICANT CHANGE UP
SODIUM SERPL-SCNC: 137 MMOL/L — SIGNIFICANT CHANGE UP (ref 135–145)
WBC # BLD: 7.33 K/UL — SIGNIFICANT CHANGE UP (ref 3.8–10.5)
WBC # FLD AUTO: 7.33 K/UL — SIGNIFICANT CHANGE UP (ref 3.8–10.5)

## 2019-12-20 PROCEDURE — 84157 ASSAY OF PROTEIN OTHER: CPT

## 2019-12-20 PROCEDURE — 87075 CULTR BACTERIA EXCEPT BLOOD: CPT

## 2019-12-20 PROCEDURE — 71045 X-RAY EXAM CHEST 1 VIEW: CPT

## 2019-12-20 PROCEDURE — 84439 ASSAY OF FREE THYROXINE: CPT

## 2019-12-20 PROCEDURE — 83735 ASSAY OF MAGNESIUM: CPT

## 2019-12-20 PROCEDURE — 86704 HEP B CORE ANTIBODY TOTAL: CPT

## 2019-12-20 PROCEDURE — 86803 HEPATITIS C AB TEST: CPT

## 2019-12-20 PROCEDURE — 89051 BODY FLUID CELL COUNT: CPT

## 2019-12-20 PROCEDURE — C1894: CPT

## 2019-12-20 PROCEDURE — C1769: CPT

## 2019-12-20 PROCEDURE — 83615 LACTATE (LD) (LDH) ENZYME: CPT

## 2019-12-20 PROCEDURE — 80053 COMPREHEN METABOLIC PANEL: CPT

## 2019-12-20 PROCEDURE — 76536 US EXAM OF HEAD AND NECK: CPT

## 2019-12-20 PROCEDURE — 87102 FUNGUS ISOLATION CULTURE: CPT

## 2019-12-20 PROCEDURE — 85610 PROTHROMBIN TIME: CPT

## 2019-12-20 PROCEDURE — 99261: CPT

## 2019-12-20 PROCEDURE — 71250 CT THORAX DX C-: CPT

## 2019-12-20 PROCEDURE — 84100 ASSAY OF PHOSPHORUS: CPT

## 2019-12-20 PROCEDURE — 36558 INSERT TUNNELED CV CATH: CPT

## 2019-12-20 PROCEDURE — 80061 LIPID PANEL: CPT

## 2019-12-20 PROCEDURE — 86709 HEPATITIS A IGM ANTIBODY: CPT

## 2019-12-20 PROCEDURE — 82945 GLUCOSE OTHER FLUID: CPT

## 2019-12-20 PROCEDURE — 86705 HEP B CORE ANTIBODY IGM: CPT

## 2019-12-20 PROCEDURE — 76937 US GUIDE VASCULAR ACCESS: CPT

## 2019-12-20 PROCEDURE — 83880 ASSAY OF NATRIURETIC PEPTIDE: CPT

## 2019-12-20 PROCEDURE — C1750: CPT

## 2019-12-20 PROCEDURE — 99232 SBSQ HOSP IP/OBS MODERATE 35: CPT

## 2019-12-20 PROCEDURE — 82042 OTHER SOURCE ALBUMIN QUAN EA: CPT

## 2019-12-20 PROCEDURE — 88305 TISSUE EXAM BY PATHOLOGIST: CPT

## 2019-12-20 PROCEDURE — 80048 BASIC METABOLIC PNL TOTAL CA: CPT

## 2019-12-20 PROCEDURE — 93005 ELECTROCARDIOGRAM TRACING: CPT

## 2019-12-20 PROCEDURE — 93306 TTE W/DOPPLER COMPLETE: CPT

## 2019-12-20 PROCEDURE — 74019 RADEX ABDOMEN 2 VIEWS: CPT

## 2019-12-20 PROCEDURE — 84460 ALANINE AMINO (ALT) (SGPT): CPT

## 2019-12-20 PROCEDURE — 99285 EMERGENCY DEPT VISIT HI MDM: CPT

## 2019-12-20 PROCEDURE — 85027 COMPLETE CBC AUTOMATED: CPT

## 2019-12-20 PROCEDURE — 83986 ASSAY PH BODY FLUID NOS: CPT

## 2019-12-20 PROCEDURE — 77001 FLUOROGUIDE FOR VEIN DEVICE: CPT

## 2019-12-20 PROCEDURE — 88112 CYTOPATH CELL ENHANCE TECH: CPT

## 2019-12-20 PROCEDURE — 87340 HEPATITIS B SURFACE AG IA: CPT

## 2019-12-20 PROCEDURE — 87205 SMEAR GRAM STAIN: CPT

## 2019-12-20 PROCEDURE — 87070 CULTURE OTHR SPECIMN AEROBIC: CPT

## 2019-12-20 PROCEDURE — 84443 ASSAY THYROID STIM HORMONE: CPT

## 2019-12-20 PROCEDURE — 86706 HEP B SURFACE ANTIBODY: CPT

## 2019-12-20 RX ORDER — HEPARIN SODIUM 5000 [USP'U]/ML
6000 INJECTION INTRAVENOUS; SUBCUTANEOUS ONCE
Refills: 0 | Status: DISCONTINUED | OUTPATIENT
Start: 2019-12-20 | End: 2019-12-20

## 2019-12-20 RX ORDER — ATORVASTATIN CALCIUM 80 MG/1
10 TABLET, FILM COATED ORAL AT BEDTIME
Refills: 0 | Status: DISCONTINUED | OUTPATIENT
Start: 2019-12-20 | End: 2019-12-20

## 2019-12-20 RX ORDER — SENNA PLUS 8.6 MG/1
2 TABLET ORAL
Qty: 0 | Refills: 0 | DISCHARGE
Start: 2019-12-20

## 2019-12-20 RX ORDER — GENTAMICIN SULFATE 0.1 %
1 OINTMENT (GRAM) TOPICAL
Qty: 0 | Refills: 0 | DISCHARGE
Start: 2019-12-20

## 2019-12-20 RX ORDER — LACTULOSE 10 G/15ML
15 SOLUTION ORAL
Qty: 0 | Refills: 0 | DISCHARGE

## 2019-12-20 RX ORDER — HEPARIN SODIUM 5000 [USP'U]/ML
500 INJECTION INTRAVENOUS; SUBCUTANEOUS
Refills: 0 | Status: COMPLETED | OUTPATIENT
Start: 2019-12-20 | End: 2019-12-20

## 2019-12-20 RX ORDER — TAMSULOSIN HYDROCHLORIDE 0.4 MG/1
1 CAPSULE ORAL
Qty: 0 | Refills: 0 | DISCHARGE
Start: 2019-12-20

## 2019-12-20 RX ORDER — METOPROLOL TARTRATE 50 MG
1 TABLET ORAL
Qty: 0 | Refills: 0 | DISCHARGE
Start: 2019-12-20

## 2019-12-20 RX ORDER — ASPIRIN/CALCIUM CARB/MAGNESIUM 324 MG
1 TABLET ORAL
Qty: 0 | Refills: 0 | DISCHARGE

## 2019-12-20 RX ORDER — ATORVASTATIN CALCIUM 80 MG/1
1 TABLET, FILM COATED ORAL
Qty: 30 | Refills: 0
Start: 2019-12-20 | End: 2020-01-18

## 2019-12-20 RX ORDER — ACETAMINOPHEN 500 MG
2 TABLET ORAL
Qty: 0 | Refills: 0 | DISCHARGE
Start: 2019-12-20

## 2019-12-20 RX ORDER — TAMSULOSIN HYDROCHLORIDE 0.4 MG/1
1 CAPSULE ORAL
Qty: 0 | Refills: 0 | DISCHARGE

## 2019-12-20 RX ORDER — HEPARIN SODIUM 5000 [USP'U]/ML
1000 INJECTION INTRAVENOUS; SUBCUTANEOUS ONCE
Refills: 0 | Status: COMPLETED | OUTPATIENT
Start: 2019-12-20 | End: 2019-12-20

## 2019-12-20 RX ORDER — CHLORHEXIDINE GLUCONATE 213 G/1000ML
1 SOLUTION TOPICAL DAILY
Refills: 0 | Status: DISCONTINUED | OUTPATIENT
Start: 2019-12-20 | End: 2019-12-20

## 2019-12-20 RX ORDER — ALLOPURINOL 300 MG
1 TABLET ORAL
Qty: 0 | Refills: 0 | DISCHARGE
Start: 2019-12-20

## 2019-12-20 RX ORDER — ALLOPURINOL 300 MG
1 TABLET ORAL
Qty: 0 | Refills: 0 | DISCHARGE

## 2019-12-20 RX ORDER — METOPROLOL TARTRATE 50 MG
1 TABLET ORAL
Qty: 0 | Refills: 0 | DISCHARGE

## 2019-12-20 RX ORDER — ASPIRIN/CALCIUM CARB/MAGNESIUM 324 MG
1 TABLET ORAL
Qty: 0 | Refills: 0 | DISCHARGE
Start: 2019-12-20

## 2019-12-20 RX ADMIN — CINACALCET 30 MILLIGRAM(S): 30 TABLET, FILM COATED ORAL at 12:26

## 2019-12-20 RX ADMIN — Medication 667 MILLIGRAM(S): at 12:28

## 2019-12-20 RX ADMIN — CHLORHEXIDINE GLUCONATE 1 APPLICATION(S): 213 SOLUTION TOPICAL at 05:36

## 2019-12-20 RX ADMIN — Medication 81 MILLIGRAM(S): at 12:28

## 2019-12-20 RX ADMIN — HEPARIN SODIUM 500 UNIT(S): 5000 INJECTION INTRAVENOUS; SUBCUTANEOUS at 15:17

## 2019-12-20 RX ADMIN — PANTOPRAZOLE SODIUM 40 MILLIGRAM(S): 20 TABLET, DELAYED RELEASE ORAL at 05:41

## 2019-12-20 RX ADMIN — Medication 100 MILLIGRAM(S): at 05:41

## 2019-12-20 RX ADMIN — HEPARIN SODIUM 1000 UNIT(S): 5000 INJECTION INTRAVENOUS; SUBCUTANEOUS at 14:17

## 2019-12-20 RX ADMIN — Medication 100 MILLIGRAM(S): at 17:19

## 2019-12-20 RX ADMIN — CHLORHEXIDINE GLUCONATE 1 APPLICATION(S): 213 SOLUTION TOPICAL at 17:20

## 2019-12-20 RX ADMIN — Medication 667 MILLIGRAM(S): at 08:33

## 2019-12-20 RX ADMIN — Medication 50 MILLIGRAM(S): at 12:34

## 2019-12-20 RX ADMIN — Medication 1000 UNIT(S): at 12:27

## 2019-12-20 RX ADMIN — CALCITRIOL 0.25 MICROGRAM(S): 0.5 CAPSULE ORAL at 12:27

## 2019-12-20 NOTE — PROGRESS NOTE ADULT - REASON FOR ADMISSION
pleural effusion

## 2019-12-20 NOTE — DISCHARGE NOTE NURSING/CASE MANAGEMENT/SOCIAL WORK - NSDCPEEMAIL_GEN_ALL_CORE
[facility] for Tobacco Control email [email or web address]

## 2019-12-20 NOTE — DISCHARGE NOTE PROVIDER - NSDCMRMEDTOKEN_GEN_ALL_CORE_FT
allopurinol 100 mg oral tablet: 1 tab(s) orally 2 times a day  aspirin 81 mg oral delayed release tablet: 1 tab(s) orally once a day  Auryxia 210 mg oral tablet: 2 tab(s) orally 3 times a day (with meals)  calcitriol 0.25 mcg oral capsule: 1 cap(s) orally once a day  calcium acetate 667 mg oral tablet: 1 tab(s) orally 3 times a day  hydrALAZINE 100 mg oral tablet: 1 tab(s) orally 2 times a day  lactulose 10 g/15 mL oral syrup: 15 milliliter(s) orally once a day, As Needed  metoprolol succinate 50 mg oral tablet, extended release: 1 tab(s) orally once a day  omeprazole 20 mg oral delayed release capsule: 1 cap(s) orally once a day  Sensipar 30 mg oral tablet: 1 tab(s) orally once a day  tamsulosin 0.4 mg oral capsule: 1 cap(s) orally 2 times a day  torsemide 10 mg oral tablet: 1 tab(s) orally 2 times a day  Vitamin D3 5000 intl units oral tablet: 1 tab(s) orally once a day acetaminophen 325 mg oral tablet: 2 tab(s) orally every 6 hours, As needed, Temp greater or equal to 38C (100.4F), Mild Pain (1 - 3)  allopurinol 100 mg oral tablet: 1 tab(s) orally 2 times a day  aspirin 81 mg oral delayed release tablet: 1 tab(s) orally once a day  Auryxia 210 mg oral tablet: 2 tab(s) orally 3 times a day (with meals)  calcitriol 0.25 mcg oral capsule: 1 cap(s) orally once a day  calcium acetate 667 mg oral tablet: 1 tab(s) orally 3 times a day  gentamicin 0.1% topical ointment: 1 application topically once a day  hydrALAZINE 100 mg oral tablet: 1 tab(s) orally 2 times a day  metoprolol succinate 50 mg oral tablet, extended release: 1 tab(s) orally once a day  omeprazole 20 mg oral delayed release capsule: 1 cap(s) orally once a day  senna oral tablet: 2 tab(s) orally once a day (at bedtime)  Sensipar 30 mg oral tablet: 1 tab(s) orally once a day  tamsulosin 0.4 mg oral capsule: 1 cap(s) orally once a day (at bedtime)  torsemide 20 mg oral tablet: 2 tab(s) orally once a day on non HD days  Vitamin D3 5000 intl units oral tablet: 1 tab(s) orally once a day acetaminophen 325 mg oral tablet: 2 tab(s) orally every 6 hours, As needed, Temp greater or equal to 38C (100.4F), Mild Pain (1 - 3)  allopurinol 100 mg oral tablet: 1 tab(s) orally 2 times a day  aspirin 81 mg oral delayed release tablet: 1 tab(s) orally once a day  atorvastatin 10 mg oral tablet: 1 tab(s) orally once a day (at bedtime)  Auryxia 210 mg oral tablet: 2 tab(s) orally 3 times a day (with meals)  calcitriol 0.25 mcg oral capsule: 1 cap(s) orally once a day  calcium acetate 667 mg oral tablet: 1 tab(s) orally 3 times a day  gentamicin 0.1% topical ointment: 1 application topically once a day  hydrALAZINE 100 mg oral tablet: 1 tab(s) orally 2 times a day  metoprolol succinate 50 mg oral tablet, extended release: 1 tab(s) orally once a day  omeprazole 20 mg oral delayed release capsule: 1 cap(s) orally once a day  senna oral tablet: 2 tab(s) orally once a day (at bedtime)  Sensipar 30 mg oral tablet: 1 tab(s) orally once a day  tamsulosin 0.4 mg oral capsule: 1 cap(s) orally once a day (at bedtime)  torsemide 20 mg oral tablet: 2 tab(s) orally once a day on non HD days  Vitamin D3 5000 intl units oral tablet: 1 tab(s) orally once a day

## 2019-12-20 NOTE — DISCHARGE NOTE PROVIDER - HOSPITAL COURSE
59 year old male with pmhx HTN, HLD ,ESRD (on peritoneal dialysis) presents to the ED c/o new right-sided pleural effusion found on CXR x2 days ago. States he had a peritoneal dialysis catheter placed in October, at which time his CXR was clear. Went to his nephrologist x2 days prior to admission CXR showed that there may be communication between his abdominal and chest cavities, subsequently referred to [facility] ED for further evaluation. 59 year old male with pmhx HTN, HLD ,ESRD (on peritoneal dialysis) presents to the ED c/o new right-sided pleural effusion found on CXR x2 days ago. States he had a peritoneal dialysis catheter placed in October, at which time his CXR was clear. Went to his nephrologist x2 days prior to admission CXR showed that there may be communication between his abdominal and chest cavities, subsequently referred to [facility] ED for further evaluation. Thoracentesis performed 12/13 - 1300 cc pleural fluid 59 year old male with pmhx HTN, HLD ,ESRD (on peritoneal dialysis) presents to the ED c/o new right-sided pleural effusion found on CXR x2 days ago. States he had a peritoneal dialysis catheter placed in October, at which time his CXR was clear. Went to his nephrologist x2 days prior to admission CXR showed that there may be communication between his abdominal and chest cavities, subsequently referred to [facility] ED for further evaluation. Thoracentesis performed 12/13 - 1300 cc pleural fluid; PD stopped 2/2 to leak - tunnelled cath placed 12/18 - HD initiated; pt mariah well; CM arranging OP HD at [address] [place] [address] 59 year old male with pmhx HTN, HLD ,ESRD (on peritoneal dialysis) presents to the ED c/o new right-sided pleural effusion found on CXR x2 days ago. States he had a peritoneal dialysis catheter placed in October, at which time his CXR was clear. Went to his nephrologist x2 days prior to admission CXR showed that there may be communication between his abdominal and chest cavities, subsequently referred to [facility] ED for further evaluation. Thoracentesis performed 12/13 - 1300 cc pleural fluid; PD stopped 2/2 to leak - tunnelled cath placed 12/18 - HD initiated; pt mariah well; CM arranging OP HD at [address]. Patient cleared for discharge home by Dr Tamayo

## 2019-12-20 NOTE — DISCHARGE NOTE PROVIDER - CARE PROVIDER_API CALL
Fabi Richey)  Internal Medicine; Nephrology  [address] [place] [address] [address] [place] [address]  Phone: [phone]  Fax: [phone]  Established Patient  Follow Up Time:

## 2019-12-20 NOTE — PROGRESS NOTE ADULT - NSHPATTENDINGPLANDISCUSS_GEN_ALL_CORE
pt
pt and family
pt ,his family and renal team
pt and his family as well nP and renal team.
rcu
med
med team

## 2019-12-20 NOTE — DISCHARGE NOTE PROVIDER - NSDCCPCAREPLAN_GEN_ALL_CORE_FT
PRINCIPAL DISCHARGE DIAGNOSIS  Diagnosis: Pleural effusion  Assessment and Plan of Treatment: resolved s/p thoracentesis and cessation of PD PRINCIPAL DISCHARGE DIAGNOSIS  Diagnosis: Pleural effusion  Assessment and Plan of Treatment: resolved s/p thoracentesis and cessation of PD      SECONDARY DISCHARGE DIAGNOSES  Diagnosis: Thyroid enlargement  Assessment and Plan of Treatment: US with no chnage since april    Diagnosis: BPH (benign prostatic hyperplasia)  Assessment and Plan of Treatment: take prescribed medication; f/u with PCP PRINCIPAL DISCHARGE DIAGNOSIS  Diagnosis: Pleural effusion  Assessment and Plan of Treatment: resolved s/p thoracentesis and cessation of PD      SECONDARY DISCHARGE DIAGNOSES  Diagnosis: ESRD on dialysis  Assessment and Plan of Treatment: OP HD as scheduled; f/u with renal    Diagnosis: Thyroid enlargement  Assessment and Plan of Treatment: US with no chnage since april    Diagnosis: GERD (gastroesophageal reflux disease)  Assessment and Plan of Treatment: take prescribed medication; f/u with PCP    Diagnosis: HTN (hypertension)  Assessment and Plan of Treatment: take prescribed medication; f/u with PCP and renal    Diagnosis: BPH (benign prostatic hyperplasia)  Assessment and Plan of Treatment: take prescribed medication; f/u with PCP PRINCIPAL DISCHARGE DIAGNOSIS  Diagnosis: Pleural effusion  Assessment and Plan of Treatment: resolved s/p thoracentesis and cessation of PD      SECONDARY DISCHARGE DIAGNOSES  Diagnosis: ESRD on dialysis  Assessment and Plan of Treatment: OP HD as scheduled; f/u with renal    Diagnosis: Thyroid enlargement  Assessment and Plan of Treatment: US with no chnage since april    Diagnosis: GERD (gastroesophageal reflux disease)  Assessment and Plan of Treatment: take prescribed medication; f/u with PCP    Diagnosis: HTN (hypertension)  Assessment and Plan of Treatment: take prescribed medication; f/u with PCP and renal    Diagnosis: Hypercholesteremia  Assessment and Plan of Treatment: take prescribed mediation; f/u with PCP    Diagnosis: BPH (benign prostatic hyperplasia)  Assessment and Plan of Treatment: take prescribed medication; f/u with PCP

## 2019-12-20 NOTE — DISCHARGE NOTE NURSING/CASE MANAGEMENT/SOCIAL WORK - NSDCCRTYPESERV_GEN_ALL_CORE_FT
Start day is Monday 12/23 at 2:30pm.  On Monday dialysis staff at [facility] will give schedule for next two weeks due to holidays.  Permanent schedule will eventually be M/W/F at 5:45p to be started when instructed by [facility] staff.

## 2019-12-20 NOTE — PROGRESS NOTE ADULT - ASSESSMENT
59 year old male with pmhx HTN, HLD ,ESRD (on peritoneal dialysis) presents to the ED c/o new right-sided pleural effusion found on CXR x2 days ago. States he had a peritoneal dialysis catheter placed in October, at which time his CXR was clear. Went to his nephrologist x2 days ago, CXR showed that there may be communication between his abdominal and chest cavities, subsequently referred to [facility] ED for further evaluation. Only Saturday he felt SOB . Denies CP,  fever, cough, abdominal pain, n/v, worsening pedal edema.     Problem/Plan - 1:  ·  Problem: Pleural effusion.  Plan: Likely from diaphragm leak secondary to PD . CT Chest .< from: CT Chest No Cont (12.12.19 @ 17:55) >    INTERPRETATION:  Large right pleural effusion with near complete   atelectasis of the right lower lobe. Redemonstrated enlarged   heterogeneous thyroid with tracheal deviation to the right and tracheal   narrowing.    Follow up final report.  House Pulmonary consult noted . S/P  Thoracentesis . TTE noted.      Problem/Plan - 2:  ·  Problem: ESRD on peritoneal dialysis.  Plan: PD stopped secondary to leak . Renal helping . S/P Perma cath and getting HD today . Outpt HD arranged .      Problem/Plan - 3:  ·  Problem: Secondary hyperparathyroidism.  Plan: Continue home treatement .      Problem/Plan - 4:  ·  Problem: HTN (hypertension).  Plan: BP meds with hold parameters.      Problem/Plan - 5:  ·  Problem: BPH (benign prostatic hyperplasia).  Plan: Flomax BID  .      Problem/Plan - 6:  Problem: Hypercholesteremia. Plan: Said holding statin since few weeks as lipids were good.     Problem/Plan - 7:  ·  Problem: Pericardial effusion .  Plan: TTE noted .  CT chest noted and no pericardial effusion.      Problem/Plan - 8:  ·  Problem: Thyroid enlargement.  Plan:  TFT and Thyroid US noted.      Problem/Plan - 9:  ·  Problem: GERD (gastroesophageal reflux disease).  Plan: PPI.     Disposition ; DC planning home today top follow up outpt.

## 2019-12-20 NOTE — DISCHARGE NOTE PROVIDER - NSDCFUADDAPPT_GEN_ALL_CORE_FT
Start day is Monday 12/23 at 2:30pm.  On Monday dialysis staff at [facility] will give schedule for next two weeks due to holidays.  Permanent schedule will eventually be M/W/F at 5:45p to be started when instructed by [facility] staff.   Follow up with PMD within 5 days of discharge

## 2019-12-20 NOTE — PROGRESS NOTE ADULT - SUBJECTIVE AND OBJECTIVE BOX
[facility] Division of Kidney Diseases & Hypertension  FOLLOW UP NOTE  --------------------------------------------------------------------------------  24 hour events/subjective: Feeling well, no complaints. For d/c home with outpatient dialysis.    PAST HISTORY  --------------------------------------------------------------------------------  No significant changes to PMH, PSH, FHx, SHx, unless otherwise noted    ALLERGIES & MEDICATIONS  --------------------------------------------------------------------------------  Allergies    No Known Allergies    Intolerances      Standing Inpatient Medications  allopurinol 100 milliGRAM(s) Oral two times a day  aspirin enteric coated 81 milliGRAM(s) Oral daily  atorvastatin 10 milliGRAM(s) Oral at bedtime  calcitriol   Capsule 0.25 MICROGram(s) Oral daily  calcium acetate 667 milliGRAM(s) Oral three times a day with meals  chlorhexidine 2% Cloths 1 Application(s) Topical daily  cholecalciferol 1000 Unit(s) Oral daily  cinacalcet 30 milliGRAM(s) Oral daily  gentamicin 0.1% Ointment 1 Application(s) Topical daily  heparin  Injectable. 1000 Unit(s) Dialysis. once  heparin  Injectable. 500 Unit(s) Dialysis. every 1 hour  hydrALAZINE 100 milliGRAM(s) Oral two times a day  metoprolol succinate ER 50 milliGRAM(s) Oral daily  pantoprazole    Tablet 40 milliGRAM(s) Oral before breakfast  senna 2 Tablet(s) Oral at bedtime  tamsulosin 0.4 milliGRAM(s) Oral at bedtime  torsemide 40 milliGRAM(s) Oral two times a day    PRN Inpatient Medications  acetaminophen   Tablet .. 650 milliGRAM(s) Oral every 6 hours PRN  sodium chloride 0.9% lock flush 10 milliLiter(s) IV Push every 1 hour PRN      REVIEW OF SYSTEMS  --------------------------------------------------------------------------------  All other systems were reviewed and are negative, except as noted.    VITALS/PHYSICAL EXAM  --------------------------------------------------------------------------------  T(C): 36.9 (12-20-19 @ 13:39), Max: 37.2 (12-19-19 @ 17:00)  HR: 87 (12-20-19 @ 13:39) (85 - 108)  BP: 124/76 (12-20-19 @ 13:39) (112/68 - 141/71)  RR: 17 (12-20-19 @ 13:39) (17 - 18)  SpO2: 97% (12-20-19 @ 13:39) (95% - 97%)  Physical Exam:  	Gen: NAD  	HEENT: Supple neck  	Pulm: CTA  	CV: RRR  	Abd: +BS, soft  	: No suprapubic tenderness  	UE: Warm, FROM  	LE: Warm, FROM, some edema  	Neuro: No focal deficits  	Psych: Normal affect and mood  	Skin: Warm, without rashes  	Vascular access: catheter    LABS/STUDIES  --------------------------------------------------------------------------------              12.0   7.33  >-----------<  37       [12-20-19 @ 12:16]              38.1     137  |  96  |  56  ----------------------------<  101      [12-20-19 @ 08:14]  4.8   |  25  |  8.79        Ca     8.0     [12-20-19 @ 08:14]      Mg     2.5     [12-19-19 @ 06:09]      Phos  6.3     [12-20-19 @ 08:14]            Creatinine Trend:  SCr 8.79 [12-20 @ 08:14]  SCr 11.71 [12-19 @ 06:09]  SCr 13.64 [12-18 @ 06:10]  SCr 13.02 [12-17 @ 06:58]  SCr 12.63 [12-16 @ 06:01]        TSH 0.61      [12-14-19 @ 11:04]  Lipid: chol 194, , HDL 45,       [12-19-19 @ 09:18]    HBsAb 212.8      [12-18-19 @ 21:29]  HBsAg Nonreact      [12-18-19 @ 21:29]  HBcAb Nonreact      [12-18-19 @ 21:29]  HCV 0.11, Nonreact      [12-18-19 @ 21:29]

## 2019-12-20 NOTE — PROGRESS NOTE ADULT - ASSESSMENT
ESRD, now on hemodialysis  For dialysis today  Outpatient dialysis scheduled      Hemodialysis Treatment.:     Schedule: Once, Modality: Hemodialysis, Access: Internal Jugular Central Venous Catheter    Dialyzer: Optiflux [id number], Time: 180 Min    Blood Flow: 350 mL/Min , Dialysate Flow: 500 mL/Min, Dialysate Temp: 36.5, Tubinmm (Adult)    Target Fluid Removal: 1.5L Liters    Dialysate Electrolytes (mEq/L):  Calcium 2.5, Sodium 138, Bicarbonate 35    Potassium Protocol  -->For serum potassium LESS THAN or EQUAL TO 3.4, notify MD/PA/NP       For serum potassium 3.5 - 4, use 3K dialysate bath       For serum potassium 4.1 - 5.9, use 2K dialysate bath       For serum potassium GREATER THAN or EQUAL TO 6, notify MD/PA/NP    Additional Instructions: Keep SBP>90mmHg (19 @ 07:46)

## 2019-12-20 NOTE — PROGRESS NOTE ADULT - SUBJECTIVE AND OBJECTIVE BOX
Wilton Rosa MD  Interventional Cardiology / Advance Heart Failure and Cardiac Transplant Specialist  [place] Office : [address] [place] [address] [place] [address]  Tel:   [place] Office : [address] [facility] [place] [address]  Tel: [phone]  Cell : [phone]    59 year old male with pmhx HTN, HLD ,ESRD (on peritoneal dialysis) presents to the ED c/o new right-sided pleural effusion found on CXR x2 days ago. States he had a peritoneal dialysis catheter placed in October, at which time his CXR was clear. Went to his nephrologist x2 days ago, CXR showed that there may be communication between his abdominal and chest cavities, subsequently referred to [facility] ED for further evaluation. Only Saturday he felt SOB . Denies CP,  fever, cough, abdominal pain, n/v, worsening pedal edema.  pt s/p thoracentesis, possible pericardial effusion around RA, CT shows no pericardial effusion, no h/o cardiac disease or surgery  	  MEDICATIONS:  aspirin enteric coated 81 milliGRAM(s) Oral daily  hydrALAZINE 100 milliGRAM(s) Oral two times a day  metoprolol succinate ER 50 milliGRAM(s) Oral daily  tamsulosin 0.4 milliGRAM(s) Oral at bedtime  torsemide 40 milliGRAM(s) Oral two times a day        acetaminophen   Tablet .. 650 milliGRAM(s) Oral every 6 hours PRN    pantoprazole    Tablet 40 milliGRAM(s) Oral before breakfast  senna 2 Tablet(s) Oral at bedtime    allopurinol 100 milliGRAM(s) Oral two times a day  atorvastatin 10 milliGRAM(s) Oral at bedtime  cinacalcet 30 milliGRAM(s) Oral daily    calcitriol   Capsule 0.25 MICROGram(s) Oral daily  calcium acetate 667 milliGRAM(s) Oral three times a day with meals  chlorhexidine 2% Cloths 1 Application(s) Topical daily  cholecalciferol 1000 Unit(s) Oral daily  gentamicin 0.1% Ointment 1 Application(s) Topical daily  sodium chloride 0.9% lock flush 10 milliLiter(s) IV Push every 1 hour PRN      PAST MEDICAL/SURGICAL HISTORY  PAST MEDICAL & SURGICAL HISTORY:  Solitary fibrous tumor: removed in 2013  BPH (benign prostatic hyperplasia)  Gout  GERD (gastroesophageal reflux disease)  Hypercholesteremia  Kidney disease  HTN (hypertension)  Lung abnormality  BPH (benign prostatic hypertrophy)  CKD (chronic kidney disease), stage III  Nephritis  History of lung surgery  Vocal cord anomaly: S/P polyp removal 2004      SOCIAL HISTORY: Substance Use (street drugs): ( x ) never used  (  ) other:    FAMILY HISTORY:  No pertinent family history in first degree relatives      REVIEW OF SYSTEMS:  CONSTITUTIONAL: No fever, weight loss, or fatigue  EYES: No eye pain, visual disturbances, or discharge  ENMT:  No difficulty hearing, tinnitus, vertigo; No sinus or throat pain  BREASTS: No pain, masses, or nipple discharge  GASTROINTESTINAL: No abdominal or epigastric pain. No nausea, vomiting, or hematemesis; No diarrhea or constipation. No melena or hematochezia.  GENITOURINARY: No dysuria, frequency, hematuria, or incontinence  NEUROLOGICAL: No headaches, memory loss, loss of strength, numbness, or tremors  ENDOCRINE: No heat or cold intolerance; No hair loss  MUSCULOSKELETAL: No joint pain or swelling; No muscle, back, or extremity pain  PSYCHIATRIC: No depression, anxiety, mood swings, or difficulty sleeping  HEME/LYMPH: No easy bruising, or bleeding gums  All others negative    PHYSICAL EXAM:  T(C): 36.9 (12-20-19 @ 13:39), Max: 37.2 (12-19-19 @ 17:00)  HR: 87 (12-20-19 @ 13:39) (85 - 106)  BP: 124/76 (12-20-19 @ 13:39) (112/68 - 141/71)  RR: 17 (12-20-19 @ 13:39) (17 - 18)  SpO2: 97% (12-20-19 @ 13:39) (95% - 97%)  Wt(kg): --  I&O's Summary    19 Dec 2019 07:01  -  20 Dec 2019 07:00  --------------------------------------------------------  IN: 1460 mL / OUT: 2100 mL / NET: -640 mL    20 Dec 2019 07:01  -  20 Dec 2019 16:45  --------------------------------------------------------  IN: 120 mL / OUT: 0 mL / NET: 120 mL          GENERAL: NAD  EYES: EOMI, PERRLA, conjunctiva and sclera clear  ENMT: No tonsillar erythema, exudates, or enlargement; Moist mucous membranes, Good dentition, No lesions  Cardiovascular: Normal S1 S2, No JVD, No murmurs, No edema  Respiratory: Lungs clear to auscultation	  Gastrointestinal:  Soft, Non-tender, + BS	  Extremities: Normal range of motion, No clubbing, cyanosis or edema  LYMPH: No lymphadenopathy noted  NERVOUS SYSTEM:  Alert & Oriented X3, Good concentration; Motor Strength 5/5 B/L upper and lower extremities; DTRs 2+ intact and symmetric                                    12.0   7.33  )-----------( 37       ( 20 Dec 2019 12:16 )             38.1     12-20    137  |  96  |  56<H>  ----------------------------<  101<H>  4.8   |  25  |  8.79<H>    Ca    8.0<L>      20 Dec 2019 08:14  Phos  6.3     12-20  Mg     2.5     12-19      proBNP:   Lipid Profile:   HgA1c:   TSH:     Consultant(s) Notes Reviewed:  [x ] YES  [ ] NO    Care Discussed with Consultants/Other Providers [ x] YES  [ ] NO    Imaging Personally Reviewed independently:  [x] YES  [ ] NO    All labs, radiologic studies, vitals, orders and medications list reviewed. Patient is seen and examined at bedside. Case discussed with medical team.

## 2019-12-20 NOTE — DISCHARGE NOTE NURSING/CASE MANAGEMENT/SOCIAL WORK - PATIENT PORTAL LINK FT
You can access the FollowMyHealth Patient Portal offered by [facility] by registering at the following website: [email or web address]. By joining [facility]’s FollowMyHealth portal, you will also be able to view your health information using other applications (apps) compatible with our system.

## 2019-12-20 NOTE — PROGRESS NOTE ADULT - SUBJECTIVE AND OBJECTIVE BOX
INTERVAL HPI/OVERNIGHT EVENTS: No new concerns.   Vital Signs Last 24 Hrs  T(C): 36.9 (20 Dec 2019 13:39), Max: 37.2 (19 Dec 2019 17:00)  T(F): 98.5 (20 Dec 2019 13:39), Max: 98.9 (19 Dec 2019 17:00)  HR: 87 (20 Dec 2019 13:39) (85 - 108)  BP: 124/76 (20 Dec 2019 13:39) (112/68 - 141/71)  BP(mean): --  RR: 17 (20 Dec 2019 13:39) (17 - 18)  SpO2: 97% (20 Dec 2019 13:39) (95% - 97%)  I&O's Summary    19 Dec 2019 07:01  -  20 Dec 2019 07:00  --------------------------------------------------------  IN: 1460 mL / OUT: 2100 mL / NET: -640 mL    20 Dec 2019 07:01  -  20 Dec 2019 14:33  --------------------------------------------------------  IN: 120 mL / OUT: 0 mL / NET: 120 mL      MEDICATIONS  (STANDING):  allopurinol 100 milliGRAM(s) Oral two times a day  aspirin enteric coated 81 milliGRAM(s) Oral daily  atorvastatin 10 milliGRAM(s) Oral at bedtime  calcitriol   Capsule 0.25 MICROGram(s) Oral daily  calcium acetate 667 milliGRAM(s) Oral three times a day with meals  chlorhexidine 2% Cloths 1 Application(s) Topical daily  cholecalciferol 1000 Unit(s) Oral daily  cinacalcet 30 milliGRAM(s) Oral daily  gentamicin 0.1% Ointment 1 Application(s) Topical daily  heparin  Injectable. 1000 Unit(s) Dialysis. once  heparin  Injectable. 500 Unit(s) Dialysis. every 1 hour  hydrALAZINE 100 milliGRAM(s) Oral two times a day  metoprolol succinate ER 50 milliGRAM(s) Oral daily  pantoprazole    Tablet 40 milliGRAM(s) Oral before breakfast  senna 2 Tablet(s) Oral at bedtime  tamsulosin 0.4 milliGRAM(s) Oral at bedtime  torsemide 40 milliGRAM(s) Oral two times a day    MEDICATIONS  (PRN):  acetaminophen   Tablet .. 650 milliGRAM(s) Oral every 6 hours PRN Temp greater or equal to 38C (100.4F), Mild Pain (1 - 3)  sodium chloride 0.9% lock flush 10 milliLiter(s) IV Push every 1 hour PRN Pre/post blood products, medications, blood draw, and to maintain line patency    LABS:                        12.0   7.33  )-----------( 37       ( 20 Dec 2019 12:16 )             38.1     12-20    137  |  96  |  56<H>  ----------------------------<  101<H>  4.8   |  25  |  8.79<H>    Ca    8.0<L>      20 Dec 2019 08:14  Phos  6.3     12-20  Mg     2.5     12-19    TPro  x   /  Alb  x   /  TBili  x   /  DBili  x   /  AST  x   /  ALT  7<L>  /  AlkPhos  x   12-18        CAPILLARY BLOOD GLUCOSE              REVIEW OF SYSTEMS:  CONSTITUTIONAL: No fever, weight loss, or fatigue  EYES: No eye pain, visual disturbances, or discharge  ENMT:  No difficulty hearing, tinnitus, vertigo; No sinus or throat pain  NECK: No pain or stiffness  RESPIRATORY: No cough, wheezing, chills or hemoptysis; No shortness of breath  CARDIOVASCULAR: No chest pain, palpitations, dizziness, or leg swelling  GASTROINTESTINAL: No abdominal or epigastric pain. No nausea, vomiting, or hematemesis; No diarrhea or constipation. No melena or hematochezia.  GENITOURINARY: No dysuria, frequency, hematuria, or incontinence  NEUROLOGICAL: No headaches, memory loss, loss of strength, numbness, or tremors    Consultant(s) Notes Reviewed:  [x ] YES  [ ] NO    PHYSICAL EXAM:  GENERAL: NAD, well-groomed, well-developed,not in any distress ,  HEAD:  Atraumatic, Normocephalic  EYES: EOMI, PERRLA, conjunctiva and sclera clear  ENMT: No tonsillar erythema, exudates, or enlargement; Moist mucous membranes, Good dentition, No lesions  NECK: Supple, No JVD, Normal thyroid  NERVOUS SYSTEM:  Alert & Oriented X3, No focal deficit   CHEST/LUNG: Good air entry bilateral except left base   HEART: Regular rate and rhythm; No murmurs, rubs, or gallops  ABDOMEN: Soft, Nontender, Nondistended; Bowel sounds present  EXTREMITIES:  2+ Peripheral Pulses, No clubbing, cyanosis, or edema  SKIN: No rashes or lesions    Care Discussed with Consultants/Other Providers [ x] YES  [ ] NO

## 2019-12-23 ENCOUNTER — INBOUND DOCUMENT (OUTPATIENT)
Age: 59
End: 2019-12-23

## 2019-12-27 ENCOUNTER — INPATIENT (INPATIENT)
Facility: HOSPITAL | Age: 59
LOS: 1 days | Discharge: HOME | DRG: 813 | End: 2019-12-29
Attending: INTERNAL MEDICINE | Admitting: INTERNAL MEDICINE
Payer: COMMERCIAL

## 2019-12-27 VITALS
TEMPERATURE: 98 F | SYSTOLIC BLOOD PRESSURE: 150 MMHG | HEIGHT: 69 IN | HEART RATE: 95 BPM | WEIGHT: 190.04 LBS | RESPIRATION RATE: 18 BRPM | DIASTOLIC BLOOD PRESSURE: 92 MMHG

## 2019-12-27 DIAGNOSIS — N18.6 END STAGE RENAL DISEASE: ICD-10-CM

## 2019-12-27 DIAGNOSIS — D69.6 THROMBOCYTOPENIA, UNSPECIFIED: ICD-10-CM

## 2019-12-27 DIAGNOSIS — I10 ESSENTIAL (PRIMARY) HYPERTENSION: ICD-10-CM

## 2019-12-27 DIAGNOSIS — Z95.828 PRESENCE OF OTHER VASCULAR IMPLANTS AND GRAFTS: ICD-10-CM

## 2019-12-27 DIAGNOSIS — D47.3 ESSENTIAL (HEMORRHAGIC) THROMBOCYTHEMIA: ICD-10-CM

## 2019-12-27 DIAGNOSIS — R58 HEMORRHAGE, NOT ELSEWHERE CLASSIFIED: ICD-10-CM

## 2019-12-27 DIAGNOSIS — N25.0 RENAL OSTEODYSTROPHY: ICD-10-CM

## 2019-12-27 DIAGNOSIS — E78.00 PURE HYPERCHOLESTEROLEMIA, UNSPECIFIED: ICD-10-CM

## 2019-12-27 DIAGNOSIS — D64.9 ANEMIA, UNSPECIFIED: ICD-10-CM

## 2019-12-27 DIAGNOSIS — Z98.890 OTHER SPECIFIED POSTPROCEDURAL STATES: Chronic | ICD-10-CM

## 2019-12-27 LAB
ANION GAP SERPL CALC-SCNC: 16 MMOL/L — SIGNIFICANT CHANGE UP (ref 5–17)
APTT BLD: 28.5 SEC — SIGNIFICANT CHANGE UP (ref 27.5–36.3)
BASOPHILS # BLD AUTO: 0.05 K/UL — SIGNIFICANT CHANGE UP (ref 0–0.2)
BASOPHILS NFR BLD AUTO: 0.7 % — SIGNIFICANT CHANGE UP (ref 0–2)
BUN SERPL-MCNC: 43 MG/DL — HIGH (ref 7–23)
CALCIUM SERPL-MCNC: 8.8 MG/DL — SIGNIFICANT CHANGE UP (ref 8.4–10.5)
CHLORIDE SERPL-SCNC: 100 MMOL/L — SIGNIFICANT CHANGE UP (ref 96–108)
CO2 SERPL-SCNC: 25 MMOL/L — SIGNIFICANT CHANGE UP (ref 22–31)
CREAT SERPL-MCNC: 9.07 MG/DL — HIGH (ref 0.5–1.3)
EOSINOPHIL # BLD AUTO: 0.25 K/UL — SIGNIFICANT CHANGE UP (ref 0–0.5)
EOSINOPHIL NFR BLD AUTO: 3.6 % — SIGNIFICANT CHANGE UP (ref 0–6)
FOLATE SERPL-MCNC: >20 NG/ML — SIGNIFICANT CHANGE UP
GLUCOSE SERPL-MCNC: 101 MG/DL — HIGH (ref 70–99)
HCT VFR BLD CALC: 37.9 % — LOW (ref 39–50)
HGB BLD-MCNC: 11.8 G/DL — LOW (ref 13–17)
IMM GRANULOCYTES NFR BLD AUTO: 0.4 % — SIGNIFICANT CHANGE UP (ref 0–1.5)
INR BLD: 0.95 RATIO — SIGNIFICANT CHANGE UP (ref 0.88–1.16)
LYMPHOCYTES # BLD AUTO: 1.19 K/UL — SIGNIFICANT CHANGE UP (ref 1–3.3)
LYMPHOCYTES # BLD AUTO: 17.4 % — SIGNIFICANT CHANGE UP (ref 13–44)
MANUAL SMEAR VERIFICATION: SIGNIFICANT CHANGE UP
MCHC RBC-ENTMCNC: 30.3 PG — SIGNIFICANT CHANGE UP (ref 27–34)
MCHC RBC-ENTMCNC: 31.1 GM/DL — LOW (ref 32–36)
MCV RBC AUTO: 97.2 FL — SIGNIFICANT CHANGE UP (ref 80–100)
MONOCYTES # BLD AUTO: 0.45 K/UL — SIGNIFICANT CHANGE UP (ref 0–0.9)
MONOCYTES NFR BLD AUTO: 6.6 % — SIGNIFICANT CHANGE UP (ref 2–14)
NEUTROPHILS # BLD AUTO: 4.88 K/UL — SIGNIFICANT CHANGE UP (ref 1.8–7.4)
NEUTROPHILS NFR BLD AUTO: 71.3 % — SIGNIFICANT CHANGE UP (ref 43–77)
NRBC # BLD: 0 /100 WBCS — SIGNIFICANT CHANGE UP (ref 0–0)
PLAT MORPH BLD: NORMAL — SIGNIFICANT CHANGE UP
PLATELET # BLD AUTO: 73 K/UL — LOW (ref 150–400)
POTASSIUM SERPL-MCNC: 4.9 MMOL/L — SIGNIFICANT CHANGE UP (ref 3.5–5.3)
POTASSIUM SERPL-SCNC: 4.9 MMOL/L — SIGNIFICANT CHANGE UP (ref 3.5–5.3)
PROTHROM AB SERPL-ACNC: 10.9 SEC — SIGNIFICANT CHANGE UP (ref 10–12.9)
RBC # BLD: 3.9 M/UL — LOW (ref 4.2–5.8)
RBC # FLD: 14 % — SIGNIFICANT CHANGE UP (ref 10.3–14.5)
RBC BLD AUTO: SIGNIFICANT CHANGE UP
SODIUM SERPL-SCNC: 141 MMOL/L — SIGNIFICANT CHANGE UP (ref 135–145)
VIT B12 SERPL-MCNC: 383 PG/ML — SIGNIFICANT CHANGE UP (ref 232–1245)
WBC # BLD: 6.85 K/UL — SIGNIFICANT CHANGE UP (ref 3.8–10.5)
WBC # FLD AUTO: 6.85 K/UL — SIGNIFICANT CHANGE UP (ref 3.8–10.5)

## 2019-12-27 PROCEDURE — 71046 X-RAY EXAM CHEST 2 VIEWS: CPT | Mod: 26

## 2019-12-27 PROCEDURE — 99223 1ST HOSP IP/OBS HIGH 75: CPT | Mod: GC

## 2019-12-27 PROCEDURE — 99285 EMERGENCY DEPT VISIT HI MDM: CPT

## 2019-12-27 RX ORDER — CALCITRIOL 0.5 UG/1
0.25 CAPSULE ORAL DAILY
Refills: 0 | Status: DISCONTINUED | OUTPATIENT
Start: 2019-12-27 | End: 2019-12-29

## 2019-12-27 RX ORDER — ALLOPURINOL 300 MG
100 TABLET ORAL
Refills: 0 | Status: DISCONTINUED | OUTPATIENT
Start: 2019-12-27 | End: 2019-12-29

## 2019-12-27 RX ORDER — CALCIUM ACETATE 667 MG
1 TABLET ORAL
Qty: 0 | Refills: 0 | DISCHARGE

## 2019-12-27 RX ORDER — ATORVASTATIN CALCIUM 80 MG/1
10 TABLET, FILM COATED ORAL AT BEDTIME
Refills: 0 | Status: DISCONTINUED | OUTPATIENT
Start: 2019-12-27 | End: 2019-12-29

## 2019-12-27 RX ORDER — SENNA PLUS 8.6 MG/1
2 TABLET ORAL AT BEDTIME
Refills: 0 | Status: DISCONTINUED | OUTPATIENT
Start: 2019-12-27 | End: 2019-12-29

## 2019-12-27 RX ORDER — CINACALCET 30 MG/1
30 TABLET, FILM COATED ORAL DAILY
Refills: 0 | Status: DISCONTINUED | OUTPATIENT
Start: 2019-12-27 | End: 2019-12-29

## 2019-12-27 RX ORDER — TAMSULOSIN HYDROCHLORIDE 0.4 MG/1
0.4 CAPSULE ORAL AT BEDTIME
Refills: 0 | Status: DISCONTINUED | OUTPATIENT
Start: 2019-12-27 | End: 2019-12-29

## 2019-12-27 RX ORDER — ACETAMINOPHEN 500 MG
650 TABLET ORAL EVERY 6 HOURS
Refills: 0 | Status: DISCONTINUED | OUTPATIENT
Start: 2019-12-27 | End: 2019-12-29

## 2019-12-27 RX ORDER — CALCIUM ACETATE 667 MG
667 TABLET ORAL
Refills: 0 | Status: DISCONTINUED | OUTPATIENT
Start: 2019-12-27 | End: 2019-12-29

## 2019-12-27 RX ORDER — PYRIDOXINE HCL (VITAMIN B6) 100 MG
25 TABLET ORAL ONCE
Refills: 0 | Status: DISCONTINUED | OUTPATIENT
Start: 2019-12-27 | End: 2019-12-27

## 2019-12-27 RX ORDER — CINACALCET 30 MG/1
1 TABLET, FILM COATED ORAL
Qty: 0 | Refills: 0 | DISCHARGE

## 2019-12-27 RX ORDER — PANTOPRAZOLE SODIUM 20 MG/1
40 TABLET, DELAYED RELEASE ORAL
Refills: 0 | Status: DISCONTINUED | OUTPATIENT
Start: 2019-12-27 | End: 2019-12-29

## 2019-12-27 RX ORDER — HYDRALAZINE HCL 50 MG
100 TABLET ORAL
Refills: 0 | Status: DISCONTINUED | OUTPATIENT
Start: 2019-12-27 | End: 2019-12-29

## 2019-12-27 RX ORDER — DIPHENHYDRAMINE HCL 50 MG
50 CAPSULE ORAL EVERY 4 HOURS
Refills: 0 | Status: DISCONTINUED | OUTPATIENT
Start: 2019-12-27 | End: 2019-12-27

## 2019-12-27 RX ORDER — METOPROLOL TARTRATE 50 MG
50 TABLET ORAL DAILY
Refills: 0 | Status: DISCONTINUED | OUTPATIENT
Start: 2019-12-27 | End: 2019-12-29

## 2019-12-27 RX ADMIN — Medication 40 MILLIGRAM(S): at 14:04

## 2019-12-27 RX ADMIN — ATORVASTATIN CALCIUM 10 MILLIGRAM(S): 80 TABLET, FILM COATED ORAL at 23:01

## 2019-12-27 RX ADMIN — SENNA PLUS 2 TABLET(S): 8.6 TABLET ORAL at 23:01

## 2019-12-27 RX ADMIN — Medication 100 MILLIGRAM(S): at 17:48

## 2019-12-27 RX ADMIN — TAMSULOSIN HYDROCHLORIDE 0.4 MILLIGRAM(S): 0.4 CAPSULE ORAL at 23:01

## 2019-12-27 RX ADMIN — Medication 667 MILLIGRAM(S): at 14:04

## 2019-12-27 RX ADMIN — Medication 50 MILLIGRAM(S): at 17:48

## 2019-12-27 RX ADMIN — CALCITRIOL 0.25 MICROGRAM(S): 0.5 CAPSULE ORAL at 14:04

## 2019-12-27 RX ADMIN — Medication 667 MILLIGRAM(S): at 17:48

## 2019-12-27 RX ADMIN — CINACALCET 30 MILLIGRAM(S): 30 TABLET, FILM COATED ORAL at 14:05

## 2019-12-27 NOTE — CONSULT NOTE ADULT - ASSESSMENT
59m with hx HTN, HLD ,ESRD formerly on PD but now on HD s/p right chest wall permacath placement in 12/2019, here for bleeding of permacath and thrombocytopenia    1. thrombocytopenia -- pt was exposed to heparin according to him, may be HIT but suspicion is low since there may be other contributing factors such as HD equipment, acute reactive thrombocytopenia  -- no objection to checking HIT and MATTY in am. Agree with avoidance of heparin products at this time  -- plts improved today and adequate  -- transfuse plts if bleeding for <50, no bleeding <10  -- also check B12, folate, MMA in am    2. bleeding -- coags nml. May be complicated by thrombocytopenia and uremic plts in setting of relatively new catheter  -- none at this time  -- renal eval appreciated  -- monitor  -- given possible uremic plts as well, if with bleeding again not controlled with local mgmt, can consider plt +/- DDAVP    3. HD -- per renal    4. anemia -- acute, may be related to blood loss  -- hgb adequate    D/w pt, family at bedside, primary team, will follow, [phone]

## 2019-12-27 NOTE — CONSULT NOTE ADULT - PROBLEM SELECTOR RECOMMENDATION 4
- C/w vitamin D 5000U QD  - C/w Sensipar 30mg QD  - C/w Calcitrol 0.25mcg QD  - C/w Phoslo 667mg TID with meals.   Low phosphorus diet and monitor serum phos QD

## 2019-12-27 NOTE — ED PROVIDER NOTE - CLINICAL SUMMARY MEDICAL DECISION MAKING FREE TEXT BOX
59m with hx HTN, HLD ,ESRD formerly on PD but now on HD s/p right chest wall permacath placement 9 days ago, here today for bleeding of permacath. Pt appears well NAD. Exam w blood-soaked gauze overlying newly placed catheter but no ttp. Plan for labs, d/w IR to see pt and reeval 59m with hx HTN, HLD ,ESRD formerly on PD but now on HD s/p right chest wall permacath placement 9 days ago, here today for bleeding of permacath. Pt appears well NAD. Exam w blood-soaked gauze overlying newly placed catheter but no ttp. Plan for labs, d/w IR to see pt and reeval    JOSEPH Gomez MD: Pt is a 59m with hx HTN, HLD ,ESRD formerly on PD but now on HD s/p right chest wall permacath placement 9 days ago, here today for bleeding of permacath. Pt states he went for his first outpt HD session 2 days ago, at which time the dressing from the recently placed catheter was changed. Subsequently noted small amount of bleeding onto new gauze. Today the dressing became soaked in blood so came to ED. On previous HD session dialysis was completed w/o difficulty, but line was heparinized due to clotting. He denies pain at the site. No exertional sx. States that during dialysis, a syringe of heparin was given to him for some "clotting" of blood. Denies hematuria, blood in stool, easy bruising, bleeding gums, HA, dizziness. Ddx includes, however, is not limited to: large lumen around permacath, low platelets, HIT, heparin s/e, other. Plan: basic labs, consult IR for bleeding permacath

## 2019-12-27 NOTE — CONSULT NOTE ADULT - SUBJECTIVE AND OBJECTIVE BOX
59m with hx HTN, HLD ,ESRD formerly on PD but now on HD s/p right chest wall permacath placement 9 days ago, here today for bleeding of permacath. Pt states he went for his first outpt HD session 2 days ago, at which time the dressing from the recently placed catheter was changed. Subsequently noted small amount of bleeding onto new gauze. Today the dressing became soaked in blood so came to ED. On previous HD session dialysis was completed w/o difficulty, but line was heparinized due to clotting. His plts were noted to have fallen to 30s on 12/20, up to 73 today.     Pt currently denies bleeding, feels fine otherwise.       PAST MEDICAL & SURGICAL HISTORY:  Solitary fibrous tumor: removed in 2013  BPH (benign prostatic hyperplasia)  Gout  GERD (gastroesophageal reflux disease)  Hypercholesteremia  Kidney disease  HTN (hypertension)  Lung abnormality  BPH (benign prostatic hypertrophy)  CKD (chronic kidney disease), stage III  Nephritis  History of lung surgery  Vocal cord anomaly: S/P polyp removal 2004      FAMILY HISTORY:  No pertinent family history in first degree relatives      Alochol: Denied  Smoking: Nonsmoker  Drug Use: Denied  Marital Status:         Allergies    No Known Allergies    Intolerances        MEDICATIONS  (STANDING):  allopurinol 100 milliGRAM(s) Oral two times a day  atorvastatin 10 milliGRAM(s) Oral at bedtime  calcitriol   Capsule 0.25 MICROGram(s) Oral daily  calcium acetate 667 milliGRAM(s) Oral three times a day with meals  cinacalcet 30 milliGRAM(s) Oral daily  hydrALAZINE 100 milliGRAM(s) Oral two times a day  metoprolol succinate ER 50 milliGRAM(s) Oral daily  pantoprazole    Tablet 40 milliGRAM(s) Oral before breakfast  senna 2 Tablet(s) Oral at bedtime  tamsulosin 0.4 milliGRAM(s) Oral at bedtime  torsemide 40 milliGRAM(s) Oral <User Schedule>    MEDICATIONS  (PRN):  acetaminophen   Tablet .. 650 milliGRAM(s) Oral every 6 hours PRN Temp greater or equal to 38.5C (101.3F), Moderate Pain (4 - 6)      ROS  No fever, sweats, chills  No epistaxis, HA, sore throat  No CP, SOB, cough, sputum  No n/v/d, abd pain, melena, hematochezia  No edema  No rash  No anxiety  No back pain, joint pain  No bleeding, bruising  No dysuria, hematuria    T(C): 36.8 (12-27-19 @ 17:12), Max: 37 (12-27-19 @ 07:21)  HR: 94 (12-27-19 @ 17:12) (92 - 102)  BP: 154/88 (12-27-19 @ 17:12) (150/92 - 158/89)  RR: 18 (12-27-19 @ 17:12) (16 - 18)  SpO2: 99% (12-27-19 @ 17:12) (98% - 99%)  Wt(kg): --    PE  NAD  Awake, alert  Anicteric, MMM  RRR  CTAB, permacath site benign, old ecchymosis superior to catheter site  Abd soft, NT, ND  No c/c/e  No rash grossly  FROM                          11.8   6.85  )-----------( 73       ( 27 Dec 2019 07:00 )             37.9       12-27    141  |  100  |  43<H>  ----------------------------<  101<H>  4.9   |  25  |  9.07<H>    Ca    8.8      27 Dec 2019 07:00

## 2019-12-27 NOTE — CONSULT NOTE ADULT - ASSESSMENT
59y old M with pmhx of BPH, HTN, ESRD previously on PD now on IHD secondary recent diaphragmatic leak with right pleural effusion, presents to [facility]  ED  for bleeding on the tunneled HD cath sit Nephrology consulted for ESRD management

## 2019-12-27 NOTE — H&P ADULT - NEGATIVE GASTROINTESTINAL SYMPTOMS
no diarrhea/no nausea/no change in bowel habits/no flatulence/no vomiting/no constipation/no abdominal pain

## 2019-12-27 NOTE — ED PROVIDER NOTE - RESPIRATORY CHEST EXAM
permacath in place in right chest wall, dressing soaked in blood; no obvious active bleeding; surrounding ecchymosis; no ttp

## 2019-12-27 NOTE — H&P ADULT - RS GEN PE MLT RESP DETAILS PC
breath sounds equal/airway patent/clear to auscultation bilaterally/normal/good air movement/respirations non-labored

## 2019-12-27 NOTE — ED PROVIDER NOTE - OBJECTIVE STATEMENT
59m with hx HTN, HLD ,ESRD formerly on PD but now on HD s/p right chest wall permacath placement 9 days ago, here today for bleeding of permacath. Pt states he went for his first outpt HD session 2 days ago, at which time the dressing from the recently placed catheter was changed. Subsequently noted small amount of bleeding onto new gauze. Today the dressing became soaked in blood so came to ED. On previous HD session dialysis was completed w/o difficulty, but line was heparinized due to clotting. He denies pain at the site. No exertional sx.

## 2019-12-27 NOTE — CONSULT NOTE ADULT - ATTENDING COMMENTS
ESRD, was on PD but transitioned to HD during last hospitalization. Here with some bleeding from catheter site. Has thrombocytopenia, but in restrospect, development of thrombocytopenia occurred at the tail end of the last hospitalization, shortly after he was initiated on dialysis.    Otherwise doing alright without complaints. No edema. Breathing comfortably.    No apparent or obvious heparin exposure preceding thrombocytopenia development. No clear medication toxicity. Only likely event is use of Optiflux dialyzer, which is electron beam sterilized and associated with thrombocytopenia.    Will dialyze tomorrow using Revaclear dialyzer. May be prudent to avoid heparin for the moment, if possible, but I doubt HIT. Otherwise cont. home medications.    Remainder per fellow, will follow.

## 2019-12-27 NOTE — H&P ADULT - PROBLEM SELECTOR PLAN 3
Was on PD and developed Right side Effusion so now on HD via Perma cath. Had HD yesterday . House [facility] consulted.

## 2019-12-27 NOTE — CONSULT NOTE ADULT - PROBLEM SELECTOR RECOMMENDATION 5
New onset. With history of recent heparin exposure? vs dialysis membrane  - Check HIT panel  - trend plt counts  - Avoid heparin for now New onset. With history of recent heparin exposure? vs dialysis membrane  - trend plt counts  - Avoid heparin for now

## 2019-12-27 NOTE — ED ADULT NURSE REASSESSMENT NOTE - NS ED NURSE REASSESS COMMENT FT1
0700 report taken from change of shift RN. Awaiting IR consult. Pt resting comfortably c family member at bedside. No complaints at this time. Will continue to monitor.

## 2019-12-27 NOTE — CONSULT NOTE ADULT - PROBLEM SELECTOR RECOMMENDATION 9
Patient wit history of ESRD on IHD Lats HD was on 12/26/19  - Labs reviewed. electrolytes grossly stable  - No plans for urgent/emergent HD today  - will plan for maintenance HD on 12/28  - monitor electrolytes

## 2019-12-27 NOTE — CONSULT NOTE ADULT - SUBJECTIVE AND OBJECTIVE BOX
[facility] Division of Kidney Diseases & Hypertension  INITIAL CONSULT NOTE  [phone]--------------------------------------------------------------------------------  HPI:  59 year old male with pmhx HTN, HLD ,ESRD  previously on PD now on IHD  s/p right chest wall tunneled HD catheter 12/18 presents for bleeding of permacath. Nephrology consulted for ESRD Management. Patient was recently admitted on 12/12/-12/20. He was previously on PD since Oct 2019, tolerating well up until he developed Right pleural effusion secondary to diaphragmatic leak s/p right thoracentesis.  Patient was transitioned to IHD with PD catheter left in place. He has his first HD session on 12/23 at which time the dressing from the recently placed catheter was changed. Subsequently noted small amount of bleeding onto new gauze. He has his 2nd outpatient HD session yesterday 12/26 and notice minimal bleeding on tunneled HD cath. Today the dressing became soaked in blood so came to ED. Patient currently without subjective complaints. Permcath changed by IR without evidence of bleeding        PAST HISTORY  --------------------------------------------------------------------------------  PAST MEDICAL & SURGICAL HISTORY:  Solitary fibrous tumor: removed in 2013  BPH (benign prostatic hyperplasia)  Gout  GERD (gastroesophageal reflux disease)  Hypercholesteremia  Kidney disease  HTN (hypertension)  Lung abnormality  BPH (benign prostatic hypertrophy)  CKD (chronic kidney disease), stage III  Nephritis  History of lung surgery  Vocal cord anomaly: S/P polyp removal 2004    FAMILY HISTORY:  No pertinent family history in first degree relatives    PAST SOCIAL HISTORY:    ALLERGIES & MEDICATIONS  --------------------------------------------------------------------------------  Allergies    No Known Allergies    Intolerances      Standing Inpatient Medications  allopurinol 100 milliGRAM(s) Oral two times a day  atorvastatin 10 milliGRAM(s) Oral at bedtime  calcitriol   Capsule 0.25 MICROGram(s) Oral daily  calcium acetate 667 milliGRAM(s) Oral three times a day with meals  cinacalcet 30 milliGRAM(s) Oral daily  hydrALAZINE 100 milliGRAM(s) Oral two times a day  metoprolol succinate ER 50 milliGRAM(s) Oral daily  pantoprazole    Tablet 40 milliGRAM(s) Oral before breakfast  senna 2 Tablet(s) Oral at bedtime  tamsulosin 0.4 milliGRAM(s) Oral at bedtime  torsemide 40 milliGRAM(s) Oral <User Schedule>    PRN Inpatient Medications  acetaminophen   Tablet .. 650 milliGRAM(s) Oral every 6 hours PRN      REVIEW OF SYSTEMS  --------------------------------------------------------------------------------  Gen: No  fevers/chills, weakness  Skin: No rashes  Head/Eyes/Ears/Mouth: No headache; Normal hearing; Normal vision w/o blurriness;   Respiratory: No dyspnea, cough, wheezing, hemoptysis  CV: No chest pain,   GI: No abdominal pain, diarrhea, constipation, nausea, vomiting  : No increased frequency, dysuria, hematuria, nocturia  MSK: No joint pain/swelling;   Neuro: No dizziness/lightheadedness, weakness, seizures    All other systems were reviewed and are negative, except as noted.    VITALS/PHYSICAL EXAM  --------------------------------------------------------------------------------  T(C): 37 (12-27-19 @ 10:01), Max: 37 (12-27-19 @ 07:21)  HR: 92 (12-27-19 @ 10:01) (92 - 102)  BP: 153/90 (12-27-19 @ 10:01) (150/92 - 158/89)  RR: 16 (12-27-19 @ 07:21) (16 - 18)  SpO2: 99% (12-27-19 @ 07:21) (99% - 99%)  Wt(kg): --  Height (cm): 175.26 (12-27-19 @ 05:07)  Weight (kg): 86.2 (12-27-19 @ 05:07)  BMI (kg/m2): 28.1 (12-27-19 @ 05:07)  BSA (m2): 2.02 (12-27-19 @ 05:07)      Physical Exam:  	Gen: NAD, well-appearing  	HEENT: PERRL, supple neck  	Pulm: CTA B/L  	CV:  S1S2  	Abd: +BS, soft   	Ext: No B/L Lower ext edema  	Neuro: No focal deficits  	Skin: Warm, without rashes  	Vascular access: right chest tunneled HD catheter    LABS/STUDIES  --------------------------------------------------------------------------------              11.8   6.85  >-----------<  73       [12-27-19 @ 07:00]              37.9     141  |  100  |  43  ----------------------------<  101      [12-27-19 @ 07:00]  4.9   |  25  |  9.07        Ca     8.8     [12-27-19 @ 07:00]      PT/INR: PT 10.9 , INR 0.95       [12-27-19 @ 07:00]  PTT: 28.5       [12-27-19 @ 07:00]      Creatinine Trend:  SCr 9.07 [12-27 @ 07:00]  SCr 8.79 [12-20 @ 08:14]  SCr 11.71 [12-19 @ 06:09]  SCr 13.64 [12-18 @ 06:10]  SCr 13.02 [12-17 @ 06:58]

## 2019-12-27 NOTE — ED PROVIDER NOTE - PROGRESS NOTE DETAILS
Assessment/Plan:      Diagnoses and all orders for this visit:    Personal history of prostate cancer  -     POCT urine dip auto non-scope  -     PSA Total, Diagnostic; Future    History of radical prostatectomy    Erectile dysfunction after radical prostatectomy          Subjective:  No complaints     Patient ID: Nancy Rios is a 77 y o  male  HPI  He is now 6-1/2 years after his laparoscopic robotic radical prostatectomy  No problems or complaints to report  He voids well, and has good urine control with no leakage or incontinence  He denies any weight loss, poor appetite, bone, flank or abdominal pains  For his ED he takes generic sildenafil p r n , which he states is satisfactory  Review of Systems   Constitutional: Negative  HENT: Negative  Eyes: Negative  Respiratory: Negative  Cardiovascular: Negative  Gastrointestinal: Negative  Endocrine: Negative  Genitourinary: Negative  Musculoskeletal: Negative  Skin: Negative  Allergic/Immunologic: Negative  Neurological: Negative  Hematological: Negative  Psychiatric/Behavioral: Negative  Objective:     Physical Exam   Constitutional: He is oriented to person, place, and time  He appears well-developed and well-nourished  No distress  HENT:   Head: Normocephalic and atraumatic  Nose: Nose normal    Mouth/Throat: Oropharynx is clear and moist    Eyes: Pupils are equal, round, and reactive to light  Conjunctivae and EOM are normal  No scleral icterus  Neck: Normal range of motion  Neck supple  Cardiovascular: Normal rate, regular rhythm, normal heart sounds and intact distal pulses  No murmur heard  Pulmonary/Chest: Effort normal and breath sounds normal  No respiratory distress  He has no wheezes  He has no rales  Abdominal: Soft  Bowel sounds are normal  He exhibits no distension and no mass  There is no tenderness  Musculoskeletal: Normal range of motion   He exhibits no edema or tenderness  Lymphadenopathy:     He has no cervical adenopathy  Neurological: He is alert and oriented to person, place, and time  No cranial nerve deficit  Skin: Skin is warm and dry  No rash noted  No erythema  No pallor  Psychiatric: He has a normal mood and affect  His behavior is normal  Judgment and thought content normal    Nursing note and vitals reviewed          PSA from 11/02/2018 was 0 05 Bernice PGY3: IR at bedside, states pressure will be applied to port site if bleeding doesn't stop stitch will be placed if that doesn't control bleeding pt will have exchange done of cath by IR ; PLT went from 177 12/19 to 114 12/19 to 37 on 12/20 to 73 on 12/27 pt started to receive heparin on 12/20 states he hasn't received heparin before HIT score 4 will admit to assess for HIT

## 2019-12-27 NOTE — ED ADULT NURSE NOTE - OBJECTIVE STATEMENT
Patient is a 59 year old male complaining of bleeding from dialysis cath. Arrived by walk-in. Patient has history of kidney disease, htn. Patient is A&O x 4. Pt reports dressing being changed yesterday at dialysis with minimal bleeding. pt states when he woke up that the dressing was saturated. pt has ecchymosis to left chest wall. Denies complaints of chest pain, sob, fevers, chills, n/v/d, headache, syncope, burning urination, blood in urine, blood in stool. Abd is soft, non tender, non distended. Skin is warm and dry. Color is consistent with ethnicity. VSS/ NAD. Safety and comfort maintained. Will continue to monitor.

## 2019-12-28 LAB
ALBUMIN SERPL ELPH-MCNC: 3.4 G/DL — SIGNIFICANT CHANGE UP (ref 3.3–5)
ALP SERPL-CCNC: 93 U/L — SIGNIFICANT CHANGE UP (ref 40–120)
ALT FLD-CCNC: 9 U/L — LOW (ref 10–45)
ANION GAP SERPL CALC-SCNC: 20 MMOL/L — HIGH (ref 5–17)
AST SERPL-CCNC: 11 U/L — SIGNIFICANT CHANGE UP (ref 10–40)
BILIRUB SERPL-MCNC: 0.2 MG/DL — SIGNIFICANT CHANGE UP (ref 0.2–1.2)
BUN SERPL-MCNC: 61 MG/DL — HIGH (ref 7–23)
CALCIUM SERPL-MCNC: 8.6 MG/DL — SIGNIFICANT CHANGE UP (ref 8.4–10.5)
CHLORIDE SERPL-SCNC: 101 MMOL/L — SIGNIFICANT CHANGE UP (ref 96–108)
CO2 SERPL-SCNC: 21 MMOL/L — LOW (ref 22–31)
CREAT SERPL-MCNC: 11.14 MG/DL — HIGH (ref 0.5–1.3)
FOLATE SERPL-MCNC: 6 NG/ML — SIGNIFICANT CHANGE UP
GLUCOSE SERPL-MCNC: 95 MG/DL — SIGNIFICANT CHANGE UP (ref 70–99)
HCT VFR BLD CALC: 35 % — LOW (ref 39–50)
HEPARIN-PF4 AB RESULT: <0.6 U/ML — SIGNIFICANT CHANGE UP (ref 0–0.9)
HGB BLD-MCNC: 10.8 G/DL — LOW (ref 13–17)
MCHC RBC-ENTMCNC: 30.3 PG — SIGNIFICANT CHANGE UP (ref 27–34)
MCHC RBC-ENTMCNC: 30.9 GM/DL — LOW (ref 32–36)
MCV RBC AUTO: 98 FL — SIGNIFICANT CHANGE UP (ref 80–100)
PF4 HEPARIN CMPLX AB SER-ACNC: NEGATIVE — SIGNIFICANT CHANGE UP
PLATELET # BLD AUTO: 97 K/UL — LOW (ref 150–400)
POTASSIUM SERPL-MCNC: 4.8 MMOL/L — SIGNIFICANT CHANGE UP (ref 3.5–5.3)
POTASSIUM SERPL-SCNC: 4.8 MMOL/L — SIGNIFICANT CHANGE UP (ref 3.5–5.3)
PROT SERPL-MCNC: 6.1 G/DL — SIGNIFICANT CHANGE UP (ref 6–8.3)
RBC # BLD: 3.57 M/UL — LOW (ref 4.2–5.8)
RBC # FLD: 13.9 % — SIGNIFICANT CHANGE UP (ref 10.3–14.5)
SODIUM SERPL-SCNC: 142 MMOL/L — SIGNIFICANT CHANGE UP (ref 135–145)
VIT B12 SERPL-MCNC: 334 PG/ML — SIGNIFICANT CHANGE UP (ref 232–1245)
WBC # BLD: 6.53 K/UL — SIGNIFICANT CHANGE UP (ref 3.8–10.5)
WBC # FLD AUTO: 6.53 K/UL — SIGNIFICANT CHANGE UP (ref 3.8–10.5)

## 2019-12-28 PROCEDURE — 90935 HEMODIALYSIS ONE EVALUATION: CPT | Mod: GC

## 2019-12-28 RX ORDER — CHLORHEXIDINE GLUCONATE 213 G/1000ML
1 SOLUTION TOPICAL DAILY
Refills: 0 | Status: DISCONTINUED | OUTPATIENT
Start: 2019-12-28 | End: 2019-12-29

## 2019-12-28 RX ADMIN — PANTOPRAZOLE SODIUM 40 MILLIGRAM(S): 20 TABLET, DELAYED RELEASE ORAL at 05:25

## 2019-12-28 RX ADMIN — CINACALCET 30 MILLIGRAM(S): 30 TABLET, FILM COATED ORAL at 12:35

## 2019-12-28 RX ADMIN — CALCITRIOL 0.25 MICROGRAM(S): 0.5 CAPSULE ORAL at 12:35

## 2019-12-28 RX ADMIN — Medication 100 MILLIGRAM(S): at 05:25

## 2019-12-28 RX ADMIN — TAMSULOSIN HYDROCHLORIDE 0.4 MILLIGRAM(S): 0.4 CAPSULE ORAL at 20:38

## 2019-12-28 RX ADMIN — Medication 667 MILLIGRAM(S): at 20:38

## 2019-12-28 RX ADMIN — Medication 667 MILLIGRAM(S): at 12:35

## 2019-12-28 RX ADMIN — SENNA PLUS 2 TABLET(S): 8.6 TABLET ORAL at 20:38

## 2019-12-28 RX ADMIN — CHLORHEXIDINE GLUCONATE 1 APPLICATION(S): 213 SOLUTION TOPICAL at 07:53

## 2019-12-28 RX ADMIN — ATORVASTATIN CALCIUM 10 MILLIGRAM(S): 80 TABLET, FILM COATED ORAL at 20:38

## 2019-12-28 RX ADMIN — Medication 100 MILLIGRAM(S): at 20:37

## 2019-12-28 RX ADMIN — Medication 667 MILLIGRAM(S): at 08:27

## 2019-12-28 RX ADMIN — Medication 100 MILLIGRAM(S): at 20:38

## 2019-12-28 RX ADMIN — Medication 50 MILLIGRAM(S): at 05:25

## 2019-12-28 NOTE — PROGRESS NOTE ADULT - SUBJECTIVE AND OBJECTIVE BOX
INTERVAL HPI/OVERNIGHT EVENTS: i feel fine.   Vital Signs Last 24 Hrs  T(C): 36.7 (28 Dec 2019 11:32), Max: 37 (27 Dec 2019 14:24)  T(F): 98 (28 Dec 2019 11:32), Max: 98.6 (27 Dec 2019 14:24)  HR: 77 (28 Dec 2019 11:32) (68 - 101)  BP: 116/68 (28 Dec 2019 11:32) (116/68 - 158/85)  BP(mean): --  RR: 18 (28 Dec 2019 11:32) (17 - 18)  SpO2: 98% (28 Dec 2019 11:32) (96% - 99%)  I&O's Summary    27 Dec 2019 07:01  -  28 Dec 2019 07:00  --------------------------------------------------------  IN: 720 mL / OUT: 0 mL / NET: 720 mL    28 Dec 2019 07:01  -  28 Dec 2019 12:26  --------------------------------------------------------  IN: 240 mL / OUT: 0 mL / NET: 240 mL      MEDICATIONS  (STANDING):  allopurinol 100 milliGRAM(s) Oral two times a day  atorvastatin 10 milliGRAM(s) Oral at bedtime  calcitriol   Capsule 0.25 MICROGram(s) Oral daily  calcium acetate 667 milliGRAM(s) Oral three times a day with meals  chlorhexidine 4% Liquid 1 Application(s) Topical daily  cinacalcet 30 milliGRAM(s) Oral daily  hydrALAZINE 100 milliGRAM(s) Oral two times a day  metoprolol succinate ER 50 milliGRAM(s) Oral daily  pantoprazole    Tablet 40 milliGRAM(s) Oral before breakfast  senna 2 Tablet(s) Oral at bedtime  tamsulosin 0.4 milliGRAM(s) Oral at bedtime  torsemide 40 milliGRAM(s) Oral <User Schedule>    MEDICATIONS  (PRN):  acetaminophen   Tablet .. 650 milliGRAM(s) Oral every 6 hours PRN Temp greater or equal to 38.5C (101.3F), Moderate Pain (4 - 6)    LABS:                        10.8   6.53  )-----------( 97       ( 28 Dec 2019 08:52 )             35.0     12-28    142  |  101  |  61<H>  ----------------------------<  95  4.8   |  21<L>  |  11.14<H>    Ca    8.6      28 Dec 2019 07:14    TPro  6.1  /  Alb  3.4  /  TBili  0.2  /  DBili  x   /  AST  11  /  ALT  9<L>  /  AlkPhos  93  12-28    PT/INR - ( 27 Dec 2019 07:00 )   PT: 10.9 sec;   INR: 0.95 ratio         PTT - ( 27 Dec 2019 07:00 )  PTT:28.5 sec    CAPILLARY BLOOD GLUCOSE              REVIEW OF SYSTEMS:  CONSTITUTIONAL: No fever, weight loss, or fatigue  EYES: No eye pain, visual disturbances, or discharge  RESPIRATORY: No cough, wheezing, chills or hemoptysis; No shortness of breath  CARDIOVASCULAR: No chest pain, palpitations, dizziness, or leg swelling  GASTROINTESTINAL: No abdominal or epigastric pain. No nausea, vomiting, or hematemesis; No diarrhea or constipation. No melena or hematochezia.  GENITOURINARY: No dysuria, frequency, hematuria, or incontinence  NEUROLOGICAL: No headaches, memory loss, loss of strength, numbness, or tremors    Consultant(s) Notes Reviewed:  [x ] YES  [ ] NO    PHYSICAL EXAM:  GENERAL: NAD, well-groomed, well-developed,not in any distress ,  HEAD:  Atraumatic, Normocephalic  EYES: EOMI, PERRLA, conjunctiva and sclera clear  ENMT: No tonsillar erythema, exudates, or enlargement; Moist mucous membranes, Good dentition, No lesions  NECK: Supple, No JVD, Normal thyroid  NERVOUS SYSTEM:  Alert & Oriented X3, No focal deficit   CHEST/LUNG: Good air entry bilateral with no  rales, rhonchi, wheezing, or rubs  HEART: Regular rate and rhythm; No murmurs, rubs, or gallops  ABDOMEN: Soft, Nontender, Nondistended; Bowel sounds present  EXTREMITIES:  2+ Peripheral Pulses, No clubbing, cyanosis, or edema    Care Discussed with Consultants/Other Providers [ x] YES  [ ] NO

## 2019-12-28 NOTE — PROGRESS NOTE ADULT - PROBLEM SELECTOR PLAN 1
Patient wit history of ESRD on IHD Lats HD was on 12/26/19  - Labs reviewed. electrolytes grossly stable  - plan HD today maintenance; 3hr, UF 2L, , dialysate flow 500  - monitor electrolytes.

## 2019-12-28 NOTE — PROGRESS NOTE ADULT - SUBJECTIVE AND OBJECTIVE BOX
I have reviewed discharge instructions with the patient. The patient verbalized understanding. Patient NAD, VSS. Patient armband removed and shredded. [facility] DIVISION OF KIDNEY DISEASES AND HYPERTENSION -- FOLLOW UP NOTE  --------------------------------------------------------------------------------  Chief Complaint:    24 hour events/subjective:  - yesterday permacath dressing changed by IR no bleed  - overnight no events reported vitals wnl  - patient seen and examined with family at bedside this morning without complain, no bleeding from guaze over permacath  - vitals/lab/medications reviewed, stable Lab K 4.8, HCO3 21  - plan HD today    PAST HISTORY  --------------------------------------------------------------------------------  No significant changes to PMH, PSH, FHx, SHx, unless otherwise noted    ALLERGIES & MEDICATIONS  --------------------------------------------------------------------------------  Allergies    No Known Allergies    Intolerances      Standing Inpatient Medications  allopurinol 100 milliGRAM(s) Oral two times a day  atorvastatin 10 milliGRAM(s) Oral at bedtime  calcitriol   Capsule 0.25 MICROGram(s) Oral daily  calcium acetate 667 milliGRAM(s) Oral three times a day with meals  chlorhexidine 4% Liquid 1 Application(s) Topical daily  cinacalcet 30 milliGRAM(s) Oral daily  hydrALAZINE 100 milliGRAM(s) Oral two times a day  metoprolol succinate ER 50 milliGRAM(s) Oral daily  pantoprazole    Tablet 40 milliGRAM(s) Oral before breakfast  senna 2 Tablet(s) Oral at bedtime  tamsulosin 0.4 milliGRAM(s) Oral at bedtime  torsemide 40 milliGRAM(s) Oral <User Schedule>    PRN Inpatient Medications  acetaminophen   Tablet .. 650 milliGRAM(s) Oral every 6 hours PRN      REVIEW OF SYSTEMS  --------------------------------------------------------------------------------  Gen: No fatigue, fevers/chills, weakness  Respiratory: No dyspnea, cough  CV: No chest pain, PND, orthopnea  GI: No abdominal pain, diarrhea, constipation, nausea, vomiting  MSK: no edema  Neuro: No dizziness/lightheadedness, weakness  Heme: No bleeding    All other systems were reviewed and are negative, except as noted.    VITALS/PHYSICAL EXAM  --------------------------------------------------------------------------------  T(C): 36.7 (12-28-19 @ 05:17), Max: 37 (12-27-19 @ 14:24)  HR: 98 (12-28-19 @ 05:17) (68 - 101)  BP: 132/73 (12-28-19 @ 05:17) (132/73 - 158/85)  RR: 18 (12-28-19 @ 05:17) (17 - 18)  SpO2: 96% (12-28-19 @ 05:17) (96% - 99%)  Wt(kg): --  Height (cm): 175.26 (12-27-19 @ 05:07)  Weight (kg): 86.2 (12-27-19 @ 05:07)  BMI (kg/m2): 28.1 (12-27-19 @ 05:07)  BSA (m2): 2.02 (12-27-19 @ 05:07)      12-27-19 @ 07:01  -  12-28-19 @ 07:00  --------------------------------------------------------  IN: 720 mL / OUT: 0 mL / NET: 720 mL    12-28-19 @ 07:01  -  12-28-19 @ 11:31  --------------------------------------------------------  IN: 240 mL / OUT: 0 mL / NET: 240 mL      Physical Exam:  	Gen: NAD, well-appearing on room air  	HEENT: MMM, supple neck, clear oropharynx  	Pulm: CTA B/L  	CV: RRR, S1S2; no murmur/rub  	Abd: +BS, soft, nontender/nondistended  	LE: Warm, no edema  	Psych: Normal affect and mood  	Skin: Warm, without rashes  	Vascular access: R tunnel catheter guaze non bloody    LABS/STUDIES  --------------------------------------------------------------------------------              10.8   6.53  >-----------<  97       [12-28-19 @ 08:52]              35.0     142  |  101  |  61  ----------------------------<  95      [12-28-19 @ 07:14]  4.8   |  21  |  11.14        Ca     8.6     [12-28-19 @ 07:14]    TPro  6.1  /  Alb  3.4  /  TBili  0.2  /  DBili  x   /  AST  11  /  ALT  9   /  AlkPhos  93  [12-28-19 @ 07:14]    PT/INR: PT 10.9 , INR 0.95       [12-27-19 @ 07:00]  PTT: 28.5       [12-27-19 @ 07:00]      Creatinine Trend:  SCr 11.14 [12-28 @ 07:14]  SCr 9.07 [12-27 @ 07:00]  SCr 8.79 [12-20 @ 08:14]  SCr 11.71 [12-19 @ 06:09]  SCr 13.64 [12-18 @ 06:10]        HbA1c 5.9      [04-08-18 @ 07:47]  TSH 0.61      [12-14-19 @ 11:04]  Lipid: chol 194, , HDL 45,       [12-19-19 @ 09:18]    HBsAb 212.8      [12-18-19 @ 21:29]  HBsAg Nonreact      [12-18-19 @ 21:29]  HBcAb Nonreact      [12-18-19 @ 21:29]  HCV 0.11, Nonreact      [12-18-19 @ 21:29]

## 2019-12-28 NOTE — PROGRESS NOTE ADULT - SUBJECTIVE AND OBJECTIVE BOX
Pt is seen and examined  pt is awake and lying in bed   Family at bedside  No new complaints  No active bleeding  Platelets trending up      PAST MEDICAL & SURGICAL HISTORY:  Solitary fibrous tumor: removed in 2013  BPH (benign prostatic hyperplasia)  Gout  GERD (gastroesophageal reflux disease)  Hypercholesteremia  Kidney disease  HTN (hypertension)  Lung abnormality  BPH (benign prostatic hypertrophy)  CKD (chronic kidney disease), stage III  Nephritis  History of lung surgery  Vocal cord anomaly: S/P polyp removal 2004      ROS:  Negative except for:    MEDICATIONS  (STANDING):  allopurinol 100 milliGRAM(s) Oral two times a day  atorvastatin 10 milliGRAM(s) Oral at bedtime  calcitriol   Capsule 0.25 MICROGram(s) Oral daily  calcium acetate 667 milliGRAM(s) Oral three times a day with meals  chlorhexidine 4% Liquid 1 Application(s) Topical daily  cinacalcet 30 milliGRAM(s) Oral daily  hydrALAZINE 100 milliGRAM(s) Oral two times a day  metoprolol succinate ER 50 milliGRAM(s) Oral daily  pantoprazole    Tablet 40 milliGRAM(s) Oral before breakfast  senna 2 Tablet(s) Oral at bedtime  tamsulosin 0.4 milliGRAM(s) Oral at bedtime  torsemide 40 milliGRAM(s) Oral <User Schedule>    MEDICATIONS  (PRN):  acetaminophen   Tablet .. 650 milliGRAM(s) Oral every 6 hours PRN Temp greater or equal to 38.5C (101.3F), Moderate Pain (4 - 6)      Allergies    No Known Allergies    Intolerances        Vital Signs Last 24 Hrs  T(C): 36.7 (28 Dec 2019 11:32), Max: 37 (27 Dec 2019 14:24)  T(F): 98 (28 Dec 2019 11:32), Max: 98.6 (27 Dec 2019 14:24)  HR: 77 (28 Dec 2019 11:32) (68 - 101)  BP: 116/68 (28 Dec 2019 11:32) (116/68 - 158/85)  BP(mean): --  RR: 18 (28 Dec 2019 11:32) (17 - 18)  SpO2: 98% (28 Dec 2019 11:32) (96% - 99%)    PHYSICAL EXAM  General: adult in NAD  HEENT: clear oropharynx, anicteric sclera, pink conjunctiva  Neck: supple  CV: normal S1/S2 with no murmur rubs or gallops  Lungs: positive air movement b/l ant lungs,clear to auscultation, no wheezes, no rales  Abdomen: soft non-tender non-distended, no hepatosplenomegaly  Ext: no clubbing cyanosis or edema     LABS:                          10.8   6.53  )-----------( 97       ( 28 Dec 2019 08:52 )             35.0     Serial CBC's  12-28 @ 08:52  Hct-35.0 / Hgb-10.8 / Plat-97 / RBC-3.57 / WBC-6.53          Serial CBC's  12-27 @ 07:00  Hct-37.9 / Hgb-11.8 / Plat-73 / RBC-3.90 / WBC-6.85            12-28    142  |  101  |  61<H>  ----------------------------<  95  4.8   |  21<L>  |  11.14<H>    Ca    8.6      28 Dec 2019 07:14    TPro  6.1  /  Alb  3.4  /  TBili  0.2  /  DBili  x   /  AST  11  /  ALT  9<L>  /  AlkPhos  93  12-28      PT/INR - ( 27 Dec 2019 07:00 )   PT: 10.9 sec;   INR: 0.95 ratio         PTT - ( 27 Dec 2019 07:00 )  PTT:28.5 sec    Vitamin B12, Serum: 334 pg/mL (12-28 @ 09:42)  Folate, Serum: 6.0 ng/mL (12-28 @ 09:42)            RADIOLOGY & ADDITIONAL STUDIES:

## 2019-12-28 NOTE — PROGRESS NOTE ADULT - ASSESSMENT
1. Thrombocytopenia     -- Platelets improving , up to 97K today  -- HIT ruled out w Low Pretest Probability and Negative Heparin-PF4 Ab by EIA  -- Low Normal B12+ Normal Folate . Will give B12 1000 mcg x 1  -- No Heme objection to use of Heparin Products      2. Bleeding     -- coags nml. May be complicated by thrombocytopenia and uremic plts in setting of relatively new catheter  -- none at this time  -- if bleeding again, can consider plt +/- DDAVP    3. HD     -- per renal    4. Anemia     -- sec to CKD and blood loss  -- check iron profile    Mckenzie Baker MD  [phone]

## 2019-12-28 NOTE — PROGRESS NOTE ADULT - ATTENDING COMMENTS
Pt seen and examined on dialysis  Feels well  No further bleeding from permacath site.  Plateletes improved.  Ok for discharge from renal standpoint.  Pt set up for dialysis at [facility] unit on Monday.

## 2019-12-28 NOTE — PROGRESS NOTE ADULT - ASSESSMENT
59y old M with pmhx of BPH, HTN, ESRD previously on PD now on IHD secondary recent diaphragmatic leak with right pleural effusion, presents to [facility]  ED  for bleeding on the tunneled HD cath sit Nephrology consulted for ESRD management

## 2019-12-28 NOTE — PROGRESS NOTE ADULT - ASSESSMENT
59m with hx HTN, HLD ,ESRD formerly on PD but now on HD s/p right chest wall permacath placement 9 days ago, here today for bleeding of permacath. Pt states he went for his first outpt HD session 2 days ago, at which time the dressing from the recently placed catheter was changed. Subsequently noted small amount of bleeding onto new gauze. Today the dressing became soaked in blood so came to ED. On previous HD session dialysis was completed w/o difficulty, but line was heparinized due to clotting. He denies pain at the site. No exertional sx.      Problem/Plan - 1:  ·  Problem: Bleeding.  Plan: from Perma cath site. S/P Dressing change by IR with no bleeding,.      Problem/Plan - 2:  ·  Problem: Thrombocytopenia.  Plan: Platelet count going up . Hematology helping  and work up in progress.      Problem/Plan - 3:  ·  Problem: ESRD (end stage renal disease) on dialysis.  Plan: Was on PD and developed Right side Effusion so now on HD via Perma cath. HD per Renal and will avoid Heparin products for now till HIT R/O .      Problem/Plan - 4:  ·  Problem:  Pleural effusion.  Plan: PD induced. X ray noted and no effusion.      Problem/Plan - 5:  ·  Problem: HTN (hypertension).  Plan: BP meds with hold parameters.      Problem/Plan - 6:  Problem: Hypercholesteremia. Plan: Statin.      Disposition : DC planning home today or tomorrow.

## 2019-12-28 NOTE — PROGRESS NOTE ADULT - PROBLEM SELECTOR PLAN 4
- C/w vitamin D 5000U QD  - C/w Sensipar 30mg QD  - c/w Calcitrol 0.25mcg QD  - c/w Phoslo 667mg TID with meals.   Low phosphorus diet and monitor serum phos QD.

## 2019-12-29 ENCOUNTER — TRANSCRIPTION ENCOUNTER (OUTPATIENT)
Age: 59
End: 2019-12-29

## 2019-12-29 VITALS
RESPIRATION RATE: 18 BRPM | SYSTOLIC BLOOD PRESSURE: 108 MMHG | OXYGEN SATURATION: 98 % | DIASTOLIC BLOOD PRESSURE: 71 MMHG | TEMPERATURE: 98 F | HEART RATE: 82 BPM

## 2019-12-29 LAB
ANION GAP SERPL CALC-SCNC: 14 MMOL/L — SIGNIFICANT CHANGE UP (ref 5–17)
ANION GAP SERPL CALC-SCNC: 18 MMOL/L — HIGH (ref 5–17)
BUN SERPL-MCNC: 38 MG/DL — HIGH (ref 7–23)
BUN SERPL-MCNC: 38 MG/DL — HIGH (ref 7–23)
CALCIUM SERPL-MCNC: 8.6 MG/DL — SIGNIFICANT CHANGE UP (ref 8.4–10.5)
CALCIUM SERPL-MCNC: 8.8 MG/DL — SIGNIFICANT CHANGE UP (ref 8.4–10.5)
CHLORIDE SERPL-SCNC: 93 MMOL/L — LOW (ref 96–108)
CHLORIDE SERPL-SCNC: 94 MMOL/L — LOW (ref 96–108)
CO2 SERPL-SCNC: 24 MMOL/L — SIGNIFICANT CHANGE UP (ref 22–31)
CO2 SERPL-SCNC: 26 MMOL/L — SIGNIFICANT CHANGE UP (ref 22–31)
CREAT SERPL-MCNC: 7.77 MG/DL — HIGH (ref 0.5–1.3)
CREAT SERPL-MCNC: 7.82 MG/DL — HIGH (ref 0.5–1.3)
FERRITIN SERPL-MCNC: 374 NG/ML — SIGNIFICANT CHANGE UP (ref 30–400)
GLUCOSE SERPL-MCNC: 121 MG/DL — HIGH (ref 70–99)
GLUCOSE SERPL-MCNC: 124 MG/DL — HIGH (ref 70–99)
HCT VFR BLD CALC: 38.4 % — LOW (ref 39–50)
HCT VFR BLD CALC: 38.5 % — LOW (ref 39–50)
HGB BLD-MCNC: 12.2 G/DL — LOW (ref 13–17)
HGB BLD-MCNC: 12.3 G/DL — LOW (ref 13–17)
IRON SATN MFR SERPL: 31 % — SIGNIFICANT CHANGE UP (ref 16–55)
IRON SATN MFR SERPL: 85 UG/DL — SIGNIFICANT CHANGE UP (ref 45–165)
MCHC RBC-ENTMCNC: 30.5 PG — SIGNIFICANT CHANGE UP (ref 27–34)
MCHC RBC-ENTMCNC: 30.7 PG — SIGNIFICANT CHANGE UP (ref 27–34)
MCHC RBC-ENTMCNC: 31.8 GM/DL — LOW (ref 32–36)
MCHC RBC-ENTMCNC: 31.9 GM/DL — LOW (ref 32–36)
MCV RBC AUTO: 95.5 FL — SIGNIFICANT CHANGE UP (ref 80–100)
MCV RBC AUTO: 96.7 FL — SIGNIFICANT CHANGE UP (ref 80–100)
NRBC # BLD: 0 /100 WBCS — SIGNIFICANT CHANGE UP (ref 0–0)
PLATELET # BLD AUTO: 124 K/UL — LOW (ref 150–400)
PLATELET # BLD AUTO: 128 K/UL — LOW (ref 150–400)
POTASSIUM SERPL-MCNC: 4.7 MMOL/L — SIGNIFICANT CHANGE UP (ref 3.5–5.3)
POTASSIUM SERPL-MCNC: 4.8 MMOL/L — SIGNIFICANT CHANGE UP (ref 3.5–5.3)
POTASSIUM SERPL-SCNC: 4.7 MMOL/L — SIGNIFICANT CHANGE UP (ref 3.5–5.3)
POTASSIUM SERPL-SCNC: 4.8 MMOL/L — SIGNIFICANT CHANGE UP (ref 3.5–5.3)
RBC # BLD: 3.97 M/UL — LOW (ref 4.2–5.8)
RBC # BLD: 4.03 M/UL — LOW (ref 4.2–5.8)
RBC # FLD: 13.6 % — SIGNIFICANT CHANGE UP (ref 10.3–14.5)
RBC # FLD: 13.8 % — SIGNIFICANT CHANGE UP (ref 10.3–14.5)
SODIUM SERPL-SCNC: 133 MMOL/L — LOW (ref 135–145)
SODIUM SERPL-SCNC: 136 MMOL/L — SIGNIFICANT CHANGE UP (ref 135–145)
TIBC SERPL-MCNC: 277 UG/DL — SIGNIFICANT CHANGE UP (ref 220–430)
UIBC SERPL-MCNC: 192 UG/DL — SIGNIFICANT CHANGE UP (ref 110–370)
WBC # BLD: 8.2 K/UL — SIGNIFICANT CHANGE UP (ref 3.8–10.5)
WBC # BLD: 8.34 K/UL — SIGNIFICANT CHANGE UP (ref 3.8–10.5)
WBC # FLD AUTO: 8.2 K/UL — SIGNIFICANT CHANGE UP (ref 3.8–10.5)
WBC # FLD AUTO: 8.34 K/UL — SIGNIFICANT CHANGE UP (ref 3.8–10.5)

## 2019-12-29 PROCEDURE — 85730 THROMBOPLASTIN TIME PARTIAL: CPT

## 2019-12-29 PROCEDURE — 82728 ASSAY OF FERRITIN: CPT

## 2019-12-29 PROCEDURE — 86022 PLATELET ANTIBODIES: CPT

## 2019-12-29 PROCEDURE — 80048 BASIC METABOLIC PNL TOTAL CA: CPT

## 2019-12-29 PROCEDURE — 85027 COMPLETE CBC AUTOMATED: CPT

## 2019-12-29 PROCEDURE — 83921 ORGANIC ACID SINGLE QUANT: CPT

## 2019-12-29 PROCEDURE — 82607 VITAMIN B-12: CPT

## 2019-12-29 PROCEDURE — 99285 EMERGENCY DEPT VISIT HI MDM: CPT

## 2019-12-29 PROCEDURE — 99261: CPT

## 2019-12-29 PROCEDURE — 80053 COMPREHEN METABOLIC PANEL: CPT

## 2019-12-29 PROCEDURE — 83550 IRON BINDING TEST: CPT

## 2019-12-29 PROCEDURE — 82746 ASSAY OF FOLIC ACID SERUM: CPT

## 2019-12-29 PROCEDURE — 71046 X-RAY EXAM CHEST 2 VIEWS: CPT

## 2019-12-29 PROCEDURE — 83540 ASSAY OF IRON: CPT

## 2019-12-29 PROCEDURE — 85610 PROTHROMBIN TIME: CPT

## 2019-12-29 RX ORDER — CALCIUM ACETATE 667 MG
667 TABLET ORAL
Qty: 0 | Refills: 0 | DISCHARGE
Start: 2019-12-29

## 2019-12-29 RX ORDER — HYDRALAZINE HCL 50 MG
1 TABLET ORAL
Qty: 0 | Refills: 0 | DISCHARGE

## 2019-12-29 RX ORDER — ATORVASTATIN CALCIUM 80 MG/1
1 TABLET, FILM COATED ORAL
Qty: 0 | Refills: 0 | DISCHARGE
Start: 2019-12-29

## 2019-12-29 RX ORDER — HYDRALAZINE HCL 50 MG
1 TABLET ORAL
Qty: 0 | Refills: 0 | DISCHARGE
Start: 2019-12-29

## 2019-12-29 RX ORDER — TAMSULOSIN HYDROCHLORIDE 0.4 MG/1
1 CAPSULE ORAL
Qty: 0 | Refills: 0 | DISCHARGE
Start: 2019-12-29

## 2019-12-29 RX ORDER — CINACALCET 30 MG/1
1 TABLET, FILM COATED ORAL
Qty: 0 | Refills: 0 | DISCHARGE
Start: 2019-12-29

## 2019-12-29 RX ORDER — CALCITRIOL 0.5 UG/1
1 CAPSULE ORAL
Qty: 0 | Refills: 0 | DISCHARGE

## 2019-12-29 RX ORDER — ALLOPURINOL 300 MG
1 TABLET ORAL
Qty: 0 | Refills: 0 | DISCHARGE
Start: 2019-12-29

## 2019-12-29 RX ORDER — CALCITRIOL 0.5 UG/1
1 CAPSULE ORAL
Qty: 0 | Refills: 0 | DISCHARGE
Start: 2019-12-29

## 2019-12-29 RX ADMIN — Medication 50 MILLIGRAM(S): at 06:24

## 2019-12-29 RX ADMIN — Medication 100 MILLIGRAM(S): at 06:24

## 2019-12-29 RX ADMIN — CHLORHEXIDINE GLUCONATE 1 APPLICATION(S): 213 SOLUTION TOPICAL at 08:57

## 2019-12-29 RX ADMIN — Medication 667 MILLIGRAM(S): at 08:56

## 2019-12-29 RX ADMIN — Medication 667 MILLIGRAM(S): at 12:33

## 2019-12-29 RX ADMIN — PANTOPRAZOLE SODIUM 40 MILLIGRAM(S): 20 TABLET, DELAYED RELEASE ORAL at 06:24

## 2019-12-29 RX ADMIN — CALCITRIOL 0.25 MICROGRAM(S): 0.5 CAPSULE ORAL at 12:33

## 2019-12-29 RX ADMIN — CINACALCET 30 MILLIGRAM(S): 30 TABLET, FILM COATED ORAL at 12:33

## 2019-12-29 NOTE — DISCHARGE NOTE PROVIDER - NSDCMRMEDTOKEN_GEN_ALL_CORE_FT
allopurinol 100 mg oral tablet: 1 tab(s) orally 2 times a day  atorvastatin 10 mg oral tablet: 1 tab(s) orally once a day (at bedtime)  Auryxia 210 mg oral tablet: 2 tab(s) orally 3 times a day (with meals)  calcitriol 0.25 mcg oral capsule: 1 cap(s) orally once a day  calcium acetate 667 mg oral tablet: 667 milligram(s) orally 3 times a day (with meals)  cinacalcet 30 mg oral tablet: 1 tab(s) orally once a day  hydrALAZINE 100 mg oral tablet: 1 tab(s) orally 2 times a day  metoprolol succinate 50 mg oral tablet, extended release: 1 tab(s) orally once a day  omeprazole 20 mg oral delayed release capsule: 1 cap(s) orally once a day  tamsulosin 0.4 mg oral capsule: 1 cap(s) orally once a day (at bedtime)  torsemide 20 mg oral tablet: 2 tab(s) orally once a day on non HD days  Vitamin D3 5000 intl units oral tablet: 1 tab(s) orally once a day

## 2019-12-29 NOTE — DISCHARGE NOTE PROVIDER - CARE PROVIDER_API CALL
Fabi Richey)  Internal Medicine; Nephrology  [address] [place] [address] [address] [place] [address]  Phone: [phone]  Fax: [phone]  Follow Up Time:

## 2019-12-29 NOTE — DISCHARGE NOTE PROVIDER - NSDCCPCAREPLAN_GEN_ALL_CORE_FT
PRINCIPAL DISCHARGE DIAGNOSIS  Diagnosis: Bleeding  Assessment and Plan of Treatment: From Perma cath site. Condition resolved      SECONDARY DISCHARGE DIAGNOSES  Diagnosis: Thrombocytopenia  Assessment and Plan of Treatment: Platelets stable    Diagnosis: ESRD (end stage renal disease) on dialysis  Assessment and Plan of Treatment: Follow up with your Kidney doctor for dialysis management

## 2019-12-29 NOTE — DISCHARGE NOTE PROVIDER - HOSPITAL COURSE
59y old M with pmhx of BPH, HTN, ESRD previously on PD now on IHD secondary recent diaphragmatic leak with right pleural effusion, presents to [facility]  ED  for bleeding on the tunneled HD cath sit Nephrology consulted for ESRD management        Seen by Heme for Thrombocytopenia     -- Platelets improving , up to 97K today    -- HIT ruled out w Low Pretest Probability and Negative Heparin-PF4 Ab by EIA    -- Low Normal B12+ Normal Folate . Will give B12 1000 mcg x 1    -- No Heme objection to use of Heparin Products        Bleeding     -- coags nml. May be complicated by thrombocytopenia and uremic plts in setting of relatively new catheter    -- if bleeding again, can consider plt +/- DDAVP         ESRD (end stage renal disease) on dialysis.  Patient wit history of ESRD on IHD Lats HD was on 12/26/19    - Labs reviewed. electrolytes grossly stable

## 2019-12-29 NOTE — PROGRESS NOTE ADULT - SUBJECTIVE AND OBJECTIVE BOX
INTERVAL HPI/OVERNIGHT EVENTS: i feel fine so want to go home.   Vital Signs Last 24 Hrs  T(C): 36.7 (29 Dec 2019 12:30), Max: 36.8 (28 Dec 2019 18:38)  T(F): 98 (29 Dec 2019 12:30), Max: 98.3 (28 Dec 2019 20:33)  HR: 82 (29 Dec 2019 12:30) (73 - 86)  BP: 108/71 (29 Dec 2019 12:30) (108/71 - 138/74)  BP(mean): --  RR: 18 (29 Dec 2019 12:30) (18 - 18)  SpO2: 98% (29 Dec 2019 12:30) (96% - 99%)  I&O's Summary    28 Dec 2019 07:01  -  29 Dec 2019 07:00  --------------------------------------------------------  IN: 1200 mL / OUT: 0 mL / NET: 1200 mL      MEDICATIONS  (STANDING):  allopurinol 100 milliGRAM(s) Oral two times a day  atorvastatin 10 milliGRAM(s) Oral at bedtime  calcitriol   Capsule 0.25 MICROGram(s) Oral daily  calcium acetate 667 milliGRAM(s) Oral three times a day with meals  chlorhexidine 4% Liquid 1 Application(s) Topical daily  cinacalcet 30 milliGRAM(s) Oral daily  hydrALAZINE 100 milliGRAM(s) Oral two times a day  metoprolol succinate ER 50 milliGRAM(s) Oral daily  pantoprazole    Tablet 40 milliGRAM(s) Oral before breakfast  senna 2 Tablet(s) Oral at bedtime  tamsulosin 0.4 milliGRAM(s) Oral at bedtime  torsemide 40 milliGRAM(s) Oral <User Schedule>    MEDICATIONS  (PRN):  acetaminophen   Tablet .. 650 milliGRAM(s) Oral every 6 hours PRN Temp greater or equal to 38.5C (101.3F), Moderate Pain (4 - 6)    LABS:                        12.3   8.34  )-----------( 124      ( 29 Dec 2019 12:37 )             38.5     12-28    142  |  101  |  61<H>  ----------------------------<  95  4.8   |  21<L>  |  11.14<H>    Ca    8.6      28 Dec 2019 07:14    TPro  6.1  /  Alb  3.4  /  TBili  0.2  /  DBili  x   /  AST  11  /  ALT  9<L>  /  AlkPhos  93  12-28        CAPILLARY BLOOD GLUCOSE              REVIEW OF SYSTEMS:  CONSTITUTIONAL: No fever, weight loss, or fatigue  EYES: No eye pain, visual disturbances, or discharge  ENMT:  No difficulty hearing, tinnitus, vertigo; No sinus or throat pain  RESPIRATORY: No cough, wheezing, chills or hemoptysis; No shortness of breath  CARDIOVASCULAR: No chest pain, palpitations, dizziness, or leg swelling  GASTROINTESTINAL: No abdominal or epigastric pain. No nausea, vomiting, or hematemesis; No diarrhea or constipation. No melena or hematochezia.  GENITOURINARY: No dysuria, frequency, hematuria, or incontinence  NEUROLOGICAL: No headaches, memory loss, loss of strength, numbness, or tremors    RADIOLOGY & ADDITIONAL TESTS:    Consultant(s) Notes Reviewed:  [x ] YES  [ ] NO    PHYSICAL EXAM:  GENERAL: NAD, well-groomed, well-developed,not in any distress ,  HEAD:  Atraumatic, Normocephalic  EYES: EOMI, PERRLA, conjunctiva and sclera clear  ENMT: No tonsillar erythema, exudates, or enlargement; Moist mucous membranes, Good dentition, No lesions  NECK: Supple, No JVD, Normal thyroid  NERVOUS SYSTEM:  Alert & Oriented X3, No focal deficit   CHEST/LUNG: Good air entry bilateral with no  rales, rhonchi, wheezing, or rubs  HEART: Regular rate and rhythm; No murmurs, rubs, or gallops  ABDOMEN: Soft, Nontender, Nondistended; Bowel sounds present, PD catheter +  EXTREMITIES:  2+ Peripheral Pulses, No clubbing, cyanosis, or edema  SKIN: Permcath +    Care Discussed with Consultants/Other Providers [ x] YES  [ ] NO

## 2019-12-29 NOTE — PROGRESS NOTE ADULT - ASSESSMENT
59m with hx HTN, HLD ,ESRD formerly on PD but now on HD s/p right chest wall permacath placement 9 days ago, here today for bleeding of permacath. Pt states he went for his first outpt HD session 2 days ago, at which time the dressing from the recently placed catheter was changed. Subsequently noted small amount of bleeding onto new gauze. Today the dressing became soaked in blood so came to ED. On previous HD session dialysis was completed w/o difficulty, but line was heparinized due to clotting. He denies pain at the site. No exertional sx.      Problem/Plan - 1:  ·  Problem: Bleeding.  Plan: from Perma cath site. S/P Dressing change by IR with no bleeding,.      Problem/Plan - 2:  ·  Problem: Thrombocytopenia.  Plan: Platelet count going up . Hematology helping  and work up negative for HIT so far.      Problem/Plan - 3:  ·  Problem: ESRD (end stage renal disease) on dialysis.  Plan: Was on PD and developed Right side Effusion so now on HD via Perma cath. HD per Renal and will avoid Heparin products for now till HIT R/O .      Problem/Plan - 4:  ·  Problem:  Pleural effusion.  Plan: PD induced. X ray noted and no effusion.      Problem/Plan - 5:  ·  Problem: HTN (hypertension).  Plan: BP meds with hold parameters.      Problem/Plan - 6:  Problem: Hypercholesteremia. Plan: Statin.      Disposition : DC planning home today  to follow up outpt.

## 2019-12-29 NOTE — DISCHARGE NOTE NURSING/CASE MANAGEMENT/SOCIAL WORK - PATIENT PORTAL LINK FT
You can access the FollowMyHealth Patient Portal offered by [facility] by registering at the following website: [email or web address]. By joining [employer]’s FollowMyHealth portal, you will also be able to view your health information using other applications (apps) compatible with our system.

## 2019-12-30 ENCOUNTER — INBOUND DOCUMENT (OUTPATIENT)
Age: 59
End: 2019-12-30

## 2019-12-31 LAB
METHYLMALONATE SERPL-SCNC: 1050 NMOL/L — HIGH (ref 0–378)
METHYLMALONATE SERPL-SCNC: 768 NMOL/L — HIGH (ref 0–378)

## 2020-01-01 PROCEDURE — 90960 ESRD SRV 4 VISITS P MO 20+: CPT

## 2020-01-02 LAB — SRA INTERP SER-IMP: SIGNIFICANT CHANGE UP

## 2020-01-11 LAB
CULTURE RESULTS: SIGNIFICANT CHANGE UP
SPECIMEN SOURCE: SIGNIFICANT CHANGE UP

## 2020-01-21 ENCOUNTER — APPOINTMENT (OUTPATIENT)
Dept: NEPHROLOGY | Facility: CLINIC | Age: 60
End: 2020-01-21

## 2020-02-01 LAB
25(OH)D3 SERPL-MCNC: 40.8 NG/ML
ALBUMIN SERPL ELPH-MCNC: 4 G/DL
ALP BLD-CCNC: 84 U/L
ALT SERPL-CCNC: 9 U/L
ANION GAP SERPL CALC-SCNC: 16 MMOL/L
AST SERPL-CCNC: 9 U/L
BASOPHILS # BLD AUTO: 0.03 K/UL
BASOPHILS NFR BLD AUTO: 0.5 %
BILIRUB SERPL-MCNC: 0.3 MG/DL
BUN SERPL-MCNC: 93 MG/DL
CALCIUM SERPL-MCNC: 8.2 MG/DL
CALCIUM SERPL-MCNC: 8.2 MG/DL
CHLORIDE SERPL-SCNC: 106 MMOL/L
CHOLEST SERPL-MCNC: 111 MG/DL
CHOLEST/HDLC SERPL: 2.6 RATIO
CO2 SERPL-SCNC: 18 MMOL/L
CREAT SERPL-MCNC: 10.28 MG/DL
EOSINOPHIL # BLD AUTO: 0.34 K/UL
EOSINOPHIL NFR BLD AUTO: 5.2 %
ESTIMATED AVERAGE GLUCOSE: 108 MG/DL
FERRITIN SERPL-MCNC: 98 NG/ML
GLUCOSE SERPL-MCNC: 103 MG/DL
HBA1C MFR BLD HPLC: 5.4 %
HCT VFR BLD CALC: 30.9 %
HDLC SERPL-MCNC: 42 MG/DL
HGB BLD-MCNC: 9.3 G/DL
IMM GRANULOCYTES NFR BLD AUTO: 0.3 %
IRON SATN MFR SERPL: 22 %
IRON SERPL-MCNC: 62 UG/DL
LDLC SERPL CALC-MCNC: 52 MG/DL
LYMPHOCYTES # BLD AUTO: 1.28 K/UL
LYMPHOCYTES NFR BLD AUTO: 19.5 %
MAGNESIUM SERPL-MCNC: 2.3 MG/DL
MAN DIFF?: NORMAL
MCHC RBC-ENTMCNC: 29.1 PG
MCHC RBC-ENTMCNC: 30.1 GM/DL
MCV RBC AUTO: 96.6 FL
MONOCYTES # BLD AUTO: 0.51 K/UL
MONOCYTES NFR BLD AUTO: 7.8 %
NEUTROPHILS # BLD AUTO: 4.39 K/UL
NEUTROPHILS NFR BLD AUTO: 66.7 %
PARATHYROID HORMONE INTACT: 748 PG/ML
PHOSPHATE SERPL-MCNC: 8.6 MG/DL
PLATELET # BLD AUTO: 145 K/UL
POTASSIUM SERPL-SCNC: 5.9 MMOL/L
PROT SERPL-MCNC: 6.2 G/DL
RBC # BLD: 3.2 M/UL
RBC # FLD: 13.6 %
SODIUM SERPL-SCNC: 140 MMOL/L
TIBC SERPL-MCNC: 287 UG/DL
TRIGL SERPL-MCNC: 87 MG/DL
UIBC SERPL-MCNC: 225 UG/DL
URATE SERPL-MCNC: 5 MG/DL
WBC # FLD AUTO: 6.57 K/UL

## 2020-02-06 ENCOUNTER — APPOINTMENT (OUTPATIENT)
Dept: VASCULAR SURGERY | Facility: CLINIC | Age: 60
End: 2020-02-06
Payer: COMMERCIAL

## 2020-02-06 ENCOUNTER — OUTPATIENT (OUTPATIENT)
Dept: OUTPATIENT SERVICES | Facility: HOSPITAL | Age: 60
LOS: 1 days | Discharge: HOME | End: 2020-02-06

## 2020-02-06 VITALS
WEIGHT: 184.31 LBS | HEIGHT: 68 IN | OXYGEN SATURATION: 97 % | SYSTOLIC BLOOD PRESSURE: 151 MMHG | HEART RATE: 77 BPM | RESPIRATION RATE: 18 BRPM | DIASTOLIC BLOOD PRESSURE: 89 MMHG | TEMPERATURE: 99 F

## 2020-02-06 DIAGNOSIS — N18.9 CHRONIC KIDNEY DISEASE, UNSPECIFIED: ICD-10-CM

## 2020-02-06 DIAGNOSIS — Z98.890 OTHER SPECIFIED POSTPROCEDURAL STATES: Chronic | ICD-10-CM

## 2020-02-06 DIAGNOSIS — I10 ESSENTIAL (PRIMARY) HYPERTENSION: ICD-10-CM

## 2020-02-06 DIAGNOSIS — Z01.818 ENCOUNTER FOR OTHER PREPROCEDURAL EXAMINATION: ICD-10-CM

## 2020-02-06 DIAGNOSIS — N40.0 BENIGN PROSTATIC HYPERPLASIA WITHOUT LOWER URINARY TRACT SYMPTOMS: ICD-10-CM

## 2020-02-06 LAB
ANION GAP SERPL CALC-SCNC: 10 MMOL/L — SIGNIFICANT CHANGE UP (ref 5–17)
APTT BLD: 32 SEC — SIGNIFICANT CHANGE UP (ref 28.5–37)
BLD GP AB SCN SERPL QL: SIGNIFICANT CHANGE UP
BUN SERPL-MCNC: 33 MG/DL — HIGH (ref 7–23)
CALCIUM SERPL-MCNC: 8.2 MG/DL — LOW (ref 8.5–10.1)
CHLORIDE SERPL-SCNC: 101 MMOL/L — SIGNIFICANT CHANGE UP (ref 96–108)
CO2 SERPL-SCNC: 27 MMOL/L — SIGNIFICANT CHANGE UP (ref 22–31)
CREAT SERPL-MCNC: 6.54 MG/DL — HIGH (ref 0.5–1.3)
GLUCOSE SERPL-MCNC: 89 MG/DL — SIGNIFICANT CHANGE UP (ref 70–99)
HCT VFR BLD CALC: 35.3 % — LOW (ref 39–50)
HGB BLD-MCNC: 11.3 G/DL — LOW (ref 13–17)
INR BLD: 1.06 RATIO — SIGNIFICANT CHANGE UP (ref 0.88–1.16)
MCHC RBC-ENTMCNC: 30.3 PG — SIGNIFICANT CHANGE UP (ref 27–34)
MCHC RBC-ENTMCNC: 32 GM/DL — SIGNIFICANT CHANGE UP (ref 32–36)
MCV RBC AUTO: 94.6 FL — SIGNIFICANT CHANGE UP (ref 80–100)
NRBC # BLD: 0 /100 WBCS — SIGNIFICANT CHANGE UP (ref 0–0)
PLATELET # BLD AUTO: 198 K/UL — SIGNIFICANT CHANGE UP (ref 150–400)
POTASSIUM SERPL-MCNC: 4.3 MMOL/L — SIGNIFICANT CHANGE UP (ref 3.5–5.3)
POTASSIUM SERPL-SCNC: 4.3 MMOL/L — SIGNIFICANT CHANGE UP (ref 3.5–5.3)
PROTHROM AB SERPL-ACNC: 11.9 SEC — SIGNIFICANT CHANGE UP (ref 10–12.9)
RBC # BLD: 3.73 M/UL — LOW (ref 4.2–5.8)
RBC # FLD: 13.7 % — SIGNIFICANT CHANGE UP (ref 10.3–14.5)
SODIUM SERPL-SCNC: 138 MMOL/L — SIGNIFICANT CHANGE UP (ref 135–145)
WBC # BLD: 7.83 K/UL — SIGNIFICANT CHANGE UP (ref 3.8–10.5)
WBC # FLD AUTO: 7.83 K/UL — SIGNIFICANT CHANGE UP (ref 3.8–10.5)

## 2020-02-06 PROCEDURE — 99203 OFFICE O/P NEW LOW 30 MIN: CPT

## 2020-02-06 PROCEDURE — 93986 DUP-SCAN HEMO COMPL UNI STD: CPT

## 2020-02-06 PROCEDURE — 93931 UPPER EXTREMITY STUDY: CPT | Mod: 59

## 2020-02-06 RX ORDER — OMEPRAZOLE 10 MG/1
1 CAPSULE, DELAYED RELEASE ORAL
Qty: 0 | Refills: 0 | DISCHARGE

## 2020-02-06 NOTE — PHYSICAL EXAM
[JVD] : no jugular venous distention  [Normal Breath Sounds] : Normal breath sounds [Normal Rate and Rhythm] : normal rate and rhythm [2+] : right 2+ [Ankle Swelling (On Exam)] : not present [Varicose Veins Of Lower Extremities] : not present [No Rash or Lesion] : No rash or lesion [Abdomen Tenderness] : ~T ~M No abdominal tenderness [] : not present [Calm] : calm [Alert] : alert [Skin Ulcer] : no ulcer [de-identified] : appears well

## 2020-02-06 NOTE — HISTORY OF PRESENT ILLNESS
[FreeTextEntry1] : 59-year-old gentleman currently on hemodialysis via right-sided permacath. Patient had been on PD however, has had malfunctioning of his catheter. He is scheduled to have this catheter removed later this month. He is here in consultation for permanent hemodialysis access. Patient is right-handed the

## 2020-02-06 NOTE — H&P PST ADULT - HISTORY OF PRESENT ILLNESS
59M pmhx HTN, HLD ,ESRD formerly on PD but now on HD s/p right chest wall permacath placement 9 days ago, ..........here today for bleeding of permacath. Pt states he went for his first outpt HD session 2 days ago, at which time the dressing from the recently placed catheter was changed. .........Subsequently noted small amount of bleeding onto new gauze. Today the dressing became soaked in blood so came to ED. On previous HD session dialysis was completed w/o difficulty, but line was heparinized due to clotting. He denies pain at the site. No exertional sx. 59M pmhx HTN, HLD ,ESRD formerly on PD but now on HD s/p right chest wall permacath placement which was c/b bleeding at site.  Here today for PST for scheduled Removal of peritoneal dialysis catheter

## 2020-02-06 NOTE — REASON FOR VISIT
[Consultation] : a consultation visit [FreeTextEntry1] : Consultation for permanent hemodialysis access

## 2020-02-06 NOTE — H&P PST ADULT - NSICDXPROBLEM_GEN_ALL_CORE_FT
"Oncology Rooming Note    October 16, 2017 11:36 AM   Milly Loaiza is a 70 year old female who presents for:    Chief Complaint   Patient presents with     Oncology Clinic Visit     f/u breast cancer and review labs and mammogram     Initial Vitals: There were no vitals taken for this visit. Estimated body mass index is 27.66 kg/(m^2) as calculated from the following:    Height as of 4/5/17: 1.626 m (5' 4\").    Weight as of 4/5/17: 73.1 kg (161 lb 2 oz). There is no height or weight on file to calculate BSA.  Data Unavailable Comment: Data Unavailable   No LMP recorded. Patient is postmenopausal.  Allergies reviewed: Yes  Medications reviewed: Yes    Medications: Medication refills not needed today.  Pharmacy name entered into [employer]: [facility] [place], [place] - [address] [facility]    Clinical concerns: Patient presents in f/u of breast cancer and to review labs and mammogram.  Just getting over a cold, lasted about a week.  No other c/o at this time. Dr. Lerma was notified.    7 minutes for nursing intake (face to face time)     Kacey Loaiza RN              " PROBLEM DIAGNOSES  Problem: Chronic kidney disease, unspecified  Assessment and Plan: Removal of peritoneal dialysis catheter  Pre-op instructions given, patient verbalized understanding  Chlorhexidine wash instructions given     Problem: HTN (hypertension)  Assessment and Plan: Continue current regimen and medications.     Problem: BPH (benign prostatic hyperplasia)  Assessment and Plan: Continue current regimen and medications.

## 2020-02-06 NOTE — ASSESSMENT
[FreeTextEntry1] : The patient underwent left arm vein mapping which shows inadequate cephalic vein beginning at the left wrist. The patient will be scheduled for a left radiocephalic AV fistula

## 2020-02-06 NOTE — H&P PST ADULT - NEGATIVE GASTROINTESTINAL SYMPTOMS
no constipation/no change in bowel habits/no vomiting/no abdominal pain/no flatulence/no diarrhea/no nausea

## 2020-02-06 NOTE — H&P PST ADULT - ASSESSMENT
59M pmhx HTN, HLD ,ESRD formerly on PD but now on HD s/p right chest wall permacath placement which was c/b bleeding at site.  Here today for PST for scheduled Removal of peritoneal dialysis catheter  CAPRINI SCORE    AGE RELATED RISK FACTORS                                                       MOBILITY RELATED FACTORS  [x ] Age 41-60 years                                            (1 Point)                  [ ] Bed rest                                                        (1 Point)  [ ] Age: 61-74 years                                           (2 Points)                [ ] Plaster cast                                                   (2 Points)  [ ] Age= 75 years                                              (3 Points)                 [ ] Bed bound for more than 72 hours                   (2 Points)    DISEASE RELATED RISK FACTORS                                               GENDER SPECIFIC FACTORS  [ ] Edema in the lower extremities                       (1 Point)                  [ ] Pregnancy                                                     (1 Point)  [ ] Varicose veins                                               (1 Point)                  [ ] Post-partum < 6 weeks                                   (1 Point)             [x ] BMI > 25 Kg/m2                                            (1 Point)                  [ ] Hormonal therapy  or oral contraception            (1 Point)                 [ ] Sepsis (in the previous month)                        (1 Point)                  [ ] History of pregnancy complications  [ ] Pneumonia or serious lung disease                                               [ ] Unexplained or recurrent                       (1 Point)           (in the previous month)                               (1 Point)  [ ] Abnormal pulmonary function test                     (1 Point)                 SURGERY RELATED RISK FACTORS  [ ] Acute myocardial infarction                              (1 Point)                 [ ]  Section                                            (1 Point)  [ ] Congestive heart failure (in the previous month)  (1 Point)                 [ ] Minor surgery                                                 (1 Point)   [ ] Inflammatory bowel disease                             (1 Point)                 [ ] Arthroscopic surgery                                        (2 Points)  [ ] Central venous access                                    (2 Points)                [x ] General surgery lasting more than 45 minutes   (2 Points)       [ ] Stroke (in the previous month)                          (5 Points)               [ ] Elective arthroplasty                                        (5 Points)                                                                                                                                               HEMATOLOGY RELATED FACTORS                                                 TRAUMA RELATED RISK FACTORS  [ ] Prior episodes of VTE                                     (3 Points)                 [ ] Fracture of the hip, pelvis, or leg                       (5 Points)  [ ] Positive family history for VTE                         (3 Points)                 [ ] Acute spinal cord injury (in the previous month)  (5 Points)  [ ] Prothrombin 16550 A                                      (3 Points)                 [ ] Paralysis  (less than 1 month)                          (5 Points)  [ ] Factor V Leiden                                             (3 Points)                 [ ] Multiple Trauma within 1 month                         (5 Points)  [ ] Lupus anticoagulants                                     (3 Points)                                                           [ ] Anticardiolipin antibodies                                (3 Points)                                                       [ ] High homocysteine in the blood                      (3 Points)                                             [ ] Other congenital or acquired thrombophilia       (3 Points)                                                [ ] Heparin induced thrombocytopenia                  (3 Points)                                          Total Score [      4    ]

## 2020-02-17 ENCOUNTER — TRANSCRIPTION ENCOUNTER (OUTPATIENT)
Age: 60
End: 2020-02-17

## 2020-02-18 ENCOUNTER — APPOINTMENT (OUTPATIENT)
Dept: SURGERY | Facility: HOSPITAL | Age: 60
End: 2020-02-18

## 2020-02-18 ENCOUNTER — OUTPATIENT (OUTPATIENT)
Dept: OUTPATIENT SERVICES | Facility: HOSPITAL | Age: 60
LOS: 1 days | Discharge: HOME | End: 2020-02-18
Payer: COMMERCIAL

## 2020-02-18 ENCOUNTER — RESULT REVIEW (OUTPATIENT)
Age: 60
End: 2020-02-18

## 2020-02-18 VITALS
RESPIRATION RATE: 17 BRPM | SYSTOLIC BLOOD PRESSURE: 135 MMHG | OXYGEN SATURATION: 100 % | WEIGHT: 185.19 LBS | HEIGHT: 69 IN | DIASTOLIC BLOOD PRESSURE: 79 MMHG | TEMPERATURE: 97 F | HEART RATE: 65 BPM

## 2020-02-18 VITALS
RESPIRATION RATE: 16 BRPM | SYSTOLIC BLOOD PRESSURE: 148 MMHG | TEMPERATURE: 98 F | HEART RATE: 65 BPM | DIASTOLIC BLOOD PRESSURE: 86 MMHG | OXYGEN SATURATION: 100 %

## 2020-02-18 DIAGNOSIS — Z98.890 OTHER SPECIFIED POSTPROCEDURAL STATES: Chronic | ICD-10-CM

## 2020-02-18 PROCEDURE — 49422 REMOVE TUNNELED IP CATH: CPT

## 2020-02-18 PROCEDURE — 88300 SURGICAL PATH GROSS: CPT | Mod: 26

## 2020-02-18 RX ORDER — HYDROMORPHONE HYDROCHLORIDE 2 MG/ML
0.5 INJECTION INTRAMUSCULAR; INTRAVENOUS; SUBCUTANEOUS
Refills: 0 | Status: DISCONTINUED | OUTPATIENT
Start: 2020-02-18 | End: 2020-02-18

## 2020-02-18 RX ORDER — ACETAMINOPHEN 500 MG
1000 TABLET ORAL ONCE
Refills: 0 | Status: COMPLETED | OUTPATIENT
Start: 2020-02-18 | End: 2020-02-18

## 2020-02-18 RX ORDER — SODIUM CHLORIDE 9 MG/ML
3 INJECTION INTRAMUSCULAR; INTRAVENOUS; SUBCUTANEOUS EVERY 8 HOURS
Refills: 0 | Status: DISCONTINUED | OUTPATIENT
Start: 2020-02-18 | End: 2020-02-18

## 2020-02-18 RX ADMIN — Medication 1000 MILLIGRAM(S): at 14:40

## 2020-02-18 RX ADMIN — Medication 400 MILLIGRAM(S): at 14:25

## 2020-02-18 NOTE — ASU DISCHARGE PLAN (ADULT/PEDIATRIC) - CALL YOUR DOCTOR IF YOU HAVE ANY OF THE FOLLOWING:
Wound/Surgical Site with redness, or foul smelling discharge or pus/Swelling that gets worse/Unable to urinate/Fever greater than (need to indicate Fahrenheit or Celsius)/Bleeding that does not stop
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
## 2020-02-20 DIAGNOSIS — I12.0 HYPERTENSIVE CHRONIC KIDNEY DISEASE WITH STAGE 5 CHRONIC KIDNEY DISEASE OR END STAGE RENAL DISEASE: ICD-10-CM

## 2020-02-20 DIAGNOSIS — Z49.02 ENCOUNTER FOR FITTING AND ADJUSTMENT OF PERITONEAL DIALYSIS CATHETER: ICD-10-CM

## 2020-02-20 DIAGNOSIS — E78.00 PURE HYPERCHOLESTEROLEMIA, UNSPECIFIED: ICD-10-CM

## 2020-02-20 DIAGNOSIS — N18.6 END STAGE RENAL DISEASE: ICD-10-CM

## 2020-02-20 DIAGNOSIS — M10.9 GOUT, UNSPECIFIED: ICD-10-CM

## 2020-02-20 DIAGNOSIS — E21.3 HYPERPARATHYROIDISM, UNSPECIFIED: ICD-10-CM

## 2020-02-20 DIAGNOSIS — E78.5 HYPERLIPIDEMIA, UNSPECIFIED: ICD-10-CM

## 2020-02-20 DIAGNOSIS — N40.0 BENIGN PROSTATIC HYPERPLASIA WITHOUT LOWER URINARY TRACT SYMPTOMS: ICD-10-CM

## 2020-02-20 DIAGNOSIS — K21.9 GASTRO-ESOPHAGEAL REFLUX DISEASE WITHOUT ESOPHAGITIS: ICD-10-CM

## 2020-03-04 ENCOUNTER — APPOINTMENT (OUTPATIENT)
Dept: SURGERY | Facility: CLINIC | Age: 60
End: 2020-03-04
Payer: COMMERCIAL

## 2020-03-04 VITALS
TEMPERATURE: 97.9 F | DIASTOLIC BLOOD PRESSURE: 88 MMHG | HEIGHT: 68 IN | BODY MASS INDEX: 27.65 KG/M2 | SYSTOLIC BLOOD PRESSURE: 158 MMHG | OXYGEN SATURATION: 100 % | WEIGHT: 182.44 LBS | HEART RATE: 64 BPM

## 2020-03-04 DIAGNOSIS — Z09 ENCOUNTER FOR FOLLOW-UP EXAMINATION AFTER COMPLETED TREATMENT FOR CONDITIONS OTHER THAN MALIGNANT NEOPLASM: ICD-10-CM

## 2020-03-04 PROCEDURE — 99212 OFFICE O/P EST SF 10 MIN: CPT

## 2020-03-04 NOTE — ASSESSMENT
[FreeTextEntry1] : Patient is well, states some bloody drainage from one of the skin sites.\par \par \par all sutures removed, Small hematoma at right sided skin site. old blood evacuated through small skin opening.\par \par dressed with 4x4.\par \par patient instructed to f/u in 1 week if wound does not close.

## 2020-03-10 ENCOUNTER — OUTPATIENT (OUTPATIENT)
Dept: OUTPATIENT SERVICES | Facility: HOSPITAL | Age: 60
LOS: 1 days | End: 2020-03-10
Payer: COMMERCIAL

## 2020-03-10 VITALS
TEMPERATURE: 99 F | HEIGHT: 67.5 IN | HEART RATE: 68 BPM | WEIGHT: 186.07 LBS | DIASTOLIC BLOOD PRESSURE: 83 MMHG | RESPIRATION RATE: 20 BRPM | SYSTOLIC BLOOD PRESSURE: 131 MMHG | OXYGEN SATURATION: 99 %

## 2020-03-10 DIAGNOSIS — G47.33 OBSTRUCTIVE SLEEP APNEA (ADULT) (PEDIATRIC): ICD-10-CM

## 2020-03-10 DIAGNOSIS — N18.6 END STAGE RENAL DISEASE: ICD-10-CM

## 2020-03-10 DIAGNOSIS — Z01.818 ENCOUNTER FOR OTHER PREPROCEDURAL EXAMINATION: ICD-10-CM

## 2020-03-10 DIAGNOSIS — T85.611A BREAKDOWN (MECHANICAL) OF INTRAPERITONEAL DIALYSIS CATHETER, INITIAL ENCOUNTER: Chronic | ICD-10-CM

## 2020-03-10 DIAGNOSIS — Z98.890 OTHER SPECIFIED POSTPROCEDURAL STATES: Chronic | ICD-10-CM

## 2020-03-10 PROCEDURE — G0463: CPT

## 2020-03-10 NOTE — H&P PST ADULT - BMI (KG/M2)
28.7 Information: Selecting Yes will display possible errors in your note based on the variables you have selected. This validation is only offered as a suggestion for you. PLEASE NOTE THAT THE VALIDATION TEXT WILL BE REMOVED WHEN YOU FINALIZE YOUR NOTE. IF YOU WANT TO FAX A PRELIMINARY NOTE YOU WILL NEED TO TOGGLE THIS TO 'NO' IF YOU DO NOT WANT IT IN YOUR FAXED NOTE.

## 2020-03-10 NOTE — H&P PST ADULT - LAST CARDIAC ANGIOGRAM/IMAGING
- appreciate vascular neurology assistance  - likely acute encephalopathy related to infectious process, AMS improved this am   denies

## 2020-03-10 NOTE — H&P PST ADULT - NSICDXPASTSURGICALHX_GEN_ALL_CORE_FT
[facility]...
PAST SURGICAL HISTORY:  History of lung surgery     Vocal cord anomaly S/P polyp removal 2004

## 2020-03-10 NOTE — H&P PST ADULT - NSANTHOSAYNRD_GEN_A_CORE
criteria/No. DEVON screening performed.  STOP BANG Legend: 0-2 = LOW Risk; 3-4 = INTERMEDIATE Risk; 5-8 = HIGH Risk

## 2020-03-10 NOTE — H&P PST ADULT - NSICDXPASTSURGICALHX_GEN_ALL_CORE_FT
PAST SURGICAL HISTORY:  History of lung surgery 2013- benign    Peritoneal dialysis catheter dysfunction s/p removal 2/20    Vocal cord anomaly S/P polyp removal 2004

## 2020-03-10 NOTE — H&P PST ADULT - NEGATIVE GASTROINTESTINAL SYMPTOMS
no nausea/no flatulence/no diarrhea/no vomiting/no constipation/no change in bowel habits/no abdominal pain

## 2020-03-10 NOTE — H&P PST ADULT - NSICDXPROBLEM_GEN_ALL_CORE_FT
PROBLEM DIAGNOSES  Problem: ESRD on dialysis  Assessment and Plan: Creation Left  Radiocephalic AV Fistula on 3/18/20  Pre- op instructions discussed   Labs - will do at PMD office  this week- will fax results   pt will take BP medication the DOS     Problem: DEVON (obstructive sleep apnea)  Assessment and Plan: OR booking notified   DEVON precautions

## 2020-03-10 NOTE — H&P PST ADULT - HISTORY OF PRESENT ILLNESS
59 M pmhx HTN, HLD ,ESRD on dialysis  (M,W,F) formerly on PD but now on HD s/p right chest wall PermCath placement which was c/b bleeding at site s/p Removal of peritoneal dialysis catheter. Presents to PST for scheduled  Creation of Left Radiocephalic AV fistula on 3/18/20.

## 2020-03-16 LAB
BASOPHILS # BLD AUTO: 0.04 K/UL
BASOPHILS NFR BLD AUTO: 0.5 %
CHOLEST SERPL-MCNC: 125 MG/DL
CHOLEST/HDLC SERPL: 2.5 RATIO
EOSINOPHIL # BLD AUTO: 0.62 K/UL
EOSINOPHIL NFR BLD AUTO: 7.9 %
ESTIMATED AVERAGE GLUCOSE: 94 MG/DL
HBA1C MFR BLD HPLC: 4.9 %
HCT VFR BLD CALC: 38.7 %
HDLC SERPL-MCNC: 51 MG/DL
HGB BLD-MCNC: 12.2 G/DL
IMM GRANULOCYTES NFR BLD AUTO: 0.3 %
LDLC SERPL CALC-MCNC: 51 MG/DL
LYMPHOCYTES # BLD AUTO: 1.89 K/UL
LYMPHOCYTES NFR BLD AUTO: 24.1 %
MAN DIFF?: NORMAL
MCHC RBC-ENTMCNC: 31 PG
MCHC RBC-ENTMCNC: 31.5 GM/DL
MCV RBC AUTO: 98.2 FL
MONOCYTES # BLD AUTO: 0.63 K/UL
MONOCYTES NFR BLD AUTO: 8 %
NEUTROPHILS # BLD AUTO: 4.65 K/UL
NEUTROPHILS NFR BLD AUTO: 59.2 %
PHOSPHATE SERPL-MCNC: 5.2 MG/DL
PLATELET # BLD AUTO: 190 K/UL
RBC # BLD: 3.94 M/UL
RBC # FLD: 13.7 %
T3FREE SERPL-MCNC: 2.89 PG/ML
T4 FREE SERPL-MCNC: 1.4 NG/DL
TRIGL SERPL-MCNC: 116 MG/DL
TSH SERPL-ACNC: 0.62 UIU/ML
URATE SERPL-MCNC: 4.2 MG/DL
WBC # FLD AUTO: 7.85 K/UL

## 2020-04-01 NOTE — PROGRESS NOTE ADULT - ASSESSMENT
58M pmhx HTN, HLD, BPH, CKD5 (not on HD) who presents with 1 day of abd pain, found to have first episode of sigmoid diverticulitis, perforated.     PLAN:  - advance to clears  - cont Cipro & flagyl (renally dosed)  - Serial abdominal exams  - appreciate renal/GI recs [facility]

## 2020-04-20 ENCOUNTER — APPOINTMENT (OUTPATIENT)
Dept: ENDOVASCULAR SURGERY | Facility: CLINIC | Age: 60
End: 2020-04-20

## 2020-04-23 ENCOUNTER — FORM ENCOUNTER (OUTPATIENT)
Age: 60
End: 2020-04-23

## 2020-04-24 ENCOUNTER — APPOINTMENT (OUTPATIENT)
Dept: ENDOVASCULAR SURGERY | Facility: CLINIC | Age: 60
End: 2020-04-24
Payer: COMMERCIAL

## 2020-04-24 VITALS
SYSTOLIC BLOOD PRESSURE: 182 MMHG | OXYGEN SATURATION: 100 % | DIASTOLIC BLOOD PRESSURE: 73 MMHG | RESPIRATION RATE: 16 BRPM | BODY MASS INDEX: 35.73 KG/M2 | WEIGHT: 182 LBS | HEART RATE: 72 BPM | HEIGHT: 60 IN | TEMPERATURE: 98.1 F

## 2020-04-24 PROCEDURE — 36589 REMOVAL TUNNELED CV CATH: CPT

## 2020-04-24 PROCEDURE — 76937 US GUIDE VASCULAR ACCESS: CPT

## 2020-04-24 PROCEDURE — 36558 INSERT TUNNELED CV CATH: CPT

## 2020-04-24 PROCEDURE — 77001 FLUOROGUIDE FOR VEIN DEVICE: CPT

## 2020-04-24 RX ORDER — HYDRALAZINE HYDROCHLORIDE 50 MG/1
50 TABLET ORAL TWICE DAILY
Qty: 180 | Refills: 3 | Status: DISCONTINUED | COMMUNITY
Start: 2016-05-03 | End: 2020-04-24

## 2020-04-24 RX ORDER — GENTAMICIN SULFATE 1 MG/G
0.1 CREAM TOPICAL DAILY
Qty: 3 | Refills: 3 | Status: DISCONTINUED | COMMUNITY
Start: 2019-09-27 | End: 2020-04-24

## 2020-04-24 RX ORDER — CALCIUM ACETATE 667 MG
667 TABLET ORAL 3 TIMES DAILY
Qty: 540 | Refills: 3 | Status: DISCONTINUED | COMMUNITY
Start: 2017-11-20 | End: 2020-04-24

## 2020-04-24 RX ORDER — SODIUM POLYSTYRENE SULFONATE 15 G/60ML
15 SUSPENSION ORAL; RECTAL DAILY
Qty: 900 | Refills: 2 | Status: DISCONTINUED | COMMUNITY
Start: 2019-03-23 | End: 2020-04-24

## 2020-04-24 RX ORDER — SEVELAMER CARBONATE 800 MG/1
800 TABLET, FILM COATED ORAL 3 TIMES DAILY
Qty: 270 | Refills: 3 | Status: DISCONTINUED | COMMUNITY
Start: 2018-02-05 | End: 2020-04-24

## 2020-04-24 RX ORDER — SODIUM POLYSTYRENE SULFONATE 4.1 MEQ/G
POWDER, FOR SUSPENSION ORAL; RECTAL DAILY
Qty: 1350 | Refills: 3 | Status: DISCONTINUED | COMMUNITY
Start: 2019-03-23 | End: 2020-04-24

## 2020-04-28 NOTE — PAST MEDICAL HISTORY
[No therapy indicated for cases scheduled for less than one hour] : No therapy indicated for cases scheduled for less than one hour. [Increasing age ( >40 years old)] : Increasing age ( >40 years old) [FreeTextEntry1] : Malignant Hyperthermia Screening Tool and Risk of Bleeding Assessment \par \par Mr. ELVIA ACEVEDO denies family of unexpected death following Anesthesia or Exercise.\par Denies Family history of Malignant Hyperthermia, Muscle or Neuromuscular disorder and High Temperature  following exercise.\par \par Mr. ELVIA ACEVEDO denies history of Muscle Spasm, Dark or Chocolate- Colored and Unanticipated fever immediately following anaesthesia or serious exercise.\par Mr. ELVIA ACEVEDO also denies bleeding tendencies/Risks of Bleeding.\par

## 2020-04-28 NOTE — PROCEDURE
[D/C IV on discharge] : D/C IV on discharge [Site check for bleeding/hematoma] : Site check for bleeding/hematoma [Resume diet] : resume diet [Vital signs on admission the q 15 mins x2] : Vital signs on admission the q 15 mins x2 [FreeTextEntry1] : right tunneled catheter removal/insertion of left tunneled catheter

## 2020-04-28 NOTE — HISTORY OF PRESENT ILLNESS
[FreeTextEntry1] : referred from dialysis/dermatitis noted around catheter site, drainage on dressing\par received vancomycin at dialysis\par alert and oriented x 3 \par accompanied by wife and daughter\par no reported falls\par took metoprolol this morning [FreeTextEntry4] : 4/22/2020 [FreeTextEntry5] : yesterday at 7pm [FreeTextEntry6] : Dr Lu

## 2020-05-05 ENCOUNTER — APPOINTMENT (OUTPATIENT)
Dept: NEPHROLOGY | Facility: CLINIC | Age: 60
End: 2020-05-05

## 2020-05-07 ENCOUNTER — APPOINTMENT (OUTPATIENT)
Dept: ENDOVASCULAR SURGERY | Facility: CLINIC | Age: 60
End: 2020-05-07
Payer: COMMERCIAL

## 2020-05-07 VITALS
HEART RATE: 114 BPM | SYSTOLIC BLOOD PRESSURE: 157 MMHG | HEIGHT: 69 IN | DIASTOLIC BLOOD PRESSURE: 77 MMHG | OXYGEN SATURATION: 98 % | BODY MASS INDEX: 26.78 KG/M2 | TEMPERATURE: 98.2 F | RESPIRATION RATE: 15 BRPM | WEIGHT: 180.78 LBS

## 2020-05-07 PROCEDURE — 35321 RECHANNELING OF ARTERY: CPT | Mod: 82,LT

## 2020-05-07 PROCEDURE — 35321 RECHANNELING OF ARTERY: CPT | Mod: LT

## 2020-05-07 PROCEDURE — 36820 AV FUSION/FOREARM VEIN: CPT | Mod: 59,LT

## 2020-05-07 NOTE — HISTORY OF PRESENT ILLNESS
[FreeTextEntry1] : alert and oriented x 3 \par accompanied by wife\par no reported falls\par  [FreeTextEntry4] : yesterday [FreeTextEntry6] : Dr Lu  [FreeTextEntry5] : yesterday at 9 pm

## 2020-05-07 NOTE — HISTORY OF PRESENT ILLNESS
[FreeTextEntry1] : alert and oriented x 3 \par accompanied by wife\par no reported falls\par  [FreeTextEntry4] : yesterday [FreeTextEntry5] : yesterday at 9 pm [FreeTextEntry6] : Dr Lu

## 2020-05-07 NOTE — PROCEDURE
[Resume diet] : resume diet [Site check for bleeding/hematoma] : Site check for bleeding/hematoma [Vital signs on admission the q 15 mins x2] : Vital signs on admission the q 15 mins x2 [FreeTextEntry1] : Creation left radiocephalic transposed AV fistula\par Left radial artery endarterectomy [FreeTextEntry2] : 59-year-old gentleman currently on hemodialysis via left-sided permacath.  Patient presents to the Center today for creation of left radiocephalic AV fistula [FreeTextEntry3] : Attending Surgeon: Gibran Castillo MD\par Assistant Surgeon: Clinton Figueroa MD\par \par Patient with a vision his left arm was prepped and draped in usual sterile fashion, a timeout was performed care was discussed with anesthesia.  1% lidocaine was infiltrated over the left wrist area and incision was made.  The underlying subcutaneous tissue was dissected and the cephalic vein was visualized.  It was then dissected both proximally and distally in all side branches were ligated divided using 4-0 silk ties.  It was then transected distally and gently dilated with heparinized saline solution.  The deep fascia was then opened and the radial artery was identified vessels were placed proximally and distally.  A small amount of systemic heparin was given.  Clamps were then placed onto the radial artery and an arteriotomy was made.  There was debris in the radial artery therefore, a limited endarterectomy was performed and a good endpoint was obtained.  The vein was then sewn to the arteriotomy using a running 6-0 Prolene stitch.  Upon completion the clamps released and there was noted to be a good thrill into the cephalic vein.  Hemostasis was then achieved.  The deep layer was closed with interrupted 3-0 Vicryl stitches.  The skin was closed using 4-0 nylon sutures.  A dry sterile dressing was applied patient tolerated the procedure well and was transferred to the recovery area in stable condition with a palpable thrill

## 2020-05-07 NOTE — PAST MEDICAL HISTORY
[Increasing age ( >40 years old)] : Increasing age ( >40 years old) [No therapy indicated for cases scheduled for less than one hour] : No therapy indicated for cases scheduled for less than one hour. [FreeTextEntry1] : Malignant Hyperthermia Screening Tool and Risk of Bleeding Assessment \par \par Mr. ELVIA ACEVEDO denies family of unexpected death following Anesthesia or Exercise.\par Denies Family history of Malignant Hyperthermia, Muscle or Neuromuscular disorder and High Temperature  following exercise.\par \par Mr. ELVIA ACEVEDO denies history of Muscle Spasm, Dark or Chocolate- Colored and Unanticipated fever immediately following anaesthesia or serious exercise.\par Mr. ELVIA ACEVEDO also denies bleeding tendencies/Risks of Bleeding.\par

## 2020-05-19 ENCOUNTER — APPOINTMENT (OUTPATIENT)
Dept: VASCULAR SURGERY | Facility: CLINIC | Age: 60
End: 2020-05-19
Payer: COMMERCIAL

## 2020-05-19 VITALS — BODY MASS INDEX: 26.66 KG/M2 | TEMPERATURE: 98.1 F | WEIGHT: 180 LBS | HEIGHT: 69 IN

## 2020-05-19 PROCEDURE — 93990 DOPPLER FLOW TESTING: CPT

## 2020-05-19 PROCEDURE — 99024 POSTOP FOLLOW-UP VISIT: CPT

## 2020-05-19 NOTE — REASON FOR VISIT
[de-identified] : Creation left radiocephalic AV fistula [de-identified] : Status post creation left radiocephalic fistula.  Patient currently on hemodialysis via right-sided permacath.

## 2020-05-19 NOTE — PHYSICAL EXAM
[Normal Breath Sounds] : Normal breath sounds [JVD] : no jugular venous distention  [Normal Rate and Rhythm] : normal rate and rhythm [No Rash or Lesion] : No rash or lesion [Skin Ulcer] : no ulcer [FreeTextEntry1] : Excellent thrill in fistula [de-identified] : Appears well

## 2020-05-19 NOTE — DISCUSSION/SUMMARY
[FreeTextEntry1] : Patient underwent a duplex study which shows no evidence of stenosis at this time.  Fistula is functioning well.  Would recommend maturation and approximately 2 weeks.

## 2020-06-02 ENCOUNTER — TRANSCRIPTION ENCOUNTER (OUTPATIENT)
Age: 60
End: 2020-06-02

## 2020-06-04 ENCOUNTER — FORM ENCOUNTER (OUTPATIENT)
Age: 60
End: 2020-06-04

## 2020-06-05 ENCOUNTER — APPOINTMENT (OUTPATIENT)
Dept: ENDOVASCULAR SURGERY | Facility: CLINIC | Age: 60
End: 2020-06-05
Payer: COMMERCIAL

## 2020-06-05 VITALS
RESPIRATION RATE: 20 BRPM | WEIGHT: 180.78 LBS | BODY MASS INDEX: 26.78 KG/M2 | OXYGEN SATURATION: 100 % | HEART RATE: 80 BPM | TEMPERATURE: 97.1 F | HEIGHT: 69 IN | SYSTOLIC BLOOD PRESSURE: 146 MMHG | DIASTOLIC BLOOD PRESSURE: 83 MMHG

## 2020-06-05 PROCEDURE — 36909Z: CUSTOM

## 2020-06-05 PROCEDURE — 36902Z: CUSTOM | Mod: 58,59

## 2020-06-05 PROCEDURE — 36011Z: CUSTOM | Mod: 59

## 2020-06-05 RX ORDER — ASPIRIN 81 MG/1
81 TABLET ORAL DAILY
Refills: 0 | Status: DISCONTINUED | COMMUNITY
End: 2020-06-05

## 2020-06-05 RX ORDER — OXYCODONE AND ACETAMINOPHEN 5; 325 MG/1; MG/1
5-325 TABLET ORAL
Qty: 18 | Refills: 0 | Status: DISCONTINUED | COMMUNITY
Start: 2020-05-07 | End: 2020-06-05

## 2020-06-05 NOTE — PROCEDURE
[Resume diet] : resume diet [Site check for bleeding/hematoma] : Site check for bleeding/hematoma [Vital signs on admission the q 15 mins x2] : Vital signs on admission the q 15 mins x2 [FreeTextEntry1] : vital signs on admission and q15 x2; site check for bleeding/hematoma/thrill

## 2020-06-05 NOTE — REASON FOR VISIT
[Other ___] : a [unfilled] visit for [Fistula Creation] : fistula creation [Non-Maturing Fistula] : non-maturing fistula

## 2020-06-05 NOTE — HISTORY OF PRESENT ILLNESS
[] : left internal jugular tunneled catheter [FreeTextEntry1] : creation Dr Castillo 5-7-2020 [FreeTextEntry2] : left tunneled catheter 4-29-20 [FreeTextEntry4] : yesterday via tunneled catheter [FreeTextEntry5] : yesterday at 9 pm [FreeTextEntry6] : Dr Lu

## 2020-06-10 ENCOUNTER — APPOINTMENT (OUTPATIENT)
Dept: VASCULAR SURGERY | Facility: HOSPITAL | Age: 60
End: 2020-06-10

## 2020-06-19 ENCOUNTER — APPOINTMENT (OUTPATIENT)
Dept: ENDOVASCULAR SURGERY | Facility: CLINIC | Age: 60
End: 2020-06-19
Payer: COMMERCIAL

## 2020-06-23 ENCOUNTER — APPOINTMENT (OUTPATIENT)
Dept: VASCULAR SURGERY | Facility: CLINIC | Age: 60
End: 2020-06-23

## 2020-06-27 ENCOUNTER — TRANSCRIPTION ENCOUNTER (OUTPATIENT)
Age: 60
End: 2020-06-27

## 2020-06-29 ENCOUNTER — FORM ENCOUNTER (OUTPATIENT)
Age: 60
End: 2020-06-29

## 2020-06-30 ENCOUNTER — APPOINTMENT (OUTPATIENT)
Dept: ENDOVASCULAR SURGERY | Facility: CLINIC | Age: 60
End: 2020-06-30
Payer: COMMERCIAL

## 2020-06-30 VITALS
TEMPERATURE: 97.8 F | WEIGHT: 180 LBS | RESPIRATION RATE: 15 BRPM | OXYGEN SATURATION: 99 % | SYSTOLIC BLOOD PRESSURE: 113 MMHG | HEIGHT: 69 IN | BODY MASS INDEX: 26.66 KG/M2 | DIASTOLIC BLOOD PRESSURE: 66 MMHG | HEART RATE: 78 BPM

## 2020-06-30 PROCEDURE — 36902Z: CUSTOM | Mod: 58,59

## 2020-06-30 PROCEDURE — 36011Z: CUSTOM | Mod: 59

## 2020-06-30 PROCEDURE — 36215Z: CUSTOM | Mod: 58,59

## 2020-06-30 PROCEDURE — 36909Z: CUSTOM

## 2020-06-30 NOTE — HISTORY OF PRESENT ILLNESS
[] : left internal jugular tunneled catheter [FreeTextEntry1] : creation Dr aCstillo 5-7-2020 [FreeTextEntry2] : left tunneled catheter 4-29-20 [FreeTextEntry4] : yesterday via tunneled catheter [FreeTextEntry5] : yesterday at 9 pm [FreeTextEntry6] : Dr Lu

## 2020-06-30 NOTE — REASON FOR VISIT
[Fistula Creation] : fistula creation [Other ___] : a [unfilled] visit for [Non-Maturing Fistula] : non-maturing fistula [Inadequate Flow within AVF] : inadequate flow within AVF

## 2020-06-30 NOTE — HISTORY OF PRESENT ILLNESS
[] : left radiocephalic fistula [FreeTextEntry1] : creation Dr Castillo 5-7-2020 [FreeTextEntry2] : left tunneled catheter 4-29-20 [FreeTextEntry4] : yesterday via tunneled catheter [FreeTextEntry5] : yesterday at 9 pm [FreeTextEntry6] : Dr Lu

## 2020-06-30 NOTE — PROCEDURE
[Site check for bleeding/hematoma] : Site check for bleeding/hematoma [Resume diet] : resume diet [Vital signs on admission the q 15 mins x2] : Vital signs on admission the q 15 mins x2 [FreeTextEntry1] : vital signs on admission and q15 x2; site check for bleeding/hematoma/thrill

## 2020-06-30 NOTE — REASON FOR VISIT
[Other ___] : a [unfilled] visit for [Fistula Creation] : fistula creation [Non-Maturing Fistula] : non-maturing fistula [Inadequate Flow within AVF] : inadequate flow within AVF

## 2020-07-14 ENCOUNTER — APPOINTMENT (OUTPATIENT)
Dept: VASCULAR SURGERY | Facility: CLINIC | Age: 60
End: 2020-07-14
Payer: COMMERCIAL

## 2020-07-14 VITALS — HEART RATE: 79 BPM | DIASTOLIC BLOOD PRESSURE: 78 MMHG | SYSTOLIC BLOOD PRESSURE: 135 MMHG

## 2020-07-14 VITALS — TEMPERATURE: 97.1 F

## 2020-07-14 PROCEDURE — 99024 POSTOP FOLLOW-UP VISIT: CPT

## 2020-07-14 PROCEDURE — 93990 DOPPLER FLOW TESTING: CPT

## 2020-07-14 NOTE — REASON FOR VISIT
[de-identified] : Status post creation left radiocephalic fistula.  Patient currently on hemodialysis via right-sided permacath. [de-identified] : Creation left radiocephalic AV fistula [Follow-Up Visit] : a follow-up visit for

## 2020-07-14 NOTE — REASON FOR VISIT
[de-identified] : Status post creation left radiocephalic fistula.  Patient currently on hemodialysis via right-sided permacath. [de-identified] : Creation left radiocephalic AV fistula [Follow-Up Visit] : a follow-up visit for

## 2020-07-16 NOTE — HISTORY OF PRESENT ILLNESS
[FreeTextEntry1] : pt on hd via permcath s/p left upper extrmeity radial cephalic avf \par no complaints at this time \par

## 2020-07-16 NOTE — PHYSICAL EXAM
[Normal Breath Sounds] : Normal breath sounds [JVD] : no jugular venous distention  [Normal Rate and Rhythm] : normal rate and rhythm [No Rash or Lesion] : No rash or lesion [de-identified] : Appears well [FreeTextEntry1] : Excellent thrill in fistula [Skin Ulcer] : no ulcer [Normal] : no acute distress, well-nourished, well developed, well appearing [Thrill] : thrill [Hand well perfused] : hand well perfused [Ulcer] : no ulcer

## 2020-07-16 NOTE — DISCUSSION/SUMMARY
[FreeTextEntry1] : 61 yo male with history of esrd on hd via permcath presents for follow up of left upper extremity radial cephalic avf \par duplex shows patent avf measuring 0.5-0.6 cm with flow rate of 925 \par pt advised that avf can be used for hd starting on 7/28/20\par will arrange for permcath removal in 4-5 weeks if no problems with access

## 2020-07-16 NOTE — PHYSICAL EXAM
[Normal Breath Sounds] : Normal breath sounds [JVD] : no jugular venous distention  [No Rash or Lesion] : No rash or lesion [Normal Rate and Rhythm] : normal rate and rhythm [FreeTextEntry1] : Excellent thrill in fistula [de-identified] : Appears well [Skin Ulcer] : no ulcer [Normal] : no acute distress, well-nourished, well developed, well appearing [Thrill] : thrill [Hand well perfused] : hand well perfused [Ulcer] : no ulcer

## 2020-07-16 NOTE — DISCUSSION/SUMMARY
[FreeTextEntry1] : 59 yo male with history of esrd on hd via permcath presents for follow up of left upper extremity radial cephalic avf \par duplex shows patent avf measuring 0.5-0.6 cm with flow rate of 925 \par pt advised that avf can be used for hd starting on 7/28/20\par will arrange for permcath removal in 4-5 weeks if no problems with access

## 2020-08-13 ENCOUNTER — FORM ENCOUNTER (OUTPATIENT)
Age: 60
End: 2020-08-13

## 2020-08-14 ENCOUNTER — APPOINTMENT (OUTPATIENT)
Dept: ENDOVASCULAR SURGERY | Facility: CLINIC | Age: 60
End: 2020-08-14
Payer: COMMERCIAL

## 2020-08-14 VITALS
SYSTOLIC BLOOD PRESSURE: 130 MMHG | DIASTOLIC BLOOD PRESSURE: 81 MMHG | OXYGEN SATURATION: 100 % | HEART RATE: 66 BPM | RESPIRATION RATE: 15 BRPM | BODY MASS INDEX: 26.66 KG/M2 | HEIGHT: 69 IN | WEIGHT: 180 LBS | TEMPERATURE: 97.6 F

## 2020-08-14 PROCEDURE — 36902Z: CUSTOM

## 2020-08-14 PROCEDURE — 36907Z: CUSTOM | Mod: 59

## 2020-08-14 PROCEDURE — 36589 REMOVAL TUNNELED CV CATH: CPT

## 2020-08-14 PROCEDURE — 36215Z: CUSTOM | Mod: 59

## 2020-08-14 NOTE — PROCEDURE
[Resume diet] : resume diet [Site check for bleeding/hematoma] : Site check for bleeding/hematoma [Site check for bleeding/hematoma/thrill/bruit] : Site check for bleeding/hematoma/thrill/bruit [Vital signs on admission the q 15 mins x2] : Vital signs on admission the q 15 mins x2 [FreeTextEntry1] : vital signs on admission and q15 x2; site check for bleeding/hematoma/thrill

## 2020-08-14 NOTE — HISTORY OF PRESENT ILLNESS
[] : left radiocephalic fistula [FreeTextEntry1] : creation Dr Castillo 5-7-2020 [FreeTextEntry2] : left tunneled catheter 4-29-20 [FreeTextEntry4] : Wednesday via tunneled catheter [FreeTextEntry5] : yesterday at 9 pm [FreeTextEntry6] : Dr Lu

## 2020-08-18 NOTE — H&P PST ADULT - PRO ARRIVE FROM
home Cyclosporine Counseling:  I discussed with the patient the risks of cyclosporine including but not limited to hypertension, gingival hyperplasia,myelosuppression, immunosuppression, liver damage, kidney damage, neurotoxicity, lymphoma, and serious infections. The patient understands that monitoring is required including baseline blood pressure, CBC, CMP, lipid panel and uric acid, and then 1-2 times monthly CMP and blood pressure.

## 2020-08-20 ENCOUNTER — APPOINTMENT (OUTPATIENT)
Dept: TRANSPLANT | Facility: CLINIC | Age: 60
End: 2020-08-20
Payer: SUBSIDIZED

## 2020-08-20 ENCOUNTER — APPOINTMENT (OUTPATIENT)
Dept: VASCULAR SURGERY | Facility: CLINIC | Age: 60
End: 2020-08-20

## 2020-08-20 ENCOUNTER — APPOINTMENT (OUTPATIENT)
Dept: TRANSPLANT | Facility: CLINIC | Age: 60
End: 2020-08-20

## 2020-08-20 PROCEDURE — 99215 OFFICE O/P EST HI 40 MIN: CPT | Mod: 95

## 2020-08-20 NOTE — REVIEW OF SYSTEMS
[As noted in HPI] : as noted in HPI [As Noted in HPI] : as noted in HPI [Negative] : Heme/Lymph [Wears glasses] : wears glasses [Urine Output: ____] : Urine Output: [unfilled] [Joint Pain] : joint pain [Muscle Pain] : muscle pain [Anemia] : anemia [Fever] : no fever [Chills] : no chills [Night Sweats] : no night sweats [Fatigue] : no fatigue [Recent Weight Gain (___ Lbs)] : no recent weight gain [Recent Weight Loss (___ Lbs)] : no recent weight loss [Sore throat] : no sore throat [Pain/Stiffness] : no pain/stiffness [Rhinorrhea] : no rhinorrhea [Trauma] : no trauma [Sclera anicteric] : sclera icteric [Double vision] : no double vision [Blurred Vision] : no blurred vision [Eye Pain] : no eye pain [Chest Pain] : no chest pain [Palpitations] : no palpitations [SOB] : no shortness of breath [Wheezing] : no wheezing [Dyspnea on Exertion] : no dyspnea on exertion [Cough] : no cough [Pleuritic Chest Pain] : no pleuritic chest pain [Abdominal Pain] : no abdominal pain [Nausea] : no nausea [Constipation] : no constipation [Dysuria] : no dysuria [Vomiting] : no vomiting [Diarrhea] : diarrhea [Frequency] : no frequency [Urgency] : no urinary urgency [Hematuria] : no hematuria [Incontinence] : no incontinence [UTI] : no UTI [Joint Stiffness] : no joint stiffness [Tattoos] : no tattoos [Muscle Weakness] : no muscle weakness [Itching] : no itching [Skin Rash] : no skin rash [Headache] : no headache [Dizziness] : no dizziness [Hair Changes] : no hair changes [Fainting] : no fainting [Confusion] : no confusion [Unsteady Gait] : steady gait [Memory Loss] : no memory loss [Seizures] : no seizures [Suicidal] : not suicidal [Hallucinations] : no hallucinations [Anxiety] : no anxiety [Adenopathy] : no adenopathy [Depression] : no depression [Easy Bleeding] : no easy bleeding [Easy Bruising] : no easy bruising

## 2020-08-20 NOTE — HISTORY OF PRESENT ILLNESS
[Other: ___] : [unfilled] [Cardiac History] : cardiac history [Claudication/Angina] : claudication/angina [Previous Kidney Transplant] : no previous kidney transplant [Blood Transfusion] : no prior blood transfusion [Hx of DVT/Thrombosis/Miscarriage] : no history of dvt/thrombosis/miscarriage [Diabetes] : no diabetes [TextBox_42] : CHF - at time of starting dialysis\par Mother - age 90- HTN\par Smoking.  None since 2004\par Drinking: none [TextBox_16] : 2019 - peritoneal dialysis [TextBox_20] : 1987 [TextBox_24] : [address] [TextBox_28] : [place] [TextBox_30] : 2 blocks  [de-identified] : Working full time as a  for [employer] [FreeTextEntry1] : 60 M Patient is here for annual follow up. \par Diagnosed with Kidney failure . ESRD Secondary to DM  \par HTN .  BPH, GERD\par No Hx of CAD, PVD, thrombosis. \par Smoking 1 PPDx15 years, no ETOH or RD\par Family Hx of CKD sister  age 30, Maternal cousin on dialysis on transplant let in [place].\par Working full time as a central  for the [employer]. \par 1 daughter, healthy. \par Other Med Hx BPH, GERD\par Past Sx Hx Vocal cord surgery- benign polyp, pulmonary mass- benign, thyroid lesion- benign. \par Last Seen for annual 2019 (seen Dr Downs for nephrology more frequently also follows Dr Fleming)\par Activity: pt leads a sedentary life. Denying daily exercise. ABle to walk about two blocks limited by fatigue.\par No hospitalizations or blood transfusions transfusions since last visit.  \par \par Listed Status 7 13\par Dialysis \par ABO 0\par PRA 0% as listed on UNOS. \par \par Most recent testing\par EK19, normal sinus rhythm, LAE \par ECHO 19 EF 75-80%, normal pulmonary pressures, min mitral regurg, hyperdynamic LV systolic function right plural effusion.\par Stress Exercise  19 86% MPHR, 4 METS, 67% EF, no evidence of infarction of inducible ischemia. \par \par Radiology\par Abd CT 19 Kidneys bilateral renal cysts other organs WNL. Small retroperitoneal lymph nodes are unchanged. Vessels WNL. \par Chest Xray 19 MOderate right plural effusion. \par \par Cancer Screening\par Colonoscopy 12 Normal size internal hemorrhoids, no polyps or masses. Small diverticular. \par PSA 19 0.87

## 2020-08-20 NOTE — ASSESSMENT
[Good candidate] : a good candidate. We should proceed with our protocol for evaluation for kidney transplantation. [FreeTextEntry1] : 1)Advanced CKD: remains a good candidate for renal transplant. Has no potential live donors.\par 2)Diabetes- discussed implications. \par 3) HTN Fairly controlled. Will continue current medications\par 4)Cardiovascular- will refrer to cardiology for updated clearance in Sept. \par 5)Cancer screening- reviewed colonoscopy and PSA. no hx of neoplasm WIll repeat PSA\par 6)ID- will updated serologies. \par 7)Hyperparathyroidism, elevated phosphorus: On Ca acetate.\par 8)Hyperphosphatemia, advised to continue Phoslo.\par \par Reported off to:Aniyah FULLER and Tori RUIZ\par 	\par Patient must complete: cardiac clearance, CTA ab and pelvis, chest CT, update serologies including COVID Ab here at the office, Complete COVID PCR testing, Send consents and COVID order with locations to the patient to sign and return. Pt must be scheduled for annual appointment. \par Donor coordinator contact information given to patient	\par KDPI >85%: no\par CDC high risk: no\par Hep C Kidney: TBD\par A2B NA\par \par \par I have personally discussed the risks and benefits of transplantation where the following was disclosed:\par  \par Reviewed factors affecting survival and morbidity while on dialysis, the transplant wait list and reviewed roberto-operative and long-term risk factors affecting outcome in kidney transplantation. \par  \par One year SRTR outcomes for national and [facility] were discussed in regards to patient survival and graft survival after transplantation. \par  \par Details of transplant surgery, including complications were discussed.\par Immunosuppression and complications including infection including life threatening sepsis and opportunistic infections, malignancy and new onset diabetes were discussed. \par  \par Benefits of live donor transplantation as well as variability in wait times across regions and multiple listing were discussed. \par KDPI >85% and PHS high risk criteria donors were discussed. \par HCV kidney transplantation was discussed.\par  \par Will proceed with completing/ updating work up and listing for transplant/ live donor transplant once work up is reviewed and found to be acceptable by multidisciplinary listing committee

## 2020-08-20 NOTE — PHYSICAL EXAM
[General Appearance - Alert] : alert [General Appearance - In No Acute Distress] : in no acute distress [Sclera] : the sclera and conjunctiva were normal [PERRL With Normal Accommodation] : pupils were equal in size, round, and reactive to light [Extraocular Movements] : extraocular movements were intact [Outer Ear] : the ears and nose were normal in appearance [Oropharynx] : the oropharynx was normal [Jugular Venous Distention Increased] : there was no jugular-venous distention [Neck Appearance] : the appearance of the neck was normal [Neck Cervical Mass (___cm)] : no neck mass was observed [Thyroid Diffuse Enlargement] : the thyroid was not enlarged [Thyroid Nodule] : there were no palpable thyroid nodules [Auscultation Breath Sounds / Voice Sounds] : lungs were clear to auscultation bilaterally [Heart Rate And Rhythm] : heart rate was normal and rhythm regular [Heart Sounds] : normal S1 and S2 [Heart Sounds Gallop] : no gallops [Heart Sounds Pericardial Friction Rub] : no pericardial rub [Full Pulse] : the pedal pulses are present [Edema] : there was no peripheral edema [Bowel Sounds] : normal bowel sounds [Abdomen Soft] : soft [Abdomen Tenderness] : non-tender [] : no hepato-splenomegaly [Abdomen Mass (___ Cm)] : no abdominal mass palpated [No Rectal Mass] : no rectal mass [Normal Sphincter Tone] : normal sphincter tone [Cervical Lymph Nodes Enlarged Posterior Bilaterally] : posterior cervical [Cervical Lymph Nodes Enlarged Anterior Bilaterally] : anterior cervical [Supraclavicular Lymph Nodes Enlarged Bilaterally] : supraclavicular [Axillary Lymph Nodes Enlarged Bilaterally] : axillary [Abnormal Walk] : normal gait [Nail Clubbing] : no clubbing  or cyanosis of the fingernails [Musculoskeletal - Swelling] : no joint swelling seen [Motor Tone] : muscle strength and tone were normal [Skin Color & Pigmentation] : normal skin color and pigmentation [Skin Turgor] : normal skin turgor [Deep Tendon Reflexes (DTR)] : deep tendon reflexes were 2+ and symmetric [No Focal Deficits] : no focal deficits [Sensation] : the sensory exam was normal to light touch and pinprick [Oriented To Time, Place, And Person] : oriented to person, place, and time [Impaired Insight] : insight and judgment were intact [Affect] : the affect was normal [Well Nourished] : well nourished [Well Developed] : well developed [No Acute Distress] : no acute distress [Normocephalic] : normocephalic [Atraumatic] : atraumatic [Sclera Anicteric] : sclera anicteric [Breathing Comfortably on RA] : breathing comfortably on room air [Responds to Questions Appropriately] : responds to questions appropriately [Alert] : alert [Appropriate] : appropriate [Oriented] : oriented [Occult Blood Positive] : stool was negative for occult blood [FreeTextEntry1] : noted echymosis over forearm

## 2020-08-20 NOTE — REASON FOR VISIT
[Family Member] : family member [Other Location: e.g. School (Enter Location, City,State)___] : at [unfilled], at the time of the visit. [Medical Office: ([facility])___] : at the medical office located in  [Verbal consent obtained from patient] : the patient, [unfilled] [Follow-Up] : a follow-up visit for [Kidney Transplant Evaluation] : kidney transplant evaluation [FreeTextEntry4] : Manda [FreeTextEntry2] : Minh Downs [FreeTextEntry1] : chronic kidney disease, came for follow up and  lab review, has met Dr. Richey

## 2020-08-20 NOTE — PLAN
[We should proceed with our protocol for kidney transplantation evaluation.] : We should proceed with our protocol for kidney transplantation evaluation. [TextBox_6] : CT angiogram with IV contrast - to assess vessel patency - can walk 2 blocks\par Patient inquired about his status in the waitlist.\par Based on a recent random donor blood type O and comparing wait times of those in the list for that specific donor, I mentioned to him that he would be approximately in the 150 place.  Patient understands this is a very rough estimate.\par I believe he should be in the active wait list

## 2020-09-08 ENCOUNTER — FORM ENCOUNTER (OUTPATIENT)
Age: 60
End: 2020-09-08

## 2020-09-09 ENCOUNTER — APPOINTMENT (OUTPATIENT)
Dept: ENDOVASCULAR SURGERY | Facility: CLINIC | Age: 60
End: 2020-09-09
Payer: COMMERCIAL

## 2020-09-09 VITALS
OXYGEN SATURATION: 99 % | BODY MASS INDEX: 26.96 KG/M2 | TEMPERATURE: 98 F | DIASTOLIC BLOOD PRESSURE: 80 MMHG | WEIGHT: 182 LBS | RESPIRATION RATE: 18 BRPM | HEIGHT: 69 IN | HEART RATE: 72 BPM | SYSTOLIC BLOOD PRESSURE: 138 MMHG

## 2020-09-09 PROCEDURE — 36902Z: CUSTOM | Mod: 59,LT

## 2020-09-09 PROCEDURE — 36908Z: CUSTOM | Mod: LT

## 2020-09-09 NOTE — REASON FOR VISIT
[Other ___] : a [unfilled] visit for [Swollen Extremity] : swollen extremity [Other: ___] : [unfilled] [Family Member] : family member

## 2020-09-25 NOTE — PAST MEDICAL HISTORY
[No therapy indicated for cases scheduled for less than one hour] : No therapy indicated for cases scheduled for less than one hour. [Increasing age ( >40 years old)] : Increasing age ( >40 years old) [FreeTextEntry1] : Malignant Hyperthermia Screening Tool and Risk of Bleeding Assessment \par Mr. ELVIA ACEVEDO denies family of unexpected death following Anesthesia or Exercise.\par Denies Family history of Malignant Hyperthermia, Muscle or Neuromuscular disorder and High Temperature  following exercise.\par \par Mr. ELVIA ACEVEDO denies history of Muscle Spasm, Dark or Chocolate- Colored and Unanticipated fever immediately following anaesthesia or serious exercise.\par Mr. ELVIA ACEVEDO also denies bleeding tendencies/Risks of Bleeding.\par

## 2020-09-25 NOTE — PROCEDURE
[Resume diet] : resume diet [Site check for bleeding/hematoma/thrill/bruit] : Site check for bleeding/hematoma/thrill/bruit [Vital signs on admission the q 15 mins x2] : Vital signs on admission the q 15 mins x2 [FreeTextEntry1] : vital signs on admission and q15 x2; site check for bleeding/hematoma/thrill

## 2020-09-25 NOTE — HISTORY OF PRESENT ILLNESS
[] : left internal jugular tunneled catheter [FreeTextEntry1] : creation Dr Castillo 5-7-2020 [FreeTextEntry2] : left tunneled catheter 4-29-20/removed [FreeTextEntry4] : Monday [FreeTextEntry5] : yesterday at 9 pm [FreeTextEntry6] : Dr Lu

## 2020-10-06 ENCOUNTER — APPOINTMENT (OUTPATIENT)
Dept: CT IMAGING | Facility: CLINIC | Age: 60
End: 2020-10-06

## 2020-10-10 ENCOUNTER — TRANSCRIPTION ENCOUNTER (OUTPATIENT)
Age: 60
End: 2020-10-10

## 2020-10-27 ENCOUNTER — RX RENEWAL (OUTPATIENT)
Age: 60
End: 2020-10-27

## 2020-11-11 ENCOUNTER — NON-APPOINTMENT (OUTPATIENT)
Age: 60
End: 2020-11-11

## 2020-11-17 ENCOUNTER — APPOINTMENT (OUTPATIENT)
Dept: VASCULAR SURGERY | Facility: CLINIC | Age: 60
End: 2020-11-17
Payer: COMMERCIAL

## 2020-11-17 PROCEDURE — 99213 OFFICE O/P EST LOW 20 MIN: CPT

## 2020-11-17 PROCEDURE — 93990 DOPPLER FLOW TESTING: CPT

## 2020-11-17 NOTE — PHYSICAL EXAM
[Normal] : no acute distress, well-nourished, well developed, well appearing [Thrill] : thrill [Hand well perfused] : hand well perfused [Ulcer] : no ulcer

## 2020-11-17 NOTE — HISTORY OF PRESENT ILLNESS
[FreeTextEntry1] : pt on hd via  left upper extrmeity radial cephalic avf \par states some trouble with needles [] : left radiocephalic fistula

## 2020-11-17 NOTE — DISCUSSION/SUMMARY
[FreeTextEntry1] : 59 yo male with history of esrd on hd via left upper extremity radial cephalic avf \par duplex shows patent avf \par no stenosis at this time

## 2020-12-03 ENCOUNTER — APPOINTMENT (OUTPATIENT)
Dept: VASCULAR SURGERY | Facility: CLINIC | Age: 60
End: 2020-12-03

## 2020-12-14 LAB
CHOLEST SERPL-MCNC: 209 MG/DL
ESTIMATED AVERAGE GLUCOSE: 100 MG/DL
HBA1C MFR BLD HPLC: 5.1 %
HDLC SERPL-MCNC: 51 MG/DL
LDLC SERPL CALC-MCNC: 128 MG/DL
NONHDLC SERPL-MCNC: 158 MG/DL
T3FREE SERPL-MCNC: 2.56 PG/ML
T4 FREE SERPL-MCNC: 1.6 NG/DL
TRIGL SERPL-MCNC: 147 MG/DL
TSH SERPL-ACNC: 0.58 UIU/ML

## 2020-12-21 ENCOUNTER — RX RENEWAL (OUTPATIENT)
Age: 60
End: 2020-12-21

## 2020-12-31 ENCOUNTER — NON-APPOINTMENT (OUTPATIENT)
Age: 60
End: 2020-12-31

## 2020-12-31 ENCOUNTER — APPOINTMENT (OUTPATIENT)
Dept: CARDIOLOGY | Facility: CLINIC | Age: 60
End: 2020-12-31
Payer: COMMERCIAL

## 2020-12-31 VITALS
HEART RATE: 102 BPM | TEMPERATURE: 98 F | OXYGEN SATURATION: 100 % | BODY MASS INDEX: 27.46 KG/M2 | HEIGHT: 69 IN | WEIGHT: 185.41 LBS | DIASTOLIC BLOOD PRESSURE: 73 MMHG | SYSTOLIC BLOOD PRESSURE: 120 MMHG

## 2020-12-31 PROCEDURE — 99213 OFFICE O/P EST LOW 20 MIN: CPT

## 2020-12-31 PROCEDURE — 99072 ADDL SUPL MATRL&STAF TM PHE: CPT

## 2020-12-31 PROCEDURE — 93000 ELECTROCARDIOGRAM COMPLETE: CPT

## 2020-12-31 NOTE — DISCUSSION/SUMMARY
[Hyperlipidemia] : hyperlipidemia [Hypertension] : hypertension [Stable] : stable [FreeTextEntry1] : \par Currently stable from a cardiovascular standpoint. Normotensive. Euvolemic. Asymptomatic. Recent lipid profile reviewed. Lipids borderline control without medical therapy (chol 209, , HDL 51, trig 147). Patient to restart simvastatin/ezetimibe 20mg/10 mg). Continue current medications. ECG completed today and reviewed. Will schedule an exercise nuclear stress test to rule out any significant CAD. In addition, will schedule an echocardiogram to reassess his cardiac structures and function. At this time, patient is considered an acceptable risk from a cardiac standpoint for renal transplant. Follow up annually pre-transplant.

## 2020-12-31 NOTE — REASON FOR VISIT
[Follow-Up - Clinic] : a clinic follow-up of [FreeTextEntry1] : pre-transplant cardiac risk assessment

## 2020-12-31 NOTE — HISTORY OF PRESENT ILLNESS
[FreeTextEntry1] : Doing okay. Denies chest pain, shortness of breath or palpitations. Patient is anuric.

## 2021-01-01 NOTE — H&P PST ADULT - VENOUS THROMBOEMBOLISM FOR WOMEN ONLY
Occupational Therapy    Visit Type: treatment  Co-treat with: Speech therapist  Nursing comments: RN gave permission for session.   Present at bedside: Mother and Father  Precautions: cardiac, surgical procedure and swallow/dysphagia    SUBJECTIVE  Mother and father present initially in AM for education, not present later for tx session        Pain   RN completed pain assessment during care time    OBJECTIVE    Autonomic Stability:    Heart Rate: stable    Respiratory: tachypnea    Oxygen Saturations: brief    Color: stable color    Bed Type: open crib  Feeding/Nutritional Status: NG, Refer to speech therapy notes for information regarding feeding plan of care, Bottle and Q3 hour    Vision:  Infant opened L eye this session only initially, as session progressed R eye then opened partially and fully by end of session. R eye partially turned outward intermittently during session.     State: alert but fussy, light sleep, eyes shut, some movement and crying    Neurobehavioral:    Cry: weak/whimpering cry only    State regulation: sleep regulation poor and abrupt transitions    Tolerance to sensory input: exaggerated response    Visual orientation: does not focus or follow    Auditory response: persistent/exaggerated startle and startles/disorganizes to environmental sounds    Touch response: startles to initial touch and startles/reacts despite support/containment    Vestibular processing: exaggerated/hyperactive response    Infant stress cues:      -physiological: hiccoughs, sneezing and tachypnea      -motor: poor quality of movement, frantic/flailing movement, hypersensitive startle and squirming or kicking      -behavioral: crying/fussing, hypersensitive and dull-looking eyes    Irritability: cries to 3-4 stimuli    Consolability: requires extensive/continuous intervention    Self regulation:       -achieved with assistance of: containment, swaddle, boundaries, rocking and therapeutic pauses      Muscle Tone:  Low  muscle tone:   - LUE: moderate   - RUE: moderate   - LLE: mild   - RLE: mild    Range of Motion (noted in % unless otherwise indicated, active reported unless noted):  Comments / Details: Infant only able to tolerate brief periods of unsaddling due to decreased state control, therefore, full assessment of symmetry is difficult at this time. Infant demo'd UE active movements at B elbows/wrists, with RUE movements > L. Infant did elevate the RUE off bed surface 2x this session, but not the L. Weak grasp present bilaterally. R wrist AROM appears WFL, with active extension past neutral present. L wrist rests in excessive flexion with lower muscle tone observed at joint. Infant demo'd active L wrist extension to neutral following extensive PROM and AAROM today, but it is not symmetrical to the R. PROM achieved slightly past neutral with stiffness appreciated. Stiffness present at L elbow as well, but decreased as compared to previous session.             Interventions    Education Completed    Person instructed: mother and father    Education completed: role of OT, current neuromotor and neurobehavioral status of infant, diagnosis/impairments pertaining to rehab and positioning of infant    Teaching method: verbal instruction/explanation    Response to education: Verbalizes understanding and Needs reinforcement           ASSESSMENT    - Impairments: abnormal tone, range of motion, strength, tolerance to sensory stimulation and physiologic stability  - Functional Limitations: self regulation, sleep/wake cycle, engagement and delayed motor milestones     Discharge Recommendations:     OT Referrals/Discharge Recommendations: Refer to development clinic, Other (comments) ([facility])                 End of Session:  Location: open crib  Safety measures: equipment intact, lines intact, infant safety band and bed rails x2  Handoff to: nurse    PLAN  Suggestions for next session as indicated: OT Frequency: 2 days/week, 3  days/week      Interventions: sensory stimulation, therapeutic activity, state regulation and sensory regulation OT treatment , co tx with ST for oral trial. Infant required extensive intervention to achieve calm awake state today, maintaining for only brief periods with MAX supports and abrupt transitions. Infant demo's asymmetries in eye opening and UE strength, resting position, and active movements. Infant demo'd disorganized state for feeding, see ST note for details of trial. Following oral trial infant was reswaddled and had wet quality to voice. RN arrived to hang NG feed and infant transitioned to sleep state with patting to chest and support of swaddle. Continue OT POC.         PLAN:  1. Continue increased OT frequency, with infant seen by at least 1 motor therapy service daily to provide daily opportunities for active movement while unswaddled.  2. Continue to monitor symmetry of visual skills, UE/LE movements, PROM, and muscle tone.  3. MD states team will have low threshold for referral for neuro consult          Documented in the chart in the following areas: Assessment.      Therapy procedure time and total treatment time can be found documented on the Time Entry flowsheet   (0) indicator not present

## 2021-01-07 DIAGNOSIS — Z01.818 ENCOUNTER FOR OTHER PREPROCEDURAL EXAMINATION: ICD-10-CM

## 2021-01-09 ENCOUNTER — TRANSCRIPTION ENCOUNTER (OUTPATIENT)
Age: 61
End: 2021-01-09

## 2021-01-26 ENCOUNTER — TRANSCRIPTION ENCOUNTER (OUTPATIENT)
Age: 61
End: 2021-01-26

## 2021-01-28 ENCOUNTER — TRANSCRIPTION ENCOUNTER (OUTPATIENT)
Age: 61
End: 2021-01-28

## 2021-02-09 ENCOUNTER — APPOINTMENT (OUTPATIENT)
Dept: CV DIAGNOSITCS | Facility: HOSPITAL | Age: 61
End: 2021-02-09

## 2021-02-09 ENCOUNTER — OUTPATIENT (OUTPATIENT)
Dept: OUTPATIENT SERVICES | Facility: HOSPITAL | Age: 61
LOS: 1 days | End: 2021-02-09
Payer: COMMERCIAL

## 2021-02-09 ENCOUNTER — APPOINTMENT (OUTPATIENT)
Dept: CV DIAGNOSTICS | Facility: HOSPITAL | Age: 61
End: 2021-02-09

## 2021-02-09 DIAGNOSIS — T85.611A BREAKDOWN (MECHANICAL) OF INTRAPERITONEAL DIALYSIS CATHETER, INITIAL ENCOUNTER: Chronic | ICD-10-CM

## 2021-02-09 DIAGNOSIS — I10 ESSENTIAL (PRIMARY) HYPERTENSION: ICD-10-CM

## 2021-02-09 DIAGNOSIS — Z98.890 OTHER SPECIFIED POSTPROCEDURAL STATES: Chronic | ICD-10-CM

## 2021-02-09 PROCEDURE — 93018 CV STRESS TEST I&R ONLY: CPT | Mod: GC

## 2021-02-09 PROCEDURE — 93306 TTE W/DOPPLER COMPLETE: CPT | Mod: 26

## 2021-02-09 PROCEDURE — 78452 HT MUSCLE IMAGE SPECT MULT: CPT | Mod: 26

## 2021-02-09 PROCEDURE — 93016 CV STRESS TEST SUPVJ ONLY: CPT | Mod: GC

## 2021-02-17 ENCOUNTER — APPOINTMENT (OUTPATIENT)
Dept: UROLOGY | Facility: CLINIC | Age: 61
End: 2021-02-17

## 2021-02-23 ENCOUNTER — APPOINTMENT (OUTPATIENT)
Dept: VASCULAR SURGERY | Facility: CLINIC | Age: 61
End: 2021-02-23
Payer: COMMERCIAL

## 2021-02-23 ENCOUNTER — NON-APPOINTMENT (OUTPATIENT)
Age: 61
End: 2021-02-23

## 2021-02-23 VITALS — TEMPERATURE: 97.7 F

## 2021-02-23 PROCEDURE — 99072 ADDL SUPL MATRL&STAF TM PHE: CPT

## 2021-02-23 PROCEDURE — 93990 DOPPLER FLOW TESTING: CPT

## 2021-02-23 PROCEDURE — 99213 OFFICE O/P EST LOW 20 MIN: CPT

## 2021-02-24 ENCOUNTER — APPOINTMENT (OUTPATIENT)
Dept: UROLOGY | Facility: CLINIC | Age: 61
End: 2021-02-24
Payer: COMMERCIAL

## 2021-02-24 VITALS
SYSTOLIC BLOOD PRESSURE: 122 MMHG | DIASTOLIC BLOOD PRESSURE: 67 MMHG | HEIGHT: 69 IN | HEART RATE: 83 BPM | TEMPERATURE: 98.3 F | WEIGHT: 185 LBS | BODY MASS INDEX: 27.4 KG/M2

## 2021-02-24 DIAGNOSIS — Z78.9 OTHER SPECIFIED HEALTH STATUS: ICD-10-CM

## 2021-02-24 PROCEDURE — 99204 OFFICE O/P NEW MOD 45 MIN: CPT

## 2021-02-24 PROCEDURE — 99072 ADDL SUPL MATRL&STAF TM PHE: CPT

## 2021-02-24 NOTE — HISTORY OF PRESENT ILLNESS
[FreeTextEntry1] : cc lower abd pressure\par hpi 61 yo male co pressure lower abd radiating to testicle\par was present last week now improved \par h/o diverticulitis\par on tamsulosin - hesitancy\par h/o crf on HD make little urine now

## 2021-02-24 NOTE — ASSESSMENT
[FreeTextEntry1] : low pvr \par check ucx \par cont tamsulosin for now \par will be getting ct c/a/p nex week - will francis white results \par check psa

## 2021-02-25 LAB — PSA SERPL-MCNC: 1.07 NG/ML

## 2021-02-25 NOTE — DISCUSSION/SUMMARY
[FreeTextEntry1] : 61 yo male with history of esrd on hd via left upper extremity radial cephalic avf presents for follow up\par pt reports 2 week history of left axillary edema\par \par duplex shows patent avf with 20-50% stenosis of the juxta-anastomosis with a flow rate of 2450\par \par pt is scheduled for a ct scan of the chest abd and pelvis for transplant workup next week \par will follow up ct scan to evaluate for possible etiology of the left axillary edema

## 2021-02-25 NOTE — HISTORY OF PRESENT ILLNESS
[FreeTextEntry1] : 59 yo male with history of esrd on hd via left upper extremity radial cephalic avf presents for follow up\par pt reports 2 week history of left axillary edema.  pt denies any difficulty with hd or bleeding after hd.  pt denies any facial or left upper extremity edema \par  [] : left radiocephalic fistula

## 2021-02-25 NOTE — PHYSICAL EXAM
[Thrill] : thrill [Pulsatile Thrill] : pulsatile thrill [Hand well perfused] : hand well perfused [2+] : left 2+ [Normal] : coordination grossly intact, no focal deficits [Aneurysm] : no aneurysm [Bleeding] : no bleeding [Ulcer] : no ulcer [de-identified] :  strength 5/5 b/l upper extremities [de-identified] : intact, edema noted to be soft within the left axillary region, no facial or left upper extremity edema

## 2021-02-26 LAB — BACTERIA UR CULT: NORMAL

## 2021-03-03 ENCOUNTER — OUTPATIENT (OUTPATIENT)
Dept: OUTPATIENT SERVICES | Facility: HOSPITAL | Age: 61
LOS: 1 days | End: 2021-03-03
Payer: COMMERCIAL

## 2021-03-03 ENCOUNTER — RESULT REVIEW (OUTPATIENT)
Age: 61
End: 2021-03-03

## 2021-03-03 ENCOUNTER — APPOINTMENT (OUTPATIENT)
Dept: CT IMAGING | Facility: IMAGING CENTER | Age: 61
End: 2021-03-03
Payer: COMMERCIAL

## 2021-03-03 DIAGNOSIS — Z98.890 OTHER SPECIFIED POSTPROCEDURAL STATES: Chronic | ICD-10-CM

## 2021-03-03 DIAGNOSIS — T85.611A BREAKDOWN (MECHANICAL) OF INTRAPERITONEAL DIALYSIS CATHETER, INITIAL ENCOUNTER: Chronic | ICD-10-CM

## 2021-03-03 DIAGNOSIS — Z00.8 ENCOUNTER FOR OTHER GENERAL EXAMINATION: ICD-10-CM

## 2021-03-03 DIAGNOSIS — N18.6 END STAGE RENAL DISEASE: ICD-10-CM

## 2021-03-03 PROCEDURE — 71260 CT THORAX DX C+: CPT | Mod: 26

## 2021-03-03 PROCEDURE — 74177 CT ABD & PELVIS W/CONTRAST: CPT | Mod: 26

## 2021-03-03 PROCEDURE — 74177 CT ABD & PELVIS W/CONTRAST: CPT

## 2021-03-03 PROCEDURE — 71260 CT THORAX DX C+: CPT

## 2021-03-05 ENCOUNTER — APPOINTMENT (OUTPATIENT)
Dept: PHYSICAL MEDICINE AND REHAB | Facility: CLINIC | Age: 61
End: 2021-03-05

## 2021-03-18 ENCOUNTER — NON-APPOINTMENT (OUTPATIENT)
Age: 61
End: 2021-03-18

## 2021-03-18 DIAGNOSIS — N18.6 END STAGE RENAL DISEASE: ICD-10-CM

## 2021-03-18 DIAGNOSIS — Z99.2 END STAGE RENAL DISEASE: ICD-10-CM

## 2021-03-23 ENCOUNTER — APPOINTMENT (OUTPATIENT)
Dept: UROLOGY | Facility: CLINIC | Age: 61
End: 2021-03-23

## 2021-04-01 ENCOUNTER — APPOINTMENT (OUTPATIENT)
Dept: VASCULAR SURGERY | Facility: CLINIC | Age: 61
End: 2021-04-01

## 2021-04-05 ENCOUNTER — OUTPATIENT (OUTPATIENT)
Dept: OUTPATIENT SERVICES | Facility: HOSPITAL | Age: 61
LOS: 1 days | End: 2021-04-05
Payer: COMMERCIAL

## 2021-04-05 ENCOUNTER — APPOINTMENT (OUTPATIENT)
Dept: ULTRASOUND IMAGING | Facility: IMAGING CENTER | Age: 61
End: 2021-04-05
Payer: COMMERCIAL

## 2021-04-05 DIAGNOSIS — T85.611A BREAKDOWN (MECHANICAL) OF INTRAPERITONEAL DIALYSIS CATHETER, INITIAL ENCOUNTER: Chronic | ICD-10-CM

## 2021-04-05 DIAGNOSIS — Z98.890 OTHER SPECIFIED POSTPROCEDURAL STATES: Chronic | ICD-10-CM

## 2021-04-05 DIAGNOSIS — N18.6 END STAGE RENAL DISEASE: ICD-10-CM

## 2021-04-05 PROCEDURE — 76882 US LMTD JT/FCL EVL NVASC XTR: CPT

## 2021-04-05 PROCEDURE — 76882 US LMTD JT/FCL EVL NVASC XTR: CPT | Mod: 26,LT

## 2021-04-13 ENCOUNTER — NON-APPOINTMENT (OUTPATIENT)
Age: 61
End: 2021-04-13

## 2021-04-13 ENCOUNTER — APPOINTMENT (OUTPATIENT)
Dept: VASCULAR SURGERY | Facility: CLINIC | Age: 61
End: 2021-04-13
Payer: COMMERCIAL

## 2021-04-13 VITALS — TEMPERATURE: 97.1 F

## 2021-04-13 PROCEDURE — 99072 ADDL SUPL MATRL&STAF TM PHE: CPT

## 2021-04-13 PROCEDURE — 99213 OFFICE O/P EST LOW 20 MIN: CPT

## 2021-04-15 ENCOUNTER — NON-APPOINTMENT (OUTPATIENT)
Age: 61
End: 2021-04-15

## 2021-04-15 NOTE — PHYSICAL EXAM
[Thrill] : thrill [Pulsatile Thrill] : pulsatile thrill [Aneurysm] : no aneurysm [Bleeding] : no bleeding [Hand well perfused] : hand well perfused [Ulcer] : no ulcer [2+] : left 2+ [Normal] : coordination grossly intact, no focal deficits [de-identified] :  strength 5/5 b/l upper extremities [de-identified] : intact, edema noted to be soft within the left axillary region, no facial or left upper extremity edema

## 2021-04-15 NOTE — DISCUSSION/SUMMARY
[FreeTextEntry1] : 61 yo male with history of esrd on hd via left upper extremity radial cephalic avf presents for follow up\par pt reports 2 week history of left axillary edema\par \par duplex shows patent avf with 20-50% stenosis of the juxta-anastomosis with a flow rate of 2450\par area of question shows benign lymph nodes on CT scan

## 2021-04-30 ENCOUNTER — APPOINTMENT (OUTPATIENT)
Dept: TRANSPLANT | Facility: CLINIC | Age: 61
End: 2021-04-30
Payer: COMMERCIAL

## 2021-04-30 PROCEDURE — 86833 HLA CLASS II HIGH DEFIN QUAL: CPT

## 2021-04-30 PROCEDURE — 86832 HLA CLASS I HIGH DEFIN QUAL: CPT

## 2021-04-30 PROCEDURE — XXXXX: CPT

## 2021-04-30 PROCEDURE — 99072 ADDL SUPL MATRL&STAF TM PHE: CPT

## 2021-04-30 PROCEDURE — 86825 HLA X-MATH NON-CYTOTOXIC: CPT

## 2021-04-30 PROCEDURE — 86826 HLA X-MATCH NONCYTOTOXC ADDL: CPT

## 2021-05-04 ENCOUNTER — APPOINTMENT (OUTPATIENT)
Dept: VASCULAR SURGERY | Facility: CLINIC | Age: 61
End: 2021-05-04
Payer: COMMERCIAL

## 2021-05-04 VITALS
WEIGHT: 185 LBS | SYSTOLIC BLOOD PRESSURE: 117 MMHG | HEIGHT: 69 IN | BODY MASS INDEX: 27.4 KG/M2 | HEART RATE: 76 BPM | DIASTOLIC BLOOD PRESSURE: 68 MMHG

## 2021-05-04 PROCEDURE — 99072 ADDL SUPL MATRL&STAF TM PHE: CPT

## 2021-05-04 PROCEDURE — 93990 DOPPLER FLOW TESTING: CPT

## 2021-05-04 PROCEDURE — 99213 OFFICE O/P EST LOW 20 MIN: CPT

## 2021-05-04 NOTE — HISTORY OF PRESENT ILLNESS
[FreeTextEntry1] : 60-year-old gentleman currently on hemodialysis via left radiocephalic AV fistula.  Patient in the past has a known central venous occlusion which was stented.  He presents to the office today with worsening swelling of the left upper extremity.  He also has complaints of varicosities on the left bicep area. [] : left radiocephalic fistula

## 2021-05-04 NOTE — DISCUSSION/SUMMARY
[FreeTextEntry1] : 59 yo male with history of esrd on hd via left upper extremity radial cephalic avf \par duplex shows patent avf \par no stenosis at this time\par However, given the swelling and varicosities would recommend fistulogram to assess the central venous circulation.

## 2021-05-10 ENCOUNTER — TRANSCRIPTION ENCOUNTER (OUTPATIENT)
Age: 61
End: 2021-05-10

## 2021-05-14 ENCOUNTER — APPOINTMENT (OUTPATIENT)
Dept: ENDOVASCULAR SURGERY | Facility: CLINIC | Age: 61
End: 2021-05-14
Payer: COMMERCIAL

## 2021-05-14 ENCOUNTER — RESULT REVIEW (OUTPATIENT)
Age: 61
End: 2021-05-14

## 2021-05-14 VITALS
HEART RATE: 72 BPM | DIASTOLIC BLOOD PRESSURE: 78 MMHG | WEIGHT: 189.59 LBS | RESPIRATION RATE: 20 BRPM | HEIGHT: 69 IN | BODY MASS INDEX: 28.08 KG/M2 | TEMPERATURE: 98 F | OXYGEN SATURATION: 100 % | SYSTOLIC BLOOD PRESSURE: 150 MMHG

## 2021-05-14 PROCEDURE — 99072 ADDL SUPL MATRL&STAF TM PHE: CPT

## 2021-05-14 PROCEDURE — 36907Z: CUSTOM | Mod: 59

## 2021-05-14 PROCEDURE — 36902Z: CUSTOM

## 2021-05-14 NOTE — HISTORY OF PRESENT ILLNESS
[] : left internal jugular tunneled catheter [FreeTextEntry2] : left tunneled catheter 4-29-20/removed [FreeTextEntry1] : creation Dr Castillo 5-7-2020 [FreeTextEntry4] : wednesday [FreeTextEntry5] : yesterday at 6:30 pm [FreeTextEntry6] : Dr Lu

## 2021-05-14 NOTE — PAST MEDICAL HISTORY
[Increasing age ( >40 years old)] : Increasing age ( >40 years old) [No therapy indicated for cases scheduled for less than one hour] : No therapy indicated for cases scheduled for less than one hour. [FreeTextEntry1] : Malignant Hyperthermia Screening Tool and Risk of Bleeding Assessment \par Mr. ELVIA ACEVEDO denies family of unexpected death following Anesthesia or Exercise.\par Denies Family history of Malignant Hyperthermia, Muscle or Neuromuscular disorder and High Temperature  following exercise.\par \par Mr. ELVIA ACEVEDO denies history of Muscle Spasm, Dark or Chocolate- Colored and Unanticipated fever immediately following anaesthesia or serious exercise.\par Mr. ELVIA ACEVEDO also denies bleeding tendencies/Risks of Bleeding.\par

## 2021-05-14 NOTE — REASON FOR VISIT
[Other ___] : a [unfilled] visit for [Upper Extremity Swelling] : upper extremity swelling [Family Member] : family member [Spouse] : spouse [FreeTextEntry2] : worsening of swelling of the L upper extremity

## 2021-05-20 ENCOUNTER — APPOINTMENT (OUTPATIENT)
Dept: TRANSPLANT | Facility: CLINIC | Age: 61
End: 2021-05-20
Payer: COMMERCIAL

## 2021-05-20 PROCEDURE — 86805 LYMPHOCYTOTOXICITY ASSAY: CPT

## 2021-05-20 PROCEDURE — 81382T: CUSTOM

## 2021-06-09 NOTE — ED ADULT NURSE NOTE - NSSISCREENINGQ3_ED_A_ED
Narcotic pain medication is being weaned. The following prescriptions have been e-prescribed to the patients [address] located on [facility]:    Orders Placed This Encounter    HYDROcodone-acetaminophen (Norco) 5-325 mg per tablet     Sig: Take 1 Tablet by mouth every eight (8) hours as needed for Pain for up to 7 days. Max Daily Amount: 3 Tablets. Indications: pain     Dispense:  21 Tablet     Refill:  0           Ro Isbell [facility], MPA, PA-C  6/9/2021  3:50 PM
[facility] last [facility] date for Norco as 5/22/21 for a 7 d/s. Last Visit: 5/21/21 with RICK Grissom  Next Appointment: 6/11/21 with RICK Grissom  Previous Refill Encounter(s): 4/28/21 #28    Requested Prescriptions     Pending Prescriptions Disp Refills    HYDROcodone-acetaminophen (Norco) 7.5-325 mg per tablet 42 Tablet 0     Sig: Take 1-2 Tablets by mouth every eight (8) hours as needed for Pain for up to 7 days. Max Daily Amount: 6 Tablets.
No

## 2021-06-28 ENCOUNTER — APPOINTMENT (OUTPATIENT)
Dept: TRANSPLANT | Facility: CLINIC | Age: 61
End: 2021-06-28
Payer: COMMERCIAL

## 2021-06-28 PROCEDURE — 99001T: CUSTOM

## 2021-06-28 PROCEDURE — 86832 HLA CLASS I HIGH DEFIN QUAL: CPT

## 2021-06-28 PROCEDURE — 86833 HLA CLASS II HIGH DEFIN QUAL: CPT

## 2021-07-13 ENCOUNTER — APPOINTMENT (OUTPATIENT)
Dept: VASCULAR SURGERY | Facility: CLINIC | Age: 61
End: 2021-07-13
Payer: COMMERCIAL

## 2021-07-13 ENCOUNTER — APPOINTMENT (OUTPATIENT)
Dept: VASCULAR SURGERY | Facility: CLINIC | Age: 61
End: 2021-07-13

## 2021-07-13 VITALS — DIASTOLIC BLOOD PRESSURE: 79 MMHG | SYSTOLIC BLOOD PRESSURE: 129 MMHG | HEART RATE: 76 BPM

## 2021-07-13 PROCEDURE — 93990 DOPPLER FLOW TESTING: CPT

## 2021-07-13 PROCEDURE — 99213 OFFICE O/P EST LOW 20 MIN: CPT

## 2021-07-13 PROCEDURE — 99072 ADDL SUPL MATRL&STAF TM PHE: CPT

## 2021-07-15 NOTE — HISTORY OF PRESENT ILLNESS
[] : left radiocephalic fistula [FreeTextEntry1] : 62 yo male with history of esrd on hd via left upper extremity radial cephalic avf presents for follow up\par pt reports 2 episodes of bleeding after hd over the past 2 months \par pt denies any history of difficulty with cannulation

## 2021-07-15 NOTE — DISCUSSION/SUMMARY
[FreeTextEntry1] : 60 yo male with history of esrd on hd via left upper extremity radial cephalic avf presents for follow up\par pt with history of central venous stenosis \par duplex shows patent avf with flow rate >1000\par given history of central venous stenosis pt already scheduled for fistulagram next month

## 2021-07-15 NOTE — PHYSICAL EXAM
[Thrill] : thrill [Hand well perfused] : hand well perfused [2+] : left 2+ [Normal] : coordination grossly intact, no focal deficits [Pulsatile Thrill] : no pulsatile thrill [Aneurysm] : no aneurysm [Ulcer] : no ulcer [de-identified] :  strength 5/5 b/l upper extremities [de-identified] : intact

## 2021-08-02 ENCOUNTER — APPOINTMENT (OUTPATIENT)
Dept: PHYSICAL MEDICINE AND REHAB | Facility: CLINIC | Age: 61
End: 2021-08-02
Payer: COMMERCIAL

## 2021-08-02 VITALS
OXYGEN SATURATION: 100 % | HEART RATE: 71 BPM | DIASTOLIC BLOOD PRESSURE: 73 MMHG | TEMPERATURE: 97.2 F | SYSTOLIC BLOOD PRESSURE: 118 MMHG

## 2021-08-02 DIAGNOSIS — M25.561 PAIN IN RIGHT KNEE: ICD-10-CM

## 2021-08-02 DIAGNOSIS — M25.562 PAIN IN RIGHT KNEE: ICD-10-CM

## 2021-08-02 PROCEDURE — 99213 OFFICE O/P EST LOW 20 MIN: CPT

## 2021-08-02 NOTE — ASSESSMENT
[FreeTextEntry1] : - bl knee x-rays\par - consider diclofenac gel- will check with nephrologist\par - PT - quad strengthening\par - Refer for US guided injections.

## 2021-08-02 NOTE — HISTORY OF PRESENT ILLNESS
[FreeTextEntry1] : Patient last seen in 2019 with bl Knee pain.\par States pain has gotten worse - feels a clicking.\par Significant pain in bl knees\par \par Takes tylenol and applies heat.\par \par Hurts with any walking. \par \par Currently on dialysis\par \par \par Had x-rays April 2019\par Small right and trace left knee joint effusions. \par \par No fractures or dislocations. \par \par Currently slightly narrowed appearing bilateral medial tibiofemoral joint \par spaces with tiny joint margin osteophytes. Preserved remaining joint spaces \par and no joint margin erosions or intra-articular or periarticular \par calcifications. \par \par Unremarkable quadriceps and patellar tendon shadows. \par \par Fabellae again seen adjacent to both lateral femoral condyles. \par \par No discrete lytic or blastic lesions. \par

## 2021-08-02 NOTE — PHYSICAL EXAM
[Normal] : Oriented to person, place, and time, insight and judgement were intact and the affect was normal [de-identified] : trace edema [de-identified] : mild effusion bl knees, tenderness w palpation medial joint line bl Knees; + crepitus; + McMurrays;  full ROM bl hips and knees; Neg Antr/Postr drawer  [de-identified] : nml motor

## 2021-08-12 ENCOUNTER — APPOINTMENT (OUTPATIENT)
Dept: PHYSICAL MEDICINE AND REHAB | Facility: CLINIC | Age: 61
End: 2021-08-12

## 2021-08-13 ENCOUNTER — APPOINTMENT (OUTPATIENT)
Dept: ENDOVASCULAR SURGERY | Facility: CLINIC | Age: 61
End: 2021-08-13
Payer: COMMERCIAL

## 2021-08-13 ENCOUNTER — RESULT REVIEW (OUTPATIENT)
Age: 61
End: 2021-08-13

## 2021-08-13 VITALS
RESPIRATION RATE: 20 BRPM | DIASTOLIC BLOOD PRESSURE: 71 MMHG | HEIGHT: 69 IN | TEMPERATURE: 97.6 F | OXYGEN SATURATION: 100 % | WEIGHT: 187.39 LBS | SYSTOLIC BLOOD PRESSURE: 120 MMHG | HEART RATE: 61 BPM | BODY MASS INDEX: 27.76 KG/M2

## 2021-08-13 PROCEDURE — 36907Z: CUSTOM

## 2021-08-13 PROCEDURE — 36901Z: CUSTOM | Mod: 59

## 2021-08-13 NOTE — PROCEDURE
[Resume diet] : resume diet [Site check for bleeding/hematoma/thrill/bruit] : Site check for bleeding/hematoma/thrill/bruit [Vital signs on admission the q 15 mins x2] : Vital signs on admission the q 15 mins x2 [FreeTextEntry1] : Left arm fistula, fistulagram, angioplasty

## 2021-08-13 NOTE — ASSESSMENT
[FreeTextEntry1] : Patient c/o of bleeding after HD in the past two months, plan for left fistula fistulagram

## 2021-08-13 NOTE — PAST MEDICAL HISTORY
[Increasing age ( >40 years old)] : Increasing age ( >40 years old) [No therapy indicated for cases scheduled for less than one hour] : No therapy indicated for cases scheduled for less than one hour. [FreeTextEntry1] : Malignant Hyperthermia Screening Tool and Risk of Bleeding Assessment\par Mr. ELVIA ACEVEDO denies family history of unexpected death following Anesthesia or Exercise.\par Denies Family history of Malignant Hyperthermia, Muscle or Neuromuscular disorder and High Temperature following exercise.\par \par Mr. ELVIA ACEVEDO denies history of Muscle Spasm, Dark or Chocolate - Colored urine and Unanticipated fever immediately following anesthesia or serious exercise. \par Mr. ACEVEDO also denies bleeding tendencies/ Risks of Bleeding.

## 2021-08-13 NOTE — HISTORY OF PRESENT ILLNESS
[] : left radiocephalic fistula [FreeTextEntry1] : Dr. Castillo [FreeTextEntry4] : Wednesday  [FreeTextEntry5] : Yesterday 7:30pm [FreeTextEntry6] : Dr. Lu

## 2021-08-13 NOTE — REASON FOR VISIT
[Other ___] : a [unfilled] visit for [Prolonged Bleeding] : prolonged bleeding [Difficult Cannulation] : difficult cannulation [FreeTextEntry2] : l

## 2021-08-13 NOTE — DISCUSSION/SUMMARY
[FreeTextEntry1] : 62 yo male with history of esrd on hd via left upper extremity radial cephalic avf presents for follow up\par pt with history of central venous stenosis \par duplex shows patent avf with flow rate >1000\par given history of central venous stenosis pt already scheduled for fistulagram next month

## 2021-08-28 ENCOUNTER — APPOINTMENT (OUTPATIENT)
Dept: RADIOLOGY | Facility: IMAGING CENTER | Age: 61
End: 2021-08-28

## 2021-09-09 ENCOUNTER — INPATIENT (INPATIENT)
Facility: HOSPITAL | Age: 61
LOS: 4 days | Discharge: HOME | DRG: 652 | End: 2021-09-14
Attending: TRANSPLANT SURGERY | Admitting: TRANSPLANT SURGERY
Payer: COMMERCIAL

## 2021-09-09 ENCOUNTER — NON-APPOINTMENT (OUTPATIENT)
Age: 61
End: 2021-09-09

## 2021-09-09 ENCOUNTER — APPOINTMENT (OUTPATIENT)
Dept: TRANSPLANT | Facility: HOSPITAL | Age: 61
End: 2021-09-09

## 2021-09-09 VITALS
TEMPERATURE: 98 F | HEART RATE: 77 BPM | SYSTOLIC BLOOD PRESSURE: 151 MMHG | OXYGEN SATURATION: 100 % | RESPIRATION RATE: 18 BRPM | DIASTOLIC BLOOD PRESSURE: 74 MMHG | HEIGHT: 69 IN | WEIGHT: 187.17 LBS

## 2021-09-09 DIAGNOSIS — N18.9 CHRONIC KIDNEY DISEASE, UNSPECIFIED: ICD-10-CM

## 2021-09-09 DIAGNOSIS — I10 ESSENTIAL (PRIMARY) HYPERTENSION: ICD-10-CM

## 2021-09-09 DIAGNOSIS — N03.9 CHRONIC NEPHRITIC SYNDROME WITH UNSPECIFIED MORPHOLOGIC CHANGES: ICD-10-CM

## 2021-09-09 DIAGNOSIS — T85.611A BREAKDOWN (MECHANICAL) OF INTRAPERITONEAL DIALYSIS CATHETER, INITIAL ENCOUNTER: Chronic | ICD-10-CM

## 2021-09-09 DIAGNOSIS — Z76.82 AWAITING ORGAN TRANSPLANT STATUS: ICD-10-CM

## 2021-09-09 DIAGNOSIS — N18.6 END STAGE RENAL DISEASE: ICD-10-CM

## 2021-09-09 DIAGNOSIS — Z98.890 OTHER SPECIFIED POSTPROCEDURAL STATES: Chronic | ICD-10-CM

## 2021-09-09 DIAGNOSIS — Z94.0 KIDNEY TRANSPLANT STATUS: ICD-10-CM

## 2021-09-09 LAB
ALBUMIN SERPL ELPH-MCNC: 4.7 G/DL — SIGNIFICANT CHANGE UP (ref 3.3–5)
ALP SERPL-CCNC: 54 U/L — SIGNIFICANT CHANGE UP (ref 40–120)
ALT FLD-CCNC: 10 U/L — SIGNIFICANT CHANGE UP (ref 10–45)
ANION GAP SERPL CALC-SCNC: 21 MMOL/L — HIGH (ref 5–17)
APTT BLD: 31.7 SEC — SIGNIFICANT CHANGE UP (ref 27.5–35.5)
AST SERPL-CCNC: 11 U/L — SIGNIFICANT CHANGE UP (ref 10–40)
BASOPHILS # BLD AUTO: 0.01 K/UL — SIGNIFICANT CHANGE UP (ref 0–0.2)
BASOPHILS # BLD AUTO: 0.04 K/UL — SIGNIFICANT CHANGE UP (ref 0–0.2)
BASOPHILS NFR BLD AUTO: 0.1 % — SIGNIFICANT CHANGE UP (ref 0–2)
BASOPHILS NFR BLD AUTO: 0.6 % — SIGNIFICANT CHANGE UP (ref 0–2)
BILIRUB SERPL-MCNC: 0.4 MG/DL — SIGNIFICANT CHANGE UP (ref 0.2–1.2)
BLD GP AB SCN SERPL QL: NEGATIVE — SIGNIFICANT CHANGE UP
BUN SERPL-MCNC: 28 MG/DL — HIGH (ref 7–23)
CALCIUM SERPL-MCNC: 10.6 MG/DL — HIGH (ref 8.4–10.5)
CHLORIDE SERPL-SCNC: 98 MMOL/L — SIGNIFICANT CHANGE UP (ref 96–108)
CO2 SERPL-SCNC: 18 MMOL/L — LOW (ref 22–31)
CREAT SERPL-MCNC: 7.37 MG/DL — HIGH (ref 0.5–1.3)
EOSINOPHIL # BLD AUTO: 0.05 K/UL — SIGNIFICANT CHANGE UP (ref 0–0.5)
EOSINOPHIL # BLD AUTO: 0.36 K/UL — SIGNIFICANT CHANGE UP (ref 0–0.5)
EOSINOPHIL NFR BLD AUTO: 0.5 % — SIGNIFICANT CHANGE UP (ref 0–6)
EOSINOPHIL NFR BLD AUTO: 5 % — SIGNIFICANT CHANGE UP (ref 0–6)
GAS PNL BLDA: SIGNIFICANT CHANGE UP
GAS PNL BLDA: SIGNIFICANT CHANGE UP
GAS PNL BLDV: SIGNIFICANT CHANGE UP
GAS PNL BLDV: SIGNIFICANT CHANGE UP
GLUCOSE BLDC GLUCOMTR-MCNC: 126 MG/DL — HIGH (ref 70–99)
GLUCOSE SERPL-MCNC: 81 MG/DL — SIGNIFICANT CHANGE UP (ref 70–99)
HCT VFR BLD CALC: 28.2 % — LOW (ref 39–50)
HCT VFR BLD CALC: 33.2 % — LOW (ref 39–50)
HGB BLD-MCNC: 11 G/DL — LOW (ref 13–17)
HGB BLD-MCNC: 9.2 G/DL — LOW (ref 13–17)
IMM GRANULOCYTES NFR BLD AUTO: 0.4 % — SIGNIFICANT CHANGE UP (ref 0–1.5)
IMM GRANULOCYTES NFR BLD AUTO: 0.6 % — SIGNIFICANT CHANGE UP (ref 0–1.5)
INR BLD: 0.91 RATIO — SIGNIFICANT CHANGE UP (ref 0.88–1.16)
LYMPHOCYTES # BLD AUTO: 0.8 K/UL — LOW (ref 1–3.3)
LYMPHOCYTES # BLD AUTO: 1.7 K/UL — SIGNIFICANT CHANGE UP (ref 1–3.3)
LYMPHOCYTES # BLD AUTO: 23.5 % — SIGNIFICANT CHANGE UP (ref 13–44)
LYMPHOCYTES # BLD AUTO: 8 % — LOW (ref 13–44)
MCHC RBC-ENTMCNC: 31.6 PG — SIGNIFICANT CHANGE UP (ref 27–34)
MCHC RBC-ENTMCNC: 32.3 PG — SIGNIFICANT CHANGE UP (ref 27–34)
MCHC RBC-ENTMCNC: 32.6 GM/DL — SIGNIFICANT CHANGE UP (ref 32–36)
MCHC RBC-ENTMCNC: 33.1 GM/DL — SIGNIFICANT CHANGE UP (ref 32–36)
MCV RBC AUTO: 96.9 FL — SIGNIFICANT CHANGE UP (ref 80–100)
MCV RBC AUTO: 97.4 FL — SIGNIFICANT CHANGE UP (ref 80–100)
MONOCYTES # BLD AUTO: 0.19 K/UL — SIGNIFICANT CHANGE UP (ref 0–0.9)
MONOCYTES # BLD AUTO: 0.54 K/UL — SIGNIFICANT CHANGE UP (ref 0–0.9)
MONOCYTES NFR BLD AUTO: 1.9 % — LOW (ref 2–14)
MONOCYTES NFR BLD AUTO: 7.5 % — SIGNIFICANT CHANGE UP (ref 2–14)
NEUTROPHILS # BLD AUTO: 4.54 K/UL — SIGNIFICANT CHANGE UP (ref 1.8–7.4)
NEUTROPHILS # BLD AUTO: 8.86 K/UL — HIGH (ref 1.8–7.4)
NEUTROPHILS NFR BLD AUTO: 62.8 % — SIGNIFICANT CHANGE UP (ref 43–77)
NEUTROPHILS NFR BLD AUTO: 89.1 % — HIGH (ref 43–77)
NRBC # BLD: 0 /100 WBCS — SIGNIFICANT CHANGE UP (ref 0–0)
NRBC # BLD: 0 /100 WBCS — SIGNIFICANT CHANGE UP (ref 0–0)
PLATELET # BLD AUTO: 154 K/UL — SIGNIFICANT CHANGE UP (ref 150–400)
PLATELET # BLD AUTO: 176 K/UL — SIGNIFICANT CHANGE UP (ref 150–400)
POTASSIUM SERPL-MCNC: 4.8 MMOL/L — SIGNIFICANT CHANGE UP (ref 3.5–5.3)
POTASSIUM SERPL-SCNC: 4.8 MMOL/L — SIGNIFICANT CHANGE UP (ref 3.5–5.3)
PROT SERPL-MCNC: 7.7 G/DL — SIGNIFICANT CHANGE UP (ref 6–8.3)
PROTHROM AB SERPL-ACNC: 11 SEC — SIGNIFICANT CHANGE UP (ref 10.6–13.6)
RBC # BLD: 2.91 M/UL — LOW (ref 4.2–5.8)
RBC # BLD: 3.41 M/UL — LOW (ref 4.2–5.8)
RBC # FLD: 12.5 % — SIGNIFICANT CHANGE UP (ref 10.3–14.5)
RBC # FLD: 12.6 % — SIGNIFICANT CHANGE UP (ref 10.3–14.5)
RH IG SCN BLD-IMP: POSITIVE — SIGNIFICANT CHANGE UP
SARS-COV-2 RNA SPEC QL NAA+PROBE: SIGNIFICANT CHANGE UP
SODIUM SERPL-SCNC: 137 MMOL/L — SIGNIFICANT CHANGE UP (ref 135–145)
WBC # BLD: 7.22 K/UL — SIGNIFICANT CHANGE UP (ref 3.8–10.5)
WBC # BLD: 9.95 K/UL — SIGNIFICANT CHANGE UP (ref 3.8–10.5)
WBC # FLD AUTO: 7.22 K/UL — SIGNIFICANT CHANGE UP (ref 3.8–10.5)
WBC # FLD AUTO: 9.95 K/UL — SIGNIFICANT CHANGE UP (ref 3.8–10.5)

## 2021-09-09 PROCEDURE — 50360 RNL ALTRNSPLJ W/O RCP NFRCT: CPT | Mod: GC

## 2021-09-09 PROCEDURE — 99254 IP/OBS CNSLTJ NEW/EST MOD 60: CPT | Mod: GC

## 2021-09-09 PROCEDURE — 50605 INSERT URETERAL SUPPORT: CPT | Mod: GC

## 2021-09-09 PROCEDURE — 71045 X-RAY EXAM CHEST 1 VIEW: CPT | Mod: 26,76

## 2021-09-09 PROCEDURE — 93010 ELECTROCARDIOGRAM REPORT: CPT | Mod: 76

## 2021-09-09 RX ORDER — DEXTROSE 50 % IN WATER 50 %
15 SYRINGE (ML) INTRAVENOUS ONCE
Refills: 0 | Status: DISCONTINUED | OUTPATIENT
Start: 2021-09-09 | End: 2021-09-14

## 2021-09-09 RX ORDER — HYDROMORPHONE HYDROCHLORIDE 2 MG/ML
30 INJECTION INTRAMUSCULAR; INTRAVENOUS; SUBCUTANEOUS
Refills: 0 | Status: DISCONTINUED | OUTPATIENT
Start: 2021-09-09 | End: 2021-09-10

## 2021-09-09 RX ORDER — SODIUM CHLORIDE 9 MG/ML
1000 INJECTION INTRAMUSCULAR; INTRAVENOUS; SUBCUTANEOUS
Refills: 0 | Status: DISCONTINUED | OUTPATIENT
Start: 2021-09-09 | End: 2021-09-10

## 2021-09-09 RX ORDER — VALGANCICLOVIR 450 MG/1
450 TABLET, FILM COATED ORAL DAILY
Refills: 0 | Status: DISCONTINUED | OUTPATIENT
Start: 2021-09-10 | End: 2021-09-10

## 2021-09-09 RX ORDER — ONDANSETRON 8 MG/1
4 TABLET, FILM COATED ORAL ONCE
Refills: 0 | Status: DISCONTINUED | OUTPATIENT
Start: 2021-09-09 | End: 2021-09-10

## 2021-09-09 RX ORDER — GLUCAGON INJECTION, SOLUTION 0.5 MG/.1ML
1 INJECTION, SOLUTION SUBCUTANEOUS ONCE
Refills: 0 | Status: DISCONTINUED | OUTPATIENT
Start: 2021-09-09 | End: 2021-09-14

## 2021-09-09 RX ORDER — CHLORHEXIDINE GLUCONATE 213 G/1000ML
1 SOLUTION TOPICAL DAILY
Refills: 0 | Status: DISCONTINUED | OUTPATIENT
Start: 2021-09-09 | End: 2021-09-14

## 2021-09-09 RX ORDER — NALOXONE HYDROCHLORIDE 4 MG/.1ML
0.1 SPRAY NASAL
Refills: 0 | Status: DISCONTINUED | OUTPATIENT
Start: 2021-09-09 | End: 2021-09-14

## 2021-09-09 RX ORDER — TACROLIMUS 5 MG/1
5 CAPSULE ORAL
Refills: 0 | Status: DISCONTINUED | OUTPATIENT
Start: 2021-09-10 | End: 2021-09-10

## 2021-09-09 RX ORDER — MYCOPHENOLATE MOFETIL 250 MG/1
1 CAPSULE ORAL EVERY 12 HOURS
Refills: 0 | Status: DISCONTINUED | OUTPATIENT
Start: 2021-09-10 | End: 2021-09-10

## 2021-09-09 RX ORDER — HYDROMORPHONE HYDROCHLORIDE 2 MG/ML
0.5 INJECTION INTRAMUSCULAR; INTRAVENOUS; SUBCUTANEOUS
Refills: 0 | Status: DISCONTINUED | OUTPATIENT
Start: 2021-09-09 | End: 2021-09-10

## 2021-09-09 RX ORDER — FAMOTIDINE 10 MG/ML
20 INJECTION INTRAVENOUS DAILY
Refills: 0 | Status: DISCONTINUED | OUTPATIENT
Start: 2021-09-10 | End: 2021-09-14

## 2021-09-09 RX ORDER — DEXTROSE 50 % IN WATER 50 %
25 SYRINGE (ML) INTRAVENOUS ONCE
Refills: 0 | Status: DISCONTINUED | OUTPATIENT
Start: 2021-09-09 | End: 2021-09-14

## 2021-09-09 RX ORDER — DEXTROSE 50 % IN WATER 50 %
15 SYRINGE (ML) INTRAVENOUS ONCE
Refills: 0 | Status: DISCONTINUED | OUTPATIENT
Start: 2021-09-09 | End: 2021-09-09

## 2021-09-09 RX ORDER — INSULIN LISPRO 100/ML
VIAL (ML) SUBCUTANEOUS AT BEDTIME
Refills: 0 | Status: DISCONTINUED | OUTPATIENT
Start: 2021-09-09 | End: 2021-09-14

## 2021-09-09 RX ORDER — SODIUM CHLORIDE 9 MG/ML
500 INJECTION INTRAMUSCULAR; INTRAVENOUS; SUBCUTANEOUS ONCE
Refills: 0 | Status: COMPLETED | OUTPATIENT
Start: 2021-09-09 | End: 2021-09-09

## 2021-09-09 RX ORDER — ONDANSETRON 8 MG/1
4 TABLET, FILM COATED ORAL EVERY 6 HOURS
Refills: 0 | Status: DISCONTINUED | OUTPATIENT
Start: 2021-09-09 | End: 2021-09-14

## 2021-09-09 RX ORDER — BASILIXIMAB 20 MG/5ML
20 INJECTION, POWDER, FOR SOLUTION INTRAVENOUS ONCE
Refills: 0 | Status: DISCONTINUED | OUTPATIENT
Start: 2021-09-09 | End: 2021-09-09

## 2021-09-09 RX ORDER — SODIUM CHLORIDE 9 MG/ML
1000 INJECTION, SOLUTION INTRAVENOUS
Refills: 0 | Status: DISCONTINUED | OUTPATIENT
Start: 2021-09-09 | End: 2021-09-14

## 2021-09-09 RX ORDER — DEXTROSE 50 % IN WATER 50 %
12.5 SYRINGE (ML) INTRAVENOUS ONCE
Refills: 0 | Status: DISCONTINUED | OUTPATIENT
Start: 2021-09-09 | End: 2021-09-14

## 2021-09-09 RX ORDER — SODIUM CHLORIDE 9 MG/ML
500 INJECTION, SOLUTION INTRAVENOUS
Refills: 0 | Status: DISCONTINUED | OUTPATIENT
Start: 2021-09-09 | End: 2021-09-10

## 2021-09-09 RX ORDER — INSULIN LISPRO 100/ML
VIAL (ML) SUBCUTANEOUS EVERY 6 HOURS
Refills: 0 | Status: DISCONTINUED | OUTPATIENT
Start: 2021-09-09 | End: 2021-09-10

## 2021-09-09 RX ORDER — NYSTATIN 500MM UNIT
500000 POWDER (EA) MISCELLANEOUS
Refills: 0 | Status: DISCONTINUED | OUTPATIENT
Start: 2021-09-10 | End: 2021-09-14

## 2021-09-09 RX ORDER — CEFAZOLIN SODIUM 1 G
2000 VIAL (EA) INJECTION ONCE
Refills: 0 | Status: DISCONTINUED | OUTPATIENT
Start: 2021-09-09 | End: 2021-09-12

## 2021-09-09 RX ADMIN — HYDROMORPHONE HYDROCHLORIDE 0.5 MILLIGRAM(S): 2 INJECTION INTRAMUSCULAR; INTRAVENOUS; SUBCUTANEOUS at 23:20

## 2021-09-09 RX ADMIN — HYDROMORPHONE HYDROCHLORIDE 30 MILLILITER(S): 2 INJECTION INTRAMUSCULAR; INTRAVENOUS; SUBCUTANEOUS at 23:23

## 2021-09-09 RX ADMIN — HYDROMORPHONE HYDROCHLORIDE 0.5 MILLIGRAM(S): 2 INJECTION INTRAMUSCULAR; INTRAVENOUS; SUBCUTANEOUS at 23:07

## 2021-09-09 NOTE — H&P ADULT - HISTORY OF PRESENT ILLNESS
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 Richey  CPRA: 0%  ABO: O          CMV:  Positive  EBV Status: Positive  Last HD: Patient is on PD. He had last PD yesterday (9/8) at 3PM.   Financially cleared- Yes  Recipient OR- 9/9/21 at 1630  Surgeon- Dr. Akhtar    ----  Donor (import): [place], [place]  Donor ID:  [id number]  [place]: [id number]  [place] [facility] [place]:  42 y.o. male  ABO:  O  High Risk: No     KDPI: 33%  COD: Anoxia  X Clamp Time:  09/08/2021 23:41  Medical Hx:  DM II (less than 5 years), ? 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 Richey  CPRA: 0%  ABO: O          CMV:  Positive  EBV Status: Positive  Last HD: Patient is on PD. He had last PD yesterday (9/8) at 3PM.   Financially cleared- Yes  Recipient OR- 9/9/21 at 1630  Surgeon- Dr. Akhtar    ----  Donor (import): [place], [place]  Donor ID:  [id number]  Match: [id number]  [facility]: [facility] [place] [facility]  Age:  42 y.o. male  ABO:  O  High Risk: No     KDPI: 33%  COD: Anoxia  X Clamp Time:  09/08/2021 23:41  Medical Hx:  DM II (less than 5 years), ? Hepatitis as a child, heavy ETOH use  Terminal Cr:  0.61  Covid Testing:  COVID NP 8/31/21: Negative; COVID BAL 9/5: negative; 9/8 NP: Negative  CMV-Positive  EBV-Negative  HepBcAb-Negative  Hepatitis C-SIM- Negative  Hepatitis C ab-Negative  MISMATCH: 2, 2, 0  Kidney ETA to [facility]: 9/9/21 at 1630  Kidney Laterality: Left Kidney  X-match – Virtual negative

## 2021-09-09 NOTE — H&P ADULT - NSHPRISKHIVSCREEN_GEN_ALL_CORE
Not applicable (known HIV negative status in last year)
Not applicable (known HIV negative status in last year)

## 2021-09-09 NOTE — H&P ADULT - NSHPPHYSICALEXAM_GEN_ALL_CORE
Constitutional: Well developed / well nourished  Eyes: Anicteric, PERRLA  ENMT: nc/at  Neck: central line *****************  Respiratory: CTA B/L  Cardiovascular: RRR  Gastrointestinal: Soft, non distended, mild tenderness at the incision site; incision c/d/i; LIMA .....   Genitourinary: Urinary catheter in place*****Voiding spontaneously  Extremities: SCD's in place and working bilaterally, AVF.....  Vascular: Palpable dp pulses bilaterally  Neurological: A&O x3  Skin: no rashes, ulcerations or lesions;  Musculoskeletal: Moving all extremities  Psychiatric: Responsive
Constitutional: Well developed / well nourished  Eyes: Anicteric, PERRLA  ENMT: nc/at  Neck: supple  Respiratory: CTA B/L  Cardiovascular: RRR  Gastrointestinal: Soft, ND/NT.  healed PD cath sites. no hernias.  Genitourinary: oliguric. no hernias, no edema  Extremities: no edema, no calf TTP  Vascular: Palpable dp pulses bilaterally  Neurological: A&O x3  Skin: no rashes, ulcerations or lesions;  Musculoskeletal: Moving all extremities  Psychiatric: Responsive

## 2021-09-09 NOTE — H&P ADULT - NSHPREVIEWOFSYSTEMS_GEN_ALL_CORE
Gen: No weight changes, fatigue, fevers/chills, weakness  Skin: No rashes  Head/Eyes/Ears/Mouth: No headache; Normal hearing; Normal vision w/o blurriness; No sinus pain/discomfort, sore throat  Respiratory: No dyspnea, cough, wheezing, hemoptysis  CV: No chest pain, PND, orthopnea  GI: no abdominal pain.  denies diarrhea, constipation, nausea, vomiting, melena, hematochezia  : No increased frequency, dysuria, hematuria, nocturia  MSK: No joint pain/swelling; no back pain; no edema  Neuro: No dizziness/lightheadedness, weakness, seizures, numbness, tingling  Heme: No easy bruising or bleeding  Endo: No heat/cold intolerance  Psych: No significant nervousness, anxiety, stress, depression  All other systems were reviewed and are negative, except as noted.
Gen: No weight changes, fatigue, fevers/chills, weakness  Skin: No rashes  Head/Eyes/Ears/Mouth: No headache; Normal hearing; Normal vision w/o blurriness; No sinus pain/discomfort, sore throat  Respiratory: No dyspnea, cough, wheezing, hemoptysis  CV: No chest pain, PND, orthopnea  GI: Mild abdominal pain at surgical incision site; denies diarrhea, constipation, nausea, vomiting, melena, hematochezia  : No increased frequency, dysuria, hematuria, nocturia  MSK: No joint pain/swelling; no back pain; no edema  Neuro: No dizziness/lightheadedness, weakness, seizures, numbness, tingling  Heme: No easy bruising or bleeding  Endo: No heat/cold intolerance  Psych: No significant nervousness, anxiety, stress, depression  All other systems were reviewed and are negative, except as noted.

## 2021-09-09 NOTE — H&P ADULT - NSICDXPASTSURGICALHX_GEN_ALL_CORE_FT
PAST SURGICAL HISTORY:  History of lung surgery 2013- benign    Peritoneal dialysis catheter dysfunction s/p removal 2/20    Vocal cord anomaly S/P polyp removal 2004    
PAST SURGICAL HISTORY:  History of lung surgery 2013- benign    Peritoneal dialysis catheter dysfunction s/p removal 2/20    Vocal cord anomaly S/P polyp removal 2004

## 2021-09-09 NOTE — BRIEF OPERATIVE NOTE - OPERATION/FINDINGS
Donor Renal Transplant  2arteries (combined for one anastamosis) 1, vein, 1 ureter  Double J stent placed  LIMA drain left in place

## 2021-09-09 NOTE — H&P ADULT - ASSESSMENT
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

## 2021-09-09 NOTE — H&P ADULT - NSHPLABSRESULTS_GEN_ALL_CORE
Vital Signs Last 24 Hrs  T(C): 36.7 (09 Sep 2021 14:20), Max: 36.7 (09 Sep 2021 14:20)  T(F): 98.1 (09 Sep 2021 14:20), Max: 98.1 (09 Sep 2021 14:20)  HR: 77 (09 Sep 2021 14:20) (77 - 77)  BP: 151/74 (09 Sep 2021 14:20) (151/74 - 151/74)  BP(mean): --  RR: 18 (09 Sep 2021 14:20) (18 - 18)  SpO2: 100% (09 Sep 2021 14:20) (100% - 100%)    I&O's Summary                            11.0   7.22  )-----------( 176      ( 09 Sep 2021 14:59 )             33.2

## 2021-09-09 NOTE — H&P ADULT - HISTORY OF PRESENT ILLNESS
61M with h/o HTN, HLD, BPH, GERD, Glaucoma, tobacco abuse (1PPD x 15y, last 2004), CKD since 1998 progressing to ESRD 2/2 biopsy proven chronic GN (bx in [place] years ago).  Originally underwent PD x 4 months in 2019, then switched to HD via L AVF MWF. PD cath was removed 2/2020.  (Nephrologists: Rossi/Yareli). UO: oliguric.  Admitted for potential DDRT    No new changes to his health.  Still actively employed with [email or web address].  COVID vaccination series completed. 61M with h/o HTN, HLD, BPH, GERD, Glaucoma, tobacco abuse (1PPD x 15y, last 2004), CKD since 1998 progressing to ESRD 2/2 biopsy proven chronic GN (bx in [place] years ago).  Originally underwent PD x 4 months in 2019, then switched to HD via L AVF MWF. PD cath was removed 2/2020.  (Nephrologists: Rossi/Yareli). UO: oliguric.  Admitted for potential DDRT    No new changes to his health.  Still actively employed with [employer].  COVID vaccination series completed.      Recipient:   vocal cord surgery, pulmonary mass (benign), thyroid lesion (benign)  Covid Vaccine: Second dose of Pfizer vaccine on 2/28/21  Nephrologist:  Dr. Richey  CPRA: 0%  ABO: O          CMV:  Positive  EBV Status: Positive  Last HD: last HD @ 3PM    ----  Donor (import): [place], [place]  Donor ID:  [id number]  [place]: [id number]  [place] [facility] [place]:  42 y.o. male  ABO:  O  High Risk: No     KDPI: 33%  COD: Anoxia  X Clamp Time:  09/08/2021 23:41  Medical Hx:  DM II (less than 5 years), ? Hepatitis as a child, heavy ETOH use  Terminal Cr:  0.61  Covid Testing:  COVID NP 8/31/21: Negative; COVID BAL 9/5: negative; 9/8 NP: Negative  CMV-Positive  EBV-Negative  HepBcAb-Negative  Hepatitis C-SIM- Negative  Hepatitis C ab-Negative  MISMATCH: 2, 2, 0  Kidney ETA to [facility]: 9/9/21 at 1630  Kidney Laterality: Left Kidney  X-match – Virtual negative

## 2021-09-09 NOTE — CONSULT NOTE ADULT - SUBJECTIVE AND OBJECTIVE BOX
[facility] DIVISION OF KIDNEY DISEASES AND HYPERTENSION -- [phone]  -- INITIAL CONSULT NOTE  --------------------------------------------------------------------------------  HPI: 61M with h/o HTN (1988), HLD, DM2, BPH, GERD, Gout, Glaucoma, tobacco abuse (1 PPD x15y, last 2004), CKD since 1988 leading to ESRD now on IHD TIW MWF before via R AVF admitted for potential DDRT on 09/09. Transplant nephrology consulted for post transplant renal care. Pt was initially started on PD in 09/2019 and was later switched to IHD in 12/2019 given diaphragmatic leak. Pt has been receiving  maintenance HD at [facility] center under care of Dr. Rossi LAWRENCE [facility].  Last HD done on 09/08 and tolerated well. Pre transplant evaluation and care under supervision of Dr. Downs.     Pt seen and examined at bedside prior to renal transplant today, NAD. Endorses no major complaints. Explained about the need for HD/CRRT should it arise post operatively and agrees to consent. HD consent obtained and placed in chart. labs pending       PAST HISTORY  --------------------------------------------------------------------------------  PAST MEDICAL & SURGICAL HISTORY:  Nephritis    CKD (chronic kidney disease), stage III    BPH (benign prostatic hypertrophy)    Lung abnormality    HTN (hypertension)    Kidney disease    Hypercholesteremia    GERD (gastroesophageal reflux disease)    Gout    BPH (benign prostatic hyperplasia)    Solitary fibrous tumor  removed in 2013    Vocal cord anomaly  S/P polyp removal 2004    History of lung surgery  2013- benign    Peritoneal dialysis catheter dysfunction  s/p removal 2/20      FAMILY HISTORY:  No pertinent family history in first degree relatives      PAST SOCIAL HISTORY:    ALLERGIES & MEDICATIONS  --------------------------------------------------------------------------------  Allergies    No Known Allergies    Intolerances      Standing Inpatient Medications  basiliximab  IVPB 20 milliGRAM(s) IV Intermittent once  ceFAZolin   IVPB 2000 milliGRAM(s) IV Intermittent once  methylPREDNISolone sodium succinate IVPB 500 milliGRAM(s) IV Intermittent once    PRN Inpatient Medications      REVIEW OF SYSTEMS  --------------------------------------------------------------------------------  Gen: No fevers/chills  Skin: No rashes  Head/Eyes/Ears: Normal hearing,   Respiratory: No dyspnea, cough  CV: No chest pain  GI: No abdominal pain, diarrhea  : anuric  MSK: No  edema      All other systems were reviewed and are negative, except as noted.    VITALS/PHYSICAL EXAM  --------------------------------------------------------------------------------  T(C): 36.7 (09-09-21 @ 14:20), Max: 36.7 (09-09-21 @ 14:20)  HR: 77 (09-09-21 @ 14:20) (77 - 77)  BP: 151/74 (09-09-21 @ 14:20) (151/74 - 151/74)  RR: 18 (09-09-21 @ 14:20) (18 - 18)  SpO2: 100% (09-09-21 @ 14:20) (100% - 100%)  Wt(kg): --  Height (cm): 175.3 (09-09-21 @ 14:20)  Weight (kg): 84.9 (09-09-21 @ 14:20)  BMI (kg/m2): 27.6 (09-09-21 @ 14:20)  BSA (m2): 2.01 (09-09-21 @ 14:20)      Physical Exam:  	Gen: NAD  	HEENT: MMM  	Pulm: CTA B/L  	CV: S1S2  	Abd: Soft, +BS   	Ext: No LE edema B/L  	Neuro: Awake  	Skin: Warm and dry  	Vascular access: LUE AVF, good thrill+    LABS/STUDIES  --------------------------------------------------------------------------------        Creatinine Trend:    Urinalysis - [12-22-18 @ 12:38]      Color Colorless / Appearance Clear / SG 1.011 / pH 6.0      Gluc Trace / Ketone Negative  / Bili Negative / Urobili Negative       Blood Trace / Protein 100 mg/dL / Leuk Est Negative / Nitrite Negative      RBC 29 / WBC 1 / Hyaline 0 / Gran  / Sq Epi  / Non Sq Epi 0 / Bacteria Negative      HbA1c 5.9      [04-08-18 @ 07:47]    HBsAb 212.8      [12-18-19 @ 21:29]  HBsAb Reactive      [02-21-17 @ 17:48]  HBsAg Nonreact      [12-18-19 @ 21:29]  HBcAb Nonreact      [12-18-19 @ 21:29]  HCV 0.11, Nonreact      [12-18-19 @ 21:29]  HIV Nonreact      [02-21-17 @ 17:48]

## 2021-09-09 NOTE — CONSULT NOTE ADULT - TIME BILLING
Patient with chronic GN, ESRD, Hypertension, on hemodialysis  Scheduled for DDRT  Reviewed clinical, lab, pretransplant work up and donor characteristics  Patient was last dialyzed on 9/8/21 and clinically stable to proceed  Plan  Will follow up post transplant  Plan of care discussed with transplant team  I was present during and reviewed clinical and lab data as well as assessment and plan as documented by the house staff as noted. Please contact if any additional questions with any change in clinical condition or on availability of any additional information or reports.

## 2021-09-09 NOTE — PRE-ANESTHESIA EVALUATION ADULT - NSATTENDATTESTRD_GEN_ALL_CORE
none The patient has been re-examined and I agree with the above assessment or I updated with my findings.

## 2021-09-09 NOTE — CONSULT NOTE ADULT - ASSESSMENT
61M with h/o HTN (1988), HLD, DM2, BPH, GERD, Gout, Glaucoma, tobacco abuse (1 PPD x15y, last 2004), CKD since 1988 leading to ESRD now on IHD TIW MWF before via R AVF admitted for potential DDRT on 09/09.    1. Pre Renal transplant recipient     ESRD on HD TIW MWF: Pt has been receiving  maintenance HD at [facility] HD center under care of Dr. Rossi ALCAZAR.  Last HD done on 09/08 and tolerated well. Pre transplant evaluation and care under supervision of Dr. Downs. HD consent obtained and placed in chart.     2. Immunosuppression: As per protocol. Simulect induction and Solumedrol today (09/09).   3. Hyperphosphatemia: on Auryxia 200 mg TID with meals and calcium acetate 667 mg TID with meals. Follow phos levels.

## 2021-09-09 NOTE — H&P ADULT - ASSESSMENT
61M with h/o HTN, HLD, BPH, GERD, Glaucoma, tobacco abuse (1PPD x 15y, last 2004), CKD since 1998 progressing to ESRD 2/2 biopsy proven chronic GN (bx in [place] years ago).  Originally underwent PD x 4 months in 2019, then switched to HD via L AVF MWF. PD cath was removed 2/2020.  (Nephrologists: Rossi/Yareli). UO: oliguric.  Admitted for potential DDRT    potential DDRT  -pre-op labs  -CXR/EKG  -Induction: Ancef/Methyprednisone/Simulect  -OR @ 5P 61M with h/o HTN, HLD, BPH, GERD, Glaucoma, tobacco abuse (1PPD x 15y, last 2004), CKD since 1998 progressing to ESRD 2/2 biopsy proven chronic GN (bx in [place] years ago).  Originally underwent PD x 4 months in 2019, then switched to HD via L AVF MWF. PD cath was removed 2/2020.  (Nephrologists: Rossi/Yareli). UO: oliguric.  Admitted for potential DDRT    potential DDRT  -pre-op labs  -COVID NEGATIVE  -CXR/EKG  -Induction: Ancef/Methyprednisone/Simulect  -OR @ 5P

## 2021-09-09 NOTE — H&P ADULT - NSICDXPASTMEDICALHX_GEN_ALL_CORE_FT
PAST MEDICAL HISTORY:  BPH (benign prostatic hyperplasia)     BPH (benign prostatic hypertrophy)     CKD (chronic kidney disease), stage III     GERD (gastroesophageal reflux disease)     Gout     HTN (hypertension)     Hypercholesteremia     Kidney disease     Lung abnormality     Nephritis     Solitary fibrous tumor removed in 2013    
PAST MEDICAL HISTORY:  BPH (benign prostatic hyperplasia)     BPH (benign prostatic hypertrophy)     CKD (chronic kidney disease), stage III     GERD (gastroesophageal reflux disease)     Gout     HTN (hypertension)     Hypercholesteremia     Kidney disease     Lung abnormality     Nephritis     Solitary fibrous tumor removed in 2013

## 2021-09-10 DIAGNOSIS — D84.9 IMMUNODEFICIENCY, UNSPECIFIED: ICD-10-CM

## 2021-09-10 DIAGNOSIS — Z94.0 KIDNEY TRANSPLANT STATUS: ICD-10-CM

## 2021-09-10 LAB
A1C WITH ESTIMATED AVERAGE GLUCOSE RESULT: 5.4 % — SIGNIFICANT CHANGE UP (ref 4–5.6)
ALBUMIN SERPL ELPH-MCNC: 3.4 G/DL — SIGNIFICANT CHANGE UP (ref 3.3–5)
ALBUMIN SERPL ELPH-MCNC: 3.5 G/DL — SIGNIFICANT CHANGE UP (ref 3.3–5)
ALBUMIN SERPL ELPH-MCNC: 3.5 G/DL — SIGNIFICANT CHANGE UP (ref 3.3–5)
ALBUMIN SERPL ELPH-MCNC: 3.7 G/DL — SIGNIFICANT CHANGE UP (ref 3.3–5)
ALP SERPL-CCNC: 41 U/L — SIGNIFICANT CHANGE UP (ref 40–120)
ALP SERPL-CCNC: 43 U/L — SIGNIFICANT CHANGE UP (ref 40–120)
ALP SERPL-CCNC: 46 U/L — SIGNIFICANT CHANGE UP (ref 40–120)
ALP SERPL-CCNC: 47 U/L — SIGNIFICANT CHANGE UP (ref 40–120)
ALT FLD-CCNC: 7 U/L — LOW (ref 10–45)
ALT FLD-CCNC: 8 U/L — LOW (ref 10–45)
ANION GAP SERPL CALC-SCNC: 16 MMOL/L — SIGNIFICANT CHANGE UP (ref 5–17)
ANION GAP SERPL CALC-SCNC: 17 MMOL/L — SIGNIFICANT CHANGE UP (ref 5–17)
ANION GAP SERPL CALC-SCNC: 17 MMOL/L — SIGNIFICANT CHANGE UP (ref 5–17)
ANION GAP SERPL CALC-SCNC: 20 MMOL/L — HIGH (ref 5–17)
AST SERPL-CCNC: 13 U/L — SIGNIFICANT CHANGE UP (ref 10–40)
AST SERPL-CCNC: 13 U/L — SIGNIFICANT CHANGE UP (ref 10–40)
AST SERPL-CCNC: 15 U/L — SIGNIFICANT CHANGE UP (ref 10–40)
AST SERPL-CCNC: 20 U/L — SIGNIFICANT CHANGE UP (ref 10–40)
BASOPHILS # BLD AUTO: 0.01 K/UL — SIGNIFICANT CHANGE UP (ref 0–0.2)
BASOPHILS # BLD AUTO: 0.03 K/UL — SIGNIFICANT CHANGE UP (ref 0–0.2)
BASOPHILS NFR BLD AUTO: 0.1 % — SIGNIFICANT CHANGE UP (ref 0–2)
BASOPHILS NFR BLD AUTO: 0.2 % — SIGNIFICANT CHANGE UP (ref 0–2)
BILIRUB SERPL-MCNC: 0.3 MG/DL — SIGNIFICANT CHANGE UP (ref 0.2–1.2)
BILIRUB SERPL-MCNC: 0.3 MG/DL — SIGNIFICANT CHANGE UP (ref 0.2–1.2)
BILIRUB SERPL-MCNC: 0.4 MG/DL — SIGNIFICANT CHANGE UP (ref 0.2–1.2)
BILIRUB SERPL-MCNC: 0.4 MG/DL — SIGNIFICANT CHANGE UP (ref 0.2–1.2)
BUN SERPL-MCNC: 30 MG/DL — HIGH (ref 7–23)
BUN SERPL-MCNC: 33 MG/DL — HIGH (ref 7–23)
BUN SERPL-MCNC: 35 MG/DL — HIGH (ref 7–23)
BUN SERPL-MCNC: 36 MG/DL — HIGH (ref 7–23)
CALCIUM SERPL-MCNC: 8.5 MG/DL — SIGNIFICANT CHANGE UP (ref 8.4–10.5)
CALCIUM SERPL-MCNC: 8.7 MG/DL — SIGNIFICANT CHANGE UP (ref 8.4–10.5)
CALCIUM SERPL-MCNC: 8.7 MG/DL — SIGNIFICANT CHANGE UP (ref 8.4–10.5)
CALCIUM SERPL-MCNC: 8.8 MG/DL — SIGNIFICANT CHANGE UP (ref 8.4–10.5)
CHLORIDE SERPL-SCNC: 100 MMOL/L — SIGNIFICANT CHANGE UP (ref 96–108)
CHLORIDE SERPL-SCNC: 100 MMOL/L — SIGNIFICANT CHANGE UP (ref 96–108)
CHLORIDE SERPL-SCNC: 97 MMOL/L — SIGNIFICANT CHANGE UP (ref 96–108)
CHLORIDE SERPL-SCNC: 97 MMOL/L — SIGNIFICANT CHANGE UP (ref 96–108)
CO2 SERPL-SCNC: 16 MMOL/L — LOW (ref 22–31)
CO2 SERPL-SCNC: 16 MMOL/L — LOW (ref 22–31)
CO2 SERPL-SCNC: 17 MMOL/L — LOW (ref 22–31)
CO2 SERPL-SCNC: 18 MMOL/L — LOW (ref 22–31)
COVID-19 SPIKE DOMAIN AB INTERP: POSITIVE
COVID-19 SPIKE DOMAIN ANTIBODY RESULT: >250 U/ML — HIGH
CREAT SERPL-MCNC: 5.15 MG/DL — HIGH (ref 0.5–1.3)
CREAT SERPL-MCNC: 5.41 MG/DL — HIGH (ref 0.5–1.3)
CREAT SERPL-MCNC: 6.41 MG/DL — HIGH (ref 0.5–1.3)
CREAT SERPL-MCNC: 7.18 MG/DL — HIGH (ref 0.5–1.3)
EOSINOPHIL # BLD AUTO: 0.02 K/UL — SIGNIFICANT CHANGE UP (ref 0–0.5)
EOSINOPHIL # BLD AUTO: 0.08 K/UL — SIGNIFICANT CHANGE UP (ref 0–0.5)
EOSINOPHIL NFR BLD AUTO: 0.1 % — SIGNIFICANT CHANGE UP (ref 0–6)
EOSINOPHIL NFR BLD AUTO: 0.5 % — SIGNIFICANT CHANGE UP (ref 0–6)
ESTIMATED AVERAGE GLUCOSE: 108 MG/DL — SIGNIFICANT CHANGE UP (ref 68–114)
GAS PNL BLDA: SIGNIFICANT CHANGE UP
GAS PNL BLDA: SIGNIFICANT CHANGE UP
GLUCOSE BLDC GLUCOMTR-MCNC: 127 MG/DL — HIGH (ref 70–99)
GLUCOSE BLDC GLUCOMTR-MCNC: 141 MG/DL — HIGH (ref 70–99)
GLUCOSE BLDC GLUCOMTR-MCNC: 157 MG/DL — HIGH (ref 70–99)
GLUCOSE BLDC GLUCOMTR-MCNC: 198 MG/DL — HIGH (ref 70–99)
GLUCOSE SERPL-MCNC: 133 MG/DL — HIGH (ref 70–99)
GLUCOSE SERPL-MCNC: 147 MG/DL — HIGH (ref 70–99)
GLUCOSE SERPL-MCNC: 168 MG/DL — HIGH (ref 70–99)
GLUCOSE SERPL-MCNC: 173 MG/DL — HIGH (ref 70–99)
HBV CORE AB SER-ACNC: SIGNIFICANT CHANGE UP
HBV SURFACE AB SER-ACNC: 360.7 MIU/ML — SIGNIFICANT CHANGE UP
HBV SURFACE AG SER-ACNC: SIGNIFICANT CHANGE UP
HCT VFR BLD CALC: 29.4 % — LOW (ref 39–50)
HCT VFR BLD CALC: 33.5 % — LOW (ref 39–50)
HCV AB S/CO SERPL IA: 0.18 S/CO — SIGNIFICANT CHANGE UP (ref 0–0.99)
HCV AB SERPL-IMP: SIGNIFICANT CHANGE UP
HCV RNA SERPL NAA DL=5-ACNC: SIGNIFICANT CHANGE UP IU/ML
HCV RNA SPEC NAA+PROBE-LOG IU: SIGNIFICANT CHANGE UP LOG10IU/ML
HGB BLD-MCNC: 10.9 G/DL — LOW (ref 13–17)
HGB BLD-MCNC: 9.6 G/DL — LOW (ref 13–17)
HIV 1+2 AB+HIV1 P24 AG SERPL QL IA: SIGNIFICANT CHANGE UP
IMM GRANULOCYTES NFR BLD AUTO: 0.5 % — SIGNIFICANT CHANGE UP (ref 0–1.5)
IMM GRANULOCYTES NFR BLD AUTO: 0.8 % — SIGNIFICANT CHANGE UP (ref 0–1.5)
LYMPHOCYTES # BLD AUTO: 0.3 K/UL — LOW (ref 1–3.3)
LYMPHOCYTES # BLD AUTO: 0.55 K/UL — LOW (ref 1–3.3)
LYMPHOCYTES # BLD AUTO: 1.8 % — LOW (ref 13–44)
LYMPHOCYTES # BLD AUTO: 3.7 % — LOW (ref 13–44)
MAGNESIUM SERPL-MCNC: 2.3 MG/DL — SIGNIFICANT CHANGE UP (ref 1.6–2.6)
MAGNESIUM SERPL-MCNC: 2.4 MG/DL — SIGNIFICANT CHANGE UP (ref 1.6–2.6)
MCHC RBC-ENTMCNC: 31.7 PG — SIGNIFICANT CHANGE UP (ref 27–34)
MCHC RBC-ENTMCNC: 31.9 PG — SIGNIFICANT CHANGE UP (ref 27–34)
MCHC RBC-ENTMCNC: 32.5 GM/DL — SIGNIFICANT CHANGE UP (ref 32–36)
MCHC RBC-ENTMCNC: 32.7 GM/DL — SIGNIFICANT CHANGE UP (ref 32–36)
MCV RBC AUTO: 97 FL — SIGNIFICANT CHANGE UP (ref 80–100)
MCV RBC AUTO: 98 FL — SIGNIFICANT CHANGE UP (ref 80–100)
MONOCYTES # BLD AUTO: 0.73 K/UL — SIGNIFICANT CHANGE UP (ref 0–0.9)
MONOCYTES # BLD AUTO: 0.77 K/UL — SIGNIFICANT CHANGE UP (ref 0–0.9)
MONOCYTES NFR BLD AUTO: 4.3 % — SIGNIFICANT CHANGE UP (ref 2–14)
MONOCYTES NFR BLD AUTO: 5.2 % — SIGNIFICANT CHANGE UP (ref 2–14)
NEUTROPHILS # BLD AUTO: 13.45 K/UL — HIGH (ref 1.8–7.4)
NEUTROPHILS # BLD AUTO: 15.67 K/UL — HIGH (ref 1.8–7.4)
NEUTROPHILS NFR BLD AUTO: 90.4 % — HIGH (ref 43–77)
NEUTROPHILS NFR BLD AUTO: 92.4 % — HIGH (ref 43–77)
NRBC # BLD: 0 /100 WBCS — SIGNIFICANT CHANGE UP (ref 0–0)
NRBC # BLD: 0 /100 WBCS — SIGNIFICANT CHANGE UP (ref 0–0)
PHOSPHATE SERPL-MCNC: 4 MG/DL — SIGNIFICANT CHANGE UP (ref 2.5–4.5)
PHOSPHATE SERPL-MCNC: 4.3 MG/DL — SIGNIFICANT CHANGE UP (ref 2.5–4.5)
PHOSPHATE SERPL-MCNC: 5 MG/DL — HIGH (ref 2.5–4.5)
PHOSPHATE SERPL-MCNC: 5.1 MG/DL — HIGH (ref 2.5–4.5)
PLATELET # BLD AUTO: 151 K/UL — SIGNIFICANT CHANGE UP (ref 150–400)
PLATELET # BLD AUTO: 190 K/UL — SIGNIFICANT CHANGE UP (ref 150–400)
POTASSIUM SERPL-MCNC: 4.7 MMOL/L — SIGNIFICANT CHANGE UP (ref 3.5–5.3)
POTASSIUM SERPL-MCNC: 4.8 MMOL/L — SIGNIFICANT CHANGE UP (ref 3.5–5.3)
POTASSIUM SERPL-MCNC: 5.3 MMOL/L — SIGNIFICANT CHANGE UP (ref 3.5–5.3)
POTASSIUM SERPL-MCNC: 6 MMOL/L — HIGH (ref 3.5–5.3)
POTASSIUM SERPL-SCNC: 4.7 MMOL/L — SIGNIFICANT CHANGE UP (ref 3.5–5.3)
POTASSIUM SERPL-SCNC: 4.8 MMOL/L — SIGNIFICANT CHANGE UP (ref 3.5–5.3)
POTASSIUM SERPL-SCNC: 5.3 MMOL/L — SIGNIFICANT CHANGE UP (ref 3.5–5.3)
POTASSIUM SERPL-SCNC: 6 MMOL/L — HIGH (ref 3.5–5.3)
PROT SERPL-MCNC: 5.8 G/DL — LOW (ref 6–8.3)
PROT SERPL-MCNC: 6 G/DL — SIGNIFICANT CHANGE UP (ref 6–8.3)
PROT SERPL-MCNC: 6.4 G/DL — SIGNIFICANT CHANGE UP (ref 6–8.3)
PROT SERPL-MCNC: 6.4 G/DL — SIGNIFICANT CHANGE UP (ref 6–8.3)
RBC # BLD: 3.03 M/UL — LOW (ref 4.2–5.8)
RBC # BLD: 3.42 M/UL — LOW (ref 4.2–5.8)
RBC # FLD: 12.6 % — SIGNIFICANT CHANGE UP (ref 10.3–14.5)
RBC # FLD: 12.8 % — SIGNIFICANT CHANGE UP (ref 10.3–14.5)
SARS-COV-2 IGG+IGM SERPL QL IA: >250 U/ML — HIGH
SARS-COV-2 IGG+IGM SERPL QL IA: POSITIVE
SODIUM SERPL-SCNC: 130 MMOL/L — LOW (ref 135–145)
SODIUM SERPL-SCNC: 133 MMOL/L — LOW (ref 135–145)
SODIUM SERPL-SCNC: 134 MMOL/L — LOW (ref 135–145)
SODIUM SERPL-SCNC: 134 MMOL/L — LOW (ref 135–145)
WBC # BLD: 14.88 K/UL — HIGH (ref 3.8–10.5)
WBC # BLD: 16.94 K/UL — HIGH (ref 3.8–10.5)
WBC # FLD AUTO: 14.88 K/UL — HIGH (ref 3.8–10.5)
WBC # FLD AUTO: 16.94 K/UL — HIGH (ref 3.8–10.5)

## 2021-09-10 PROCEDURE — 76776 US EXAM K TRANSPL W/DOPPLER: CPT | Mod: 26,RT

## 2021-09-10 PROCEDURE — 99232 SBSQ HOSP IP/OBS MODERATE 35: CPT

## 2021-09-10 PROCEDURE — 99232 SBSQ HOSP IP/OBS MODERATE 35: CPT | Mod: GC,24

## 2021-09-10 RX ORDER — METOPROLOL TARTRATE 50 MG
12.5 TABLET ORAL
Refills: 0 | Status: DISCONTINUED | OUTPATIENT
Start: 2021-09-10 | End: 2021-09-14

## 2021-09-10 RX ORDER — INSULIN LISPRO 100/ML
VIAL (ML) SUBCUTANEOUS
Refills: 0 | Status: DISCONTINUED | OUTPATIENT
Start: 2021-09-10 | End: 2021-09-14

## 2021-09-10 RX ORDER — ASPIRIN/CALCIUM CARB/MAGNESIUM 324 MG
81 TABLET ORAL DAILY
Refills: 0 | Status: DISCONTINUED | OUTPATIENT
Start: 2021-09-10 | End: 2021-09-14

## 2021-09-10 RX ORDER — SODIUM CHLORIDE 9 MG/ML
500 INJECTION INTRAMUSCULAR; INTRAVENOUS; SUBCUTANEOUS ONCE
Refills: 0 | Status: COMPLETED | OUTPATIENT
Start: 2021-09-10 | End: 2021-09-10

## 2021-09-10 RX ORDER — SENNA PLUS 8.6 MG/1
2 TABLET ORAL AT BEDTIME
Refills: 0 | Status: DISCONTINUED | OUTPATIENT
Start: 2021-09-10 | End: 2021-09-14

## 2021-09-10 RX ORDER — TRAMADOL HYDROCHLORIDE 50 MG/1
25 TABLET ORAL EVERY 6 HOURS
Refills: 0 | Status: DISCONTINUED | OUTPATIENT
Start: 2021-09-10 | End: 2021-09-14

## 2021-09-10 RX ORDER — SODIUM CHLORIDE 9 MG/ML
1000 INJECTION INTRAMUSCULAR; INTRAVENOUS; SUBCUTANEOUS
Refills: 0 | Status: DISCONTINUED | OUTPATIENT
Start: 2021-09-10 | End: 2021-09-11

## 2021-09-10 RX ORDER — TAMSULOSIN HYDROCHLORIDE 0.4 MG/1
0.4 CAPSULE ORAL AT BEDTIME
Refills: 0 | Status: DISCONTINUED | OUTPATIENT
Start: 2021-09-10 | End: 2021-09-12

## 2021-09-10 RX ORDER — MYCOPHENOLATE MOFETIL 250 MG/1
1000 CAPSULE ORAL
Refills: 0 | Status: DISCONTINUED | OUTPATIENT
Start: 2021-09-10 | End: 2021-09-14

## 2021-09-10 RX ORDER — TACROLIMUS 5 MG/1
4 CAPSULE ORAL ONCE
Refills: 0 | Status: COMPLETED | OUTPATIENT
Start: 2021-09-10 | End: 2021-09-10

## 2021-09-10 RX ORDER — TRAMADOL HYDROCHLORIDE 50 MG/1
50 TABLET ORAL EVERY 6 HOURS
Refills: 0 | Status: DISCONTINUED | OUTPATIENT
Start: 2021-09-10 | End: 2021-09-11

## 2021-09-10 RX ORDER — VALGANCICLOVIR 450 MG/1
450 TABLET, FILM COATED ORAL DAILY
Refills: 0 | Status: DISCONTINUED | OUTPATIENT
Start: 2021-09-10 | End: 2021-09-14

## 2021-09-10 RX ORDER — SODIUM BICARBONATE 1 MEQ/ML
1300 SYRINGE (ML) INTRAVENOUS
Refills: 0 | Status: DISCONTINUED | OUTPATIENT
Start: 2021-09-10 | End: 2021-09-14

## 2021-09-10 RX ADMIN — TRAMADOL HYDROCHLORIDE 50 MILLIGRAM(S): 50 TABLET ORAL at 21:55

## 2021-09-10 RX ADMIN — SODIUM CHLORIDE 250 MILLILITER(S): 9 INJECTION INTRAMUSCULAR; INTRAVENOUS; SUBCUTANEOUS at 18:57

## 2021-09-10 RX ADMIN — SODIUM CHLORIDE 50 MILLILITER(S): 9 INJECTION, SOLUTION INTRAVENOUS at 05:17

## 2021-09-10 RX ADMIN — TRAMADOL HYDROCHLORIDE 50 MILLIGRAM(S): 50 TABLET ORAL at 14:01

## 2021-09-10 RX ADMIN — TACROLIMUS 5 MILLIGRAM(S): 5 CAPSULE ORAL at 08:18

## 2021-09-10 RX ADMIN — Medication 500000 UNIT(S): at 11:26

## 2021-09-10 RX ADMIN — TACROLIMUS 4 MILLIGRAM(S): 5 CAPSULE ORAL at 08:56

## 2021-09-10 RX ADMIN — Medication 81 MILLIGRAM(S): at 13:00

## 2021-09-10 RX ADMIN — FAMOTIDINE 20 MILLIGRAM(S): 10 INJECTION INTRAVENOUS at 11:26

## 2021-09-10 RX ADMIN — TRAMADOL HYDROCHLORIDE 25 MILLIGRAM(S): 50 TABLET ORAL at 17:10

## 2021-09-10 RX ADMIN — HYDROMORPHONE HYDROCHLORIDE 30 MILLILITER(S): 2 INJECTION INTRAMUSCULAR; INTRAVENOUS; SUBCUTANEOUS at 07:19

## 2021-09-10 RX ADMIN — Medication 1 TABLET(S): at 11:26

## 2021-09-10 RX ADMIN — HYDROMORPHONE HYDROCHLORIDE 30 MILLILITER(S): 2 INJECTION INTRAMUSCULAR; INTRAVENOUS; SUBCUTANEOUS at 06:01

## 2021-09-10 RX ADMIN — Medication 1: at 18:39

## 2021-09-10 RX ADMIN — Medication 125 MILLIGRAM(S): at 05:16

## 2021-09-10 RX ADMIN — VALGANCICLOVIR 450 MILLIGRAM(S): 450 TABLET, FILM COATED ORAL at 11:26

## 2021-09-10 RX ADMIN — Medication 500000 UNIT(S): at 23:22

## 2021-09-10 RX ADMIN — Medication 500000 UNIT(S): at 17:12

## 2021-09-10 RX ADMIN — CHLORHEXIDINE GLUCONATE 1 APPLICATION(S): 213 SOLUTION TOPICAL at 13:01

## 2021-09-10 RX ADMIN — Medication 500000 UNIT(S): at 00:50

## 2021-09-10 RX ADMIN — SODIUM CHLORIDE 70 MILLILITER(S): 9 INJECTION INTRAMUSCULAR; INTRAVENOUS; SUBCUTANEOUS at 00:15

## 2021-09-10 RX ADMIN — SODIUM CHLORIDE 500 MILLILITER(S): 9 INJECTION INTRAMUSCULAR; INTRAVENOUS; SUBCUTANEOUS at 00:15

## 2021-09-10 RX ADMIN — TRAMADOL HYDROCHLORIDE 50 MILLIGRAM(S): 50 TABLET ORAL at 22:30

## 2021-09-10 RX ADMIN — SENNA PLUS 2 TABLET(S): 8.6 TABLET ORAL at 22:12

## 2021-09-10 RX ADMIN — MYCOPHENOLATE MOFETIL 1 GRAM(S): 250 CAPSULE ORAL at 06:20

## 2021-09-10 RX ADMIN — TAMSULOSIN HYDROCHLORIDE 0.4 MILLIGRAM(S): 0.4 CAPSULE ORAL at 22:12

## 2021-09-10 RX ADMIN — Medication 12.5 MILLIGRAM(S): at 17:12

## 2021-09-10 RX ADMIN — SODIUM CHLORIDE 500 MILLILITER(S): 9 INJECTION INTRAMUSCULAR; INTRAVENOUS; SUBCUTANEOUS at 18:13

## 2021-09-10 RX ADMIN — TRAMADOL HYDROCHLORIDE 25 MILLIGRAM(S): 50 TABLET ORAL at 18:10

## 2021-09-10 RX ADMIN — TRAMADOL HYDROCHLORIDE 50 MILLIGRAM(S): 50 TABLET ORAL at 13:01

## 2021-09-10 RX ADMIN — Medication 1300 MILLIGRAM(S): at 17:11

## 2021-09-10 RX ADMIN — MYCOPHENOLATE MOFETIL 1000 MILLIGRAM(S): 250 CAPSULE ORAL at 20:07

## 2021-09-10 RX ADMIN — Medication 125 MILLIGRAM(S): at 17:13

## 2021-09-10 RX ADMIN — Medication 500000 UNIT(S): at 05:16

## 2021-09-10 NOTE — PROGRESS NOTE ADULT - SUBJECTIVE AND OBJECTIVE BOX
Transplant Surgery - Multidisciplinary Rounds  --------------------------------------------------------------  DDRT   Date: 9/10/21        POD# 2    Present: Patient seen and examined with multidisciplinary team including Transplant Surgeon: Dr. Perez.  Transplant Nephrologist: Dr. Downs.  Pharmacist: Naveen. Inpatient providers: SUMMER Vee, NP Edison, Dr. Lombardi PGY3, Dr. Cardenas PGY3, Ngoc MS4, unit RN during AM rounds.  Disciplines not in attendance will be notified of the plan.   plan:       HPI:61M with h/o HTN, HLD, BPH, GERD, Glaucoma, tobacco abuse (1PPD x 15y, last ), CKD since  progressing to ESRD 2/2 biopsy proven chronic GN (bx in [place] years ago).  Originally underwent PD x 4 months in , then switched to HD via L AVF MWF. PD cath was removed 2020.  (Nephrologists: Rossi/Yareli). UO: oliguric.  Now s/p right DDRT  with stent placement  and Simulect induction     Recipient:   vocal cord surgery, pulmonary mass (benign), thyroid lesion (benign)  Covid Vaccine: Second dose of Pfizer vaccine on 21  Nephrologist:  Dr. Richey  CPRA: 0%  ABO: O          CMV:  Positive  EBV Status: Positive  Last HD: last HD @ 3PM    ----  Donor (import): [place], MD  Donor ID:  [id number]  Match: [id number]  [place] [facility] [place]  Age:  42 y.o. male  ABO:  O  High Risk: No     KDPI: 33%  COD: Anoxia  X Clamp Time:  2021 23:41  Medical Hx:  DM II (less than 5 years), ? Hepatitis as a child, heavy ETOH use  Terminal Cr:  0.61  Covid Testing:  COVID NP 21: Negative; COVID BAL : negative;  NP: Negative  CMV-Positive  EBV-Negative  HepBcAb-Negative  Hepatitis C-SIM- Negative  Hepatitis C ab-Negative  MISMATCH: 2, 2, 0  Kidney ETA to [facility]: 21 at 1630  Kidney Laterality: Left Kidney  X-match – Virtual negative     OR:  Donor Renal Transplant  2arteries (combined for one anastomosis) 1, vein, 1 ureter  Double J stent placed  LIMA drain left in place  CIT ~21h      Interval events:  S/P DDRT POD1  Doing well  Good graft function Cr 6.41 from 7.18, UO 825ml since surgery  Pain controlled with PCA Dilaudid  Cullen +JPx1 intact  NPO overnight  S/P 500ml fluid bolus for low UO and hypotension      Immunosuppression:  Induction with Simulect  Envarsus 5mg increased to 9mg this am hold am dose for level, MMF , steroid taper  Maintenance Immunosuppression  Ongoing monitoring for signs of rejection.      Potential Discharge date: pending clinical improvement  Education:  Medications  Plan of care:  See Below      MEDICATIONS  (STANDING):  aspirin enteric coated 81 milliGRAM(s) Oral daily  ceFAZolin   IVPB 2000 milliGRAM(s) IV Intermittent once  chlorhexidine 2% Cloths 1 Application(s) Topical daily  dextrose 40% Gel 15 Gram(s) Oral once  dextrose 5%. 1000 milliLiter(s) (50 mL/Hr) IV Continuous <Continuous>  dextrose 5%. 1000 milliLiter(s) (100 mL/Hr) IV Continuous <Continuous>  dextrose 50% Injectable 25 Gram(s) IV Push once  dextrose 50% Injectable 12.5 Gram(s) IV Push once  dextrose 50% Injectable 25 Gram(s) IV Push once  famotidine    Tablet 20 milliGRAM(s) Oral daily  glucagon  Injectable 1 milliGRAM(s) IntraMuscular once  glucagon  Injectable 1 milliGRAM(s) IntraMuscular once  insulin lispro (ADMELOG) corrective regimen sliding scale   SubCutaneous every 6 hours  insulin lispro (ADMELOG) corrective regimen sliding scale   SubCutaneous at bedtime  methylPREDNISolone sodium succinate Injectable 125 milliGRAM(s) IV Push two times a day  metoprolol tartrate 12.5 milliGRAM(s) Oral two times a day  mycophenolate mofetil 1 Gram(s) Oral every 12 hours  nystatin    Suspension 010020 Unit(s) Swish and Swallow four times a day  senna 2 Tablet(s) Oral at bedtime  sodium chloride 0.45%. 500 milliLiter(s) (50 mL/Hr) IV Continuous <Continuous>  sodium chloride 0.9%. 1000 milliLiter(s) (70 mL/Hr) IV Continuous <Continuous>  tamsulosin 0.4 milliGRAM(s) Oral at bedtime  trimethoprim   80 mG/sulfamethoxazole 400 mG 1 Tablet(s) Oral daily  valGANciclovir 450 milliGRAM(s) Oral daily    MEDICATIONS  (PRN):  naloxone Injectable 0.1 milliGRAM(s) IV Push every 3 minutes PRN For ANY of the following changes in patient status:  A. RR LESS THAN 10 breaths per minute, B. Oxygen saturation LESS THAN 90%, C. Sedation score of 6  ondansetron Injectable 4 milliGRAM(s) IV Push every 6 hours PRN Nausea  traMADol 25 milliGRAM(s) Oral every 6 hours PRN Moderate Pain (4 - 6)  traMADol 50 milliGRAM(s) Oral every 6 hours PRN Moderate Pain (4 - 6)      PAST MEDICAL & SURGICAL HISTORY:  Nephritis  CKD (chronic kidney disease), stage III  BPH (benign prostatic hypertrophy)  Lung abnormality  HTN (hypertension)  Kidney disease  Hypercholesteremia  GERD (gastroesophageal reflux disease)  Gout  BPH (benign prostatic hyperplasia)  Solitary fibrous tumor removed in   Vocal cord anomaly S/P polyp removal   History of lung surgery - benign  Peritoneal dialysis catheter dysfunction s/p removal     Vital Signs Last 24 Hrs  T(C): 36.5 (10 Sep 2021 08:00), Max: 37 (09 Sep 2021 16:12)  T(F): 97.7 (10 Sep 2021 08:00), Max: 98.6 (09 Sep 2021 16:12)  HR: 102 (10 Sep 2021 10:00) (77 - 114)  BP: 143/77 (10 Sep 2021 10:00) (96/55 - 159/78)  BP(mean): 103 (10 Sep 2021 10:00) (71 - 103)  RR: 16 (10 Sep 2021 06:05) (15 - 19)  SpO2: 100% (10 Sep 2021 10:00) (97% - 100%)    I&O's Summary    09 Sep 2021 07:01  -  10 Sep 2021 07:00  --------------------------------------------------------  IN: 1435 mL / OUT: 1115 mL / NET: 320 mL    10 Sep 2021 07:01  -  10 Sep 2021 11:27  --------------------------------------------------------  IN: 325 mL / OUT: 325 mL / NET: 0 mL                      9.6    14.88 )-----------( 151      ( 10 Sep 2021 04:31 )             29.4     09-10    130<L>  |  97  |  33<H>  ----------------------------<  147<H>  5.3   |  16<L>  |  6.41<H>    Ca    8.5      10 Sep 2021 04:31  Phos  5.1     -10  Mg     2.3     09-10    TPro  6.0  /  Alb  3.5  /  TBili  0.3  /  DBili  x   /  AST  13  /  ALT  8<L>  /  AlkPhos  43  09-10        REVIEW OF SYSTEMS  --------------------------------------------------------------------------------  Gen: No weight changes, fatigue, fevers/chills, weakness  Skin: No rashes  Head/Eyes/Ears/Mouth: No headache; Normal hearing; Normal vision w/o blurriness; No sinus pain/discomfort, sore throat  Respiratory: No dyspnea, cough, wheezing, hemoptysis  CV: No chest pain, PND, orthopnea  GI: No abdominal pain, diarrhea, constipation, nausea, vomiting, melena, hematochezia  : No increased frequency, dysuria, hematuria, nocturia  MSK: No joint pain/swelling; no back pain; no edema  Neuro: No dizziness/lightheadedness, weakness, seizures, numbness, tingling  Heme: No easy bruising or bleeding  Endo: No heat/cold intolerance  Psych: No significant nervousness, anxiety, stress, depression  All other systems were reviewed and are negative, except as noted.      -------------------------------  PHYSICAL EXAM:  Constitutional: Well developed / well nourished  Eyes: Anicteric, PERRLA  ENMT: nc/at  Neck: supple   Respiratory: CTA B/L  Cardiovascular: RRR  Gastrointestinal: Soft, NT, ND, LIMA RLQ w/ sanguineous output   Genitourinary: Urinary catheter in place  Extremities: SCD's in place and working bilaterally  Vascular: Palpable dp pulses bilaterally, LUE AVF +thrill/+bruit   Neurological: A&O x3  Skin: Warm,dry,intact throughout, sanguinous bridget on wound dressing   Musculoskeletal: Moving all extremities  Psychiatric: Responsive               Transplant Surgery - Multidisciplinary Rounds  --------------------------------------------------------------  DDRT   Date: 9/10/21        POD# 1    Present: Patient seen and examined with multidisciplinary team including Transplant Surgeon: Dr. Perez.  Transplant Nephrologist: Dr. Downs.  Pharmacist: Naveen. Inpatient providers: SUMMER Vee, NP Edison, Dr. Lombardi PGY3, Dr. Cardenas PGY3, Ngoc MS4, unit RN during AM rounds.  Disciplines not in attendance will be notified of the plan.   plan:       HPI:61M with h/o HTN, HLD, BPH, GERD, Glaucoma, tobacco abuse (1PPD x 15y, last ), CKD since  progressing to ESRD 2/2 biopsy proven chronic GN (bx in [place] years ago).  Originally underwent PD x 4 months in , then switched to HD via L AVF MWF. PD cath was removed 2020.  (Nephrologists: Rossi/Yareli). UO: oliguric.  Now s/p right DDRT  with stent placement  and Simulect induction     Recipient:   vocal cord surgery, pulmonary mass (benign), thyroid lesion (benign)  Covid Vaccine: Second dose of Pfizer vaccine on 21  Nephrologist:  Dr. Richey  CPRA: 0%  ABO: O          CMV:  Positive  EBV Status: Positive  Last HD: last HD @ 3PM    ----  Donor (import): [place], [place]  Donor ID:  [id number]  Match: [id number]  [place] [facility] [place]  Age:  42 y.o. male  ABO:  O  High Risk: No     KDPI: 33%  COD: Anoxia  X Clamp Time:  2021 23:41  Medical Hx:  DM II (less than 5 years), ? Hepatitis as a child, heavy ETOH use  Terminal Cr:  0.61  Covid Testing:  COVID NP 21: Negative; COVID BAL : negative;  NP: Negative  CMV-Positive  EBV-Negative  HepBcAb-Negative  Hepatitis C-SIM- Negative  Hepatitis C ab-Negative  MISMATCH: 2, 2, 0  Kidney ETA to [facility]: 21 at 1630  Kidney Laterality: Left Kidney  X-match – Virtual negative     OR:  Donor Renal Transplant  2arteries (combined for one anastomosis) 1, vein, 1 ureter  Double J stent placed  LIMA drain left in place  CIT ~21h      Interval events:  S/P DDRT POD1  Doing well  Good graft function Cr 6.41 from 7.18, UO 825ml since surgery  Pain controlled with PCA Dilaudid  Cullen +JPx1 intact  NPO overnight  S/P 500ml fluid bolus for low UO and hypotension      Immunosuppression:  Induction with Simulect  Envarsus 5mg increased to 9mg this am hold am dose for level, MMF , steroid taper  Maintenance Immunosuppression  Ongoing monitoring for signs of rejection.      Potential Discharge date: pending clinical improvement  Education:  Medications  Plan of care:  See Below      MEDICATIONS  (STANDING):  aspirin enteric coated 81 milliGRAM(s) Oral daily  ceFAZolin   IVPB 2000 milliGRAM(s) IV Intermittent once  chlorhexidine 2% Cloths 1 Application(s) Topical daily  dextrose 40% Gel 15 Gram(s) Oral once  dextrose 5%. 1000 milliLiter(s) (50 mL/Hr) IV Continuous <Continuous>  dextrose 5%. 1000 milliLiter(s) (100 mL/Hr) IV Continuous <Continuous>  dextrose 50% Injectable 25 Gram(s) IV Push once  dextrose 50% Injectable 12.5 Gram(s) IV Push once  dextrose 50% Injectable 25 Gram(s) IV Push once  famotidine    Tablet 20 milliGRAM(s) Oral daily  glucagon  Injectable 1 milliGRAM(s) IntraMuscular once  glucagon  Injectable 1 milliGRAM(s) IntraMuscular once  insulin lispro (ADMELOG) corrective regimen sliding scale   SubCutaneous every 6 hours  insulin lispro (ADMELOG) corrective regimen sliding scale   SubCutaneous at bedtime  methylPREDNISolone sodium succinate Injectable 125 milliGRAM(s) IV Push two times a day  metoprolol tartrate 12.5 milliGRAM(s) Oral two times a day  mycophenolate mofetil 1 Gram(s) Oral every 12 hours  nystatin    Suspension 965348 Unit(s) Swish and Swallow four times a day  senna 2 Tablet(s) Oral at bedtime  sodium chloride 0.45%. 500 milliLiter(s) (50 mL/Hr) IV Continuous <Continuous>  sodium chloride 0.9%. 1000 milliLiter(s) (70 mL/Hr) IV Continuous <Continuous>  tamsulosin 0.4 milliGRAM(s) Oral at bedtime  trimethoprim   80 mG/sulfamethoxazole 400 mG 1 Tablet(s) Oral daily  valGANciclovir 450 milliGRAM(s) Oral daily    MEDICATIONS  (PRN):  naloxone Injectable 0.1 milliGRAM(s) IV Push every 3 minutes PRN For ANY of the following changes in patient status:  A. RR LESS THAN 10 breaths per minute, B. Oxygen saturation LESS THAN 90%, C. Sedation score of 6  ondansetron Injectable 4 milliGRAM(s) IV Push every 6 hours PRN Nausea  traMADol 25 milliGRAM(s) Oral every 6 hours PRN Moderate Pain (4 - 6)  traMADol 50 milliGRAM(s) Oral every 6 hours PRN Moderate Pain (4 - 6)      PAST MEDICAL & SURGICAL HISTORY:  Nephritis  CKD (chronic kidney disease), stage III  BPH (benign prostatic hypertrophy)  Lung abnormality  HTN (hypertension)  Kidney disease  Hypercholesteremia  GERD (gastroesophageal reflux disease)  Gout  BPH (benign prostatic hyperplasia)  Solitary fibrous tumor removed in   Vocal cord anomaly S/P polyp removal   History of lung surgery - benign  Peritoneal dialysis catheter dysfunction s/p removal     Vital Signs Last 24 Hrs  T(C): 36.5 (10 Sep 2021 08:00), Max: 37 (09 Sep 2021 16:12)  T(F): 97.7 (10 Sep 2021 08:00), Max: 98.6 (09 Sep 2021 16:12)  HR: 102 (10 Sep 2021 10:00) (77 - 114)  BP: 143/77 (10 Sep 2021 10:00) (96/55 - 159/78)  BP(mean): 103 (10 Sep 2021 10:00) (71 - 103)  RR: 16 (10 Sep 2021 06:05) (15 - 19)  SpO2: 100% (10 Sep 2021 10:00) (97% - 100%)    I&O's Summary    09 Sep 2021 07:01  -  10 Sep 2021 07:00  --------------------------------------------------------  IN: 1435 mL / OUT: 1115 mL / NET: 320 mL    10 Sep 2021 07:01  -  10 Sep 2021 11:27  --------------------------------------------------------  IN: 325 mL / OUT: 325 mL / NET: 0 mL                      9.6    14.88 )-----------( 151      ( 10 Sep 2021 04:31 )             29.4     09-10    130<L>  |  97  |  33<H>  ----------------------------<  147<H>  5.3   |  16<L>  |  6.41<H>    Ca    8.5      10 Sep 2021 04:31  Phos  5.1     -10  Mg     2.3     09-10    TPro  6.0  /  Alb  3.5  /  TBili  0.3  /  DBili  x   /  AST  13  /  ALT  8<L>  /  AlkPhos  43  09-10        REVIEW OF SYSTEMS  --------------------------------------------------------------------------------  Gen: No weight changes, fatigue, fevers/chills, weakness  Skin: No rashes  Head/Eyes/Ears/Mouth: No headache; Normal hearing; Normal vision w/o blurriness; No sinus pain/discomfort, sore throat  Respiratory: No dyspnea, cough, wheezing, hemoptysis  CV: No chest pain, PND, orthopnea  GI: No abdominal pain, diarrhea, constipation, nausea, vomiting, melena, hematochezia  : No increased frequency, dysuria, hematuria, nocturia  MSK: No joint pain/swelling; no back pain; no edema  Neuro: No dizziness/lightheadedness, weakness, seizures, numbness, tingling  Heme: No easy bruising or bleeding  Endo: No heat/cold intolerance  Psych: No significant nervousness, anxiety, stress, depression  All other systems were reviewed and are negative, except as noted.      -------------------------------  PHYSICAL EXAM:  Constitutional: Well developed / well nourished  Eyes: Anicteric, PERRLA  ENMT: nc/at  Neck: supple   Respiratory: CTA B/L  Cardiovascular: RRR  Gastrointestinal: Soft, NT, ND, LIMA RLQ w/ sanguineous output   Genitourinary: Urinary catheter in place  Extremities: SCD's in place and working bilaterally  Vascular: Palpable dp pulses bilaterally, LUE AVF +thrill/+bruit   Neurological: A&O x3  Skin: Warm,dry,intact throughout, sanguinous bridget on wound dressing   Musculoskeletal: Moving all extremities  Psychiatric: Responsive

## 2021-09-10 NOTE — DIETITIAN INITIAL EVALUATION ADULT. - OTHER INFO
Pt S/P kidney transplant recipient (09/09) - diet recently advanced. Pt denies nausea, vomiting, diarrhea, or constipation, last BM 2 days ago (09/08). Urine output as per flow sheets (09/10) 325 ml so far. Denies hx of difficulty chewing/swallowing.     Pt denies weight changes PTA, reports UBW approximately 86 kg. Weight as per flow sheets (09/09) 84.9 kg.     Provided education on post transplant nutrition therapy and food safety guidelines for transplant recipients. Discussed importance of thoroughly washing all fresh fruits/vegetables, importance of avoiding uncooked/raw/unpasteurized foods, avoiding pre-made deli/buffet/salad bar meals. Foods recommended as healthy well balanced diet and importance of adequate protein intakes for proper post-surgical healing discussed. Reviewed recommendations to avoid grapefruit, pomegranate and star fruit while taking immunosuppressant medication. Reviewed recommendations for moderate intake of sodium and carbohydrates with transplant medications. Reviewed effect of steroids on BG levels and importance of limiting concentrated sweets. Pt was receptive and expressed understanding. All questions answered. Provided nutrition package including: USDA Food Safety for Transplant Recipients booklet; food safety and BG control handouts, fridge magnet with cooking temperatures, and RD information card.

## 2021-09-10 NOTE — PROGRESS NOTE ADULT - SUBJECTIVE AND OBJECTIVE BOX
Day 1 of Anesthesia Pain Management Service    SUBJECTIVE: I'm doing ok    Pain Scale Score:	[X] Refer to charted pain scores    THERAPY:    [ ] IV PCA Morphine		[ ] 5 mg/mL	[ ] 1 mg/mL  [X] IV PCA Hydromorphone	[ ] 5 mg/mL	[X] 1 mg/mL  [ ] IV PCA Fentanyl		[ ] 50 micrograms/mL    Demand dose: 0.2 mg     Lockout: 6 minutes   Continuous Rate: 0 mg/hr  4 Hour Limit: 4 mg    MEDICATIONS  (STANDING):  ceFAZolin   IVPB 2000 milliGRAM(s) IV Intermittent once  chlorhexidine 2% Cloths 1 Application(s) Topical daily  dextrose 40% Gel 15 Gram(s) Oral once  dextrose 5%. 1000 milliLiter(s) (50 mL/Hr) IV Continuous <Continuous>  dextrose 5%. 1000 milliLiter(s) (100 mL/Hr) IV Continuous <Continuous>  dextrose 50% Injectable 25 Gram(s) IV Push once  dextrose 50% Injectable 12.5 Gram(s) IV Push once  dextrose 50% Injectable 25 Gram(s) IV Push once  famotidine    Tablet 20 milliGRAM(s) Oral daily  glucagon  Injectable 1 milliGRAM(s) IntraMuscular once  glucagon  Injectable 1 milliGRAM(s) IntraMuscular once  HYDROmorphone PCA (1 mG/mL) 30 milliLiter(s) PCA Continuous PCA Continuous  insulin lispro (ADMELOG) corrective regimen sliding scale   SubCutaneous every 6 hours  insulin lispro (ADMELOG) corrective regimen sliding scale   SubCutaneous at bedtime  methylPREDNISolone sodium succinate Injectable 125 milliGRAM(s) IV Push two times a day  mycophenolate mofetil 1 Gram(s) Oral every 12 hours  nystatin    Suspension 275809 Unit(s) Swish and Swallow four times a day  sodium chloride 0.45%. 500 milliLiter(s) (50 mL/Hr) IV Continuous <Continuous>  sodium chloride 0.9%. 1000 milliLiter(s) (70 mL/Hr) IV Continuous <Continuous>  tacrolimus ER Tablet (ENVARSUS XR) 5 milliGRAM(s) Oral <User Schedule>  trimethoprim   80 mG/sulfamethoxazole 400 mG 1 Tablet(s) Oral daily  valGANciclovir 450 milliGRAM(s) Oral daily    MEDICATIONS  (PRN):  HYDROmorphone PCA (1 mG/mL) Rescue Clinician Bolus 0.5 milliGRAM(s) IV Push every 15 minutes PRN for Pain Scale GREATER THAN 6  naloxone Injectable 0.1 milliGRAM(s) IV Push every 3 minutes PRN For ANY of the following changes in patient status:  A. RR LESS THAN 10 breaths per minute, B. Oxygen saturation LESS THAN 90%, C. Sedation score of 6  ondansetron Injectable 4 milliGRAM(s) IV Push every 6 hours PRN Nausea      OBJECTIVE:    Sedation Score:	[ X] Alert 	[ ] Drowsy 	[ ] Arousable	[ ] Asleep	[ ] Unresponsive    Side Effects:	[X ] None	[ ] Nausea	[ ] Vomiting	[ ] Pruritus  		[ ] Other:    Vital Signs Last 24 Hrs  T(C): 36.8 (10 Sep 2021 06:05), Max: 37 (09 Sep 2021 16:12)  T(F): 98.2 (10 Sep 2021 06:05), Max: 98.6 (09 Sep 2021 16:12)  HR: 103 (10 Sep 2021 06:05) (77 - 114)  BP: 134/72 (10 Sep 2021 06:05) (96/55 - 159/78)  BP(mean): 102 (10 Sep 2021 05:00) (71 - 102)  RR: 16 (10 Sep 2021 06:05) (15 - 19)  SpO2: 100% (10 Sep 2021 06:05) (97% - 100%)    ASSESSMENT/ PLAN    Therapy to  be:               [X] Continued   [ ] Discontinued   [ ] Changed to PRN Analgesics    Documentation and Verification of current medications:   [X] Done	[ ] Not done, not eligible    Comments: Endorsing good analgesia. Reeducated to use.

## 2021-09-10 NOTE — DIETITIAN INITIAL EVALUATION ADULT. - ETIOLOGY
limited prior education on post-transplant nutrition therapy and food safety guidelines Increased demands for nutrients for surgical healing

## 2021-09-10 NOTE — DIETITIAN INITIAL EVALUATION ADULT. - REASON FOR ADMISSION
Pt 62 y/o M with PMH as per chart: HTN, HLD, BPH, GERD, Glaucoma, tobacco abuse (1PPD x 15y, last 2004), CKD since (1998) progressing to ESRD 2/2 biopsy proven chronic GN (bx in [place] years ago), PD x 4 months in (2019), then switched to HD, admitted for kidney transplant, S/P DDRT (09/09)  with stent placement  and Simulect induction.

## 2021-09-10 NOTE — DIETITIAN INITIAL EVALUATION ADULT. - PERTINENT LABORATORY DATA
(09/10) HbA1c 5.4%; Finger sticks: (09/10) 141 (09/09) 126; (09/10) Na 130, BUN 33, Cr 6.41, , Phos 5.1

## 2021-09-10 NOTE — DIETITIAN INITIAL EVALUATION ADULT. - LITERATURE/VIDEOS GIVEN
Provided nutrition package including: USDA Food Safety for Transplant Recipients booklet; food safety and BG control handouts, fridge magnet with cooking temperatures, and RD information card.

## 2021-09-10 NOTE — PROGRESS NOTE ADULT - SUBJECTIVE AND OBJECTIVE BOX
--------------------------------------------------------------------------------  Chief Complaint: has discomfort from [place]    24 hour events/subjective:    Reviewed    PAST HISTORY  --------------------------------------------------------------------------------  No significant changes to PMH, PSH, FHx, SHx, unless otherwise noted    ALLERGIES & MEDICATIONS  --------------------------------------------------------------------------------  Allergies    No Known Allergies    Intolerances      Standing Inpatient Medications  aspirin enteric coated 81 milliGRAM(s) Oral daily  ceFAZolin   IVPB 2000 milliGRAM(s) IV Intermittent once  chlorhexidine 2% Cloths 1 Application(s) Topical daily  dextrose 40% Gel 15 Gram(s) Oral once  dextrose 5%. 1000 milliLiter(s) IV Continuous <Continuous>  dextrose 5%. 1000 milliLiter(s) IV Continuous <Continuous>  dextrose 50% Injectable 25 Gram(s) IV Push once  dextrose 50% Injectable 12.5 Gram(s) IV Push once  dextrose 50% Injectable 25 Gram(s) IV Push once  famotidine    Tablet 20 milliGRAM(s) Oral daily  glucagon  Injectable 1 milliGRAM(s) IntraMuscular once  glucagon  Injectable 1 milliGRAM(s) IntraMuscular once  insulin lispro (ADMELOG) corrective regimen sliding scale   SubCutaneous every 6 hours  insulin lispro (ADMELOG) corrective regimen sliding scale   SubCutaneous at bedtime  methylPREDNISolone sodium succinate Injectable 125 milliGRAM(s) IV Push two times a day  metoprolol tartrate 12.5 milliGRAM(s) Oral two times a day  mycophenolate mofetil 1 Gram(s) Oral every 12 hours  nystatin    Suspension 814742 Unit(s) Swish and Swallow four times a day  senna 2 Tablet(s) Oral at bedtime  sodium bicarbonate 1300 milliGRAM(s) Oral two times a day  sodium chloride 0.45%. 500 milliLiter(s) IV Continuous <Continuous>  sodium chloride 0.9%. 1000 milliLiter(s) IV Continuous <Continuous>  tamsulosin 0.4 milliGRAM(s) Oral at bedtime  trimethoprim   80 mG/sulfamethoxazole 400 mG 1 Tablet(s) Oral daily  valGANciclovir 450 milliGRAM(s) Oral daily    PRN Inpatient Medications  naloxone Injectable 0.1 milliGRAM(s) IV Push every 3 minutes PRN  ondansetron Injectable 4 milliGRAM(s) IV Push every 6 hours PRN  traMADol 25 milliGRAM(s) Oral every 6 hours PRN  traMADol 50 milliGRAM(s) Oral every 6 hours PRN      REVIEW OF SYSTEMS  --------------------------------------------------------------------------------  Cullen discomfort  All other systems were reviewed and are negative, except as noted.    VITALS/PHYSICAL EXAM  --------------------------------------------------------------------------------  T(C): 36.5 (09-10-21 @ 08:00), Max: 37 (09-09-21 @ 16:12)  HR: 112 (09-10-21 @ 14:00) (86 - 114)  BP: 121/62 (09-10-21 @ 14:00) (96/55 - 159/78)  RR: 16 (09-10-21 @ 06:05) (15 - 19)  SpO2: 98% (09-10-21 @ 14:00) (97% - 100%)  Height (cm): 175.3 (09-09-21 @ 18:07)  Weight (kg): 84.9 (09-09-21 @ 18:07)  BMI (kg/m2): 27.6 (09-09-21 @ 18:07)  BSA (m2): 2.01 (09-09-21 @ 18:07)      09-09-21 @ 07:01  -  09-10-21 @ 07:00  --------------------------------------------------------  IN: 1435 mL / OUT: 1115 mL / NET: 320 mL    09-10-21 @ 07:01  -  09-10-21 @ 14:33  --------------------------------------------------------  IN: 665 mL / OUT: 650 mL / NET: 15 mL      Physical Exam:  	Gen: No acute symptoms  	HEENT: PERRL, supple neck, clear oropharynx  	Pulm: CTA B/L  	CV: RRR, S1S2; no rub  	Back: No spinal or CVA tenderness; no sacral edema  	Abd: post op, soft,  nondistended                      Transplant: Drain+   	: Cullen+  	UE: AVF+  	LE: Warm, FROM, intact strength; no edema  	Neuro: No asterixis  	Psych: Normal affect and mood  	Skin: Warm, without rashes      LABS/STUDIES  --------------------------------------------------------------------------------              9.6    14.88 >-----------<  151      [09-10-21 @ 04:31]              29.4     130  |  97  |  33  ----------------------------<  147      [09-10-21 @ 04:31]  5.3   |  16  |  6.41        Ca     8.5     [09-10-21 @ 04:31]      Mg     2.3     [09-10-21 @ 04:31]      Phos  5.1     [09-10-21 @ 04:31]    TPro  6.0  /  Alb  3.5  /  TBili  0.3  /  DBili  x   /  AST  13  /  ALT  8   /  AlkPhos  43  [09-10-21 @ 04:31]    PT/INR: PT 11.0 , INR 0.91       [09-09-21 @ 14:58]  PTT: 31.7       [09-09-21 @ 14:58]      Creatinine Trend:  SCr 6.41 [09-10 @ 04:31]  SCr 7.18 [09-09 @ 23:29]  SCr 7.37 [09-09 @ 14:58]                  HbA1c 5.9      [04-08-18 @ 07:47]    HBsAb 360.7      [09-09-21 @ 22:47]  HBsAg Nonreact      [09-09-21 @ 22:47]  HBcAb Nonreact      [09-09-21 @ 22:47]  HCV 0.18, Nonreact      [09-09-21 @ 22:47]  HIV Nonreact      [09-09-21 @ 22:47]

## 2021-09-10 NOTE — PROGRESS NOTE ADULT - SUBJECTIVE AND OBJECTIVE BOX
Transplant Surgery Post-Op Note  --------------------------------------------------------------  DDRT   Date: 9/10/21        POD# 1    HPI:61M with h/o HTN, HLD, BPH, GERD, Glaucoma, tobacco abuse (1PPD x 15y, last ), CKD since  progressing to ESRD 2/2 biopsy proven chronic GN (bx in [place] years ago).  Originally underwent PD x 4 months in , then switched to HD via L AVF MWF. PD cath was removed 2020.  (Nephrologists: Rossi/Yareli). UO: oliguric.  Now s/p right DDRT  with stent placement  and Simulect induction     Recipient:   vocal cord surgery, pulmonary mass (benign), thyroid lesion (benign)  Covid Vaccine: Second dose of Pfizer vaccine on 21  Nephrologist:  Dr. Richey  CPRA: 0%  ABO: O          CMV:  Positive  EBV Status: Positive  Last HD: last HD @ 3PM    ----  Donor (import): Hampton, MD  Donor ID:  [id number]  Match: [id number]  OPO: [facility] [place]  Age:  42 y.o. male  ABO:  O  High Risk: No     KDPI: 33%  COD: Anoxia  X Clamp Time:  2021 23:41  Medical Hx:  DM II (less than 5 years), ? Hepatitis as a child, heavy ETOH use  Terminal Cr:  0.61  Covid Testing:  COVID NP 21: Negative; COVID BAL : negative;  NP: Negative  CMV-Positive  EBV-Negative  HepBcAb-Negative  Hepatitis C-SIM- Negative  Hepatitis C ab-Negative  MISMATCH: 2, 2, 0  Kidney ETA to [facility]: 21 at 1630  Kidney Laterality: Left Kidney  X-match – Virtual negative     OR: Donor Renal Transplant  2arteries (combined for one anastamosis) 1, vein, 1 ureter  Double J stent placed  LIMA drain left in place  CIT ~21h      Interval events:  Pt seen and examined in PACU, he is alert, hemodynamically stable and reports adequate pain control. While in PACU he was hypotensive, 500 NS bolus was given. Post operative US performed in PACU demonstrating good blood flow throughout transplanted kidney, no TATYANA, no hydro.              ------------------------------  Vital Signs Last 24 Hrs  T(C): 36 (10 Sep 2021 01:45), Max: 37 (09 Sep 2021 16:12)  T(F): 96.8 (10 Sep 2021 01:45), Max: 98.6 (09 Sep 2021 16:12)  HR: 101 (10 Sep 2021 03:15) (77 - 114)  BP: 120/69 (10 Sep 2021 03:15) (96/55 - 159/78)  BP(mean): 87 (10 Sep 2021 03:15) (71 - 93)  RR: 15 (10 Sep 2021 03:15) (15 - 19)  SpO2: 99% (10 Sep 2021 03:15) (97% - 100%)    I&O's Summary    09 Sep 2021 07:01  -  10 Sep 2021 03:39  --------------------------------------------------------  IN: 810 mL / OUT: 365 mL / NET: 445 mL                              9.2    9.95  )-----------( 154      ( 09 Sep 2021 23:37 )             28.2         133<L>  |  97  |  30<H>  ----------------------------<  133<H>  4.7   |  16<L>  |  7.18<H>    Ca    8.8      09 Sep 2021 23:29  Phos  5.0       Mg     2.4         TPro  5.8<L>  /  Alb  3.4  /  TBili  0.4  /  DBili  x   /  AST  13  /  ALT  7<L>  /  AlkPhos  41  -        -------------------------------  PHYSICAL EXAM:  Constitutional: Well developed / well nourished  Eyes: Anicteric, PERRLA  ENMT: nc/at  Neck: supple   Respiratory: CTA B/L  Cardiovascular: RRR  Gastrointestinal: Soft, NT, ND, LIMA RLQ w/ sanguineous output   Genitourinary: Urinary catheter in place  Extremities: SCD's in place and working bilaterally  Vascular: Palpable dp pulses bilaterally, LUE AVF +thrill/+bruit   Neurological: A&O x3  Skin: Warm,dry,intact throughout, sanguinous bridget on wound dressing   Musculoskeletal: Moving all extremities  Psychiatric: Responsive

## 2021-09-10 NOTE — DIETITIAN INITIAL EVALUATION ADULT. - ADD RECOMMEND
1. Will continue to monitor PO intake, weight, labs, skin, GI status, diet, urine output. 2. Gently encourage PO intake and provide food preferences. 3. Provided education on post transplant nutrition therapy and food safety guidelines for transplant recipients with nutrition package before discharge - made aware RD remains available.

## 2021-09-10 NOTE — DIETITIAN INITIAL EVALUATION ADULT. - PHYSCIAL ASSESSMENT
overweight Skin: no noted pressure injuries as per documentation.   No visual signs of muscle/fat loss noted.

## 2021-09-10 NOTE — DIETITIAN INITIAL EVALUATION ADULT. - CONTINUE CURRENT NUTRITION CARE PLAN
Continue renal diet upon discharge. Recommend follow up visit with Transplant MD and outpatient RD for dietary modifications as warranted./yes

## 2021-09-10 NOTE — DIETITIAN INITIAL EVALUATION ADULT. - ORAL INTAKE PTA/DIET HISTORY
Pt reports good appetite and PO intake at home. Confirms NKFA. Reports following a low K+ and low Phos diet at home. Pt reports not taking any vitamins or nutritional supplements PTA.

## 2021-09-11 LAB
ALBUMIN SERPL ELPH-MCNC: 3.2 G/DL — LOW (ref 3.3–5)
ALP SERPL-CCNC: 46 U/L — SIGNIFICANT CHANGE UP (ref 40–120)
ALT FLD-CCNC: 6 U/L — LOW (ref 10–45)
ANION GAP SERPL CALC-SCNC: 14 MMOL/L — SIGNIFICANT CHANGE UP (ref 5–17)
AST SERPL-CCNC: 16 U/L — SIGNIFICANT CHANGE UP (ref 10–40)
BASOPHILS # BLD AUTO: 0.02 K/UL — SIGNIFICANT CHANGE UP (ref 0–0.2)
BASOPHILS NFR BLD AUTO: 0.1 % — SIGNIFICANT CHANGE UP (ref 0–2)
BILIRUB SERPL-MCNC: 0.2 MG/DL — SIGNIFICANT CHANGE UP (ref 0.2–1.2)
BUN SERPL-MCNC: 45 MG/DL — HIGH (ref 7–23)
CALCIUM SERPL-MCNC: 8 MG/DL — LOW (ref 8.4–10.5)
CHLORIDE SERPL-SCNC: 103 MMOL/L — SIGNIFICANT CHANGE UP (ref 96–108)
CO2 SERPL-SCNC: 16 MMOL/L — LOW (ref 22–31)
CREAT FLD-MCNC: 4.08 MG/DL — SIGNIFICANT CHANGE UP
CREAT SERPL-MCNC: 4.31 MG/DL — HIGH (ref 0.5–1.3)
EOSINOPHIL # BLD AUTO: 0.01 K/UL — SIGNIFICANT CHANGE UP (ref 0–0.5)
EOSINOPHIL NFR BLD AUTO: 0.1 % — SIGNIFICANT CHANGE UP (ref 0–6)
GLUCOSE BLDC GLUCOMTR-MCNC: 125 MG/DL — HIGH (ref 70–99)
GLUCOSE BLDC GLUCOMTR-MCNC: 152 MG/DL — HIGH (ref 70–99)
GLUCOSE BLDC GLUCOMTR-MCNC: 155 MG/DL — HIGH (ref 70–99)
GLUCOSE BLDC GLUCOMTR-MCNC: 165 MG/DL — HIGH (ref 70–99)
GLUCOSE SERPL-MCNC: 149 MG/DL — HIGH (ref 70–99)
HCT VFR BLD CALC: 29.3 % — LOW (ref 39–50)
HGB BLD-MCNC: 9.7 G/DL — LOW (ref 13–17)
IMM GRANULOCYTES NFR BLD AUTO: 0.5 % — SIGNIFICANT CHANGE UP (ref 0–1.5)
LYMPHOCYTES # BLD AUTO: 0.27 K/UL — LOW (ref 1–3.3)
LYMPHOCYTES # BLD AUTO: 1.8 % — LOW (ref 13–44)
MAGNESIUM SERPL-MCNC: 2.5 MG/DL — SIGNIFICANT CHANGE UP (ref 1.6–2.6)
MCHC RBC-ENTMCNC: 32.3 PG — SIGNIFICANT CHANGE UP (ref 27–34)
MCHC RBC-ENTMCNC: 33.1 GM/DL — SIGNIFICANT CHANGE UP (ref 32–36)
MCV RBC AUTO: 97.7 FL — SIGNIFICANT CHANGE UP (ref 80–100)
MONOCYTES # BLD AUTO: 0.56 K/UL — SIGNIFICANT CHANGE UP (ref 0–0.9)
MONOCYTES NFR BLD AUTO: 3.8 % — SIGNIFICANT CHANGE UP (ref 2–14)
NEUTROPHILS # BLD AUTO: 13.73 K/UL — HIGH (ref 1.8–7.4)
NEUTROPHILS NFR BLD AUTO: 93.7 % — HIGH (ref 43–77)
NRBC # BLD: 0 /100 WBCS — SIGNIFICANT CHANGE UP (ref 0–0)
PHOSPHATE SERPL-MCNC: 3.4 MG/DL — SIGNIFICANT CHANGE UP (ref 2.5–4.5)
PLATELET # BLD AUTO: 195 K/UL — SIGNIFICANT CHANGE UP (ref 150–400)
POTASSIUM SERPL-MCNC: 4.9 MMOL/L — SIGNIFICANT CHANGE UP (ref 3.5–5.3)
POTASSIUM SERPL-SCNC: 4.9 MMOL/L — SIGNIFICANT CHANGE UP (ref 3.5–5.3)
PROT SERPL-MCNC: 5.5 G/DL — LOW (ref 6–8.3)
RBC # BLD: 3 M/UL — LOW (ref 4.2–5.8)
RBC # FLD: 13 % — SIGNIFICANT CHANGE UP (ref 10.3–14.5)
SODIUM SERPL-SCNC: 133 MMOL/L — LOW (ref 135–145)
TACROLIMUS SERPL-MCNC: <2 NG/ML — SIGNIFICANT CHANGE UP
WBC # BLD: 14.67 K/UL — HIGH (ref 3.8–10.5)
WBC # FLD AUTO: 14.67 K/UL — HIGH (ref 3.8–10.5)

## 2021-09-11 PROCEDURE — 99232 SBSQ HOSP IP/OBS MODERATE 35: CPT

## 2021-09-11 RX ORDER — POLYETHYLENE GLYCOL 3350 17 G/17G
17 POWDER, FOR SOLUTION ORAL ONCE
Refills: 0 | Status: COMPLETED | OUTPATIENT
Start: 2021-09-11 | End: 2021-09-11

## 2021-09-11 RX ORDER — TACROLIMUS 5 MG/1
11 CAPSULE ORAL
Refills: 0 | Status: DISCONTINUED | OUTPATIENT
Start: 2021-09-12 | End: 2021-09-13

## 2021-09-11 RX ORDER — OXYBUTYNIN CHLORIDE 5 MG
5 TABLET ORAL DAILY
Refills: 0 | Status: DISCONTINUED | OUTPATIENT
Start: 2021-09-11 | End: 2021-09-11

## 2021-09-11 RX ORDER — TRAMADOL HYDROCHLORIDE 50 MG/1
50 TABLET ORAL EVERY 6 HOURS
Refills: 0 | Status: DISCONTINUED | OUTPATIENT
Start: 2021-09-11 | End: 2021-09-14

## 2021-09-11 RX ORDER — TACROLIMUS 5 MG/1
11 CAPSULE ORAL ONCE
Refills: 0 | Status: COMPLETED | OUTPATIENT
Start: 2021-09-11 | End: 2021-09-11

## 2021-09-11 RX ORDER — OXYBUTYNIN CHLORIDE 5 MG
2.5 TABLET ORAL
Refills: 0 | Status: DISCONTINUED | OUTPATIENT
Start: 2021-09-11 | End: 2021-09-14

## 2021-09-11 RX ADMIN — TRAMADOL HYDROCHLORIDE 50 MILLIGRAM(S): 50 TABLET ORAL at 20:30

## 2021-09-11 RX ADMIN — TRAMADOL HYDROCHLORIDE 50 MILLIGRAM(S): 50 TABLET ORAL at 03:36

## 2021-09-11 RX ADMIN — TRAMADOL HYDROCHLORIDE 50 MILLIGRAM(S): 50 TABLET ORAL at 12:00

## 2021-09-11 RX ADMIN — MYCOPHENOLATE MOFETIL 1000 MILLIGRAM(S): 250 CAPSULE ORAL at 08:14

## 2021-09-11 RX ADMIN — CHLORHEXIDINE GLUCONATE 1 APPLICATION(S): 213 SOLUTION TOPICAL at 12:14

## 2021-09-11 RX ADMIN — Medication 500000 UNIT(S): at 13:13

## 2021-09-11 RX ADMIN — TACROLIMUS 11 MILLIGRAM(S): 5 CAPSULE ORAL at 13:14

## 2021-09-11 RX ADMIN — POLYETHYLENE GLYCOL 3350 17 GRAM(S): 17 POWDER, FOR SOLUTION ORAL at 11:18

## 2021-09-11 RX ADMIN — Medication 81 MILLIGRAM(S): at 13:13

## 2021-09-11 RX ADMIN — Medication 500000 UNIT(S): at 18:02

## 2021-09-11 RX ADMIN — Medication 500000 UNIT(S): at 23:59

## 2021-09-11 RX ADMIN — TRAMADOL HYDROCHLORIDE 50 MILLIGRAM(S): 50 TABLET ORAL at 04:15

## 2021-09-11 RX ADMIN — VALGANCICLOVIR 450 MILLIGRAM(S): 450 TABLET, FILM COATED ORAL at 13:15

## 2021-09-11 RX ADMIN — Medication 2.5 MILLIGRAM(S): at 18:02

## 2021-09-11 RX ADMIN — TAMSULOSIN HYDROCHLORIDE 0.4 MILLIGRAM(S): 0.4 CAPSULE ORAL at 21:23

## 2021-09-11 RX ADMIN — TRAMADOL HYDROCHLORIDE 50 MILLIGRAM(S): 50 TABLET ORAL at 11:18

## 2021-09-11 RX ADMIN — Medication 12.5 MILLIGRAM(S): at 18:03

## 2021-09-11 RX ADMIN — Medication 2.5 MILLIGRAM(S): at 13:29

## 2021-09-11 RX ADMIN — Medication 1: at 13:14

## 2021-09-11 RX ADMIN — TRAMADOL HYDROCHLORIDE 25 MILLIGRAM(S): 50 TABLET ORAL at 09:00

## 2021-09-11 RX ADMIN — SENNA PLUS 2 TABLET(S): 8.6 TABLET ORAL at 21:23

## 2021-09-11 RX ADMIN — Medication 60 MILLIGRAM(S): at 18:03

## 2021-09-11 RX ADMIN — TRAMADOL HYDROCHLORIDE 50 MILLIGRAM(S): 50 TABLET ORAL at 19:53

## 2021-09-11 RX ADMIN — Medication 12.5 MILLIGRAM(S): at 05:03

## 2021-09-11 RX ADMIN — Medication 1300 MILLIGRAM(S): at 18:03

## 2021-09-11 RX ADMIN — Medication 500000 UNIT(S): at 05:04

## 2021-09-11 RX ADMIN — Medication 1 TABLET(S): at 13:15

## 2021-09-11 RX ADMIN — Medication 1300 MILLIGRAM(S): at 05:03

## 2021-09-11 RX ADMIN — MYCOPHENOLATE MOFETIL 1000 MILLIGRAM(S): 250 CAPSULE ORAL at 19:54

## 2021-09-11 RX ADMIN — Medication 60 MILLIGRAM(S): at 05:04

## 2021-09-11 RX ADMIN — FAMOTIDINE 20 MILLIGRAM(S): 10 INJECTION INTRAVENOUS at 13:14

## 2021-09-11 RX ADMIN — Medication 1: at 09:54

## 2021-09-11 RX ADMIN — TRAMADOL HYDROCHLORIDE 25 MILLIGRAM(S): 50 TABLET ORAL at 08:14

## 2021-09-11 NOTE — PROGRESS NOTE ADULT - SUBJECTIVE AND OBJECTIVE BOX
Transplant Surgery - Multidisciplinary Rounds  --------------------------------------------------------------  DDRT   Date: 9/10/21        POD# 2    Present: Patient seen and examined with multidisciplinary team during AM rounds including Transplant Surgeon: Dr. Perez, Transplant nephrologist Dr. Rasta PATRICK Inpatient Providers:  NOAH Hogan, unit RN.  Disciplines not in attendance will be notified of the plan.     HPI:61M with h/o HTN, HLD, BPH, GERD, Glaucoma, tobacco abuse (1PPD x 15y, last ), CKD since  progressing to ESRD 2/2 biopsy proven chronic GN (bx in [place] years ago).  Originally underwent PD x 4 months in , then switched to HD via L AVF MWF. PD cath was removed 2020.  (Nephrologists: Rossi/Yareli). UO: oliguric.  Now s/p right DDRT  with stent placement  and Simulect induction     Recipient:   vocal cord surgery, pulmonary mass (benign), thyroid lesion (benign)  Covid Vaccine: Second dose of Pfizer vaccine on 21  Nephrologist:  Dr. Richey  CPRA: 0%  ABO: O          CMV:  Positive  EBV Status: Positive  Last HD: last HD @ 3PM    ----  Donor (import): Carroll, MD  Donor ID:  [id number]  Match: [id number]  [place] [facility] [place]  Age:  42 y.o. male  ABO:  O  High Risk: No     KDPI: 33%  COD: Anoxia  X Clamp Time:  2021 23:41  Medical Hx:  DM II (less than 5 years), ? Hepatitis as a child, heavy ETOH use  Terminal Cr:  0.61  Covid Testing:  COVID NP 21: Negative; COVID BAL : negative;  NP: Negative  CMV-Positive  EBV-Negative  HepBcAb-Negative  Hepatitis C-SIM- Negative  Hepatitis C ab-Negative  MISMATCH: 2, 2, 0  Kidney E[place] to [facility]: 21 at 1630  Kidney Laterality: Left Kidney  X-match – Virtual negative     OR:  Donor Renal Transplant  2arteries (combined for one anastomosis) 1, vein, 1 ureter  Double J stent placed  LIMA drain left in place  CIT ~21h      Interval events:  S/P DDRT POD 2  Good graft function Cr down to 4.31 from 6.41, UO 2.1L   Received 1L fluid bolus for low UO yesterday with improvement  Increased IVF to 100ml/hr for UO  PCA changed to tramadol PRN  for pain  Cullen +JPx1 intact  Diet advanced mariah well      Immunosuppression:  Induction with Simulect  Envarsus pending level this am, MMF , steroid taper  Maintenance Immunosuppression  Ongoing monitoring for signs of rejection.      Potential Discharge date: pending clinical improvement  Education:  Medications  Plan of care:  See Below      MEDICATIONS  (STANDING):  aspirin enteric coated 81 milliGRAM(s) Oral daily  ceFAZolin   IVPB 2000 milliGRAM(s) IV Intermittent once  chlorhexidine 2% Cloths 1 Application(s) Topical daily  dextrose 40% Gel 15 Gram(s) Oral once  dextrose 5%. 1000 milliLiter(s) (50 mL/Hr) IV Continuous <Continuous>  dextrose 5%. 1000 milliLiter(s) (100 mL/Hr) IV Continuous <Continuous>  dextrose 50% Injectable 25 Gram(s) IV Push once  dextrose 50% Injectable 12.5 Gram(s) IV Push once  dextrose 50% Injectable 25 Gram(s) IV Push once  famotidine    Tablet 20 milliGRAM(s) Oral daily  glucagon  Injectable 1 milliGRAM(s) IntraMuscular once  glucagon  Injectable 1 milliGRAM(s) IntraMuscular once  insulin lispro (ADMELOG) corrective regimen sliding scale   SubCutaneous at bedtime  insulin lispro (ADMELOG) corrective regimen sliding scale   SubCutaneous three times a day before meals  methylPREDNISolone sodium succinate Injectable 60 milliGRAM(s) IV Push two times a day  metoprolol tartrate 12.5 milliGRAM(s) Oral two times a day  mycophenolate mofetil 1000 milliGRAM(s) Oral <User Schedule>  nystatin    Suspension 209258 Unit(s) Swish and Swallow four times a day  oxybutynin 2.5 milliGRAM(s) Oral two times a day  polyethylene glycol 3350 17 Gram(s) Oral once  senna 2 Tablet(s) Oral at bedtime  sodium bicarbonate 1300 milliGRAM(s) Oral two times a day  tamsulosin 0.4 milliGRAM(s) Oral at bedtime  trimethoprim   80 mG/sulfamethoxazole 400 mG 1 Tablet(s) Oral daily  valGANciclovir 450 milliGRAM(s) Oral daily    MEDICATIONS  (PRN):  naloxone Injectable 0.1 milliGRAM(s) IV Push every 3 minutes PRN For ANY of the following changes in patient status:  A. RR LESS THAN 10 breaths per minute, B. Oxygen saturation LESS THAN 90%, C. Sedation score of 6  ondansetron Injectable 4 milliGRAM(s) IV Push every 6 hours PRN Nausea  traMADol 25 milliGRAM(s) Oral every 6 hours PRN Moderate Pain (4 - 6)  traMADol 50 milliGRAM(s) Oral every 6 hours PRN Severe Pain (7 - 10)      PAST MEDICAL & SURGICAL HISTORY:  Nephritis    CKD (chronic kidney disease), stage III    BPH (benign prostatic hypertrophy)    Lung abnormality    HTN (hypertension)    Kidney disease    Hypercholesteremia    GERD (gastroesophageal reflux disease)    Gout    BPH (benign prostatic hyperplasia)    Solitary fibrous tumor  removed in     Vocal cord anomaly  S/P polyp removal     History of lung surgery  - benign    Peritoneal dialysis catheter dysfunction  s/p removal         Vital Signs Last 24 Hrs  T(C): 36.7 (11 Sep 2021 09:00), Max: 37.1 (11 Sep 2021 01:01)  T(F): 98 (11 Sep 2021 09:00), Max: 98.8 (11 Sep 2021 01:01)  HR: 96 (11 Sep 2021 09:00) (96 - 113)  BP: 126/69 (11 Sep 2021 09:00) (102/55 - 139/77)  BP(mean): 101 (11 Sep 2021 05:00) (72 - 105)  RR: 18 (11 Sep 2021 09:00) (18 - 18)  SpO2: 100% (11 Sep 2021 09:00) (97% - 100%)    I&O's Summary    10 Sep 2021 07:01  -  11 Sep 2021 07:00  --------------------------------------------------------  IN: 4125 mL / OUT: 2230 mL / NET: 1895 mL    11 Sep 2021 07:01  -  11 Sep 2021 11:18  --------------------------------------------------------  IN: 560 mL / OUT: 400 mL / NET: 160 mL                              9.7    14.67 )-----------( 195      ( 11 Sep 2021 06:06 )             29.3         133<L>  |  103  |  45<H>  ----------------------------<  149<H>  4.9   |  16<L>  |  4.31<H>    Ca    8.0<L>      11 Sep 2021 06:06  Phos  3.4       Mg     2.5         TPro  5.5<L>  /  Alb  3.2<L>  /  TBili  0.2  /  DBili  x   /  AST  16  /  ALT  6<L>  /  AlkPhos  46      Tacrolimus (), Serum: <2.0 ng/mL ( @ 08:45)       REVIEW OF SYSTEMS  --------------------------------------------------------------------------------  Gen: No weight changes, fatigue, fevers/chills, weakness  Skin: No rashes  Head/Eyes/Ears/Mouth: No headache; Normal hearing; Normal vision w/o blurriness; No sinus pain/discomfort, sore throat  Respiratory: No dyspnea, cough, wheezing, hemoptysis  CV: No chest pain, PND, orthopnea  GI: No abdominal pain, diarrhea, constipation, nausea, vomiting, melena, hematochezia  : No increased frequency, dysuria, hematuria, nocturia  MSK: No joint pain/swelling; no back pain; no edema  Neuro: No dizziness/lightheadedness, weakness, seizures, numbness, tingling  Heme: No easy bruising or bleeding  Endo: No heat/cold intolerance  Psych: No significant nervousness, anxiety, stress, depression  All other systems were reviewed and are negative, except as noted.      -------------------------------  PHYSICAL EXAM:  Constitutional: Well developed / well nourished  Eyes: Anicteric, PERRLA  ENMT: nc/at  Neck: supple   Respiratory: CTA B/L  Cardiovascular: RRR  Gastrointestinal: Soft, NT, ND, LIMA RLQ w/ sanguineous output   Genitourinary: Urinary catheter in place  Extremities: SCD's in place and working bilaterally  Vascular: Palpable dp pulses bilaterally, LUE AVF +thrill/+bruit   Neurological: A&O x3  Skin: Warm,dry,intact throughout, surgical site honey louis dressing , LIMA x1  Musculoskeletal: Moving all extremities  Psychiatric: Responsive

## 2021-09-11 NOTE — PROGRESS NOTE ADULT - SUBJECTIVE AND OBJECTIVE BOX
[facility] DIVISION OF KIDNEY DISEASES AND HYPERTENSION -- FOLLOW UP NOTE  --------------------------------------------------------------------------------  Chief Complaint:  kidney transplant    24 hour events/subjective:  PT feels well except for bladder spasms      PAST HISTORY  --------------------------------------------------------------------------------  No significant changes to PMH, PSH, FHx, SHx, unless otherwise noted    ALLERGIES & MEDICATIONS  --------------------------------------------------------------------------------  Allergies    No Known Allergies    Intolerances      Standing Inpatient Medications  aspirin enteric coated 81 milliGRAM(s) Oral daily  ceFAZolin   IVPB 2000 milliGRAM(s) IV Intermittent once  chlorhexidine 2% Cloths 1 Application(s) Topical daily  dextrose 40% Gel 15 Gram(s) Oral once  dextrose 5%. 1000 milliLiter(s) IV Continuous <Continuous>  dextrose 5%. 1000 milliLiter(s) IV Continuous <Continuous>  dextrose 50% Injectable 25 Gram(s) IV Push once  dextrose 50% Injectable 12.5 Gram(s) IV Push once  dextrose 50% Injectable 25 Gram(s) IV Push once  famotidine    Tablet 20 milliGRAM(s) Oral daily  glucagon  Injectable 1 milliGRAM(s) IntraMuscular once  glucagon  Injectable 1 milliGRAM(s) IntraMuscular once  insulin lispro (ADMELOG) corrective regimen sliding scale   SubCutaneous at bedtime  insulin lispro (ADMELOG) corrective regimen sliding scale   SubCutaneous three times a day before meals  methylPREDNISolone sodium succinate Injectable 60 milliGRAM(s) IV Push two times a day  metoprolol tartrate 12.5 milliGRAM(s) Oral two times a day  mycophenolate mofetil 1000 milliGRAM(s) Oral <User Schedule>  nystatin    Suspension 450882 Unit(s) Swish and Swallow four times a day  senna 2 Tablet(s) Oral at bedtime  sodium bicarbonate 1300 milliGRAM(s) Oral two times a day  sodium chloride 0.9%. 1000 milliLiter(s) IV Continuous <Continuous>  tamsulosin 0.4 milliGRAM(s) Oral at bedtime  trimethoprim   80 mG/sulfamethoxazole 400 mG 1 Tablet(s) Oral daily  valGANciclovir 450 milliGRAM(s) Oral daily    PRN Inpatient Medications  naloxone Injectable 0.1 milliGRAM(s) IV Push every 3 minutes PRN  ondansetron Injectable 4 milliGRAM(s) IV Push every 6 hours PRN  traMADol 25 milliGRAM(s) Oral every 6 hours PRN  traMADol 50 milliGRAM(s) Oral every 6 hours PRN      REVIEW OF SYSTEMS  --------------------------------------------------------------------------------  Gen: No fatigue, fevers/chills, weakness  Skin: No rashes  Head/Eyes/Ears/Mouth: No headache;No sore throat  Respiratory: No dyspnea, cough,   CV: No chest pain, PND, orthopnea  GI: No abdominal pain, diarrhea, constipation, nausea, vomiting  Transplant: No pain  : bladder spasm  MSK: No joint pain/swelling; no back pain; no edema  Neuro: No dizziness/lightheadedness, weakness, seizures, numbness, tingling  Psych: No significant nervousness, anxiety, stress, depression    All other systems were reviewed and are negative, except as noted.    VITALS/PHYSICAL EXAM  --------------------------------------------------------------------------------  T(C): 36.7 (09-11-21 @ 09:00), Max: 37.1 (09-11-21 @ 01:01)  HR: 96 (09-11-21 @ 09:00) (96 - 113)  BP: 126/69 (09-11-21 @ 09:00) (102/55 - 143/77)  RR: 18 (09-11-21 @ 09:00) (18 - 18)  SpO2: 100% (09-11-21 @ 09:00) (97% - 100%)  Wt(kg): --  Height (cm): 175.3 (09-09-21 @ 18:07)  Weight (kg): 84.9 (09-09-21 @ 18:07)  BMI (kg/m2): 27.6 (09-09-21 @ 18:07)  BSA (m2): 2.01 (09-09-21 @ 18:07)      09-10-21 @ 07:01  -  09-11-21 @ 07:00  --------------------------------------------------------  IN: 4125 mL / OUT: 2230 mL / NET: 1895 mL    09-11-21 @ 07:01  -  09-11-21 @ 09:13  --------------------------------------------------------  IN: 100 mL / OUT: 100 mL / NET: 0 mL      Physical Exam:  	Gen: NAD  	HEENT: PERRL, supple neck, clear oropharynx  	Pulm: CTA B/L  	CV: RRR, S1S2; no rub  	Back: No spinal or CVA tenderness; no sacral edema  	Abd: +BS, soft, nontender/nondistended                      Transplant: mild tenderness, swelling, incision c/d/i  	: No suprapubic tenderness  	UE: trace edema  	LE: trace LE edema  	Neuro: No focal deficits  	Psych: Normal affect and mood  	Skin: Warm, without rashes      LABS/STUDIES  --------------------------------------------------------------------------------              9.7    14.67 >-----------<  195      [09-11-21 @ 06:06]              29.3     133  |  103  |  45  ----------------------------<  149      [09-11-21 @ 06:06]  4.9   |  16  |  4.31        Ca     8.0     [09-11-21 @ 06:06]      Mg     2.5     [09-11-21 @ 06:06]      Phos  3.4     [09-11-21 @ 06:06]    TPro  5.5  /  Alb  3.2  /  TBili  0.2  /  DBili  x   /  AST  16  /  ALT  6   /  AlkPhos  46  [09-11-21 @ 06:06]    PT/INR: PT 11.0 , INR 0.91       [09-09-21 @ 14:58]  PTT: 31.7       [09-09-21 @ 14:58]      Creatinine Trend:  SCr 4.31 [09-11 @ 06:06]  SCr 5.41 [09-10 @ 15:54]  SCr 5.15 [09-10 @ 14:43]  SCr 6.41 [09-10 @ 04:31]  SCr 7.18 [09-09 @ 23:29]                  HbA1c 5.9      [04-08-18 @ 07:47]    HBsAb 360.7      [09-09-21 @ 22:47]  HBsAg Nonreact      [09-09-21 @ 22:47]  HBcAb Nonreact      [09-09-21 @ 22:47]  HCV 0.18, Nonreact      [09-09-21 @ 22:47]  HIV Nonreact      [09-09-21 @ 22:47]

## 2021-09-12 LAB
ALBUMIN SERPL ELPH-MCNC: 3.1 G/DL — LOW (ref 3.3–5)
ALP SERPL-CCNC: 42 U/L — SIGNIFICANT CHANGE UP (ref 40–120)
ALT FLD-CCNC: 8 U/L — LOW (ref 10–45)
ANION GAP SERPL CALC-SCNC: 11 MMOL/L — SIGNIFICANT CHANGE UP (ref 5–17)
AST SERPL-CCNC: 14 U/L — SIGNIFICANT CHANGE UP (ref 10–40)
BASOPHILS # BLD AUTO: 0.01 K/UL — SIGNIFICANT CHANGE UP (ref 0–0.2)
BASOPHILS NFR BLD AUTO: 0.1 % — SIGNIFICANT CHANGE UP (ref 0–2)
BILIRUB SERPL-MCNC: 0.2 MG/DL — SIGNIFICANT CHANGE UP (ref 0.2–1.2)
BUN SERPL-MCNC: 48 MG/DL — HIGH (ref 7–23)
CALCIUM SERPL-MCNC: 8.4 MG/DL — SIGNIFICANT CHANGE UP (ref 8.4–10.5)
CHLORIDE SERPL-SCNC: 105 MMOL/L — SIGNIFICANT CHANGE UP (ref 96–108)
CO2 SERPL-SCNC: 18 MMOL/L — LOW (ref 22–31)
CREAT SERPL-MCNC: 2.65 MG/DL — HIGH (ref 0.5–1.3)
EOSINOPHIL # BLD AUTO: 0.01 K/UL — SIGNIFICANT CHANGE UP (ref 0–0.5)
EOSINOPHIL NFR BLD AUTO: 0.1 % — SIGNIFICANT CHANGE UP (ref 0–6)
GLUCOSE BLDC GLUCOMTR-MCNC: 111 MG/DL — HIGH (ref 70–99)
GLUCOSE BLDC GLUCOMTR-MCNC: 132 MG/DL — HIGH (ref 70–99)
GLUCOSE BLDC GLUCOMTR-MCNC: 172 MG/DL — HIGH (ref 70–99)
GLUCOSE BLDC GLUCOMTR-MCNC: 200 MG/DL — HIGH (ref 70–99)
GLUCOSE SERPL-MCNC: 150 MG/DL — HIGH (ref 70–99)
HCT VFR BLD CALC: 27.3 % — LOW (ref 39–50)
HGB BLD-MCNC: 8.9 G/DL — LOW (ref 13–17)
IMM GRANULOCYTES NFR BLD AUTO: 0.8 % — SIGNIFICANT CHANGE UP (ref 0–1.5)
LYMPHOCYTES # BLD AUTO: 0.59 K/UL — LOW (ref 1–3.3)
LYMPHOCYTES # BLD AUTO: 4.9 % — LOW (ref 13–44)
MAGNESIUM SERPL-MCNC: 2.7 MG/DL — HIGH (ref 1.6–2.6)
MCHC RBC-ENTMCNC: 32.4 PG — SIGNIFICANT CHANGE UP (ref 27–34)
MCHC RBC-ENTMCNC: 32.6 GM/DL — SIGNIFICANT CHANGE UP (ref 32–36)
MCV RBC AUTO: 99.3 FL — SIGNIFICANT CHANGE UP (ref 80–100)
MONOCYTES # BLD AUTO: 0.84 K/UL — SIGNIFICANT CHANGE UP (ref 0–0.9)
MONOCYTES NFR BLD AUTO: 7 % — SIGNIFICANT CHANGE UP (ref 2–14)
NEUTROPHILS # BLD AUTO: 10.39 K/UL — HIGH (ref 1.8–7.4)
NEUTROPHILS NFR BLD AUTO: 87.1 % — HIGH (ref 43–77)
NRBC # BLD: 0 /100 WBCS — SIGNIFICANT CHANGE UP (ref 0–0)
PHOSPHATE SERPL-MCNC: 2.8 MG/DL — SIGNIFICANT CHANGE UP (ref 2.5–4.5)
PLATELET # BLD AUTO: 149 K/UL — LOW (ref 150–400)
POTASSIUM SERPL-MCNC: 4.7 MMOL/L — SIGNIFICANT CHANGE UP (ref 3.5–5.3)
POTASSIUM SERPL-SCNC: 4.7 MMOL/L — SIGNIFICANT CHANGE UP (ref 3.5–5.3)
PROT SERPL-MCNC: 5.7 G/DL — LOW (ref 6–8.3)
RBC # BLD: 2.75 M/UL — LOW (ref 4.2–5.8)
RBC # FLD: 13.1 % — SIGNIFICANT CHANGE UP (ref 10.3–14.5)
SODIUM SERPL-SCNC: 134 MMOL/L — LOW (ref 135–145)
TACROLIMUS SERPL-MCNC: 5.9 NG/ML — SIGNIFICANT CHANGE UP
WBC # BLD: 11.94 K/UL — HIGH (ref 3.8–10.5)
WBC # FLD AUTO: 11.94 K/UL — HIGH (ref 3.8–10.5)

## 2021-09-12 PROCEDURE — 99232 SBSQ HOSP IP/OBS MODERATE 35: CPT

## 2021-09-12 RX ORDER — TAMSULOSIN HYDROCHLORIDE 0.4 MG/1
0.4 CAPSULE ORAL
Refills: 0 | Status: DISCONTINUED | OUTPATIENT
Start: 2021-09-12 | End: 2021-09-14

## 2021-09-12 RX ORDER — BASILIXIMAB 20 MG/5ML
20 INJECTION, POWDER, FOR SOLUTION INTRAVENOUS ONCE
Refills: 0 | Status: COMPLETED | OUTPATIENT
Start: 2021-09-13 | End: 2021-09-13

## 2021-09-12 RX ORDER — POLYETHYLENE GLYCOL 3350 17 G/17G
17 POWDER, FOR SOLUTION ORAL DAILY
Refills: 0 | Status: DISCONTINUED | OUTPATIENT
Start: 2021-09-12 | End: 2021-09-14

## 2021-09-12 RX ORDER — LIDOCAINE HCL 20 MG/ML
5 VIAL (ML) INJECTION ONCE
Refills: 0 | Status: COMPLETED | OUTPATIENT
Start: 2021-09-12 | End: 2021-09-12

## 2021-09-12 RX ORDER — FUROSEMIDE 40 MG
60 TABLET ORAL ONCE
Refills: 0 | Status: COMPLETED | OUTPATIENT
Start: 2021-09-12 | End: 2021-09-12

## 2021-09-12 RX ADMIN — Medication 500000 UNIT(S): at 17:28

## 2021-09-12 RX ADMIN — Medication 500000 UNIT(S): at 05:14

## 2021-09-12 RX ADMIN — MYCOPHENOLATE MOFETIL 1000 MILLIGRAM(S): 250 CAPSULE ORAL at 08:26

## 2021-09-12 RX ADMIN — Medication 5 MILLILITER(S): at 12:06

## 2021-09-12 RX ADMIN — TRAMADOL HYDROCHLORIDE 50 MILLIGRAM(S): 50 TABLET ORAL at 17:27

## 2021-09-12 RX ADMIN — Medication 1300 MILLIGRAM(S): at 05:14

## 2021-09-12 RX ADMIN — Medication 500000 UNIT(S): at 11:35

## 2021-09-12 RX ADMIN — Medication 1: at 13:31

## 2021-09-12 RX ADMIN — Medication 2.5 MILLIGRAM(S): at 17:30

## 2021-09-12 RX ADMIN — Medication 10 MILLIGRAM(S): at 12:06

## 2021-09-12 RX ADMIN — Medication 12.5 MILLIGRAM(S): at 05:14

## 2021-09-12 RX ADMIN — Medication 1 TABLET(S): at 11:36

## 2021-09-12 RX ADMIN — POLYETHYLENE GLYCOL 3350 17 GRAM(S): 17 POWDER, FOR SOLUTION ORAL at 11:37

## 2021-09-12 RX ADMIN — Medication 1300 MILLIGRAM(S): at 17:29

## 2021-09-12 RX ADMIN — Medication 2.5 MILLIGRAM(S): at 05:17

## 2021-09-12 RX ADMIN — SENNA PLUS 2 TABLET(S): 8.6 TABLET ORAL at 21:03

## 2021-09-12 RX ADMIN — TACROLIMUS 11 MILLIGRAM(S): 5 CAPSULE ORAL at 08:26

## 2021-09-12 RX ADMIN — Medication 30 MILLIGRAM(S): at 05:14

## 2021-09-12 RX ADMIN — Medication 5 MILLIGRAM(S): at 08:23

## 2021-09-12 RX ADMIN — TRAMADOL HYDROCHLORIDE 25 MILLIGRAM(S): 50 TABLET ORAL at 23:40

## 2021-09-12 RX ADMIN — Medication 500000 UNIT(S): at 23:06

## 2021-09-12 RX ADMIN — TAMSULOSIN HYDROCHLORIDE 0.4 MILLIGRAM(S): 0.4 CAPSULE ORAL at 11:45

## 2021-09-12 RX ADMIN — VALGANCICLOVIR 450 MILLIGRAM(S): 450 TABLET, FILM COATED ORAL at 11:36

## 2021-09-12 RX ADMIN — MYCOPHENOLATE MOFETIL 1000 MILLIGRAM(S): 250 CAPSULE ORAL at 19:48

## 2021-09-12 RX ADMIN — Medication 81 MILLIGRAM(S): at 11:35

## 2021-09-12 RX ADMIN — TRAMADOL HYDROCHLORIDE 50 MILLIGRAM(S): 50 TABLET ORAL at 18:15

## 2021-09-12 RX ADMIN — CHLORHEXIDINE GLUCONATE 1 APPLICATION(S): 213 SOLUTION TOPICAL at 11:42

## 2021-09-12 RX ADMIN — Medication 30 MILLIGRAM(S): at 17:28

## 2021-09-12 RX ADMIN — FAMOTIDINE 20 MILLIGRAM(S): 10 INJECTION INTRAVENOUS at 11:35

## 2021-09-12 RX ADMIN — Medication 12.5 MILLIGRAM(S): at 17:29

## 2021-09-12 RX ADMIN — TRAMADOL HYDROCHLORIDE 25 MILLIGRAM(S): 50 TABLET ORAL at 23:06

## 2021-09-12 RX ADMIN — Medication 60 MILLIGRAM(S): at 08:24

## 2021-09-12 RX ADMIN — TAMSULOSIN HYDROCHLORIDE 0.4 MILLIGRAM(S): 0.4 CAPSULE ORAL at 21:08

## 2021-09-12 NOTE — PROGRESS NOTE ADULT - SUBJECTIVE AND OBJECTIVE BOX
Transplant Surgery - Multidisciplinary Rounds  --------------------------------------------------------------  R DDRT   Date: 9/10/21        POD# 3    Present: Patient seen and examined with multidisciplinary team during AM rounds including Transplant Surgeon: Dr. Perez, Transplant nephrologist: Dr. Moses De La Torre. Inpatient Providers:  SUMMER Pandey, unit RN.  Disciplines not in attendance will be notified of the plan.     HPI:61M with h/o HTN, HLD, BPH, GERD, Glaucoma, tobacco abuse (1PPD x 15y, last ), CKD since  progressing to ESRD 2/2 biopsy proven chronic GN (bx in [place] years ago).  Originally underwent PD x 4 months in , then switched to HD via L AVF MWF. PD cath was removed 2020.  (Nephrologists: Rossi/Yareli). UO: oliguric.  Now s/p right DDRT  with stent placement  and Simulect induction     Recipient:   vocal cord surgery, pulmonary mass (benign), thyroid lesion (benign)  Covid Vaccine: Second dose of Pfizer vaccine on 21  Nephrologist:  Dr. Richey  CPRA: 0%  ABO: O          CMV:  Positive  EBV Status: Positive  Last HD: last HD @ 3PM    ----  Donor (import): Clinton, MD  Donor ID:  [id number]  Match: [id number]  [place]O: [facility] [place]  Age:  42 y.o. male  ABO:  O  High Risk: No     KDPI: 33%  COD: Anoxia  X Clamp Time:  2021 23:41  Medical Hx:  DM II (less than 5 years), ? Hepatitis as a child, heavy ETOH use  Terminal Cr:  0.61  Covid Testing:  COVID NP 21: Negative; COVID BAL : negative;  NP: Negative  CMV-Positive  EBV-Negative  HepBcAb-Negative  Hepatitis C-SIM- Negative  Hepatitis C ab-Negative  MISMATCH: 2, 2, 0  Kidney ETA to [facility]: 21 at 1630  Kidney Laterality: Left Kidney  X-match – Virtual negative     OR:  Donor Renal Transplant  2arteries (combined for one anastomosis) 1, vein, 1 ureter  Double J stent placed  LIMA drain left in place  CIT ~21h      Interval events:  POD#3  -Afebrile, VS stable  -c/o bladder spasms, some improvement on Ditropan/Flomax  -graft: downtrending Cr 2.65, UO 2L via mcneal. LIMA serous 215  -OOB, tolerating PO  - +constipation, +flatus    Immunosuppression:  Induction with Simulect  Envarsus 11, MMF , steroid taper  Maintenance Immunosuppression  Ongoing monitoring for signs of rejection.      Potential Discharge date: pending clinical improvement  Education:  Medications  Plan of care:  See Below      MEDICATIONS  (STANDING):  aspirin enteric coated 81 milliGRAM(s) Oral daily  ceFAZolin   IVPB 2000 milliGRAM(s) IV Intermittent once  chlorhexidine 2% Cloths 1 Application(s) Topical daily  dextrose 40% Gel 15 Gram(s) Oral once  dextrose 5%. 1000 milliLiter(s) (50 mL/Hr) IV Continuous <Continuous>  dextrose 5%. 1000 milliLiter(s) (100 mL/Hr) IV Continuous <Continuous>  dextrose 50% Injectable 25 Gram(s) IV Push once  dextrose 50% Injectable 12.5 Gram(s) IV Push once  dextrose 50% Injectable 25 Gram(s) IV Push once  famotidine    Tablet 20 milliGRAM(s) Oral daily  glucagon  Injectable 1 milliGRAM(s) IntraMuscular once  glucagon  Injectable 1 milliGRAM(s) IntraMuscular once  insulin lispro (ADMELOG) corrective regimen sliding scale   SubCutaneous at bedtime  insulin lispro (ADMELOG) corrective regimen sliding scale   SubCutaneous three times a day before meals  methylPREDNISolone sodium succinate Injectable 30 milliGRAM(s) IV Push two times a day  metoprolol tartrate 12.5 milliGRAM(s) Oral two times a day  mycophenolate mofetil 1000 milliGRAM(s) Oral <User Schedule>  nystatin    Suspension 133882 Unit(s) Swish and Swallow four times a day  oxybutynin 2.5 milliGRAM(s) Oral two times a day  polyethylene glycol 3350 17 Gram(s) Oral daily  senna 2 Tablet(s) Oral at bedtime  sodium bicarbonate 1300 milliGRAM(s) Oral two times a day  tacrolimus ER Tablet (ENVARSUS XR) 11 milliGRAM(s) Oral <User Schedule>  tamsulosin 0.4 milliGRAM(s) Oral at bedtime  trimethoprim   80 mG/sulfamethoxazole 400 mG 1 Tablet(s) Oral daily  valGANciclovir 450 milliGRAM(s) Oral daily    MEDICATIONS  (PRN):  naloxone Injectable 0.1 milliGRAM(s) IV Push every 3 minutes PRN For ANY of the following changes in patient status:  A. RR LESS THAN 10 breaths per minute, B. Oxygen saturation LESS THAN 90%, C. Sedation score of 6  ondansetron Injectable 4 milliGRAM(s) IV Push every 6 hours PRN Nausea  polyethylene glycol 3350 17 Gram(s) Oral daily PRN Constipation  traMADol 50 milliGRAM(s) Oral every 6 hours PRN Severe Pain (7 - 10)  traMADol 25 milliGRAM(s) Oral every 6 hours PRN Moderate Pain (4 - 6)      Vital Signs Last 24 Hrs  T(C): 36.6 (12 Sep 2021 09:00), Max: 36.7 (11 Sep 2021 13:00)  T(F): 97.9 (12 Sep 2021 09:00), Max: 98 (11 Sep 2021 13:00)  HR: 86 (12 Sep 2021 09:00) (86 - 99)  BP: 122/61 (12 Sep 2021 09:00) (116/67 - 139/75)  BP(mean): 100 (11 Sep 2021 21:05) (100 - 100)  RR: 18 (12 Sep 2021 09:00) (18 - 18)  SpO2: 100% (12 Sep 2021 09:00) (97% - 100%)    I&O's Summary    11 Sep 2021 07:01  -  12 Sep 2021 07:00  --------------------------------------------------------  IN: 1860 mL / OUT: 2845 mL / NET: -985 mL    12 Sep 2021 07:01  -  12 Sep 2021 10:07  --------------------------------------------------------  IN: 0 mL / OUT: 110 mL / NET: -110 mL                              8.9    11.94 )-----------( 149      ( 12 Sep 2021 06:35 )             27.3     -12    134<L>  |  105  |  48<H>  ----------------------------<  150<H>  4.7   |  18<L>  |  2.65<H>    Ca    8.4      12 Sep 2021 06:35  Phos  2.8       Mg     2.7         TPro  5.7<L>  /  Alb  3.1<L>  /  TBili  0.2  /  DBili  x   /  AST  14  /  ALT  8<L>  /  AlkPhos  42      Tacrolimus (), Serum: <2.0 ng/mL ( @ 08:45)          REVIEW OF SYSTEMS  --------------------------------------------------------------------------------  Gen: No weight changes, fatigue, fevers/chills, weakness  Skin: No rashes  Head/Eyes/Ears/Mouth: No headache; Normal hearing; Normal vision w/o blurriness; No sinus pain/discomfort, sore throat  Respiratory: No dyspnea, cough, wheezing, hemoptysis  CV: No chest pain, PND, orthopnea  GI: No abdominal pain, diarrhea, constipation, nausea, vomiting, melena, hematochezia  : No increased frequency, dysuria, hematuria, nocturia. +bladder spasms  MSK: No joint pain/swelling; no back pain; no edema  Neuro: No dizziness/lightheadedness, weakness, seizures, numbness, tingling  Heme: No easy bruising or bleeding  Endo: No heat/cold intolerance  Psych: No significant nervousness, anxiety, stress, depression  All other systems were reviewed and are negative, except as noted.      -------------------------------  PHYSICAL EXAM:  Constitutional: Well developed / well nourished  Eyes: Anicteric, PERRLA  ENMT: nc/at  Neck: supple   Respiratory: CTA B/L  Cardiovascular: RRR  Gastrointestinal: Soft, ND. appropriate incisional TTP. RLQ incision c/d/i. LIMA serous  Genitourinary: Urinary catheter in place with clear UO  Extremities: SCD's in place and working bilaterally, no edema, no calf TTP  Vascular: Palpable dp pulses bilaterally, LUE AVF +thrill/+bruit   Neurological: A&O x3  Skin: no lesions, rashes, ulcerations. good perfusion.  Musculoskeletal: Moving all extremities  Psychiatric: Responsive

## 2021-09-12 NOTE — PROGRESS NOTE ADULT - SUBJECTIVE AND OBJECTIVE BOX
[facility] DIVISION OF KIDNEY DISEASES AND HYPERTENSION -- FOLLOW UP NOTE  --------------------------------------------------------------------------------  Chief Complaint: kidney transplant    24 hour events/subjective:  Pt feels well.  No BM      PAST HISTORY  --------------------------------------------------------------------------------  No significant changes to PMH, PSH, FHx, SHx, unless otherwise noted    ALLERGIES & MEDICATIONS  --------------------------------------------------------------------------------  Allergies    No Known Allergies    Intolerances      Standing Inpatient Medications  aspirin enteric coated 81 milliGRAM(s) Oral daily  ceFAZolin   IVPB 2000 milliGRAM(s) IV Intermittent once  chlorhexidine 2% Cloths 1 Application(s) Topical daily  dextrose 40% Gel 15 Gram(s) Oral once  dextrose 5%. 1000 milliLiter(s) IV Continuous <Continuous>  dextrose 5%. 1000 milliLiter(s) IV Continuous <Continuous>  dextrose 50% Injectable 25 Gram(s) IV Push once  dextrose 50% Injectable 12.5 Gram(s) IV Push once  dextrose 50% Injectable 25 Gram(s) IV Push once  famotidine    Tablet 20 milliGRAM(s) Oral daily  glucagon  Injectable 1 milliGRAM(s) IntraMuscular once  glucagon  Injectable 1 milliGRAM(s) IntraMuscular once  insulin lispro (ADMELOG) corrective regimen sliding scale   SubCutaneous at bedtime  insulin lispro (ADMELOG) corrective regimen sliding scale   SubCutaneous three times a day before meals  methylPREDNISolone sodium succinate Injectable 30 milliGRAM(s) IV Push two times a day  metoprolol tartrate 12.5 milliGRAM(s) Oral two times a day  mycophenolate mofetil 1000 milliGRAM(s) Oral <User Schedule>  nystatin    Suspension 027206 Unit(s) Swish and Swallow four times a day  oxybutynin 2.5 milliGRAM(s) Oral two times a day  polyethylene glycol 3350 17 Gram(s) Oral daily  senna 2 Tablet(s) Oral at bedtime  sodium bicarbonate 1300 milliGRAM(s) Oral two times a day  tacrolimus ER Tablet (ENVARSUS XR) 11 milliGRAM(s) Oral <User Schedule>  tamsulosin 0.4 milliGRAM(s) Oral at bedtime  trimethoprim   80 mG/sulfamethoxazole 400 mG 1 Tablet(s) Oral daily  valGANciclovir 450 milliGRAM(s) Oral daily    PRN Inpatient Medications  naloxone Injectable 0.1 milliGRAM(s) IV Push every 3 minutes PRN  ondansetron Injectable 4 milliGRAM(s) IV Push every 6 hours PRN  polyethylene glycol 3350 17 Gram(s) Oral daily PRN  traMADol 50 milliGRAM(s) Oral every 6 hours PRN  traMADol 25 milliGRAM(s) Oral every 6 hours PRN      REVIEW OF SYSTEMS  --------------------------------------------------------------------------------  Gen: No fatigue, fevers/chills, weakness  Skin: No rashes  Head/Eyes/Ears/Mouth: No headache;No sore throat  Respiratory: No dyspnea, cough,   CV: No chest pain, PND, orthopnea  GI: No abdominal pain, diarrhea, constipation, nausea, vomiting  Transplant: No pain  : bladder spasms   MSK: No joint pain/swelling; no back pain; no edema  Neuro: No dizziness/lightheadedness, weakness, seizures, numbness, tingling  Psych: No significant nervousness, anxiety, stress, depression    All other systems were reviewed and are negative, except as noted.    VITALS/PHYSICAL EXAM  --------------------------------------------------------------------------------  T(C): 36.4 (09-12-21 @ 05:23), Max: 36.7 (09-11-21 @ 09:00)  HR: 97 (09-12-21 @ 05:23) (92 - 99)  BP: 117/65 (09-12-21 @ 05:23) (116/67 - 139/75)  RR: 18 (09-12-21 @ 05:23) (18 - 18)  SpO2: 100% (09-12-21 @ 05:23) (97% - 100%)  Wt(kg): --        09-11-21 @ 07:01  -  09-12-21 @ 07:00  --------------------------------------------------------  IN: 1860 mL / OUT: 2845 mL / NET: -985 mL    09-12-21 @ 07:01  -  09-12-21 @ 08:39  --------------------------------------------------------  IN: 0 mL / OUT: 110 mL / NET: -110 mL      Physical Exam:  	Gen: NAD  	HEENT: PERRL, supple neck, clear oropharynx  	Pulm: CTA B/L  	CV: RRR, S1S2; no rub  	Back: No spinal or CVA tenderness; no sacral edema  	Abd: +BS, soft, nontender/nondistended                      Transplant: No tenderness, swelling, incision c/d/i, LIMA drain in place.   	: No suprapubic tenderness  	UE: Warm, FROM; no edema; no asterixis  	LE: trace LE edema.   	Neuro: No focal deficits  	Psych: Normal affect and mood  	Skin: Warm, without rashes      LABS/STUDIES  --------------------------------------------------------------------------------              8.9    11.94 >-----------<  149      [09-12-21 @ 06:35]              27.3     134  |  105  |  48  ----------------------------<  150      [09-12-21 @ 06:35]  4.7   |  18  |  2.65        Ca     8.4     [09-12-21 @ 06:35]      Mg     2.7     [09-12-21 @ 06:35]      Phos  2.8     [09-12-21 @ 06:35]    TPro  5.7  /  Alb  3.1  /  TBili  0.2  /  DBili  x   /  AST  14  /  ALT  8   /  AlkPhos  42  [09-12-21 @ 06:35]          Creatinine Trend:  SCr 2.65 [09-12 @ 06:35]  SCr 4.31 [09-11 @ 06:06]  SCr 5.41 [09-10 @ 15:54]  SCr 5.15 [09-10 @ 14:43]  SCr 6.41 [09-10 @ 04:31]    Tacrolimus (), Serum: <2.0 ng/mL (09-11 @ 08:45)                HbA1c 5.9      [04-08-18 @ 07:47]    HBsAb 360.7      [09-09-21 @ 22:47]  HBsAg Nonreact      [09-09-21 @ 22:47]  HBcAb Nonreact      [09-09-21 @ 22:47]  HCV 0.18, Nonreact      [09-09-21 @ 22:47]  HIV Nonreact      [09-09-21 @ 22:47]

## 2021-09-13 ENCOUNTER — TRANSCRIPTION ENCOUNTER (OUTPATIENT)
Age: 61
End: 2021-09-13

## 2021-09-13 LAB
ALBUMIN SERPL ELPH-MCNC: 2.9 G/DL — LOW (ref 3.3–5)
ALP SERPL-CCNC: 42 U/L — SIGNIFICANT CHANGE UP (ref 40–120)
ALT FLD-CCNC: 9 U/L — LOW (ref 10–45)
ANION GAP SERPL CALC-SCNC: 11 MMOL/L — SIGNIFICANT CHANGE UP (ref 5–17)
AST SERPL-CCNC: 14 U/L — SIGNIFICANT CHANGE UP (ref 10–40)
BASOPHILS # BLD AUTO: 0 K/UL — SIGNIFICANT CHANGE UP (ref 0–0.2)
BASOPHILS NFR BLD AUTO: 0 % — SIGNIFICANT CHANGE UP (ref 0–2)
BILIRUB SERPL-MCNC: 0.2 MG/DL — SIGNIFICANT CHANGE UP (ref 0.2–1.2)
BUN SERPL-MCNC: 49 MG/DL — HIGH (ref 7–23)
CALCIUM SERPL-MCNC: 8.4 MG/DL — SIGNIFICANT CHANGE UP (ref 8.4–10.5)
CHLORIDE SERPL-SCNC: 103 MMOL/L — SIGNIFICANT CHANGE UP (ref 96–108)
CO2 SERPL-SCNC: 20 MMOL/L — LOW (ref 22–31)
CREAT SERPL-MCNC: 2 MG/DL — HIGH (ref 0.5–1.3)
EOSINOPHIL # BLD AUTO: 0.05 K/UL — SIGNIFICANT CHANGE UP (ref 0–0.5)
EOSINOPHIL NFR BLD AUTO: 0.6 % — SIGNIFICANT CHANGE UP (ref 0–6)
GLUCOSE BLDC GLUCOMTR-MCNC: 130 MG/DL — HIGH (ref 70–99)
GLUCOSE BLDC GLUCOMTR-MCNC: 141 MG/DL — HIGH (ref 70–99)
GLUCOSE BLDC GLUCOMTR-MCNC: 141 MG/DL — HIGH (ref 70–99)
GLUCOSE BLDC GLUCOMTR-MCNC: 169 MG/DL — HIGH (ref 70–99)
GLUCOSE SERPL-MCNC: 140 MG/DL — HIGH (ref 70–99)
HCT VFR BLD CALC: 25.9 % — LOW (ref 39–50)
HGB BLD-MCNC: 8.4 G/DL — LOW (ref 13–17)
IMM GRANULOCYTES NFR BLD AUTO: 0.9 % — SIGNIFICANT CHANGE UP (ref 0–1.5)
LYMPHOCYTES # BLD AUTO: 0.71 K/UL — LOW (ref 1–3.3)
LYMPHOCYTES # BLD AUTO: 8.2 % — LOW (ref 13–44)
MAGNESIUM SERPL-MCNC: 2.4 MG/DL — SIGNIFICANT CHANGE UP (ref 1.6–2.6)
MCHC RBC-ENTMCNC: 31.8 PG — SIGNIFICANT CHANGE UP (ref 27–34)
MCHC RBC-ENTMCNC: 32.4 GM/DL — SIGNIFICANT CHANGE UP (ref 32–36)
MCV RBC AUTO: 98.1 FL — SIGNIFICANT CHANGE UP (ref 80–100)
MONOCYTES # BLD AUTO: 0.88 K/UL — SIGNIFICANT CHANGE UP (ref 0–0.9)
MONOCYTES NFR BLD AUTO: 10.1 % — SIGNIFICANT CHANGE UP (ref 2–14)
NEUTROPHILS # BLD AUTO: 6.96 K/UL — SIGNIFICANT CHANGE UP (ref 1.8–7.4)
NEUTROPHILS NFR BLD AUTO: 80.2 % — HIGH (ref 43–77)
NRBC # BLD: 0 /100 WBCS — SIGNIFICANT CHANGE UP (ref 0–0)
PHOSPHATE SERPL-MCNC: 3.1 MG/DL — SIGNIFICANT CHANGE UP (ref 2.5–4.5)
PLATELET # BLD AUTO: 152 K/UL — SIGNIFICANT CHANGE UP (ref 150–400)
POTASSIUM SERPL-MCNC: 4.7 MMOL/L — SIGNIFICANT CHANGE UP (ref 3.5–5.3)
POTASSIUM SERPL-SCNC: 4.7 MMOL/L — SIGNIFICANT CHANGE UP (ref 3.5–5.3)
PROT SERPL-MCNC: 5.2 G/DL — LOW (ref 6–8.3)
RBC # BLD: 2.64 M/UL — LOW (ref 4.2–5.8)
RBC # FLD: 12.9 % — SIGNIFICANT CHANGE UP (ref 10.3–14.5)
SODIUM SERPL-SCNC: 134 MMOL/L — LOW (ref 135–145)
TACROLIMUS SERPL-MCNC: 7.1 NG/ML — SIGNIFICANT CHANGE UP
WBC # BLD: 8.68 K/UL — SIGNIFICANT CHANGE UP (ref 3.8–10.5)
WBC # FLD AUTO: 8.68 K/UL — SIGNIFICANT CHANGE UP (ref 3.8–10.5)

## 2021-09-13 PROCEDURE — 99232 SBSQ HOSP IP/OBS MODERATE 35: CPT | Mod: GC

## 2021-09-13 RX ORDER — SIMETHICONE 80 MG/1
80 TABLET, CHEWABLE ORAL ONCE
Refills: 0 | Status: COMPLETED | OUTPATIENT
Start: 2021-09-13 | End: 2021-09-13

## 2021-09-13 RX ORDER — TACROLIMUS 5 MG/1
1 CAPSULE ORAL ONCE
Refills: 0 | Status: COMPLETED | OUTPATIENT
Start: 2021-09-13 | End: 2021-09-13

## 2021-09-13 RX ORDER — ERYTHROPOIETIN 10000 [IU]/ML
10000 INJECTION, SOLUTION INTRAVENOUS; SUBCUTANEOUS ONCE
Refills: 0 | Status: COMPLETED | OUTPATIENT
Start: 2021-09-13 | End: 2021-09-13

## 2021-09-13 RX ORDER — TACROLIMUS 5 MG/1
12 CAPSULE ORAL
Refills: 0 | Status: DISCONTINUED | OUTPATIENT
Start: 2021-09-14 | End: 2021-09-14

## 2021-09-13 RX ADMIN — VALGANCICLOVIR 450 MILLIGRAM(S): 450 TABLET, FILM COATED ORAL at 12:49

## 2021-09-13 RX ADMIN — ERYTHROPOIETIN 10000 UNIT(S): 10000 INJECTION, SOLUTION INTRAVENOUS; SUBCUTANEOUS at 12:55

## 2021-09-13 RX ADMIN — Medication 2.5 MILLIGRAM(S): at 18:06

## 2021-09-13 RX ADMIN — SENNA PLUS 2 TABLET(S): 8.6 TABLET ORAL at 21:56

## 2021-09-13 RX ADMIN — Medication 1300 MILLIGRAM(S): at 18:07

## 2021-09-13 RX ADMIN — MYCOPHENOLATE MOFETIL 1000 MILLIGRAM(S): 250 CAPSULE ORAL at 08:42

## 2021-09-13 RX ADMIN — Medication 500000 UNIT(S): at 05:26

## 2021-09-13 RX ADMIN — Medication 500000 UNIT(S): at 12:47

## 2021-09-13 RX ADMIN — TRAMADOL HYDROCHLORIDE 25 MILLIGRAM(S): 50 TABLET ORAL at 20:15

## 2021-09-13 RX ADMIN — TAMSULOSIN HYDROCHLORIDE 0.4 MILLIGRAM(S): 0.4 CAPSULE ORAL at 08:42

## 2021-09-13 RX ADMIN — CHLORHEXIDINE GLUCONATE 1 APPLICATION(S): 213 SOLUTION TOPICAL at 13:46

## 2021-09-13 RX ADMIN — Medication 20 MILLIGRAM(S): at 18:05

## 2021-09-13 RX ADMIN — Medication 2.5 MILLIGRAM(S): at 05:29

## 2021-09-13 RX ADMIN — MYCOPHENOLATE MOFETIL 1000 MILLIGRAM(S): 250 CAPSULE ORAL at 19:38

## 2021-09-13 RX ADMIN — Medication 81 MILLIGRAM(S): at 12:49

## 2021-09-13 RX ADMIN — TAMSULOSIN HYDROCHLORIDE 0.4 MILLIGRAM(S): 0.4 CAPSULE ORAL at 21:56

## 2021-09-13 RX ADMIN — FAMOTIDINE 20 MILLIGRAM(S): 10 INJECTION INTRAVENOUS at 12:47

## 2021-09-13 RX ADMIN — Medication 500000 UNIT(S): at 18:05

## 2021-09-13 RX ADMIN — Medication 12.5 MILLIGRAM(S): at 05:27

## 2021-09-13 RX ADMIN — POLYETHYLENE GLYCOL 3350 17 GRAM(S): 17 POWDER, FOR SOLUTION ORAL at 05:49

## 2021-09-13 RX ADMIN — SIMETHICONE 80 MILLIGRAM(S): 80 TABLET, CHEWABLE ORAL at 19:34

## 2021-09-13 RX ADMIN — Medication 20 MILLIGRAM(S): at 05:27

## 2021-09-13 RX ADMIN — TRAMADOL HYDROCHLORIDE 25 MILLIGRAM(S): 50 TABLET ORAL at 19:38

## 2021-09-13 RX ADMIN — Medication 1: at 12:47

## 2021-09-13 RX ADMIN — Medication 12.5 MILLIGRAM(S): at 18:06

## 2021-09-13 RX ADMIN — BASILIXIMAB 100 MILLIGRAM(S): 20 INJECTION, POWDER, FOR SOLUTION INTRAVENOUS at 10:31

## 2021-09-13 RX ADMIN — Medication 1 TABLET(S): at 12:48

## 2021-09-13 RX ADMIN — TACROLIMUS 11 MILLIGRAM(S): 5 CAPSULE ORAL at 08:42

## 2021-09-13 RX ADMIN — Medication 500000 UNIT(S): at 23:21

## 2021-09-13 RX ADMIN — TACROLIMUS 1 MILLIGRAM(S): 5 CAPSULE ORAL at 12:54

## 2021-09-13 RX ADMIN — Medication 1300 MILLIGRAM(S): at 05:29

## 2021-09-13 RX ADMIN — SIMETHICONE 80 MILLIGRAM(S): 80 TABLET, CHEWABLE ORAL at 23:20

## 2021-09-13 NOTE — CHART NOTE - NSCHARTNOTEFT_GEN_A_CORE
Nutrition Follow Up Note  Pt seen for post kidney transplant recipient nutrition follow up per department protocol.     Pt 62 y/o M with PMH as per chart: HTN, HLD, BPH, GERD, Glaucoma, tobacco abuse (1PPD x 15y, last ), CKD since () progressing to ESRD 2/2 biopsy proven chronic GN (bx in [place] years ago), PD x 4 months in (), then switched to HD, admitted for kidney transplant, S/P DDRT () with stent placement and Simulect induction, good graft function, good urine output, creatinine slowly decreasing.     Source: [x] Patient       [x] EMR        [] RN        [] Family at bedside       [] Other:    -If unable to interview patient: [] Trach/Vent/BiPAP  [] Disoriented/confused/inappropriate to interview as per chart     Pt reports good appetite and PO intake. Denies difficulty chewing/swallowing. Pt denies nausea, vomiting, diarrhea, or constipation, reports last BM today (). Urine output as per flow sheets () 2600 ml -> () 3345 ml -> () 325 ml so far. Pt received education on post transplant nutrition therapy and food safety guidelines for transplant recipients with nutrition package in previous RD visit - denies having questions/concerns about diet and nutrition education provided; able to teach back 3-4 points. Pt aware RD remains available.     Diet Order:   Diet, Renal Restrictions:   For patients receiving Renal Replacement - No Protein Restr, No Conc K, No Conc Phos, Low Sodium (09-10-)    Weights:   Daily Weight in k.6 (), Weight in k.6 (), Weight in k.1 (), Weight in k (09-10) -?accuracy of weight fluctuations as pt with edema and S/P DDRT.   Pt reported UBW approximately 86 kg in previous RD visit.     Nutritionally Pertinent MEDICATIONS  (STANDING):  dextrose 40% Gel  dextrose 5%.  dextrose 5%.  dextrose 50% Injectable  dextrose 50% Injectable  dextrose 50% Injectable  famotidine    Tablet  glucagon  Injectable  glucagon  Injectable  insulin lispro (ADMELOG) corrective regimen sliding scale  insulin lispro (ADMELOG) corrective regimen sliding scale  metoprolol tartrate  nystatin    Suspension  polyethylene glycol 3350  predniSONE   Tablet  senna  sodium bicarbonate  tamsulosin  trimethoprim   80 mG/sulfamethoxazole 400 mG  valGANciclovir    Pertinent Labs: () Na 134<L>, BUN 49<H>, Cr 2.00<H>, <H>, K+ 4.7, Phos 3.1, Mg 2.4, Alk Phos 42, ALT/SGPT 9<L>, AST/SGOT 14    A1C with Estimated Average Glucose Result: 5.4 % (09/10)    Finger Sticks:  POCT Blood Glucose.: 130 mg/dL ( @ 08:31)  POCT Blood Glucose.: 172 mg/dL ( @ 21:39)  POCT Blood Glucose.: 111 mg/dL ( @ 17:25)  POCT Blood Glucose.: 200 mg/dL ( @ 12:44)    Skin per nursing documentation: no pressure injuries.   Edema as per flow sheets: +1 generalized (previously +1 vlad. hand edema).     Estimated Needs:   [x] no change since previous assessment  [] recalculated:     Previous Nutrition Diagnoses:  [x] Increased Nutrient Needs   [x] Food & Nutrition Related Knowledge Deficit     Nutrition Diagnoses are: [x] ongoing - addressed with PO intake encouragement and nutrition therapy education.     New Nutrition Diagnosis: [x] Not applicable    Nutrition Care Plan:  [x] Achieved - pt currently at goals: pt to meet >75% of estimated nutritional needs during hospital stay and able to teach back 3 points of nutrition education provided.     Nutrition Interventions:     Education Provided:       [] Yes  [x] No    Recommendations:      1. Consider regular diet upon discharge. Recommend follow up visit with Transplant MD and outpatient RD for dietary modifications as warranted.  2. Will provide Powerade Zero 3xday for urine output ~3L.   3. Gently encourage PO intake and provide food preferences.   4. Review education on post transplant nutrition therapy and food safety guidelines for transplant recipients as needed/requested before discharge.   5. Continue to obtain weight as able to identify changes if any.     Monitoring and Evaluation:   Continue to monitor nutritional intake, tolerance to diet prescription, weights, labs, skin integrity, urine output     RD remains available upon request and will follow up per protocol  Sandra Urbano MS RDN CDN #[phone]

## 2021-09-13 NOTE — DISCHARGE NOTE PROVIDER - NSDCFUADDAPPT_GEN_ALL_CORE_FT
-Please follow-up with Dr. Akhtar on  -follow-up with Dr. Akhtar in 4-6 weeks for ureteral stent removal -Please follow-up with Dr. Akhtar on 9/16/21  -follow-up with Dr. Akhtar in 4-6 weeks for ureteral stent removal

## 2021-09-13 NOTE — DISCHARGE NOTE PROVIDER - NSDCHC_MEDRECSTATUS_GEN_ALL_CORE
Admission Reconciliation is Completed  Discharge Reconciliation is Not Complete Admission Reconciliation is Completed  Discharge Reconciliation is Completed Authored by Resident/PA/NP

## 2021-09-13 NOTE — DISCHARGE NOTE PROVIDER - HOSPITAL COURSE
61M with h/o HTN, HLD, BPH, GERD, Glaucoma, tobacco abuse (1PPD x 15y, last ), CKD since  progressing to ESRD 2/2 biopsy proven chronic GN (bx in [place] years ago).  Originally underwent PD x 4 months in 2019, then switched to HD via L AVF MWF. PD cath was removed 2020.  (Nephrologists: Rossi/Yareli). UO: oliguric.      Now s/p right DDRT with stent placement and Simulect induction.    Had good graft function post-operatively.  U/S with good perfusion.  Cr was trending down. D/C Cr----    Did well from a surgical perspective; tolerated diet, ambulated and had bowel function.  Cullen was removed, passed TOV.    Was evaluated daily by our multidisciplinary transplant team of surgeons, nephrologists, pharmacists, ACPs, SW, RNs and deemed safe for d/c home with the following plan:  -ENVARSUS-----, MMF 1g BID, standard steroid taper  -LIMA---------  -f/u with Dr. Akhtar on---  -f/u with Dr. Akhtar in 4-6 weeks for removal of ureteral stent      Recipient:   vocal cord surgery, pulmonary mass (benign), thyroid lesion (benign)  Covid Vaccine: Second dose of Pfizer vaccine on 21  Nephrologist:  Dr. Richey  CPRA: 0%  ABO: O          CMV:  Positive  EBV Status: Positive    ----  Donor (import): Kane, MD  Age:  42 y.o. male  ABO:  O  High Risk: No     KDPI: 33%  COD: Anoxia  Medical Hx:  DM II (less than 5 years), ? Hepatitis as a child, heavy ETOH use  Terminal Cr:  0.61  CMV-Positive  EBV-Negative  HepBcAb-Negative  Hepatitis C-SIM- Negative  Hepatitis C ab-Negative  MISMATCH: 2, 2, 0  Kidney Laterality: Left Kidney  X-match – Virtual negative     OR:  Donor Renal Transplant  2arteries (combined for one anastomosis) 1, vein, 1 ureter  Double J stent placed  LIMA drain left in place  CIT ~21h     61M with h/o HTN, HLD, BPH, GERD, Glaucoma, tobacco abuse (1PPD x 15y, last ), CKD since  progressing to ESRD 2/2 biopsy proven chronic GN (bx in [place] years ago).  Originally underwent PD x 4 months in 2019, then switched to HD via L AVF MWF. PD cath was removed 2020.  (Nephrologists: Rossi/Yareli). UO: oliguric.      Now s/p right DDRT with stent placement and Simulect induction.    Had good graft function post-operatively.  U/S with good perfusion.  Cr was trending down. D/C Cr----    Did well from a surgical perspective; tolerated diet, ambulated and had bowel function.  Cullen was removed, passed TOV.    Was evaluated daily by our multidisciplinary transplant team of surgeons, nephrologists, pharmacists, ACPs, SW, RNs and deemed safe for d/c home with the following plan:  -ENVARSUS 14mg qd, MMF 1g BID, standard steroid taper  -LIMA to SS output record chart to be completed daily by the pt  -f/u with Dr. Akhtar on This 21  -f/u with Dr. Akhtar in 4-6 weeks for removal of ureteral stent      Recipient:   vocal cord surgery, pulmonary mass (benign), thyroid lesion (benign)  Covid Vaccine: Second dose of Pfizer vaccine on 21  Nephrologist:  Dr. Richey  CPRA: 0%  ABO: O          CMV:  Positive  EBV Status: Positive    ----  Donor (import): Ear[place]art, MD  Age:  42 y.o. male  ABO:  O  High Risk: No     KDPI: 33%  COD: Anoxia  Medical Hx:  DM II (less than 5 years), ? Hepatitis as a child, heavy ETOH use  Terminal Cr:  0.61  CMV-Positive  EBV-Negative  HepBcAb-Negative  Hepatitis C-SIM- Negative  Hepatitis C ab-Negative  MISMATCH: 2, 2, 0  Kidney Laterality: Left Kidney  X-match – Virtual negative     OR:  Donor Renal Transplant  2arteries (combined for one anastomosis) 1, vein, 1 ureter  Double J stent placed  LIMA drain left in place  CIT ~21h     61M with h/o HTN, HLD, BPH, GERD, Glaucoma, tobacco abuse (1PPD x 15y, last ), CKD since  progressing to ESRD 2/2 biopsy proven chronic GN (bx in [place] years ago).  Originally underwent PD x 4 months in 2019, then switched to HD via L AVF MWF. PD cath was removed 2020.  (Nephrologists: Rossi/Yareli). UO: oliguric.      Now s/p right DDRT with stent placement and Simulect induction.    Had good graft function post-operatively.  U/S with good perfusion.  Cr was trending down. D/C Cr 1.74    Did well from a surgical perspective; tolerated diet, ambulated and had bowel function.  Cullen was removed, passed TOV.    Was evaluated daily by our multidisciplinary transplant team of surgeons, nephrologists, pharmacists, ACPs, SW, RNs and deemed safe for d/c home with the following plan:  -ENVARSUS 14mg qd, MMF 1g BID, standard steroid taper  -LIMA to SS output record chart to be completed daily by the pt  -f/u with Dr. Akhtar on This 21  -f/u with Dr. Akhtar in 4-6 weeks for removal of ureteral stent      Recipient:   vocal cord surgery, pulmonary mass (benign), thyroid lesion (benign)  Covid Vaccine: Second dose of Pfizer vaccine on 21  Nephrologist:  Dr. Richey  CPRA: 0%  ABO: O          CMV:  Positive  EBV Status: Positive    ----  Donor (import): D[place]as, MD  Age:  42 y.o. male  ABO:  O  High Risk: No     KDPI: 33%  COD: Anoxia  Medical Hx:  DM II (less than 5 years), ? Hepatitis as a child, heavy ETOH use  Terminal Cr:  0.61  CMV-Positive  EBV-Negative  HepBcAb-Negative  Hepatitis C-SIM- Negative  Hepatitis C ab-Negative  MISMATCH: 2, 2, 0  Kidney Laterality: Left Kidney  X-match – Virtual negative     OR:  Donor Renal Transplant  2arteries (combined for one anastomosis) 1, vein, 1 ureter  Double J stent placed  LIMA drain left in place  CIT ~21h     61M with h/o HTN, HLD, BPH, GERD, Glaucoma, tobacco abuse (1PPD x 15y, last ), CKD since  progressing to ESRD 2/2 biopsy proven chronic GN (bx in [place] years ago).  Originally underwent PD x 4 months in , then switched to HD via L AVF MWF. PD cath was removed 2020.  (Nephrologists: Rossi/Yareli). UO: oliguric.      Now s/p right DDRT with stent placement and Simulect induction.    Stent education provided on 21.    Had good graft function post-operatively.  U/S with good perfusion.  Cr was trending down. D/C Cr 1.74    Did well from a surgical perspective; tolerated diet, ambulated and had bowel function.  Cullen was removed, passed TOV.    Was evaluated daily by our multidisciplinary transplant team of surgeons, nephrologists, pharmacists, ACPs, SW, RNs and deemed safe for d/c home with the following plan:  -ENVARSUS 14mg qd, MMF 1g BID, standard steroid taper  -LIMA to SS output record chart to be completed daily by the pt  -f/u with Dr. Akhtar on This 21  -f/u with Dr. Akhtar in 4-6 weeks for removal of ureteral stent      Recipient:   vocal cord surgery, pulmonary mass (benign), thyroid lesion (benign)  Covid Vaccine: Second dose of Pfizer vaccine on 21  Nephrologist:  Dr. Richey  CPRA: 0%  ABO: O          CMV:  Positive  EBV Status: Positive    ----  Donor (import): Bighorn, MD  Age:  42 y.o. male  ABO:  O  High Risk: No     KDPI: 33%  COD: Anoxia  Medical Hx:  DM II (less than 5 years), ? Hepatitis as a child, heavy ETOH use  Terminal Cr:  0.61  CMV-Positive  EBV-Negative  HepBcAb-Negative  Hepatitis C-SIM- Negative  Hepatitis C ab-Negative  MISMATCH: 2, 2, 0  Kidney Laterality: Left Kidney  X-match – Virtual negative     OR:  Donor Renal Transplant  2arteries (combined for one anastomosis) 1, vein, 1 ureter  Double J stent placed  LIMA drain left in place  CIT ~21h

## 2021-09-13 NOTE — PROGRESS NOTE ADULT - SUBJECTIVE AND OBJECTIVE BOX
Transplant Surgery - Multidisciplinary Rounds  --------------------------------------------------------------  R DDRT   Date: 9/10/21        POD# 4    Present: Patient seen and examined with multidisciplinary team during AM rounds including Transplant Surgeon: Dr. Perez, Transplant nephrologist: Dr. Moses De La Torre. Inpatient Providers:  SUMMER Pandey/Nanda, Transplant Pharmacist Steve Hart and unit RN.  Disciplines not in attendance will be notified of the plan.     HPI: 61M with h/o HTN, HLD, BPH, GERD, Glaucoma, tobacco abuse (1PPD x 15y, last ), CKD since  progressing to ESRD 2/2 biopsy proven chronic GN (bx in [place] years ago).  Originally underwent PD x 4 months in , then switched to HD via L AVF MWF. PD cath was removed 2020.  (Nephrologists: Rossi/Yareli). UO: oliguric.  Now s/p right DDRT  with stent placement  and Simulect induction     Recipient:   vocal cord surgery, pulmonary mass (benign), thyroid lesion (benign)  Covid Vaccine: Second dose of Pfizer vaccine on 21  Nephrologist:  Dr. Richey  CPRA: 0%  ABO: O          CMV:  Positive  EBV Status: Positive    ----  Donor (import): [place], MD  Age:  42 y.o. male  ABO:  O  High Risk: No     KDPI: 33%  COD: Anoxia  Medical Hx:  DM II (less than 5 years), ? Hepatitis as a child, heavy ETOH use  Terminal Cr:  0.61  CMV-Positive  EBV-Negative  HepBcAb-Negative  Hepatitis C-SIM- Negative  Hepatitis C ab-Negative  MISMATCH: 2, 2, 0  Kidney Laterality: Left Kidney  X-match – Virtual negative     OR:  Donor Renal Transplant  2arteries (combined for one anastomosis) 1, vein, 1 ureter  Double J stent placed  LIMA drain left in place  CIT ~21h    Interval events:  - POD 4 s/p DDRT with good graft function: SCr 2 (fro m 2.65), u/o 3L  - LIMA with 185cc SS drainage  - received Lasix 60mg x 1 dose yest   - Afebrile, VS stable  - bladder spasms improving  - Pain controlled, tolerating PO intake, + bowel function    Immunosuppression:  Induction with Simulect, last dose today  Maintenance Immunosuppression: Tac level 5.9 yesterday, on Envarsus 11, MMF , steroid taper  Ongoing monitoring for signs of rejection.    Potential Discharge date: Tuesday   Education:  Medications  Plan of care:  See Below    MEDICATIONS  (STANDING):  aspirin enteric coated 81 milliGRAM(s) Oral daily  basiliximab  IVPB 20 milliGRAM(s) IV Intermittent once  chlorhexidine 2% Cloths 1 Application(s) Topical daily  dextrose 40% Gel 15 Gram(s) Oral once  dextrose 5%. 1000 milliLiter(s) (50 mL/Hr) IV Continuous <Continuous>  dextrose 5%. 1000 milliLiter(s) (100 mL/Hr) IV Continuous <Continuous>  dextrose 50% Injectable 25 Gram(s) IV Push once  dextrose 50% Injectable 12.5 Gram(s) IV Push once  dextrose 50% Injectable 25 Gram(s) IV Push once  epoetin alexandre-epbx (RETACRIT) Injectable 50635 Unit(s) SubCutaneous once  famotidine    Tablet 20 milliGRAM(s) Oral daily  glucagon  Injectable 1 milliGRAM(s) IntraMuscular once  glucagon  Injectable 1 milliGRAM(s) IntraMuscular once  insulin lispro (ADMELOG) corrective regimen sliding scale   SubCutaneous at bedtime  insulin lispro (ADMELOG) corrective regimen sliding scale   SubCutaneous three times a day before meals  metoprolol tartrate 12.5 milliGRAM(s) Oral two times a day  mycophenolate mofetil 1000 milliGRAM(s) Oral <User Schedule>  nystatin    Suspension 827626 Unit(s) Swish and Swallow four times a day  oxybutynin 2.5 milliGRAM(s) Oral two times a day  polyethylene glycol 3350 17 Gram(s) Oral daily  predniSONE   Tablet 20 milliGRAM(s) Oral two times a day  senna 2 Tablet(s) Oral at bedtime  sodium bicarbonate 1300 milliGRAM(s) Oral two times a day  tacrolimus ER Tablet (ENVARSUS XR) 11 milliGRAM(s) Oral <User Schedule>  tamsulosin 0.4 milliGRAM(s) Oral <User Schedule>  trimethoprim   80 mG/sulfamethoxazole 400 mG 1 Tablet(s) Oral daily  valGANciclovir 450 milliGRAM(s) Oral daily    MEDICATIONS  (PRN):  naloxone Injectable 0.1 milliGRAM(s) IV Push every 3 minutes PRN For ANY of the following changes in patient status:  A. RR LESS THAN 10 breaths per minute, B. Oxygen saturation LESS THAN 90%, C. Sedation score of 6  ondansetron Injectable 4 milliGRAM(s) IV Push every 6 hours PRN Nausea  polyethylene glycol 3350 17 Gram(s) Oral daily PRN Constipation  traMADol 25 milliGRAM(s) Oral every 6 hours PRN Moderate Pain (4 - 6)  traMADol 50 milliGRAM(s) Oral every 6 hours PRN Severe Pain (7 - 10)      PAST MEDICAL & SURGICAL HISTORY:  Nephritis  CKD (chronic kidney disease), stage III  BPH (benign prostatic hypertrophy)  Lung abnormality  HTN (hypertension)  Kidney disease  Hypercholesteremia  GERD (gastroesophageal reflux disease)  Gout  BPH (benign prostatic hyperplasia)  Solitary fibrous tumor removed in   Vocal cord anomaly S/P polyp removal   History of lung surgery - benign  Peritoneal dialysis catheter dysfunction s/p removal     Vital Signs Last 24 Hrs  T(C): 36.7 (13 Sep 2021 09:00), Max: 37.1 (13 Sep 2021 05:16)  T(F): 98 (13 Sep 2021 09:00), Max: 98.7 (13 Sep 2021 05:16)  HR: 76 (13 Sep 2021 09:00) (76 - 99)  BP: 132/67 (13 Sep 2021 09:00) (124/64 - 139/77)  BP(mean): 92 (13 Sep 2021 05:16) (87 - 92)  RR: 18 (13 Sep 2021 09:00) (18 - 18)  SpO2: 100% (13 Sep 2021 09:00) (97% - 100%)    I&O's Summary    12 Sep 2021 07:01  -  13 Sep 2021 07:00  --------------------------------------------------------  IN: 2160 mL / OUT: 3530 mL / NET: -1370 mL    13 Sep 2021 07:01  -  13 Sep 2021 10:26  --------------------------------------------------------  IN: 0 mL / OUT: 325 mL / NET: -325 mL                          8.4    8.68  )-----------( 152      ( 13 Sep 2021 06:14 )             25.9     13    134<L>  |  103  |  49<H>  ----------------------------<  140<H>  4.7   |  20<L>  |  2.00<H>    Ca    8.4      13 Sep 2021 06:14  Phos  3.1       Mg     2.4         TPro  5.2<L>  /  Alb  2.9<L>  /  TBili  0.2  /  DBili  x   /  AST  14  /  ALT  9<L>  /  AlkPhos  42      Tacrolimus (), Serum: 5.9 ng/mL ( @ 09:28)    REVIEW OF SYSTEMS  --------------------------------------------------------------------------------  Gen: No weight changes, fatigue, fevers/chills, weakness  Skin: No rashes  Head/Eyes/Ears/Mouth: No headache; Normal hearing; Normal vision w/o blurriness; No sinus pain/discomfort, sore throat  Respiratory: No dyspnea, cough, wheezing, hemoptysis  CV: No chest pain, PND, orthopnea  GI: No abdominal pain, diarrhea, constipation, nausea, vomiting, melena, hematochezia  : No increased frequency, dysuria, hematuria, nocturia. +bladder spasms  MSK: No joint pain/swelling; no back pain; no edema  Neuro: No dizziness/lightheadedness, weakness, seizures, numbness, tingling  Heme: No easy bruising or bleeding  Endo: No heat/cold intolerance  Psych: No significant nervousness, anxiety, stress, depression  All other systems were reviewed and are negative, except as noted.      -------------------------------  PHYSICAL EXAM:  Constitutional: Well developed / well nourished  Eyes: Anicteric, PERRLA  ENMT: nc/at  Neck: supple   Respiratory: CTA B/L  Cardiovascular: RRR  Gastrointestinal: Soft, ND. appropriate incisional TTP. RLQ incision c/d/i. LIMA serous  Genitourinary: Urinary catheter in place with clear UO  Extremities: SCD's in place and working bilaterally, no edema, no calf TTP  Vascular: Palpable dp pulses bilaterally, LUE AVF +thrill/+bruit   Neurological: A&O x3  Skin: no lesions, rashes, ulcerations. good perfusion.  Musculoskeletal: Moving all extremities  Psychiatric: Responsive

## 2021-09-13 NOTE — DISCHARGE NOTE PROVIDER - NSDCCPCAREPLAN_GEN_ALL_CORE_FT
PRINCIPAL DISCHARGE DIAGNOSIS  Diagnosis: Kidney transplant recipient  Assessment and Plan of Treatment: - Avoid heavy lifting aything over 5lbs for six weeks following your surgery date. Do NOT drive a car or operate machinery unless cleared by your transplant surgeon during your follow up visit. Avoid straining, use stool softners as needed. Stop stool softners if diarrhea develops.   - Call transplant clinic if you develop fever, increased abdominal pain, redness/swelling or bleeding around your incision site  - Call transplant clinic if you have difficulty urinating, notice decrease in urine output or blood in urine.   - Follow FOOD SAFETY instruction provided to you in your patient education guide bookelet   - Bathing: Shower is allowed, you can let soap and water flow over your incision; do NOT rub the area. Bath is NOT allowed until your incision is healed and you have been cleared by your transplant surgeon.      SECONDARY DISCHARGE DIAGNOSES  Diagnosis: Immunosuppressed status  Assessment and Plan of Treatment: - Transplant recipients are at risk for developing infection because immune system is lowered due to transplant medications.   - Avoid contact with sick people; practice good hand hygiene;   - Take medications as directed by your translant team. Never stop taking medications unless instructed by your transplant physician.  - Do NOT double up medication dose if you missed your dose; call the transplant office for instructions.

## 2021-09-13 NOTE — PROGRESS NOTE ADULT - SUBJECTIVE AND OBJECTIVE BOX
[facility] DIVISION OF KIDNEY DISEASES AND HYPERTENSION -- FOLLOW UP NOTE  --------------------------------------------------------------------------------  Chief Complaint:    24 hour events/subjective: Patient was seen and examined at bedside. Reported feeling well. Had small BM yesterday. SCr today at 2 downtrending and UOP ~ 3145 s/p IV lasix 60 mg on 09/12.       PAST HISTORY  --------------------------------------------------------------------------------  No significant changes to PMH, PSH, FHx, SHx, unless otherwise noted    ALLERGIES & MEDICATIONS  --------------------------------------------------------------------------------  Allergies    No Known Allergies    Intolerances      Standing Inpatient Medications  aspirin enteric coated 81 milliGRAM(s) Oral daily  chlorhexidine 2% Cloths 1 Application(s) Topical daily  dextrose 40% Gel 15 Gram(s) Oral once  dextrose 5%. 1000 milliLiter(s) IV Continuous <Continuous>  dextrose 5%. 1000 milliLiter(s) IV Continuous <Continuous>  dextrose 50% Injectable 25 Gram(s) IV Push once  dextrose 50% Injectable 12.5 Gram(s) IV Push once  dextrose 50% Injectable 25 Gram(s) IV Push once  epoetin alexandre-epbx (RETACRIT) Injectable 08756 Unit(s) SubCutaneous once  famotidine    Tablet 20 milliGRAM(s) Oral daily  glucagon  Injectable 1 milliGRAM(s) IntraMuscular once  glucagon  Injectable 1 milliGRAM(s) IntraMuscular once  insulin lispro (ADMELOG) corrective regimen sliding scale   SubCutaneous at bedtime  insulin lispro (ADMELOG) corrective regimen sliding scale   SubCutaneous three times a day before meals  metoprolol tartrate 12.5 milliGRAM(s) Oral two times a day  mycophenolate mofetil 1000 milliGRAM(s) Oral <User Schedule>  nystatin    Suspension 410016 Unit(s) Swish and Swallow four times a day  oxybutynin 2.5 milliGRAM(s) Oral two times a day  polyethylene glycol 3350 17 Gram(s) Oral daily  predniSONE   Tablet 20 milliGRAM(s) Oral two times a day  senna 2 Tablet(s) Oral at bedtime  sodium bicarbonate 1300 milliGRAM(s) Oral two times a day  tacrolimus ER Tablet (ENVARSUS XR) 11 milliGRAM(s) Oral <User Schedule>  tamsulosin 0.4 milliGRAM(s) Oral <User Schedule>  trimethoprim   80 mG/sulfamethoxazole 400 mG 1 Tablet(s) Oral daily  valGANciclovir 450 milliGRAM(s) Oral daily    PRN Inpatient Medications  naloxone Injectable 0.1 milliGRAM(s) IV Push every 3 minutes PRN  ondansetron Injectable 4 milliGRAM(s) IV Push every 6 hours PRN  polyethylene glycol 3350 17 Gram(s) Oral daily PRN  traMADol 25 milliGRAM(s) Oral every 6 hours PRN  traMADol 50 milliGRAM(s) Oral every 6 hours PRN      REVIEW OF SYSTEMS  --------------------------------------------------------------------------------  Gen: No fevers/chills  Skin: No rashes  Head/Eyes/Ears: Normal hearing,   Respiratory: No dyspnea, cough  CV: No chest pain  GI: No abdominal pain, diarrhea  : No dysuria, hematuria  MSK: No  edema    All other systems were reviewed and are negative, except as noted.    VITALS/PHYSICAL EXAM  --------------------------------------------------------------------------------  T(C): 36.7 (09-13-21 @ 09:00), Max: 37.1 (09-13-21 @ 05:16)  HR: 76 (09-13-21 @ 09:00) (76 - 99)  BP: 132/67 (09-13-21 @ 09:00) (124/64 - 139/77)  RR: 18 (09-13-21 @ 09:00) (18 - 18)  SpO2: 100% (09-13-21 @ 09:00) (97% - 100%)  Wt(kg): --        09-12-21 @ 07:01  -  09-13-21 @ 07:00  --------------------------------------------------------  IN: 2160 mL / OUT: 3530 mL / NET: -1370 mL    09-13-21 @ 07:01  -  09-13-21 @ 11:14  --------------------------------------------------------  IN: 0 mL / OUT: 325 mL / NET: -325 mL        Physical Exam:  	Gen: NAD  	HEENT: PERRL, supple neck, clear oropharynx  	Pulm: CTA B/L  	CV: RRR, S1S2; no rub  	Back: No spinal or CVA tenderness; no sacral edema  	Abd: +BS, soft, nontender/nondistended                      Transplant: No tenderness, swelling, incision c/d/i, LIMA drain in place.   	: No suprapubic tenderness  	UE: Warm, FROM; no edema; no asterixis, L AVF good thrill+  	LE: trace LE edema.   	Neuro: No focal deficits  	Psych: Normal affect and mood  	Skin: Warm, without rashes      LABS/STUDIES  --------------------------------------------------------------------------------              8.4    8.68  >-----------<  152      [09-13-21 @ 06:14]              25.9     134  |  103  |  49  ----------------------------<  140      [09-13-21 @ 06:14]  4.7   |  20  |  2.00        Ca     8.4     [09-13-21 @ 06:14]      Mg     2.4     [09-13-21 @ 06:14]      Phos  3.1     [09-13-21 @ 06:14]    TPro  5.2  /  Alb  2.9  /  TBili  0.2  /  DBili  x   /  AST  14  /  ALT  9   /  AlkPhos  42  [09-13-21 @ 06:14]          Creatinine Trend:  SCr 2.00 [09-13 @ 06:14]  SCr 2.65 [09-12 @ 06:35]  SCr 4.31 [09-11 @ 06:06]  SCr 5.41 [09-10 @ 15:54]  SCr 5.15 [09-10 @ 14:43]        HbA1c 5.9      [04-08-18 @ 07:47]    HBsAb 360.7      [09-09-21 @ 22:47]  HBsAg Nonreact      [09-09-21 @ 22:47]  HBcAb Nonreact      [09-09-21 @ 22:47]  HCV 0.18, Nonreact      [09-09-21 @ 22:47]  HIV Nonreact      [09-09-21 @ 22:47]

## 2021-09-13 NOTE — DISCHARGE NOTE PROVIDER - NSDCFUSCHEDAPPT_GEN_ALL_CORE_FT
ELVIA ACEVEDO ; 09/27/2021 ; NPP Nephro [address] ELVIA Ford ; 09/29/2021 ; NPP Rad  Opd [facility] Encinas ; 09/29/2021 ; NPP Diag Rad [address] ELVIA Aquino ; 11/09/2021 ; NPP Surg [address] ELVIA Tamayo ; 11/09/2021 ; NPP Surg Vasc [address] ELVIA ACEVEDO ; 09/16/2021 ; NPP Surg [address] ELVIA Ford ; 09/27/2021 ; NPP Nephro [address] ELVIA Ford ; 09/29/2021 ; [facility] ; [facility] [address] [facility] [facility] ; NPP Surg [address] ELVIA Tamayo ; 11/09/2021 ; NPP Surg Vasc [address]

## 2021-09-13 NOTE — DISCHARGE NOTE PROVIDER - CARE PROVIDER_API CALL
Leonor Akhtar)  Surgery  [address] [place] [address]  Phone: [phone]  Fax: [phone]  Follow Up Time:

## 2021-09-13 NOTE — DISCHARGE NOTE PROVIDER - NSDCMRMEDTOKEN_GEN_ALL_CORE_FT
Auryxia 210 mg oral tablet: 2 tab(s) orally 3 times a day (with meals)  calcitriol 0.25 mcg oral capsule: 1 cap(s) orally once a day  calcium acetate 667 mg oral tablet: 667 milligram(s) orally 3 times a day (with meals)  cinacalcet 30 mg oral tablet: 1 tab(s) orally once a day  metoprolol succinate 50 mg oral tablet, extended release: 1 tab(s) orally once a day  tamsulosin 0.4 mg oral capsule: 1 cap(s) orally 2 times a day  Vitamin D3 5000 intl units oral tablet: 1 tab(s) orally once a day   aspirin 81 mg oral delayed release tablet: 1 tab(s) orally once a day  famotidine 20 mg oral tablet: 1 tab(s) orally once a day  Metoprolol Tartrate 25 mg oral tablet: 0.5 tab(s) orally 2 times a day   mycophenolate mofetil 250 mg oral capsule: 1000 milligram(s) orally 2 times a day  nystatin 100,000 units/mL oral suspension: 5 milliliter(s) orally 4 times a day  predniSONE 5 mg oral tablet: 1 tab(s) orally once a day  Follow taper from med action pro  senna oral tablet: 2 tab(s) orally once a day (at bedtime)  sodium bicarbonate 650 mg oral tablet: 2 tab(s) orally 2 times a day  sulfamethoxazole-trimethoprim 400 mg-80 mg oral tablet: 1 tab(s) orally once a day  tacrolimus 1 mg oral tablet, extended release: 14 tab(s) orally   tamsulosin 0.4 mg oral capsule: 1 cap(s) orally   traMADol 50 mg oral tablet: 0.5 tab(s) orally every 6 hours, As needed, Moderate Pain (4 - 6)  valGANciclovir 450 mg oral tablet: 1 tab(s) orally once a day

## 2021-09-14 ENCOUNTER — TRANSCRIPTION ENCOUNTER (OUTPATIENT)
Age: 61
End: 2021-09-14

## 2021-09-14 VITALS
DIASTOLIC BLOOD PRESSURE: 71 MMHG | TEMPERATURE: 98 F | HEART RATE: 86 BPM | SYSTOLIC BLOOD PRESSURE: 141 MMHG | OXYGEN SATURATION: 94 % | RESPIRATION RATE: 18 BRPM

## 2021-09-14 LAB
ALBUMIN SERPL ELPH-MCNC: 3.2 G/DL — LOW (ref 3.3–5)
ALP SERPL-CCNC: 41 U/L — SIGNIFICANT CHANGE UP (ref 40–120)
ALT FLD-CCNC: 11 U/L — SIGNIFICANT CHANGE UP (ref 10–45)
ANION GAP SERPL CALC-SCNC: 12 MMOL/L — SIGNIFICANT CHANGE UP (ref 5–17)
AST SERPL-CCNC: 12 U/L — SIGNIFICANT CHANGE UP (ref 10–40)
BASOPHILS # BLD AUTO: 0.01 K/UL — SIGNIFICANT CHANGE UP (ref 0–0.2)
BASOPHILS NFR BLD AUTO: 0.2 % — SIGNIFICANT CHANGE UP (ref 0–2)
BILIRUB SERPL-MCNC: 0.3 MG/DL — SIGNIFICANT CHANGE UP (ref 0.2–1.2)
BUN SERPL-MCNC: 44 MG/DL — HIGH (ref 7–23)
CALCIUM SERPL-MCNC: 8.8 MG/DL — SIGNIFICANT CHANGE UP (ref 8.4–10.5)
CHLORIDE SERPL-SCNC: 106 MMOL/L — SIGNIFICANT CHANGE UP (ref 96–108)
CO2 SERPL-SCNC: 19 MMOL/L — LOW (ref 22–31)
CREAT SERPL-MCNC: 1.74 MG/DL — HIGH (ref 0.5–1.3)
EOSINOPHIL # BLD AUTO: 0.07 K/UL — SIGNIFICANT CHANGE UP (ref 0–0.5)
EOSINOPHIL NFR BLD AUTO: 1.1 % — SIGNIFICANT CHANGE UP (ref 0–6)
GLUCOSE BLDC GLUCOMTR-MCNC: 131 MG/DL — HIGH (ref 70–99)
GLUCOSE BLDC GLUCOMTR-MCNC: 139 MG/DL — HIGH (ref 70–99)
GLUCOSE SERPL-MCNC: 132 MG/DL — HIGH (ref 70–99)
HCT VFR BLD CALC: 26.5 % — LOW (ref 39–50)
HGB BLD-MCNC: 8.5 G/DL — LOW (ref 13–17)
IMM GRANULOCYTES NFR BLD AUTO: 2.3 % — HIGH (ref 0–1.5)
LYMPHOCYTES # BLD AUTO: 0.68 K/UL — LOW (ref 1–3.3)
LYMPHOCYTES # BLD AUTO: 10.4 % — LOW (ref 13–44)
MAGNESIUM SERPL-MCNC: 2.5 MG/DL — SIGNIFICANT CHANGE UP (ref 1.6–2.6)
MCHC RBC-ENTMCNC: 32.1 GM/DL — SIGNIFICANT CHANGE UP (ref 32–36)
MCHC RBC-ENTMCNC: 32.2 PG — SIGNIFICANT CHANGE UP (ref 27–34)
MCV RBC AUTO: 100.4 FL — HIGH (ref 80–100)
MONOCYTES # BLD AUTO: 0.67 K/UL — SIGNIFICANT CHANGE UP (ref 0–0.9)
MONOCYTES NFR BLD AUTO: 10.2 % — SIGNIFICANT CHANGE UP (ref 2–14)
NEUTROPHILS # BLD AUTO: 4.96 K/UL — SIGNIFICANT CHANGE UP (ref 1.8–7.4)
NEUTROPHILS NFR BLD AUTO: 75.8 % — SIGNIFICANT CHANGE UP (ref 43–77)
NRBC # BLD: 0 /100 WBCS — SIGNIFICANT CHANGE UP (ref 0–0)
PHOSPHATE SERPL-MCNC: 2 MG/DL — LOW (ref 2.5–4.5)
PLATELET # BLD AUTO: 153 K/UL — SIGNIFICANT CHANGE UP (ref 150–400)
POTASSIUM SERPL-MCNC: 4.6 MMOL/L — SIGNIFICANT CHANGE UP (ref 3.5–5.3)
POTASSIUM SERPL-SCNC: 4.6 MMOL/L — SIGNIFICANT CHANGE UP (ref 3.5–5.3)
PROT SERPL-MCNC: 5.3 G/DL — LOW (ref 6–8.3)
RBC # BLD: 2.64 M/UL — LOW (ref 4.2–5.8)
RBC # FLD: 12.6 % — SIGNIFICANT CHANGE UP (ref 10.3–14.5)
SODIUM SERPL-SCNC: 137 MMOL/L — SIGNIFICANT CHANGE UP (ref 135–145)
TACROLIMUS SERPL-MCNC: 6.4 NG/ML — SIGNIFICANT CHANGE UP
WBC # BLD: 6.54 K/UL — SIGNIFICANT CHANGE UP (ref 3.8–10.5)
WBC # FLD AUTO: 6.54 K/UL — SIGNIFICANT CHANGE UP (ref 3.8–10.5)

## 2021-09-14 PROCEDURE — 84100 ASSAY OF PHOSPHORUS: CPT

## 2021-09-14 PROCEDURE — 85610 PROTHROMBIN TIME: CPT

## 2021-09-14 PROCEDURE — 86901 BLOOD TYPING SEROLOGIC RH(D): CPT

## 2021-09-14 PROCEDURE — 87389 HIV-1 AG W/HIV-1&-2 AB AG IA: CPT

## 2021-09-14 PROCEDURE — 85730 THROMBOPLASTIN TIME PARTIAL: CPT

## 2021-09-14 PROCEDURE — 71045 X-RAY EXAM CHEST 1 VIEW: CPT

## 2021-09-14 PROCEDURE — C1769: CPT

## 2021-09-14 PROCEDURE — 86769 SARS-COV-2 COVID-19 ANTIBODY: CPT

## 2021-09-14 PROCEDURE — 86706 HEP B SURFACE ANTIBODY: CPT

## 2021-09-14 PROCEDURE — 82435 ASSAY OF BLOOD CHLORIDE: CPT

## 2021-09-14 PROCEDURE — C2617: CPT

## 2021-09-14 PROCEDURE — 80197 ASSAY OF TACROLIMUS: CPT

## 2021-09-14 PROCEDURE — 84132 ASSAY OF SERUM POTASSIUM: CPT

## 2021-09-14 PROCEDURE — 85014 HEMATOCRIT: CPT

## 2021-09-14 PROCEDURE — 80053 COMPREHEN METABOLIC PANEL: CPT

## 2021-09-14 PROCEDURE — U0003: CPT

## 2021-09-14 PROCEDURE — 82565 ASSAY OF CREATININE: CPT

## 2021-09-14 PROCEDURE — C1889: CPT

## 2021-09-14 PROCEDURE — 83735 ASSAY OF MAGNESIUM: CPT

## 2021-09-14 PROCEDURE — 86900 BLOOD TYPING SEROLOGIC ABO: CPT

## 2021-09-14 PROCEDURE — 86803 HEPATITIS C AB TEST: CPT

## 2021-09-14 PROCEDURE — 82803 BLOOD GASES ANY COMBINATION: CPT

## 2021-09-14 PROCEDURE — 87340 HEPATITIS B SURFACE AG IA: CPT

## 2021-09-14 PROCEDURE — 82570 ASSAY OF URINE CREATININE: CPT

## 2021-09-14 PROCEDURE — 85025 COMPLETE CBC W/AUTO DIFF WBC: CPT

## 2021-09-14 PROCEDURE — 99232 SBSQ HOSP IP/OBS MODERATE 35: CPT | Mod: GC

## 2021-09-14 PROCEDURE — 86850 RBC ANTIBODY SCREEN: CPT

## 2021-09-14 PROCEDURE — 82330 ASSAY OF CALCIUM: CPT

## 2021-09-14 PROCEDURE — 85018 HEMOGLOBIN: CPT

## 2021-09-14 PROCEDURE — 76776 US EXAM K TRANSPL W/DOPPLER: CPT

## 2021-09-14 PROCEDURE — 84295 ASSAY OF SERUM SODIUM: CPT

## 2021-09-14 PROCEDURE — 82962 GLUCOSE BLOOD TEST: CPT

## 2021-09-14 PROCEDURE — 83036 HEMOGLOBIN GLYCOSYLATED A1C: CPT

## 2021-09-14 PROCEDURE — 82947 ASSAY GLUCOSE BLOOD QUANT: CPT

## 2021-09-14 PROCEDURE — 93005 ELECTROCARDIOGRAM TRACING: CPT

## 2021-09-14 PROCEDURE — 87522 HEPATITIS C REVRS TRNSCRPJ: CPT

## 2021-09-14 PROCEDURE — 83605 ASSAY OF LACTIC ACID: CPT

## 2021-09-14 PROCEDURE — 86704 HEP B CORE ANTIBODY TOTAL: CPT

## 2021-09-14 RX ORDER — SODIUM BICARBONATE 1 MEQ/ML
2 SYRINGE (ML) INTRAVENOUS
Qty: 0 | Refills: 0 | DISCHARGE
Start: 2021-09-14

## 2021-09-14 RX ORDER — TAMSULOSIN HYDROCHLORIDE 0.4 MG/1
1 CAPSULE ORAL
Qty: 0 | Refills: 0 | DISCHARGE
Start: 2021-09-14

## 2021-09-14 RX ORDER — FERRIC CITRATE 210 MG/1
2 TABLET, COATED ORAL
Qty: 0 | Refills: 0 | DISCHARGE

## 2021-09-14 RX ORDER — VALGANCICLOVIR 450 MG/1
1 TABLET, FILM COATED ORAL
Qty: 0 | Refills: 0 | DISCHARGE
Start: 2021-09-14

## 2021-09-14 RX ORDER — TACROLIMUS 5 MG/1
3 CAPSULE ORAL
Qty: 0 | Refills: 0 | DISCHARGE
Start: 2021-09-14

## 2021-09-14 RX ORDER — NYSTATIN 500MM UNIT
5 POWDER (EA) MISCELLANEOUS
Qty: 0 | Refills: 0 | DISCHARGE
Start: 2021-09-14

## 2021-09-14 RX ORDER — FAMOTIDINE 10 MG/ML
1 INJECTION INTRAVENOUS
Qty: 0 | Refills: 0 | DISCHARGE
Start: 2021-09-14

## 2021-09-14 RX ORDER — METOPROLOL TARTRATE 50 MG
0.5 TABLET ORAL
Qty: 30 | Refills: 0
Start: 2021-09-14 | End: 2021-10-13

## 2021-09-14 RX ORDER — TACROLIMUS 5 MG/1
2 CAPSULE ORAL ONCE
Refills: 0 | Status: COMPLETED | OUTPATIENT
Start: 2021-09-14 | End: 2021-09-14

## 2021-09-14 RX ORDER — MYCOPHENOLATE MOFETIL 250 MG/1
1000 CAPSULE ORAL
Qty: 0 | Refills: 0 | DISCHARGE
Start: 2021-09-14

## 2021-09-14 RX ORDER — TRAMADOL HYDROCHLORIDE 50 MG/1
0.5 TABLET ORAL
Qty: 0 | Refills: 0 | DISCHARGE
Start: 2021-09-14

## 2021-09-14 RX ORDER — TACROLIMUS 5 MG/1
14 CAPSULE ORAL
Qty: 0 | Refills: 0 | DISCHARGE
Start: 2021-09-14

## 2021-09-14 RX ORDER — MYCOPHENOLATE MOFETIL 250 MG/1
2 CAPSULE ORAL
Qty: 0 | Refills: 0 | DISCHARGE
Start: 2021-09-14

## 2021-09-14 RX ORDER — OXYBUTYNIN CHLORIDE 5 MG/1
5 TABLET ORAL
Qty: 30 | Refills: 3 | Status: DISCONTINUED | COMMUNITY
Start: 2021-09-13 | End: 2021-09-14

## 2021-09-14 RX ORDER — SENNA PLUS 8.6 MG/1
2 TABLET ORAL
Qty: 0 | Refills: 0 | DISCHARGE
Start: 2021-09-14

## 2021-09-14 RX ORDER — TACROLIMUS 5 MG/1
14 CAPSULE ORAL
Refills: 0 | Status: DISCONTINUED | OUTPATIENT
Start: 2021-09-15 | End: 2021-09-14

## 2021-09-14 RX ORDER — ASPIRIN/CALCIUM CARB/MAGNESIUM 324 MG
1 TABLET ORAL
Qty: 0 | Refills: 0 | DISCHARGE
Start: 2021-09-14

## 2021-09-14 RX ORDER — CHOLECALCIFEROL (VITAMIN D3) 125 MCG
1 CAPSULE ORAL
Qty: 0 | Refills: 0 | DISCHARGE

## 2021-09-14 RX ADMIN — Medication 1 TABLET(S): at 12:04

## 2021-09-14 RX ADMIN — Medication 10 MILLIGRAM(S): at 12:00

## 2021-09-14 RX ADMIN — TACROLIMUS 12 MILLIGRAM(S): 5 CAPSULE ORAL at 08:00

## 2021-09-14 RX ADMIN — Medication 12.5 MILLIGRAM(S): at 05:42

## 2021-09-14 RX ADMIN — Medication 2.5 MILLIGRAM(S): at 05:42

## 2021-09-14 RX ADMIN — Medication 10 MILLIGRAM(S): at 05:42

## 2021-09-14 RX ADMIN — Medication 81 MILLIGRAM(S): at 12:04

## 2021-09-14 RX ADMIN — Medication 500000 UNIT(S): at 12:04

## 2021-09-14 RX ADMIN — TAMSULOSIN HYDROCHLORIDE 0.4 MILLIGRAM(S): 0.4 CAPSULE ORAL at 09:00

## 2021-09-14 RX ADMIN — TACROLIMUS 2 MILLIGRAM(S): 5 CAPSULE ORAL at 12:04

## 2021-09-14 RX ADMIN — POLYETHYLENE GLYCOL 3350 17 GRAM(S): 17 POWDER, FOR SOLUTION ORAL at 05:42

## 2021-09-14 RX ADMIN — FAMOTIDINE 20 MILLIGRAM(S): 10 INJECTION INTRAVENOUS at 12:03

## 2021-09-14 RX ADMIN — MYCOPHENOLATE MOFETIL 1000 MILLIGRAM(S): 250 CAPSULE ORAL at 08:00

## 2021-09-14 RX ADMIN — VALGANCICLOVIR 450 MILLIGRAM(S): 450 TABLET, FILM COATED ORAL at 12:04

## 2021-09-14 RX ADMIN — Medication 500000 UNIT(S): at 05:42

## 2021-09-14 RX ADMIN — Medication 1300 MILLIGRAM(S): at 05:42

## 2021-09-14 NOTE — CHART NOTE - NSCHARTNOTEFT_GEN_A_CORE
ELVIA ACEVEDO was provided with the Stent Information Sheet.     The patient was educated that they have a ureteral stent to avoid major complications like blockage or leaking following kidney transplantation. The patient was educated that the ureteral stent is left in typically for 4 – 6 weeks and removed by the surgeon on an outpatient basis. The procedure was briefly explained to the patient and will be explained in further detail during the outpatient visit.     Patient was educated on appointment for stent removal and diet restrictions before scheduled procedure. The patient was also instructed to have someone with them to accompany them home as they will not be allowed to drive.     Contact information was provided to the patient for additional questions or if the stent is not removed by 6 weeks.     The patient was provided with written and verbal education about their ureteral stent. All questions and concerns were addressed appropriately with the patient. The patient demonstrated understanding of the information.     Time spent with patient **XXX** minutes.

## 2021-09-14 NOTE — PROGRESS NOTE ADULT - SUBJECTIVE AND OBJECTIVE BOX
Transplant Surgery - Multidisciplinary Rounds  --------------------------------------------------------------  R DDRT   Date: 9/10/21        POD# 5    Present: Patient seen and examined with multidisciplinary transplant team including Surgeon Dr. Guerra, Nephrologist Dr. Moses De La Torre, Dr. Blount (Fellow), Pharmacy: [facility], Resident/Student, Inpatient: SUMMER Ng, ALINA Duffy, Resident Jose J Lombardi PGY-3, MS4 Ro Grossman and unit RN during am rounds. Disciplines not in attendance will be notified of plan.     HPI: 61M with h/o HTN, HLD, BPH, GERD, Glaucoma, tobacco abuse (1PPD x 15y, last ), CKD since  progressing to ESRD 2/2 biopsy proven chronic GN (bx in [place] years ago).  Originally underwent PD x 4 months in , then switched to HD via L AVF MWF. PD cath was removed 2020.  (Nephrologists: Rossi/Yareli). UO: oliguric.  Now s/p right DDRT  with stent placement  and Simulect induction     Recipient:   vocal cord surgery, pulmonary mass (benign), thyroid lesion (benign)  Covid Vaccine: Second dose of Pfizer vaccine on 21  Nephrologist:  Dr. Richey  CPRA: 0%  ABO: O          CMV:  Positive  EBV Status: Positive    ----  Donor (import): [place], [place]  Age:  42 y.o. male  ABO:  O  High Risk: No     KDPI: 33%  COD: Anoxia  Medical Hx:  DM II (less than 5 years), ? Hepatitis as a child, heavy ETOH use  Terminal Cr:  0.61  CMV-Positive  EBV-Negative  HepBcAb-Negative  Hepatitis C-SIM- Negative  Hepatitis C ab-Negative  MISMATCH: 2, 2, 0  Kidney Laterality: Left Kidney  X-match – Virtual negative     OR:  Donor Renal Transplant  2arteries (combined for one anastomosis) 1, vein, 1 ureter  Double J stent placed  LIMA drain left in place  CIT ~21h    Interval events:  - POD 5 s/p DDRT with good graft function: SCr 1.74 from 2.0;   - Cullen removed; voiding freely u/o 2.2L  - LIMA with 300cc SS drainage  - Afebrile, VS stable  - bladder spasms improving w/ oxybutinin  - Pain controlled, tolerating PO intake, + bowel function    Immunosuppression:  Induction with Simulect, last dose today  Maintenance Immunosuppression: Tac level 5.9 yesterday, on Envarsus 11, MMF , steroid taper  Ongoing monitoring for signs of rejection.    Potential Discharge date: Tuesday   Education:  Medications  Plan of care:  See Below    MEDICATIONS  (STANDING):  aspirin enteric coated 81 milliGRAM(s) Oral daily  basiliximab  IVPB 20 milliGRAM(s) IV Intermittent once  chlorhexidine 2% Cloths 1 Application(s) Topical daily  dextrose 40% Gel 15 Gram(s) Oral once  dextrose 5%. 1000 milliLiter(s) (50 mL/Hr) IV Continuous <Continuous>  dextrose 5%. 1000 milliLiter(s) (100 mL/Hr) IV Continuous <Continuous>  dextrose 50% Injectable 25 Gram(s) IV Push once  dextrose 50% Injectable 12.5 Gram(s) IV Push once  dextrose 50% Injectable 25 Gram(s) IV Push once  epoetin alexandre-epbx (RETACRIT) Injectable 89785 Unit(s) SubCutaneous once  famotidine    Tablet 20 milliGRAM(s) Oral daily  glucagon  Injectable 1 milliGRAM(s) IntraMuscular once  glucagon  Injectable 1 milliGRAM(s) IntraMuscular once  insulin lispro (ADMELOG) corrective regimen sliding scale   SubCutaneous at bedtime  insulin lispro (ADMELOG) corrective regimen sliding scale   SubCutaneous three times a day before meals  metoprolol tartrate 12.5 milliGRAM(s) Oral two times a day  mycophenolate mofetil 1000 milliGRAM(s) Oral <User Schedule>  nystatin    Suspension 027785 Unit(s) Swish and Swallow four times a day  oxybutynin 2.5 milliGRAM(s) Oral two times a day  polyethylene glycol 3350 17 Gram(s) Oral daily  predniSONE   Tablet 20 milliGRAM(s) Oral two times a day  senna 2 Tablet(s) Oral at bedtime  sodium bicarbonate 1300 milliGRAM(s) Oral two times a day  tacrolimus ER Tablet (ENVARSUS XR) 11 milliGRAM(s) Oral <User Schedule>  tamsulosin 0.4 milliGRAM(s) Oral <User Schedule>  trimethoprim   80 mG/sulfamethoxazole 400 mG 1 Tablet(s) Oral daily  valGANciclovir 450 milliGRAM(s) Oral daily    MEDICATIONS  (PRN):  naloxone Injectable 0.1 milliGRAM(s) IV Push every 3 minutes PRN For ANY of the following changes in patient status:  A. RR LESS THAN 10 breaths per minute, B. Oxygen saturation LESS THAN 90%, C. Sedation score of 6  ondansetron Injectable 4 milliGRAM(s) IV Push every 6 hours PRN Nausea  polyethylene glycol 3350 17 Gram(s) Oral daily PRN Constipation  traMADol 25 milliGRAM(s) Oral every 6 hours PRN Moderate Pain (4 - 6)  traMADol 50 milliGRAM(s) Oral every 6 hours PRN Severe Pain (7 - 10)      PAST MEDICAL & SURGICAL HISTORY:  Nephritis  CKD (chronic kidney disease), stage III  BPH (benign prostatic hypertrophy)  Lung abnormality  HTN (hypertension)  Kidney disease  Hypercholesteremia  GERD (gastroesophageal reflux disease)  Gout  BPH (benign prostatic hyperplasia)  Solitary fibrous tumor removed in   Vocal cord anomaly S/P polyp removal   History of lung surgery - benign  Peritoneal dialysis catheter dysfunction s/p removal     Vital Signs Last 24 Hrs  T(C): 36.7 ( @ 09:00), Max: 37.1 ( @ 00:53)  HR: 81 ( @ 09:00) (81 - 94)  BP: 148/73 ( @ 09:00) (135/68 - 154/77)  RR: 18 ( @ 09:00) (18 - 18)  SpO2: 100% ( @ 09:00) (97% - 100%)    I&O's Summary     @ 07:01  -   @ 07:00  --------------------------------------------------------  IN:    Oral Fluid: 1320 mL  Total IN: 1320 mL    OUT:    Bulb (mL): 300 mL    Indwelling Catheter - Urethral (mL): 225 mL    Voided (mL): 1970 mL  Total OUT: 2495 mL    Total NET: -1175 mL       @ 07:01  -   @ 11:28  --------------------------------------------------------  IN:    Oral Fluid: 360 mL  Total IN: 360 mL    OUT:    Bulb (mL): 80 mL    Voided (mL): 110 mL  Total OUT: 190 mL    Total NET: 170 mL              @ 06:30                    8.5  CBC: 6.54>)-------(<153                     26.5                 137 | 106 | 44    CMP:  ----------------------< 132               4.6 | 19 | 1.74                      Ca:8.8  Phos:2.0  M.5               0.3|      |12        LFTs:  ------|41|-----             -|      |-               8.4    8.68  )-----------( 152      ( 13 Sep 2021 06:14 )             25.9     09-13    134<L>  |  103  |  49<H>  ----------------------------<  140<H>  4.7   |  20<L>  |  2.00<H>    Ca    8.4      13 Sep 2021 06:14  Phos  3.1     -13  Mg     2.4     13    TPro  5.2<L>  /  Alb  2.9<L>  /  TBili  0.2  /  DBili  x   /  AST  14  /  ALT  9<L>  /  AlkPhos  42      Tacrolimus (), Serum: 5.9 ng/mL ( @ 09:28)    REVIEW OF SYSTEMS  --------------------------------------------------------------------------------  Gen: No weight changes, fatigue, fevers/chills, weakness  Skin: No rashes  Head/Eyes/Ears/Mouth: No headache; Normal hearing; Normal vision w/o blurriness; No sinus pain/discomfort, sore throat  Respiratory: No dyspnea, cough, wheezing, hemoptysis  CV: No chest pain, PND, orthopnea  GI: No abdominal pain, diarrhea, constipation, nausea, vomiting, melena, hematochezia  : No increased frequency, dysuria, hematuria, nocturia. +bladder spasms  MSK: No joint pain/swelling; no back pain; no edema  Neuro: No dizziness/lightheadedness, weakness, seizures, numbness, tingling  Heme: No easy bruising or bleeding  Endo: No heat/cold intolerance  Psych: No significant nervousness, anxiety, stress, depression  All other systems were reviewed and are negative, except as noted.      -------------------------------  PHYSICAL EXAM:  Constitutional: Well developed / well nourished  Eyes: Anicteric, PERRLA  ENMT: nc/at  Neck: supple   Respiratory: CTA B/L  Cardiovascular: RRR  Gastrointestinal: Soft, ND. appropriate incisional TTP. RLQ incision c/d/i. LIMA serous  Genitourinary: Voiding freely  Extremities: SCD's in place and working bilaterally, no edema, no calf TTP  Vascular: Palpable dp pulses bilaterally, LUE AVF +thrill/+bruit   Neurological: A&O x3  Skin: no lesions, rashes, ulcerations. good perfusion.  Musculoskeletal: Moving all extremities  Psychiatric: Responsive

## 2021-09-14 NOTE — PROGRESS NOTE ADULT - ATTENDING COMMENTS
POD#2 s/p DDRT  +improving renal function, good urine output  Immunosuppression - increase to Envarsus 11mg daily, Cont Steroid taper, Cellcept 1gm bid.   Will check tacrolimus level and adjust dose accordingly.  Plan for simulect on POD#4  Transplant Prophylaxis with nystatin, bactrim, valcyte  GI prophylaxis due to steroids  monitor glucose levels and adjust sliding scare as necessary   Cont mcneal catheter for at least 3 days  OOB, ambulate, PT  medication teaching
Pt doing well  Has ATN but improving   Immunosuppression per protocol.  Planned d/c home tomorrow.
POD#3 s/p DDRT  +improving renal function, good urine output, creatinine slowly decreasing  Immunosuppression - Cont Envarsus 11mg daily, Steroid taper, Cellcept 1gm bid.   Will check tacrolimus level and adjust dose accordingly.  Plan for simulect on POD#4  Transplant Prophylaxis with nystatin, bactrim, valcyte  GI prophylaxis due to steroids  monitor glucose levels and adjust sliding scare as necessary   Cont mcneal catheter until tomorrow  OOB, ambulate, PT  medication teaching
Pt doing well post transplant.    ATN resolving, creatinine improving.    D/c home today, f/u this week.   HTN stable
POD#4 s/p DDRT  +improving renal function, good urine output, creatinine continues to decrease, now 2  Immunosuppression - Cont Envarsus 11mg daily, Steroid taper, Cellcept 1gm bid.   Will check tacrolimus level and adjust dose accordingly.  Simulect today  Transplant Prophylaxis with nystatin, bactrim, valcyte  GI prophylaxis due to steroids  monitor glucose levels and adjust sliding scare as necessary   d/c mcneal catheter. patient encouraged to void q 30-60 min  OOB, ambulate, PT  medication teaching
POD5 DDRT  good graft function  voiding well  LIMA ~300ml/d serous  abdomen soft, non-distended    Plan  d/c home today  leave LIMA in  bowel regimen/dulcolax  d/c oxybutinin   flomax  simulect  envarsus per level, mmf 1g bid pred taper
POD#1 s/p DDRT  +improving renal function, good urine output  Cont current immunosuppression with Envarsus 9mg daily, Steroid taper, Cellcept 1gm bid.   Will check tacrolimus level and adjust dose accordingly.  Plan for simulect on POD#4  Transplant Prophylaxis with nystatin, bactrim, valcyte  GI prophylaxis due to steroids  monitor glucose levels and adjust sliding scare as necessary   Cont mcneal catheter for at least 3 days  OOB, ambulate, PT  medication teaching

## 2021-09-14 NOTE — DISCHARGE NOTE NURSING/CASE MANAGEMENT/SOCIAL WORK - NSDCFUADDAPPT_GEN_ALL_CORE_FT
-Please follow-up with Dr. Akhtar on 9/16/21  -follow-up with Dr. Akhtar in 4-6 weeks for ureteral stent removal

## 2021-09-14 NOTE — PROGRESS NOTE ADULT - REASON FOR ADMISSION
potential DDRT
DDRT
potential DDRT

## 2021-09-14 NOTE — PHARMACY EDUCATION NOTE - OTHER (SPECIFY) FT
Metoprolol tartrate, Flomax, Oxybutynin, Sodium Bicarb
Flomax, Sodium Bicarbonate, Metoprolol tartrate

## 2021-09-14 NOTE — PROGRESS NOTE ADULT - PROVIDER SPECIALTY LIST ADULT
Anesthesia
Transplant Surgery
Anesthesia
Transplant Nephrology
Transplant Nephrology
Pharmacy
Transplant Nephrology
Transplant Surgery
Transplant Surgery
Transplant Nephrology
Transplant Surgery
Transplant Nephrology

## 2021-09-14 NOTE — PHARMACY EDUCATION NOTE - EDUCATION SUMMARY
Discharge immunosuppressant medications and prophylatic anti-infective agents reviewed with the patient. Outpatient medication schedule was discussed in detail including: medication name, indication, dose, administration times, treatment duration, side effects, drug interactions, and special instructions. Patient questions and concerns were answered and addressed. Patient demonstrated understanding. 
Discharge immunosuppressant medications and prophylatic anti-infective agents reviewed with the patient. Outpatient medication schedule was discussed in detail including: medication name, indication, dose, administration times, treatment duration, side effects, drug interactions, and special instructions. Patient questions and concerns were answered and addressed. Patient demonstrated understanding.

## 2021-09-14 NOTE — PROGRESS NOTE ADULT - ASSESSMENT
ELVIA ACEVEDO is a 61y Male s/p DDRT on 9/9/21. PMH is significant for HTN, HLD, BPH, GERD, and glaucoma.    NKDA  CMV+/+    Transplant Medications  Induction  -Basiliximab 20 mg POD 0 (given in OR) and POD 4  -Methyprednisolone taper (switch to PO prednisone on POD 4)            POD 0: 500 mg IV in OR            POD 1: 125 mg IV Q12H            POD 2: 60 mg IV Q12H            POD 3: 30 mg IV Q12H        Maintenance Immunosuppression  -Tacrolimus XR (Envarsus XR) 0.14 mg/kg/dose Q24H at 8AM (Adjust for goal trough: 8-10)  -Mycophenolate 1,000 mg PO/IV Q12H  -Prednisone             POD 4: 20 mg PO Q12H            POD 5: 10 mg PO Q12H            POD 6: 5 mg PO Q12H            POD 7-: 5 mg daily     Anti-infection   -Bactrim SS tablet (frequency based on renal function)  -Valganciclovir (dose based on CMV serostatus and frequency based on renal function)  -Nystatin swish and swallow 5 mL four times daily    Surgical prophylaxis pre- and intra-operative dosing  -Cefazolin    Prophylaxis  -GI ppx: famotidine 20 mg daily  -Bowel ppx: senna  -DVT: sequential compression device  -Pain:            Mild: Acetaminophen 650 mg every 6 hours PRN           Moderate: Tramadol 25 mg every 4 hours PRN (adjust for renal function)           Severe: Tramadol 50 mg every 4 hours PRN (adjust for renal function)    Home medications  -metoprolol succinate 25 mg daily  -tamsulosin 0.4 mg daily  -aspirin 81 mg daily  -Vitamin D3 5000 IU daily  -aurixia three times daily  -paricalcitriol 2 mcg daily    Outpatient medication reconciliation reviewed and will be re-started appropriately.  Plan discussed with multidisciplinary team.     Jewell Hodge, GeorgesD  
61M with h/o HTN, HLD, BPH, GERD, Glaucoma, tobacco abuse (1PPD x 15y, last 2004), CKD since 1998 progressing to ESRD 2/2 biopsy proven chronic GN (bx in [place] years ago).  Originally underwent PD x 4 months in 2019, then switched to HD via L AVF MWF. PD cath was removed 2/2020.  (Nephrologists: Rossi/Yareli). UO: oliguric.  Now s/p right DDRT  with stent placement  and Simulect induction 9/9/21    1. S/P DDRT POD 3:  - mild ATN  - creatinine slowly improving.  Will give lasix 60mgs IV x 1 as weight 6 Kg above dry weight.   2. Immunosupression:  -Envarsus dose pending level  -MMF 1G bid  -Pred taper  -PPX w/ Valcyte, Nystatin Bactrim   3. HTN:  -cont lopressor and aspirin.  Well controlled.   4.  Bladder spasm - cont flomax, ditropan.  Plan to remove mcneal tomorrow.   
61M with h/o HTN, HLD, BPH, GERD, Glaucoma, tobacco abuse (1PPD x 15y, last 2004), CKD since 1998 progressing to ESRD 2/2 biopsy proven chronic GN (bx in [place] years ago).  Originally underwent PD x 4 months in 2019, then switched to HD via L AVF MWF. PD cath was removed 2/2020.  (Nephrologists: Rossi/Yareli). UO: oliguric.  Now s/p right DDRT  with stent placement  and Simulect induction 9/9/21    1. S/P DDRT POD 4:  - mild ATN   - creatinine slowly improving, Scr today at 1.74, nonoliguric with UOP~1970 cc in last 24 hours.    - Monitor labs and urine output. Avoid NSAIDs, ACEI/ARBS, RCA and nephrotoxins. Dose medications as per eGFR.      2. Immunosupression:  - s/p Simulect 2 dose on 09/13.  - Tac level 6.4 on 09/14 on Envarsus 12, dose as per level  -MMF 1G bid  -Pred taper as per protocol.  -PPX w/ Valcyte, Nystatin Bactrim     3. HTN:  -cont lopressor 12.5 mg BID and aspirin.  Well controlled.     4.  Bladder spasm - cont flomax 0.4 mg BID. Can stop Oxybutynin. Cullen removed, passed TOV. Bladder scan today showed no PVR.      Stable to discharge home with appropriate follow ups.   
61M with h/o HTN, HLD, BPH, GERD, Glaucoma, tobacco abuse (1PPD x 15y, last 2004), CKD since 1998 progressing to ESRD 2/2 biopsy proven chronic GN (bx in [place] years ago).  Originally underwent PD x 4 months in 2019, then switched to HD via L AVF MWF. PD cath was removed 2/2020.  (Nephrologists: Rossi/Yareli). UO: oliguric.  Now s/p right DDRT  with stent placement  and Simulect induction 9/9/21    S/P DDRT POD 2:  -Cr downtrending making urine  -DC IVF  -PRN tramadol tylenol monitor and manage pain  -Bowel regimen with senna @HS, add miralax x1 today  -Diet as mariah  -flomax 0.4mg QD, start Ditropan 2.5mg BID for bladder spasms   -asa 81mg po qd  -CBC,CMP, Mg/Phos, Coags [place] level   -Strictr I&O's, keep mcneal in place  -SCD  -Send LIMA Cr      Immunosuppression:  -Envarsus by level, MMF 1G bid, steroid taper  -PPX w/ Valcyte, Nystatin Bactrim   -Tacrolimus level daily goal (8-10)    HTN:  - On lopressor 12.5mg BID  - Monitor BP/HR  
61M with h/o HTN, HLD, BPH, GERD, Glaucoma, tobacco abuse (1PPD x 15y, last 2004), CKD since 1998 progressing to ESRD 2/2 biopsy proven chronic GN (bx in [place] years ago).  Originally underwent PD x 4 months in 2019, then switched to HD via L AVF MWF. PD cath was removed 2/2020.  (Nephrologists: Rossi/Yareli). UO: oliguric.  Now s/p right DDRT with stent placement  and Simulect induction 9/9/21    S/P DDRT POD #3:  -good graft function, continue strict I&Os. weight up.  off IVF yesterday.  Lasix 60x1 today   -increase bowel regimen  -ADAT  -OOB with PT, continue SCDs, encourage spirometry  -Flomax/Ditropan for bladder spasms (likely related to stent/mcneal)  -ASA 81 QD    Immunosuppression:  -Envarsus by level, MMF 1G bid, steroid taper  -PPX w/ Valcyte, Nystatin Bactrim   -Simulect dose # 2 tomorrow    HTN:  - On lopressor 12.5mg BID, adjust accordingly    Dispo:  - Anticipate d/c home in the next 24-48H  
61M with h/o HTN, HLD, BPH, GERD, Glaucoma, tobacco abuse (1PPD x 15y, last 2004), CKD since 1998 progressing to ESRD 2/2 biopsy proven chronic GN (bx in [place] years ago).  Originally underwent PD x 4 months in 2019, then switched to HD via L AVF MWF. PD cath was removed 2/2020.  (Nephrologists: Rossi/Yareli). UO: oliguric.  Now s/p right DDRT  with stent placement  and Simulect induction 9/9/21    S/P DDRT POD 1:  -Cr downtrending making urine  -NS @ 70cc/hr w/ 1:1 0.45% NS replacements   -Change PCA to PRN tramadol tylenol monitor and manage pain  -Bowel regimen with senna @HS  -Advance diet as mariah  -Start home medication flomax 0.4mg QD  -Start asa 81mg po qd  -CBC,CMP, Mg/Phos, Coags [place] level   -Strictr I&O's  -SCD  -PCA for pain       Immunosupression:  -Envarsus increase to 9 mg , hold am pending level  -MMF 1G bid  -Pred taper  -PPX w/ Valcyte, Nystatin Bactrim     HTN:  -Tachycardic overnight start home BB lopressor 12.5mg BID  - Start asa 81mg qd  
61M with h/o HTN, HLD, BPH, GERD, Glaucoma, tobacco abuse (1PPD x 15y, last 2004), CKD since 1998 progressing to ESRD 2/2 biopsy proven chronic GN (bx in [place] years ago).  Originally underwent PD x 4 months in 2019, then switched to HD via L AVF MWF. PD cath was removed 2/2020.  (Nephrologists: Rossi/Yareli). UO: oliguric.  Now s/p right DDRT with stent placement  and Simulect induction 9/9/21    S/P DDRT POD 5  - good graft function   -Voiding freely with 0cc on post void residual check; continue to monitor urine output  - Keep LIMA output (~300cc)  - Will go home with drain; start drain care education  - continue strict I&Os.   - Diet: Renal  - Pain control  - Bowel regimen; add dulcolax suppository  - OOB with PT, continue SCDs, encourage spirometry  - Discontinue oxybutinin; continue Flomax  - ASA 81 QD  - Stent education today prior to DC      Immunosuppression:  -Envarsus by level, MMF 1G bid, steroid taper  -PPX w/ Valcyte, Nystatin Bactrim   -Last dose of Simulect today    HTN:  - On metoprlol 12.5mg BID, adjust accordingly    Dispo:  - dc today  - Outpatient follow up with Dr. Akhtar Thursday 09/16  
61M with h/o HTN, HLD, BPH, GERD, Glaucoma, tobacco abuse (1PPD x 15y, last 2004), CKD since 1998 progressing to ESRD 2/2 biopsy proven chronic GN (bx in [place] years ago).  Originally underwent PD x 4 months in 2019, then switched to HD via L AVF MWF. PD cath was removed 2/2020.  (Nephrologists: Rossi/Yareli). UO: oliguric.  Now s/p right DDRT  with stent placement  and Simulect induction     Plan:   -NS @ 70cc/hr w/ 1:1 0.45% NS replacements   -CBC,CMP, Mg/Phos, Coags [place] level   -Trend labs  -Bedside tele,BP, O2 cont monitoring   -Strictr I&O's  -SCD  -PCA for pain       Immuno:   -Envarsus 5 mg to start in AM   -MMF 1G bid  -Pred taper  -PPX w/ Valcyte, Nystatin Bactrim 
61M with h/o HTN, HLD, BPH, GERD, Glaucoma, tobacco abuse (1PPD x 15y, last 2004), CKD since 1998 progressing to ESRD 2/2 biopsy proven chronic GN (bx in [place] years ago).  Originally underwent PD x 4 months in 2019, then switched to HD via L AVF MWF. PD cath was removed 2/2020.  (Nephrologists: Rossi/Yareli). UO: oliguric.  Now s/p right DDRT  with stent placement  and Simulect induction 9/9/21    1. S/P DDRT POD 2:  - mild ATN  - creatinine slowly improving.   2. Immunosupression:  -Envarsus dose pending level  -MMF 1G bid  -Pred taper  -PPX w/ Valcyte, Nystatin Bactrim   3. HTN:  -cont lopressor and aspirin  4.  Bladder spasm - cont flomax, add ditropan.   
61M with h/o HTN, HLD, BPH, GERD, Glaucoma, tobacco abuse (1PPD x 15y, last 2004), CKD since 1998 progressing to ESRD 2/2 biopsy proven chronic GN (bx in [place] years ago).  Originally underwent PD x 4 months in 2019, then switched to HD via L AVF MWF. PD cath was removed 2/2020.  (Nephrologists: Rossi/Yareli). UO: oliguric.  Now s/p right DDRT  with stent placement  and Simulect induction 9/9/21    1. S/P DDRT POD 4:  - mild ATN   - creatinine slowly improving.  s/p lasix 60 mg IV once on 09/12, UOP~ 3L.   - Monitor labs and urine output. Avoid NSAIDs, ACEI/ARBS, RCA and nephrotoxins. Dose medications as per eGFR.      2. Immunosupression:  - Tac level 5.9 on 09/12 on Envarsus 11.  -MMF 1G bid  -Pred taper  -PPX w/ Valcyte, Nystatin Bactrim     3. HTN:  -cont lopressor 12.5 mg BID and aspirin.  Well controlled.     4.  Bladder spasm - cont flomax, ditropan.  Plan to remove mcneal today.   
61M with h/o HTN, HLD, BPH, GERD, Glaucoma, tobacco abuse (1PPD x 15y, last 2004), CKD since 1998 progressing to ESRD 2/2 biopsy proven chronic GN (bx in [place] years ago).  Originally underwent PD x 4 months in 2019, then switched to HD via L AVF MWF. PD cath was removed 2/2020.  (Nephrologists: Rossi/Yareli). UO: oliguric.  Now s/p right DDRT with stent placement  and Simulect induction 9/9/21    S/P DDRT POD 4  - good graft function   - Remove Mcneal today  - Keep LIMA output ~185cc)  - continue strict I&Os.   - Diet: Renal  - Pain control  - Bowel regimen  - OOB with PT, continue SCDs, encourage spirometry  - Flomax/Ditropan for bladder spasms (likely related to stent/mcneal)  - EPO 10K x 1 dose  - ASA 81 QD    Immunosuppression:  -Envarsus by level, MMF 1G bid, steroid taper  -PPX w/ Valcyte, Nystatin Bactrim   -Last dose of Simulect today    HTN:  - On metoprlol 12.5mg BID, adjust accordingly    Dispo:  - dc home tomorrow

## 2021-09-14 NOTE — PROGRESS NOTE ADULT - SUBJECTIVE AND OBJECTIVE BOX
[facility] DIVISION OF KIDNEY DISEASES AND HYPERTENSION -- FOLLOW UP NOTE  --------------------------------------------------------------------------------    24 hour events:  subjective: ELVIA ACEVEDO was seen and examined at bedside, NAD. Complaining of abdominal discomfort and bloating. NO BM yesterday, minimal roberto LIMA drain leak noted. SCr today at 1.74, downtrended and excellent UOP~ 1970. s/p Simulect # 2 dose on 09/13.      Standing Inpatient Medications  aspirin enteric coated 81 milliGRAM(s) Oral daily  bisacodyl Suppository 10 milliGRAM(s) Rectal once  chlorhexidine 2% Cloths 1 Application(s) Topical daily  dextrose 40% Gel 15 Gram(s) Oral once  dextrose 5%. 1000 milliLiter(s) IV Continuous <Continuous>  dextrose 5%. 1000 milliLiter(s) IV Continuous <Continuous>  dextrose 50% Injectable 25 Gram(s) IV Push once  dextrose 50% Injectable 12.5 Gram(s) IV Push once  dextrose 50% Injectable 25 Gram(s) IV Push once  famotidine    Tablet 20 milliGRAM(s) Oral daily  glucagon  Injectable 1 milliGRAM(s) IntraMuscular once  glucagon  Injectable 1 milliGRAM(s) IntraMuscular once  insulin lispro (ADMELOG) corrective regimen sliding scale   SubCutaneous at bedtime  insulin lispro (ADMELOG) corrective regimen sliding scale   SubCutaneous three times a day before meals  metoprolol tartrate 12.5 milliGRAM(s) Oral two times a day  mycophenolate mofetil 1000 milliGRAM(s) Oral <User Schedule>  nystatin    Suspension 609355 Unit(s) Swish and Swallow four times a day  polyethylene glycol 3350 17 Gram(s) Oral daily  predniSONE   Tablet 10 milliGRAM(s) Oral two times a day  senna 2 Tablet(s) Oral at bedtime  sodium bicarbonate 1300 milliGRAM(s) Oral two times a day  tacrolimus ER Tablet (ENVARSUS XR) 12 milliGRAM(s) Oral <User Schedule>  tamsulosin 0.4 milliGRAM(s) Oral <User Schedule>  trimethoprim   80 mG/sulfamethoxazole 400 mG 1 Tablet(s) Oral daily  valGANciclovir 450 milliGRAM(s) Oral daily    PRN Inpatient Medications  naloxone Injectable 0.1 milliGRAM(s) IV Push every 3 minutes PRN  ondansetron Injectable 4 milliGRAM(s) IV Push every 6 hours PRN  polyethylene glycol 3350 17 Gram(s) Oral daily PRN  traMADol 25 milliGRAM(s) Oral every 6 hours PRN  traMADol 50 milliGRAM(s) Oral every 6 hours PRN        VITALS/PHYSICAL EXAM  --------------------------------------------------------------------------------  T(C): 36.7 (09-14-21 @ 09:00), Max: 37.1 (09-14-21 @ 00:53)  HR: 81 (09-14-21 @ 09:00) (81 - 94)  BP: 148/73 (09-14-21 @ 09:00) (135/68 - 154/77)  RR: 18 (09-14-21 @ 09:00) (18 - 18)  SpO2: 100% (09-14-21 @ 09:00) (97% - 100%)  Wt(kg): --        09-13-21 @ 07:01  -  09-14-21 @ 07:00  --------------------------------------------------------  IN: 1320 mL / OUT: 2495 mL / NET: -1175 mL    09-14-21 @ 07:01  -  09-14-21 @ 11:23  --------------------------------------------------------  IN: 360 mL / OUT: 190 mL / NET: 170 mL      Physical Exam:  	Gen: NAD  	HEENT: PERRL, supple neck, clear oropharynx  	Pulm: CTA B/L  	CV: RRR, S1S2; no rub  	Back: No spinal or CVA tenderness; no sacral edema  	Abd: +BS, soft, nontender/nondistended                      Transplant: No tenderness, swelling, incision c/d/i, LIMA drain in place.   	: No suprapubic tenderness  	UE: Warm, FROM; no edema; no asterixis, L AVF good thrill+  	LE: trace LE edema.   	Neuro: No focal deficits  	Psych: Normal affect and mood  	Skin: Warm, without rashes    LABS/STUDIES  --------------------------------------------------------------------------------              8.5    6.54  >-----------<  153      [09-14-21 @ 06:30]              26.5     137  |  106  |  44  ----------------------------<  132      [09-14-21 @ 06:27]  4.6   |  19  |  1.74        Ca     8.8     [09-14-21 @ 06:27]      Mg     2.5     [09-14-21 @ 06:27]      Phos  2.0     [09-14-21 @ 06:27]    TPro  5.3  /  Alb  3.2  /  TBili  0.3  /  DBili  x   /  AST  12  /  ALT  11  /  AlkPhos  41  [09-14-21 @ 06:27]          Creatinine Trend:  SCr 1.74 [09-14 @ 06:27]  SCr 2.00 [09-13 @ 06:14]  SCr 2.65 [09-12 @ 06:35]  SCr 4.31 [09-11 @ 06:06]  SCr 5.41 [09-10 @ 15:54]    Tacrolimus (), Serum: 6.4 ng/mL (09-14 @ 07:03)  Tacrolimus (), Serum: 7.1 ng/mL (09-13 @ 06:55)  Tacrolimus (), Serum: 5.9 ng/mL (09-12 @ 09:28)  Tacrolimus (), Serum: <2.0 ng/mL (09-11 @ 08:45)    CAPILLARY BLOOD GLUCOSE  POCT Blood Glucose.: 131 mg/dL (14 Sep 2021 08:51)  POCT Blood Glucose.: 141 mg/dL (13 Sep 2021 21:27)  POCT Blood Glucose.: 141 mg/dL (13 Sep 2021 19:20)  POCT Blood Glucose.: 169 mg/dL (13 Sep 2021 12:26)    HbA1c 5.9      [04-08-18 @ 07:47]    HBsAb 360.7      [09-09-21 @ 22:47]  HBsAg Nonreact      [09-09-21 @ 22:47]  HBcAb Nonreact      [09-09-21 @ 22:47]  HCV 0.18, Nonreact      [09-09-21 @ 22:47]  HIV Nonreact      [09-09-21 @ 22:47]

## 2021-09-14 NOTE — DISCHARGE NOTE NURSING/CASE MANAGEMENT/SOCIAL WORK - PATIENT PORTAL LINK FT
You can access the FollowMyHealth Patient Portal offered by [facility] by registering at the following website: [email or web address]. By joining [employer] [facility]’s FollowMyHealth portal, you will also be able to view your health information using other applications (apps) compatible with our system.

## 2021-09-16 ENCOUNTER — NON-APPOINTMENT (OUTPATIENT)
Age: 61
End: 2021-09-16

## 2021-09-16 ENCOUNTER — APPOINTMENT (OUTPATIENT)
Dept: TRANSPLANT | Facility: CLINIC | Age: 61
End: 2021-09-16
Payer: COMMERCIAL

## 2021-09-16 VITALS
WEIGHT: 195 LBS | RESPIRATION RATE: 16 BRPM | TEMPERATURE: 98.2 F | HEIGHT: 69 IN | SYSTOLIC BLOOD PRESSURE: 159 MMHG | DIASTOLIC BLOOD PRESSURE: 83 MMHG | BODY MASS INDEX: 28.88 KG/M2 | OXYGEN SATURATION: 100 % | HEART RATE: 91 BPM

## 2021-09-16 PROCEDURE — 99212 OFFICE O/P EST SF 10 MIN: CPT

## 2021-09-16 RX ORDER — POLYETHYLENE GLYCOL 3350 17 G/17G
17 POWDER, FOR SOLUTION ORAL
Qty: 1 | Refills: 0 | Status: ACTIVE | COMMUNITY
Start: 2021-09-16 | End: 1900-01-01

## 2021-09-16 NOTE — ASSESSMENT
[FreeTextEntry1] : doing well one week post DDRT\par LIMA still high drainage; 500 cc /24 hrs; will check fluid for creatinine level again ( checked few times before)\par immunosuppression tacro, mmf and steroids\par will add 20 mg of lasix daily\par labs today\par follow up on Monday with Dr Downs\par

## 2021-09-16 NOTE — HISTORY OF PRESENT ILLNESS
[ Donor] :  donor [Basiliximab] : basiliximab [Steroids] : steroids [] : Yes [Brain Death] : brain death [KDPI: ____] : Kidney Donor Profile Index: [unfilled] [TextBox_7] : September 9 2021 [TextBox_34] : immediate graft function\par discharged home on post day 5 with creatinine 1.7 [TextBox_52] : donor is 42 year old male with diabetes\par left kidney with 2 arteries; reconstructed together on back table [de-identified] : one week post  donor kidney transplant\par discharged home with good graft function\par drain in place because of high drain output; \par doing well\par eating and ambulating\par good urine output\par complaining of frequency of urination. also some testicular swelling and mild lower ext swelling\par mild incisional pain\par LIMA still in place; output about 500 cc daily\par

## 2021-09-16 NOTE — PHYSICAL EXAM
[Normal] : normal [TextBox_34] : + bilateral lower ext edema [Clean] : clean [Dry] : dry [Healing Well] : healing well [FreeTextEntry1] : LIMA drain in place

## 2021-09-17 LAB
ALBUMIN SERPL ELPH-MCNC: 3.7 G/DL
ALP BLD-CCNC: 47 U/L
ALT SERPL-CCNC: 15 U/L
ANION GAP SERPL CALC-SCNC: 10 MMOL/L
APPEARANCE: CLEAR
AST SERPL-CCNC: 13 U/L
BACTERIA: NEGATIVE
BASOPHILS # BLD AUTO: 0.04 K/UL
BASOPHILS NFR BLD AUTO: 0.5 %
BILIRUB SERPL-MCNC: 0.5 MG/DL
BILIRUBIN URINE: NEGATIVE
BLOOD URINE: ABNORMAL
BUN SERPL-MCNC: 24 MG/DL
CALCIUM SERPL-MCNC: 9.2 MG/DL
CALCIUM SERPL-MCNC: 9.2 MG/DL
CHLORIDE SERPL-SCNC: 110 MMOL/L
CO2 SERPL-SCNC: 22 MMOL/L
COLOR: YELLOW
CREAT FLD-MCNC: 1.31 MG/DL
CREAT SERPL-MCNC: 1.31 MG/DL
CREAT SPEC-SCNC: 123 MG/DL
CREAT/PROT UR: 0.6 RATIO
EOSINOPHIL # BLD AUTO: 0.28 K/UL
EOSINOPHIL NFR BLD AUTO: 3.7 %
GLUCOSE QUALITATIVE U: NEGATIVE
GLUCOSE SERPL-MCNC: 170 MG/DL
HCT VFR BLD CALC: 28.4 %
HGB BLD-MCNC: 9.1 G/DL
HYALINE CASTS: 1 /LPF
IMM GRANULOCYTES NFR BLD AUTO: 4.6 %
KETONES URINE: NEGATIVE
LDH SERPL-CCNC: 191 U/L
LEUKOCYTE ESTERASE URINE: ABNORMAL
LYMPHOCYTES # BLD AUTO: 1.57 K/UL
LYMPHOCYTES NFR BLD AUTO: 20.8 %
MAGNESIUM SERPL-MCNC: 2 MG/DL
MAN DIFF?: NORMAL
MCHC RBC-ENTMCNC: 32 GM/DL
MCHC RBC-ENTMCNC: 32.9 PG
MCV RBC AUTO: 102.5 FL
MICROSCOPIC-UA: NORMAL
MONOCYTES # BLD AUTO: 0.74 K/UL
MONOCYTES NFR BLD AUTO: 9.8 %
NEUTROPHILS # BLD AUTO: 4.56 K/UL
NEUTROPHILS NFR BLD AUTO: 60.6 %
NITRITE URINE: NEGATIVE
PARATHYROID HORMONE INTACT: 235 PG/ML
PH URINE: 6
PHOSPHATE SERPL-MCNC: 1.3 MG/DL
PLATELET # BLD AUTO: 177 K/UL
POTASSIUM SERPL-SCNC: 5.2 MMOL/L
PROT SERPL-MCNC: 5.9 G/DL
PROT UR-MCNC: 75 MG/DL
PROTEIN URINE: ABNORMAL
RBC # BLD: 2.77 M/UL
RBC # FLD: 13.2 %
RED BLOOD CELLS URINE: 41 /HPF
SODIUM SERPL-SCNC: 143 MMOL/L
SPECIFIC GRAVITY URINE: 1.02
SQUAMOUS EPITHELIAL CELLS: 3 /HPF
TACROLIMUS SERPL-MCNC: 7.2 NG/ML
URATE SERPL-MCNC: 6.3 MG/DL
UROBILINOGEN URINE: NORMAL
WBC # FLD AUTO: 7.54 K/UL
WHITE BLOOD CELLS URINE: 23 /HPF

## 2021-09-20 ENCOUNTER — APPOINTMENT (OUTPATIENT)
Dept: NEPHROLOGY | Facility: CLINIC | Age: 61
End: 2021-09-20
Payer: COMMERCIAL

## 2021-09-20 ENCOUNTER — LABORATORY RESULT (OUTPATIENT)
Age: 61
End: 2021-09-20

## 2021-09-20 VITALS
DIASTOLIC BLOOD PRESSURE: 64 MMHG | RESPIRATION RATE: 14 BRPM | HEART RATE: 84 BPM | BODY MASS INDEX: 27.77 KG/M2 | SYSTOLIC BLOOD PRESSURE: 120 MMHG | WEIGHT: 188.05 LBS

## 2021-09-20 LAB
ALBUMIN SERPL ELPH-MCNC: 4.3 G/DL
ALP BLD-CCNC: 58 U/L
ALT SERPL-CCNC: 14 U/L
ANION GAP SERPL CALC-SCNC: 13 MMOL/L
APPEARANCE: CLEAR
AST SERPL-CCNC: 12 U/L
BACTERIA: NEGATIVE
BILIRUB SERPL-MCNC: 0.4 MG/DL
BILIRUBIN URINE: NEGATIVE
BLOOD URINE: NEGATIVE
BUN SERPL-MCNC: 14 MG/DL
CALCIUM SERPL-MCNC: 9.4 MG/DL
CALCIUM SERPL-MCNC: 9.4 MG/DL
CHLORIDE SERPL-SCNC: 103 MMOL/L
CO2 SERPL-SCNC: 23 MMOL/L
COLOR: COLORLESS
CREAT SERPL-MCNC: 1.2 MG/DL
CREAT SPEC-SCNC: 15 MG/DL
CREAT/PROT UR: NORMAL RATIO
GLUCOSE QUALITATIVE U: NEGATIVE
GLUCOSE SERPL-MCNC: 139 MG/DL
HYALINE CASTS: 0 /LPF
KETONES URINE: NEGATIVE
LDH SERPL-CCNC: 194 U/L
LEUKOCYTE ESTERASE URINE: NEGATIVE
MAGNESIUM SERPL-MCNC: 1.3 MG/DL
MICROSCOPIC-UA: NORMAL
NITRITE URINE: NEGATIVE
PARATHYROID HORMONE INTACT: 304 PG/ML
PH URINE: 6
PHOSPHATE SERPL-MCNC: 2.6 MG/DL
POTASSIUM SERPL-SCNC: 5 MMOL/L
PROT SERPL-MCNC: 6.4 G/DL
PROT UR-MCNC: <4 MG/DL
PROTEIN URINE: NEGATIVE
RED BLOOD CELLS URINE: 1 /HPF
SODIUM SERPL-SCNC: 139 MMOL/L
SPECIFIC GRAVITY URINE: 1.01
SQUAMOUS EPITHELIAL CELLS: 0 /HPF
TACROLIMUS SERPL-MCNC: 9.9 NG/ML
URATE SERPL-MCNC: 6.6 MG/DL
UROBILINOGEN URINE: NORMAL
WHITE BLOOD CELLS URINE: 1 /HPF

## 2021-09-20 PROCEDURE — 99214 OFFICE O/P EST MOD 30 MIN: CPT

## 2021-09-20 RX ORDER — METOPROLOL SUCCINATE 50 MG/1
50 TABLET, EXTENDED RELEASE ORAL
Qty: 30 | Refills: 3 | Status: DISCONTINUED | COMMUNITY
Start: 2021-09-13 | End: 2021-09-20

## 2021-09-20 RX ORDER — FERRIC CITRATE 210 MG/1
1 GM TABLET, COATED ORAL 3 TIMES DAILY
Qty: 540 | Refills: 3 | Status: DISCONTINUED | COMMUNITY
Start: 2019-10-16 | End: 2021-09-20

## 2021-09-20 RX ORDER — VITAMIN K2 90 MCG
125 MCG CAPSULE ORAL DAILY
Qty: 90 | Refills: 3 | Status: DISCONTINUED | COMMUNITY
Start: 2017-11-20 | End: 2021-09-20

## 2021-09-20 RX ORDER — PREDNISONE 5 MG/1
5 TABLET ORAL
Qty: 42 | Refills: 0 | Status: DISCONTINUED | COMMUNITY
Start: 2021-09-10 | End: 2021-09-20

## 2021-09-20 RX ORDER — LACTULOSE 10 G/15ML
20 SOLUTION ORAL
Qty: 90 | Refills: 3 | Status: DISCONTINUED | COMMUNITY
Start: 2019-10-09 | End: 2021-09-20

## 2021-09-20 RX ORDER — CINACALCET 30 MG/1
30 TABLET ORAL TWICE DAILY
Qty: 180 | Refills: 3 | Status: DISCONTINUED | COMMUNITY
Start: 2019-11-07 | End: 2021-09-20

## 2021-09-20 RX ORDER — DICLOFENAC SODIUM 1% 10 MG/G
1 GEL TOPICAL DAILY
Qty: 6 | Refills: 0 | Status: DISCONTINUED | COMMUNITY
Start: 2021-08-09 | End: 2021-09-20

## 2021-09-20 RX ORDER — CETIRIZINE HYDROCHLORIDE 5 MG/1
5 TABLET ORAL
Qty: 30 | Refills: 2 | Status: DISCONTINUED | COMMUNITY
Start: 2020-01-25 | End: 2021-09-20

## 2021-09-20 RX ORDER — CALCITRIOL 0.25 UG/1
0.25 CAPSULE, LIQUID FILLED ORAL
Qty: 90 | Refills: 0 | Status: DISCONTINUED | COMMUNITY
Start: 2019-11-07 | End: 2021-09-20

## 2021-09-20 NOTE — HISTORY OF PRESENT ILLNESS
[FreeTextEntry1] : He received  donor kidney transplant on 21 at [facility] (Surgeon-Dr. Akhtar)\par He was on hemodialysis for 2 years prior to transplant. HTN, chronic GN, ESRD Recipient:\par \par CPRA: 0%\par ABO: O\par CMV: Positive\par EBV Status: Positive\par \par Donor (import): [place], [place]\par Age: 42 y.o. male\par ABO: O\par High Risk: No\par KDPI: 33%\par COD: Anoxia\par Medical Hx: DM II (less than 5 years), ? Hepatitis as a child, heavy ETOH use\par Terminal Cr: 0.61\par CMV-Positive\par EBV-Negative\par HepBcAb-Negative\par Hepatitis C-SIM- Negative\par Hepatitis C ab-Negative\par MISMATCH: 2, 2, 0\par Kidney Laterality: Left Kidney\par X-match ? Virtual negative\par \par \par Operative Findings:\par - Operative Findings  Donor Renal Transplant\par 2arteries (combined for one anastamosis) 1, vein, 1 ureter\par Double J stent placed\par LIMA drain left in place\par CIT ~21h\par  \par Has ureteral stent. \par \par Post op course: \par Immediate allograft function.\par He had uneventful post op course and was discharged on 21\par \par Had post op visit with surgeon. \par \par Currently\par Has no fever, no urinary symptoms except nocturia: 4 times/night at present\par Home blood pressure, temp and weight flow sheet reviewed.\par Home glucose: not diabetic. None.\par Medications side effects: none noted.\par Adherence and understanding: Good.\par \par Functional /Employment status: Reviewed.\par Cardiologist:Dr. Junior Bazan\par

## 2021-09-20 NOTE — ASSESSMENT
[FreeTextEntry1] : Renal Transplant recipient: Had immediate allograft function, normal creatinine at discharge. Has urinary frequency and nocturia. No dysuria/hematuria. No fever/chills. Tolerating medications.\par Immunosuppression: reviewed; simulect induction, on tac/MMF/prednisone, last Tac level noted; will recheck. Currently on Envarsus 14 mg daily.; full dose MMF and prednisone at 5 mg daily\par Gave flow sheets to maintain home charts of   blood pressure, temperature, weight and urine output.\par Hypertension: controlled; Reviewed medications.\par Hyperlipidemia: On statin, continue the same.\par Cardiovascular risk reduction discussed. On aspirin.\par Infection prophylaxis: On Bactrim, Valcyte and nystatin as well as GI prophylaxis.\par Discussed ambulation, using incentive spirometer, optimal glucose and blood pressure readings, adherence with medications and follow ups, follow up clinic visit schedule, avoiding dehydration, mosquito bites; prevention of DVT as well as food safety.\par He met with transplant surgeon; post op wound care, staples removal and ureteral stent removal were discussed.\par

## 2021-09-20 NOTE — REASON FOR VISIT
[Consultation] : a consultation visit [FreeTextEntry1] : Post kidney transplant follow up- Post op visit

## 2021-09-20 NOTE — PHYSICAL EXAM
[General Appearance - Alert] : alert [General Appearance - In No Acute Distress] : in no acute distress [Sclera] : the sclera and conjunctiva were normal [PERRL With Normal Accommodation] : pupils were equal in size, round, and reactive to light [Extraocular Movements] : extraocular movements were intact [Outer Ear] : the ears and nose were normal in appearance [Oropharynx] : the oropharynx was normal [Neck Appearance] : the appearance of the neck was normal [Neck Cervical Mass (___cm)] : no neck mass was observed [Jugular Venous Distention Increased] : there was no jugular-venous distention [Thyroid Diffuse Enlargement] : the thyroid was not enlarged [Thyroid Nodule] : there were no palpable thyroid nodules [Auscultation Breath Sounds / Voice Sounds] : lungs were clear to auscultation bilaterally [Heart Sounds] : normal S1 and S2 [Heart Rate And Rhythm] : heart rate was normal and rhythm regular [Heart Sounds Gallop] : no gallops [Murmurs] : no murmurs [Heart Sounds Pericardial Friction Rub] : no pericardial rub [Full Pulse] : the pedal pulses are present [Edema] : there was no peripheral edema [Abdomen Soft] : soft [Bowel Sounds] : normal bowel sounds [Abdomen Tenderness] : non-tender [Abdomen Mass (___ Cm)] : no abdominal mass palpated [Cervical Lymph Nodes Enlarged Posterior Bilaterally] : posterior cervical [Cervical Lymph Nodes Enlarged Anterior Bilaterally] : anterior cervical [Supraclavicular Lymph Nodes Enlarged Bilaterally] : supraclavicular [Axillary Lymph Nodes Enlarged Bilaterally] : axillary [Inguinal Lymph Nodes Enlarged Bilaterally] : inguinal [Abnormal Walk] : normal gait [Involuntary Movements] : no involuntary movements were seen [___ (cm) Fistula] : [unfilled] (cm) fistula [Bruit] : a bruit was present [Thrill] : a thrill was present [] : no rash [No Focal Deficits] : no focal deficits [Oriented To Time, Place, And Person] : oriented to person, place, and time [Impaired Insight] : insight and judgment were intact [Affect] : the affect was normal [FreeTextEntry1] : Drain+ No bruit. Healing incision.

## 2021-09-21 LAB
BASOPHILS # BLD AUTO: 0.08 K/UL
BASOPHILS NFR BLD AUTO: 0.9 %
BKV DNA SPEC QL NAA+PROBE: NEGATIVE COPIES/ML
EOSINOPHIL # BLD AUTO: 0.15 K/UL
EOSINOPHIL NFR BLD AUTO: 1.7 %
HCT VFR BLD CALC: 32 %
HGB BLD-MCNC: 10.1 G/DL
LOG 10 BK QUANTITATION PCR: NORMAL
LYMPHOCYTES # BLD AUTO: 1.45 K/UL
LYMPHOCYTES NFR BLD AUTO: 16.5 %
MAN DIFF?: NORMAL
MCHC RBC-ENTMCNC: 31.6 GM/DL
MCHC RBC-ENTMCNC: 32.7 PG
MCV RBC AUTO: 103.6 FL
MONOCYTES # BLD AUTO: 0 K/UL
MONOCYTES NFR BLD AUTO: 0 %
NEUTROPHILS # BLD AUTO: 7.05 K/UL
NEUTROPHILS NFR BLD AUTO: 80 %
PLATELET # BLD AUTO: 205 K/UL
RBC # BLD: 3.09 M/UL
RBC # FLD: 14.5 %
WBC # FLD AUTO: 8.81 K/UL

## 2021-09-27 ENCOUNTER — APPOINTMENT (OUTPATIENT)
Dept: NEPHROLOGY | Facility: CLINIC | Age: 61
End: 2021-09-27
Payer: COMMERCIAL

## 2021-09-27 ENCOUNTER — APPOINTMENT (OUTPATIENT)
Dept: NEPHROLOGY | Facility: CLINIC | Age: 61
End: 2021-09-27

## 2021-09-27 VITALS
SYSTOLIC BLOOD PRESSURE: 120 MMHG | RESPIRATION RATE: 14 BRPM | BODY MASS INDEX: 26.79 KG/M2 | DIASTOLIC BLOOD PRESSURE: 72 MMHG | WEIGHT: 181.44 LBS | HEART RATE: 84 BPM

## 2021-09-27 PROCEDURE — 99214 OFFICE O/P EST MOD 30 MIN: CPT

## 2021-09-27 RX ORDER — METOPROLOL TARTRATE 25 MG/1
25 TABLET, FILM COATED ORAL
Qty: 30 | Refills: 0 | Status: DISCONTINUED | COMMUNITY
Start: 2021-09-14

## 2021-09-27 RX ORDER — TAMSULOSIN HYDROCHLORIDE 0.4 MG/1
0.4 CAPSULE ORAL
Qty: 180 | Refills: 3 | Status: DISCONTINUED | COMMUNITY
Start: 2018-02-05 | End: 2021-09-27

## 2021-09-27 RX ORDER — ASPIRIN 81 MG
81 TABLET, DELAYED RELEASE (ENTERIC COATED) ORAL
Refills: 0 | Status: DISCONTINUED | COMMUNITY
End: 2021-09-27

## 2021-09-27 RX ORDER — METOPROLOL SUCCINATE 25 MG/1
25 TABLET, EXTENDED RELEASE ORAL
Qty: 90 | Refills: 0 | Status: DISCONTINUED | COMMUNITY
Start: 2021-04-26

## 2021-09-27 RX ORDER — AMOXICILLIN 500 MG/1
500 TABLET, FILM COATED ORAL
Qty: 21 | Refills: 0 | Status: DISCONTINUED | COMMUNITY
Start: 2021-04-06

## 2021-09-27 NOTE — ASSESSMENT
[FreeTextEntry1] : Renal Transplant recipient: Had immediate allograft function, normal creatinine at discharge. Has urinary frequency and nocturia. No dysuria/hematuria. No fever/chills. Tolerating medications.\par Immunosuppression: reviewed; simulect induction, on tac/MMF/prednisone, last Tac level noted; will recheck. Currently on Envarsus 14 mg daily.; full dose MMF and prednisone at 5 mg daily\par Gave flow sheets to maintain home charts of   blood pressure, temperature, weight and urine output.\par Hypertension: controlled; Reviewed medications.\par Hyperlipidemia: Was on simvastatin, off for 2 years after being on dialysis, will check lipid panel in 3 months. Used to get severe muscle cramps\par Drain/Stent: Has surgical follow up. Discussed with covering surgeon regarding drain, will remove once drainage is consistently less than 50 ml/day.\par Cardiovascular risk reduction discussed. On aspirin.\par Elevated Uric acid level/Gout: Started on allopurinol.\par Infection prophylaxis: On Bactrim, Valcyte and nystatin as well as GI prophylaxis.\par Discussed ambulation, using incentive spirometer, optimal glucose and blood pressure readings, adherence with medications and follow ups, follow up clinic visit schedule, avoiding dehydration, mosquito bites; prevention of DVT as well as food safety.\par \par

## 2021-09-27 NOTE — HISTORY OF PRESENT ILLNESS
[FreeTextEntry1] : He received  donor kidney transplant on 21 at [facility] (Surgeon-Dr. Akhtar)\par \par Currently\par Has no fever, no urinary symptoms except nocturia: 4 times/night at present.\par Drain volume 70 -100 ml/day\par Home blood pressure, temp and weight flow sheet reviewed.\par Home glucose: not diabetic. None.\par Medications side effects: none noted.\par Adherence and understanding: Good.\par \par Functional /Employment status: Reviewed. Employed on sick time at present.\par Cardiologist:Dr. Junior Bazan\par

## 2021-09-27 NOTE — PHYSICAL EXAM
[General Appearance - Alert] : alert [General Appearance - In No Acute Distress] : in no acute distress [Sclera] : the sclera and conjunctiva were normal [PERRL With Normal Accommodation] : pupils were equal in size, round, and reactive to light [Extraocular Movements] : extraocular movements were intact [Outer Ear] : the ears and nose were normal in appearance [Oropharynx] : the oropharynx was normal [Neck Appearance] : the appearance of the neck was normal [Neck Cervical Mass (___cm)] : no neck mass was observed [Jugular Venous Distention Increased] : there was no jugular-venous distention [Thyroid Diffuse Enlargement] : the thyroid was not enlarged [Thyroid Nodule] : there were no palpable thyroid nodules [Auscultation Breath Sounds / Voice Sounds] : lungs were clear to auscultation bilaterally [Heart Rate And Rhythm] : heart rate was normal and rhythm regular [Heart Sounds] : normal S1 and S2 [Heart Sounds Gallop] : no gallops [Murmurs] : no murmurs [Heart Sounds Pericardial Friction Rub] : no pericardial rub [Full Pulse] : the pedal pulses are present [Edema] : there was no peripheral edema [Bowel Sounds] : normal bowel sounds [Abdomen Soft] : soft [Abdomen Tenderness] : non-tender [Abdomen Mass (___ Cm)] : no abdominal mass palpated [FreeTextEntry1] : Drain+ No bruit. Healing incision. Clear drain fluid. [Cervical Lymph Nodes Enlarged Posterior Bilaterally] : posterior cervical [Cervical Lymph Nodes Enlarged Anterior Bilaterally] : anterior cervical [Supraclavicular Lymph Nodes Enlarged Bilaterally] : supraclavicular [Axillary Lymph Nodes Enlarged Bilaterally] : axillary [Inguinal Lymph Nodes Enlarged Bilaterally] : inguinal [Abnormal Walk] : normal gait [Involuntary Movements] : no involuntary movements were seen [___ (cm) Fistula] : [unfilled] (cm) fistula [Bruit] : a bruit was present [Thrill] : a thrill was present [] : no rash [No Focal Deficits] : no focal deficits [Oriented To Time, Place, And Person] : oriented to person, place, and time [Impaired Insight] : insight and judgment were intact [Affect] : the affect was normal

## 2021-09-27 NOTE — END OF VISIT
[Time Spent: ___ minutes] : I have spent [unfilled] minutes of time on the encounter. Patient presneting with headache, numbness, nihhs 0. neuro intact. noting htn, will obtain lab, ct, med to control bp and headache, ed obs and reassess

## 2021-09-28 LAB
ALBUMIN SERPL ELPH-MCNC: 4.5 G/DL
ALP BLD-CCNC: 69 U/L
ALT SERPL-CCNC: 13 U/L
ANION GAP SERPL CALC-SCNC: 11 MMOL/L
APPEARANCE: CLEAR
AST SERPL-CCNC: 13 U/L
BACTERIA: NEGATIVE
BASOPHILS # BLD AUTO: 0.06 K/UL
BASOPHILS NFR BLD AUTO: 0.8 %
BILIRUB SERPL-MCNC: 0.5 MG/DL
BILIRUBIN URINE: NEGATIVE
BLOOD URINE: NEGATIVE
BUN SERPL-MCNC: 16 MG/DL
CALCIUM SERPL-MCNC: 9.7 MG/DL
CALCIUM SERPL-MCNC: 9.7 MG/DL
CHLORIDE SERPL-SCNC: 104 MMOL/L
CO2 SERPL-SCNC: 22 MMOL/L
COLOR: NORMAL
COVID-19 SPIKE DOMAIN ANTIBODY INTERPRETATION: POSITIVE
CREAT SERPL-MCNC: 1.28 MG/DL
CREAT SPEC-SCNC: 33 MG/DL
CREAT/PROT UR: NORMAL RATIO
EOSINOPHIL # BLD AUTO: 0.07 K/UL
EOSINOPHIL NFR BLD AUTO: 1 %
GLUCOSE QUALITATIVE U: NEGATIVE
GLUCOSE SERPL-MCNC: 164 MG/DL
HCT VFR BLD CALC: 32.5 %
HGB BLD-MCNC: 10.5 G/DL
HYALINE CASTS: 0 /LPF
IMM GRANULOCYTES NFR BLD AUTO: 0.4 %
KETONES URINE: NEGATIVE
LDH SERPL-CCNC: 161 U/L
LEUKOCYTE ESTERASE URINE: NEGATIVE
LYMPHOCYTES # BLD AUTO: 1.21 K/UL
LYMPHOCYTES NFR BLD AUTO: 16.9 %
MAGNESIUM SERPL-MCNC: 1.5 MG/DL
MAN DIFF?: NORMAL
MCHC RBC-ENTMCNC: 32.2 PG
MCHC RBC-ENTMCNC: 32.3 GM/DL
MCV RBC AUTO: 99.7 FL
MICROSCOPIC-UA: NORMAL
MONOCYTES # BLD AUTO: 0.43 K/UL
MONOCYTES NFR BLD AUTO: 6 %
NEUTROPHILS # BLD AUTO: 5.36 K/UL
NEUTROPHILS NFR BLD AUTO: 74.9 %
NITRITE URINE: NEGATIVE
PARATHYROID HORMONE INTACT: 180 PG/ML
PH URINE: 6
PHOSPHATE SERPL-MCNC: 1.7 MG/DL
PLATELET # BLD AUTO: 169 K/UL
POTASSIUM SERPL-SCNC: 4.7 MMOL/L
PROT SERPL-MCNC: 6.6 G/DL
PROT UR-MCNC: <4 MG/DL
PROTEIN URINE: NEGATIVE
RBC # BLD: 3.26 M/UL
RBC # FLD: 14.2 %
RED BLOOD CELLS URINE: 1 /HPF
SARS-COV-2 AB SERPL IA-ACNC: >250 U/ML
SODIUM SERPL-SCNC: 136 MMOL/L
SPECIFIC GRAVITY URINE: 1.01
SQUAMOUS EPITHELIAL CELLS: 0 /HPF
TACROLIMUS SERPL-MCNC: 9.1 NG/ML
URATE SERPL-MCNC: 6.8 MG/DL
UROBILINOGEN URINE: NORMAL
WBC # FLD AUTO: 7.16 K/UL
WHITE BLOOD CELLS URINE: 1 /HPF

## 2021-09-29 ENCOUNTER — APPOINTMENT (OUTPATIENT)
Dept: ULTRASOUND IMAGING | Facility: IMAGING CENTER | Age: 61
End: 2021-09-29

## 2021-09-29 ENCOUNTER — APPOINTMENT (OUTPATIENT)
Dept: RADIOLOGY | Facility: IMAGING CENTER | Age: 61
End: 2021-09-29

## 2021-09-30 ENCOUNTER — APPOINTMENT (OUTPATIENT)
Dept: TRANSPLANT | Facility: CLINIC | Age: 61
End: 2021-09-30
Payer: COMMERCIAL

## 2021-09-30 VITALS
RESPIRATION RATE: 16 BRPM | OXYGEN SATURATION: 100 % | WEIGHT: 184 LBS | DIASTOLIC BLOOD PRESSURE: 76 MMHG | SYSTOLIC BLOOD PRESSURE: 133 MMHG | BODY MASS INDEX: 27.25 KG/M2 | HEIGHT: 69 IN | TEMPERATURE: 97.6 F | HEART RATE: 80 BPM

## 2021-09-30 PROCEDURE — 99024 POSTOP FOLLOW-UP VISIT: CPT

## 2021-09-30 NOTE — HISTORY OF PRESENT ILLNESS
[de-identified] : \par Transplant Type:  donor \par Date of Surgery: 2021 \par Induction Agent(s): basiliximab, steroids \par Ureteral Stent: Yes \par Transplant Complications: \par immediate graft function\par discharged home on post day 5 with creatinine 1.7 \par Donor Characteristics: brain death \par Kidney Donor Profile Index: 33% \par donor is 42 year old male with diabetes\par left kidney with 2 arteries; reconstructed together on back table \par \par Doing well,\par afebrile\par no urinary symptoms\par drain volume now ~10ml/d\par \par good bp control \par \par . \par

## 2021-09-30 NOTE — PHYSICAL EXAM
[Normal] : normal [Clean] : clean [Dry] : dry [Healing Well] : healing well [FreeTextEntry1] : drain removed

## 2021-09-30 NOTE — ASSESSMENT
[FreeTextEntry1] : doing well post DDRT 3 weeks ago\par \par 1)\par good graft function sCr 1.28\par \par 2)\par immunosuppression\par envarsus 14mg\par MMF 1g bid\par pred 5\par \par 3)\par nystatin\par bactrim\par valgan\par \par \par 4) ureteral stent needs to be removed at 4-6 weeks

## 2021-10-04 LAB
BKV DNA SPEC QL NAA+PROBE: NEGATIVE COPIES/ML
LOG 10 BK QUANTITATION PCR: NORMAL

## 2021-10-07 ENCOUNTER — APPOINTMENT (OUTPATIENT)
Dept: TRANSPLANT | Facility: CLINIC | Age: 61
End: 2021-10-07
Payer: COMMERCIAL

## 2021-10-07 VITALS
DIASTOLIC BLOOD PRESSURE: 76 MMHG | OXYGEN SATURATION: 100 % | HEART RATE: 79 BPM | SYSTOLIC BLOOD PRESSURE: 126 MMHG | TEMPERATURE: 97.5 F

## 2021-10-07 LAB
BKV DNA SPEC QL NAA+PROBE: NEGATIVE COPIES/ML
LOG 10 BK QUANTITATION PCR: NORMAL

## 2021-10-07 PROCEDURE — 99024 POSTOP FOLLOW-UP VISIT: CPT

## 2021-10-07 RX ORDER — METOPROLOL TARTRATE 25 MG/1
25 TABLET, FILM COATED ORAL EVERY 6 HOURS
Qty: 60 | Refills: 0 | Status: DISCONTINUED | COMMUNITY
Start: 2021-10-07 | End: 2021-10-07

## 2021-10-07 RX ORDER — TACROLIMUS 1 MG/1
1 TABLET, EXTENDED RELEASE ORAL
Qty: 270 | Refills: 3 | Status: DISCONTINUED | COMMUNITY
Start: 2021-09-10 | End: 2021-10-07

## 2021-10-07 NOTE — ASSESSMENT
[FreeTextEntry1] : doing well 4 weeks post  donor kidney transplant\par good graft function\par immunosuppression tacro, mmf and steroids\par will d/c lasix\par labs today\par will schedule for stent removal in two weeks

## 2021-10-07 NOTE — HISTORY OF PRESENT ILLNESS
[ Donor] :  donor [Basiliximab] : basiliximab [Steroids] : steroids [] : Yes [Brain Death] : brain death [KDPI: ____] : Kidney Donor Profile Index: [unfilled] [TextBox_7] : September 9 2021 [TextBox_34] : immediate graft function\par discharged home on post day 5 with creatinine 1.7 [TextBox_52] : donor is 42 year old male with diabetes\par left kidney with 2 arteries; reconstructed together on back table [de-identified] : doing well\par good urine output\par blood pressure well controlled\par drain was removed last week\par last creatinine 1.28\par still on lasix 20mg\par \par \par \par

## 2021-10-08 ENCOUNTER — NON-APPOINTMENT (OUTPATIENT)
Age: 61
End: 2021-10-08

## 2021-10-08 LAB
ALBUMIN SERPL ELPH-MCNC: 4.3 G/DL
ALP BLD-CCNC: 82 U/L
ALT SERPL-CCNC: 10 U/L
ANION GAP SERPL CALC-SCNC: 9 MMOL/L
APPEARANCE: CLEAR
AST SERPL-CCNC: 10 U/L
BACTERIA: NEGATIVE
BASOPHILS # BLD AUTO: 0.04 K/UL
BASOPHILS NFR BLD AUTO: 0.8 %
BILIRUB SERPL-MCNC: 0.4 MG/DL
BILIRUBIN URINE: NEGATIVE
BLOOD URINE: NEGATIVE
BUN SERPL-MCNC: 19 MG/DL
CALCIUM SERPL-MCNC: 9.8 MG/DL
CHLORIDE SERPL-SCNC: 105 MMOL/L
CO2 SERPL-SCNC: 23 MMOL/L
COLOR: NORMAL
CREAT SERPL-MCNC: 1.28 MG/DL
CREAT SPEC-SCNC: 90 MG/DL
CREAT/PROT UR: 0.1 RATIO
EOSINOPHIL # BLD AUTO: 0.11 K/UL
EOSINOPHIL NFR BLD AUTO: 2.2 %
GLUCOSE QUALITATIVE U: NEGATIVE
GLUCOSE SERPL-MCNC: 171 MG/DL
HBV SURFACE AG SER QL: NONREACTIVE
HCT VFR BLD CALC: 34 %
HCV AB SER QL: NONREACTIVE
HCV RNA SERPL NAA DL=5-ACNC: NOT DETECTED IU/ML
HCV RNA SERPL NAA+PROBE-LOG IU: NOT DETECTED LOG10IU/ML
HCV S/CO RATIO: 0.15 S/CO
HGB BLD-MCNC: 11 G/DL
HIV1 RNA # SERPL NAA+PROBE: NORMAL
HIV1 RNA # SERPL NAA+PROBE: NORMAL COPIES/ML
HIV1+2 AB SPEC QL IA.RAPID: NONREACTIVE
HYALINE CASTS: 0 /LPF
IMM GRANULOCYTES NFR BLD AUTO: 0.4 %
KETONES URINE: NEGATIVE
LDH SERPL-CCNC: 141 U/L
LEUKOCYTE ESTERASE URINE: NEGATIVE
LYMPHOCYTES # BLD AUTO: 1.55 K/UL
LYMPHOCYTES NFR BLD AUTO: 31.4 %
MAGNESIUM SERPL-MCNC: 1.4 MG/DL
MAN DIFF?: NORMAL
MCHC RBC-ENTMCNC: 32.4 GM/DL
MCHC RBC-ENTMCNC: 32.5 PG
MCV RBC AUTO: 100.6 FL
MICROSCOPIC-UA: NORMAL
MONOCYTES # BLD AUTO: 0.41 K/UL
MONOCYTES NFR BLD AUTO: 8.3 %
NEUTROPHILS # BLD AUTO: 2.8 K/UL
NEUTROPHILS NFR BLD AUTO: 56.9 %
NITRITE URINE: NEGATIVE
PH URINE: 5.5
PHOSPHATE SERPL-MCNC: 1.9 MG/DL
PLATELET # BLD AUTO: 195 K/UL
POTASSIUM SERPL-SCNC: 5.2 MMOL/L
PROT SERPL-MCNC: 6.2 G/DL
PROT UR-MCNC: 11 MG/DL
PROTEIN URINE: NEGATIVE
RBC # BLD: 3.38 M/UL
RBC # FLD: 13.8 %
RED BLOOD CELLS URINE: 3 /HPF
SODIUM SERPL-SCNC: 137 MMOL/L
SPECIFIC GRAVITY URINE: 1.01
SQUAMOUS EPITHELIAL CELLS: 1 /HPF
TACROLIMUS SERPL-MCNC: 12.1 NG/ML
URATE SERPL-MCNC: 7.1 MG/DL
UROBILINOGEN URINE: NORMAL
VIRAL LOAD INTERP: NORMAL
VIRAL LOAD LOG: NORMAL LG COP/ML
WBC # FLD AUTO: 4.93 K/UL
WHITE BLOOD CELLS URINE: 2 /HPF

## 2021-10-10 LAB
HBV DNA # SERPL NAA+PROBE: NOT DETECTED IU/ML
HEPB DNA PCR LOG: NOT DETECTED LOG10IU/ML

## 2021-10-14 ENCOUNTER — APPOINTMENT (OUTPATIENT)
Dept: TRANSPLANT | Facility: CLINIC | Age: 61
End: 2021-10-14
Payer: COMMERCIAL

## 2021-10-14 VITALS
DIASTOLIC BLOOD PRESSURE: 80 MMHG | HEART RATE: 75 BPM | OXYGEN SATURATION: 99 % | SYSTOLIC BLOOD PRESSURE: 135 MMHG | RESPIRATION RATE: 18 BRPM | TEMPERATURE: 97.3 F

## 2021-10-14 PROCEDURE — 99024 POSTOP FOLLOW-UP VISIT: CPT

## 2021-10-14 NOTE — ASSESSMENT
[FreeTextEntry1] : doing well 5 weeks post  donor kidney transplant\par creatinine 1.28\par immunosuppression envarsus 12mg, cellcept and steroids\par no changes\par check labs today\par follow up with Dr Downs in 10 days

## 2021-10-14 NOTE — HISTORY OF PRESENT ILLNESS
[ Donor] :  donor [Basiliximab] : basiliximab [Steroids] : steroids [] : Yes [Brain Death] : brain death [KDPI: ____] : Kidney Donor Profile Index: [unfilled] [TextBox_7] : September 9 2021 [TextBox_34] : immediate graft function\par discharged home on post day 5 with creatinine 1.7 [TextBox_52] : donor is 42 year old male with diabetes\par left kidney with 2 arteries; reconstructed together on back table [de-identified] : doing well\par last creatinine 1.28\par blood pressure well controlled\par good urine output\par lasix stopped\par

## 2021-10-18 LAB
ALBUMIN SERPL ELPH-MCNC: 4.3 G/DL
ALP BLD-CCNC: 81 U/L
ALT SERPL-CCNC: 9 U/L
ANION GAP SERPL CALC-SCNC: 10 MMOL/L
APPEARANCE: CLEAR
AST SERPL-CCNC: 8 U/L
BACTERIA: NEGATIVE
BASOPHILS # BLD AUTO: 0.03 K/UL
BASOPHILS NFR BLD AUTO: 0.5 %
BILIRUB SERPL-MCNC: 0.4 MG/DL
BILIRUBIN URINE: NEGATIVE
BLOOD URINE: NEGATIVE
BUN SERPL-MCNC: 17 MG/DL
CALCIUM SERPL-MCNC: 9.7 MG/DL
CALCIUM SERPL-MCNC: 9.7 MG/DL
CHLORIDE SERPL-SCNC: 104 MMOL/L
CO2 SERPL-SCNC: 22 MMOL/L
COLOR: YELLOW
COVID-19 SPIKE DOMAIN ANTIBODY INTERPRETATION: POSITIVE
CREAT SERPL-MCNC: 1.12 MG/DL
CREAT SPEC-SCNC: 129 MG/DL
CREAT/PROT UR: 0.1 RATIO
EOSINOPHIL # BLD AUTO: 0.07 K/UL
EOSINOPHIL NFR BLD AUTO: 1.1 %
GLUCOSE QUALITATIVE U: NEGATIVE
GLUCOSE SERPL-MCNC: 160 MG/DL
HCT VFR BLD CALC: 34.2 %
HGB BLD-MCNC: 11 G/DL
HYALINE CASTS: 0 /LPF
IMM GRANULOCYTES NFR BLD AUTO: 0.9 %
KETONES URINE: NEGATIVE
LDH SERPL-CCNC: 155 U/L
LEUKOCYTE ESTERASE URINE: NEGATIVE
LYMPHOCYTES # BLD AUTO: 1.55 K/UL
LYMPHOCYTES NFR BLD AUTO: 24 %
MAGNESIUM SERPL-MCNC: 1.4 MG/DL
MAN DIFF?: NORMAL
MCHC RBC-ENTMCNC: 32.2 GM/DL
MCHC RBC-ENTMCNC: 32.3 PG
MCV RBC AUTO: 100.3 FL
MICROSCOPIC-UA: NORMAL
MONOCYTES # BLD AUTO: 0.47 K/UL
MONOCYTES NFR BLD AUTO: 7.3 %
NEUTROPHILS # BLD AUTO: 4.28 K/UL
NEUTROPHILS NFR BLD AUTO: 66.2 %
NITRITE URINE: NEGATIVE
PARATHYROID HORMONE INTACT: 155 PG/ML
PH URINE: 6
PHOSPHATE SERPL-MCNC: 2.2 MG/DL
PLATELET # BLD AUTO: 189 K/UL
POTASSIUM SERPL-SCNC: 5.3 MMOL/L
PROT SERPL-MCNC: 6.1 G/DL
PROT UR-MCNC: 13 MG/DL
PROTEIN URINE: NORMAL
RBC # BLD: 3.41 M/UL
RBC # FLD: 13.2 %
RED BLOOD CELLS URINE: 2 /HPF
SARS-COV-2 AB SERPL IA-ACNC: >250 U/ML
SODIUM SERPL-SCNC: 136 MMOL/L
SPECIFIC GRAVITY URINE: 1.02
SQUAMOUS EPITHELIAL CELLS: 1 /HPF
TACROLIMUS SERPL-MCNC: 10.4 NG/ML
URATE SERPL-MCNC: 6.3 MG/DL
UROBILINOGEN URINE: NORMAL
WBC # FLD AUTO: 6.46 K/UL
WHITE BLOOD CELLS URINE: 2 /HPF

## 2021-10-19 LAB
BKV DNA SPEC QL NAA+PROBE: NEGATIVE COPIES/ML
LOG 10 BK QUANTITATION PCR: NORMAL

## 2021-10-19 NOTE — ED ADULT NURSE NOTE - PAIN RATING/NUMBER SCALE (0-10): ACTIVITY
Marily,    Thank you for choosing us for your care. I have placed an order for an antifungal to be sent out so that you can start treatment. If you find that your symptoms do not improve as expected please let me know and we can proceed with additional testing such as wet prep or urine tests.     Sukh Qiu PA-C on 10/19/2021 at 12:28 PM    
0

## 2021-10-21 ENCOUNTER — OUTPATIENT (OUTPATIENT)
Dept: OUTPATIENT SERVICES | Facility: HOSPITAL | Age: 61
LOS: 1 days | End: 2021-10-21
Payer: COMMERCIAL

## 2021-10-21 VITALS
WEIGHT: 184.09 LBS | OXYGEN SATURATION: 100 % | HEIGHT: 69 IN | SYSTOLIC BLOOD PRESSURE: 138 MMHG | HEART RATE: 89 BPM | RESPIRATION RATE: 16 BRPM | DIASTOLIC BLOOD PRESSURE: 78 MMHG | TEMPERATURE: 98 F

## 2021-10-21 DIAGNOSIS — Z94.0 KIDNEY TRANSPLANT STATUS: Chronic | ICD-10-CM

## 2021-10-21 DIAGNOSIS — Z94.0 KIDNEY TRANSPLANT STATUS: ICD-10-CM

## 2021-10-21 DIAGNOSIS — Z01.818 ENCOUNTER FOR OTHER PREPROCEDURAL EXAMINATION: ICD-10-CM

## 2021-10-21 DIAGNOSIS — Z98.890 OTHER SPECIFIED POSTPROCEDURAL STATES: Chronic | ICD-10-CM

## 2021-10-21 DIAGNOSIS — T85.611A BREAKDOWN (MECHANICAL) OF INTRAPERITONEAL DIALYSIS CATHETER, INITIAL ENCOUNTER: Chronic | ICD-10-CM

## 2021-10-21 PROCEDURE — G0463: CPT

## 2021-10-21 PROCEDURE — 87086 URINE CULTURE/COLONY COUNT: CPT

## 2021-10-21 RX ORDER — LIDOCAINE HCL 20 MG/ML
0.2 VIAL (ML) INJECTION ONCE
Refills: 0 | Status: DISCONTINUED | OUTPATIENT
Start: 2021-10-25 | End: 2021-11-08

## 2021-10-21 RX ORDER — SODIUM CHLORIDE 9 MG/ML
3 INJECTION INTRAMUSCULAR; INTRAVENOUS; SUBCUTANEOUS EVERY 8 HOURS
Refills: 0 | Status: DISCONTINUED | OUTPATIENT
Start: 2021-10-25 | End: 2021-11-08

## 2021-10-21 RX ORDER — CEFAZOLIN SODIUM 1 G
2000 VIAL (EA) INJECTION ONCE
Refills: 0 | Status: DISCONTINUED | OUTPATIENT
Start: 2021-10-25 | End: 2021-11-08

## 2021-10-21 NOTE — H&P PST ADULT - ATTENDING COMMENTS
agree with above  for cysto and stent removal  procedure explained to patient, consent signed in chart

## 2021-10-21 NOTE — H&P PST ADULT - HISTORY OF PRESENT ILLNESS
61 year old male with PMH of HTN, HLD, Gout, BPH, GERD, and ESRD s/p  donor kidney transplant on 2021 61 year old male with PMH of HTN, HLD, Gout, BPH, GERD, and ESRD s/p  donor kidney transplant on 2021 presents today for presurgical evaluation for stent removal from right transplanted kidney on 10/25. Patient denies chest pain, SOB, anura, dysuria, hematuria at this time.     Covid vaccine, pfizer, 2 doses - last dose March  Covid test 10/22 @ [facility]   Denies recent covid exposure and recent travel

## 2021-10-21 NOTE — H&P PST ADULT - NSICDXPASTSURGICALHX_GEN_ALL_CORE_FT
PAST SURGICAL HISTORY:  History of lung surgery 2013- benign    Peritoneal dialysis catheter dysfunction s/p removal 2/2020    S/P kidney transplant 9/9/2021    Vocal cord anomaly S/P polyp removal 2004

## 2021-10-21 NOTE — H&P PST ADULT - --DESCRIBE SURGICAL SITE
9/9/2021 s/p renal transplant, scar is healing 9/9/2021 s/p right renal transplant, scar is healing well on right abdomen

## 2021-10-21 NOTE — H&P PST ADULT - HEALTH CARE MAINTENANCE
covid vaccine - 2 doses, pfizer, Last dose March   No flu vaccine this year No flu vaccine this year

## 2021-10-22 ENCOUNTER — OUTPATIENT (OUTPATIENT)
Dept: OUTPATIENT SERVICES | Facility: HOSPITAL | Age: 61
LOS: 1 days | End: 2021-10-22
Payer: COMMERCIAL

## 2021-10-22 DIAGNOSIS — T85.611A BREAKDOWN (MECHANICAL) OF INTRAPERITONEAL DIALYSIS CATHETER, INITIAL ENCOUNTER: Chronic | ICD-10-CM

## 2021-10-22 DIAGNOSIS — Z11.52 ENCOUNTER FOR SCREENING FOR COVID-19: ICD-10-CM

## 2021-10-22 DIAGNOSIS — Z94.0 KIDNEY TRANSPLANT STATUS: Chronic | ICD-10-CM

## 2021-10-22 DIAGNOSIS — Z98.890 OTHER SPECIFIED POSTPROCEDURAL STATES: Chronic | ICD-10-CM

## 2021-10-22 LAB — SARS-COV-2 RNA SPEC QL NAA+PROBE: SIGNIFICANT CHANGE UP

## 2021-10-22 PROCEDURE — C9803: CPT

## 2021-10-22 PROCEDURE — U0003: CPT

## 2021-10-22 PROCEDURE — U0005: CPT

## 2021-10-23 LAB
CULTURE RESULTS: NO GROWTH — SIGNIFICANT CHANGE UP
SPECIMEN SOURCE: SIGNIFICANT CHANGE UP

## 2021-10-24 ENCOUNTER — TRANSCRIPTION ENCOUNTER (OUTPATIENT)
Age: 61
End: 2021-10-24

## 2021-10-25 ENCOUNTER — OUTPATIENT (OUTPATIENT)
Dept: INPATIENT UNIT | Facility: HOSPITAL | Age: 61
LOS: 1 days | End: 2021-10-25
Payer: COMMERCIAL

## 2021-10-25 ENCOUNTER — APPOINTMENT (OUTPATIENT)
Dept: TRANSPLANT | Facility: HOSPITAL | Age: 61
End: 2021-10-25

## 2021-10-25 VITALS
OXYGEN SATURATION: 100 % | HEIGHT: 69 IN | TEMPERATURE: 99 F | RESPIRATION RATE: 14 BRPM | WEIGHT: 184.09 LBS | DIASTOLIC BLOOD PRESSURE: 77 MMHG | HEART RATE: 78 BPM | SYSTOLIC BLOOD PRESSURE: 138 MMHG

## 2021-10-25 VITALS
OXYGEN SATURATION: 98 % | TEMPERATURE: 97 F | HEART RATE: 74 BPM | RESPIRATION RATE: 16 BRPM | SYSTOLIC BLOOD PRESSURE: 136 MMHG | DIASTOLIC BLOOD PRESSURE: 64 MMHG

## 2021-10-25 DIAGNOSIS — Z98.890 OTHER SPECIFIED POSTPROCEDURAL STATES: Chronic | ICD-10-CM

## 2021-10-25 DIAGNOSIS — Z01.818 ENCOUNTER FOR OTHER PREPROCEDURAL EXAMINATION: ICD-10-CM

## 2021-10-25 DIAGNOSIS — T85.611A BREAKDOWN (MECHANICAL) OF INTRAPERITONEAL DIALYSIS CATHETER, INITIAL ENCOUNTER: Chronic | ICD-10-CM

## 2021-10-25 DIAGNOSIS — Z94.0 KIDNEY TRANSPLANT STATUS: ICD-10-CM

## 2021-10-25 DIAGNOSIS — Z94.0 KIDNEY TRANSPLANT STATUS: Chronic | ICD-10-CM

## 2021-10-25 PROCEDURE — 52310 CYSTOSCOPY AND TREATMENT: CPT

## 2021-10-25 PROCEDURE — 52000 CYSTOURETHROSCOPY: CPT | Mod: GC,58

## 2021-10-25 RX ORDER — ONDANSETRON 8 MG/1
4 TABLET, FILM COATED ORAL ONCE
Refills: 0 | Status: DISCONTINUED | OUTPATIENT
Start: 2021-10-25 | End: 2021-10-25

## 2021-10-25 RX ORDER — SODIUM,POTASSIUM PHOSPHATES 278-250MG
1 POWDER IN PACKET (EA) ORAL
Qty: 0 | Refills: 0 | DISCHARGE

## 2021-10-25 RX ORDER — ACETAMINOPHEN 500 MG
1000 TABLET ORAL ONCE
Refills: 0 | Status: DISCONTINUED | OUTPATIENT
Start: 2021-10-25 | End: 2021-10-25

## 2021-10-25 RX ORDER — FENTANYL CITRATE 50 UG/ML
25 INJECTION INTRAVENOUS
Refills: 0 | Status: DISCONTINUED | OUTPATIENT
Start: 2021-10-25 | End: 2021-10-25

## 2021-10-25 RX ORDER — FENTANYL CITRATE 50 UG/ML
50 INJECTION INTRAVENOUS ONCE
Refills: 0 | Status: DISCONTINUED | OUTPATIENT
Start: 2021-10-25 | End: 2021-10-25

## 2021-10-25 RX ORDER — ALLOPURINOL 300 MG
1 TABLET ORAL
Qty: 0 | Refills: 0 | DISCHARGE

## 2021-10-25 RX ADMIN — SODIUM CHLORIDE 3 MILLILITER(S): 9 INJECTION INTRAMUSCULAR; INTRAVENOUS; SUBCUTANEOUS at 08:03

## 2021-10-25 NOTE — ASU DISCHARGE PLAN (ADULT/PEDIATRIC) - CARE PROVIDER_API CALL
Leonor Akhtar)  Surgery  [address] [place] [address]  Phone: [phone]  Fax: [phone]  Follow Up Time:

## 2021-10-25 NOTE — BRIEF OPERATIVE NOTE - OPERATION/FINDINGS
Cystoscopy and removal of Right Double J stent. post procedure straight cath Cystoscopy and removal of Right Double J stent. Bladder irrigated. Post procedure straight cath

## 2021-10-25 NOTE — ASU DISCHARGE PLAN (ADULT/PEDIATRIC) - ASU DC SPECIAL INSTRUCTIONSFT
Get plenty of rest. If experiencing significant pain, bleeding with urination, foul odor, please call the office.

## 2021-10-25 NOTE — ASU PREOP CHECKLIST - IV STARTED
yes O-Z Flap Text: The defect edges were debeveled with a #15 scalpel blade.  Given the location of the defect, shape of the defect and the proximity to free margins an O-Z flap was deemed most appropriate.  Using a sterile surgical marker, an appropriate transposition flap was drawn incorporating the defect and placing the expected incisions within the relaxed skin tension lines where possible. The area thus outlined was incised deep to adipose tissue with a #15 scalpel blade.  The skin margins were undermined to an appropriate distance in all directions utilizing iris scissors.

## 2021-10-26 ENCOUNTER — APPOINTMENT (OUTPATIENT)
Dept: NEPHROLOGY | Facility: CLINIC | Age: 61
End: 2021-10-26
Payer: COMMERCIAL

## 2021-10-26 VITALS
DIASTOLIC BLOOD PRESSURE: 77 MMHG | HEART RATE: 90 BPM | HEIGHT: 69 IN | SYSTOLIC BLOOD PRESSURE: 147 MMHG | TEMPERATURE: 97 F | WEIGHT: 185 LBS | RESPIRATION RATE: 18 BRPM | OXYGEN SATURATION: 99 % | BODY MASS INDEX: 27.4 KG/M2

## 2021-10-26 VITALS — SYSTOLIC BLOOD PRESSURE: 124 MMHG | DIASTOLIC BLOOD PRESSURE: 70 MMHG

## 2021-10-26 PROCEDURE — 99215 OFFICE O/P EST HI 40 MIN: CPT

## 2021-10-26 NOTE — ASSESSMENT
[FreeTextEntry1] : Renal Transplant recipient: Had immediate allograft function, normal creatinine at discharge. Has urinary frequency and nocturia. No dysuria/hematuria. No fever/chills. Tolerating medications.\par Immunosuppression: reviewed; simulect induction, on tac/MMF/prednisone, last Tac level noted; will recheck. Currently on Envarsus 12 mg daily.; full dose MMF and prednisone at 5 mg daily\par Gave flow sheets to maintain home charts of   blood pressure, temperature, weight and urine output.\par Hypertension: controlled; Reviewed medications.\par Hyperlipidemia: Was on simvastatin, off for 2 years after being on dialysis, will check lipid panel in 3 months. Used to get severe muscle cramps\par Drain/Stent: Both have been removed.\par Gout: Has been on allopurinol.\par Cardiovascular risk reduction discussed. On aspirin.\par Elevated Uric acid level/Gout: Started on allopurinol.\par Infection prophylaxis: On Bactrim, Valcyte and nystatin as well as GI prophylaxis.\par Discussed ambulation, using incentive spirometer, optimal glucose and blood pressure readings, adherence with medications and follow ups, follow up clinic visit schedule, avoiding dehydration, mosquito bites; prevention of DVT as well as food safety.\par Advised weight loss and avoid high glycemic foods.\par \par

## 2021-10-26 NOTE — HISTORY OF PRESENT ILLNESS
[FreeTextEntry1] : He received  donor kidney transplant on 21 at [facility] (Surgeon-Dr. Akhtar)\par \par Currently\par Has no fever, no urinary symptoms except nocturia: 3- times/night at present.\par Home blood pressure, temp and weight flow sheet reviewed.\par Home glucose: not diabetic. None.\par Medications side effects: none noted.\par Adherence and understanding: Good.\par \par Functional /Employment status: Reviewed. Employed on sick time at present.\par Cardiologist:Dr. Junior Bazan\par

## 2021-10-27 LAB
ALBUMIN SERPL ELPH-MCNC: 4.5 G/DL
ALP BLD-CCNC: 90 U/L
ALT SERPL-CCNC: 10 U/L
ANION GAP SERPL CALC-SCNC: 11 MMOL/L
APPEARANCE: CLEAR
AST SERPL-CCNC: 12 U/L
BACTERIA: NEGATIVE
BASOPHILS # BLD AUTO: 0.04 K/UL
BASOPHILS NFR BLD AUTO: 0.5 %
BILIRUB SERPL-MCNC: 0.4 MG/DL
BILIRUBIN URINE: NEGATIVE
BLOOD URINE: NEGATIVE
BUN SERPL-MCNC: 15 MG/DL
CALCIUM SERPL-MCNC: 9.6 MG/DL
CALCIUM SERPL-MCNC: 9.6 MG/DL
CHLORIDE SERPL-SCNC: 105 MMOL/L
CO2 SERPL-SCNC: 22 MMOL/L
COLOR: YELLOW
CREAT SERPL-MCNC: 0.92 MG/DL
CREAT SPEC-SCNC: 153 MG/DL
CREAT/PROT UR: 0.1 RATIO
EOSINOPHIL # BLD AUTO: 0.11 K/UL
EOSINOPHIL NFR BLD AUTO: 1.3 %
GLUCOSE QUALITATIVE U: NORMAL
GLUCOSE SERPL-MCNC: 143 MG/DL
HCT VFR BLD CALC: 36.6 %
HGB BLD-MCNC: 11.8 G/DL
HYALINE CASTS: 0 /LPF
IMM GRANULOCYTES NFR BLD AUTO: 0.6 %
KETONES URINE: NEGATIVE
LDH SERPL-CCNC: 140 U/L
LEUKOCYTE ESTERASE URINE: NEGATIVE
LYMPHOCYTES # BLD AUTO: 1.87 K/UL
LYMPHOCYTES NFR BLD AUTO: 22.3 %
MAGNESIUM SERPL-MCNC: 1.5 MG/DL
MAN DIFF?: NORMAL
MCHC RBC-ENTMCNC: 32 PG
MCHC RBC-ENTMCNC: 32.2 GM/DL
MCV RBC AUTO: 99.2 FL
MICROSCOPIC-UA: NORMAL
MONOCYTES # BLD AUTO: 0.6 K/UL
MONOCYTES NFR BLD AUTO: 7.2 %
NEUTROPHILS # BLD AUTO: 5.72 K/UL
NEUTROPHILS NFR BLD AUTO: 68.1 %
NITRITE URINE: NEGATIVE
PARATHYROID HORMONE INTACT: 147 PG/ML
PH URINE: 5.5
PHOSPHATE SERPL-MCNC: 1.9 MG/DL
PLATELET # BLD AUTO: 197 K/UL
POTASSIUM SERPL-SCNC: 4.5 MMOL/L
PROT SERPL-MCNC: 6.4 G/DL
PROT UR-MCNC: 12 MG/DL
PROTEIN URINE: NORMAL
RBC # BLD: 3.69 M/UL
RBC # FLD: 13.2 %
RED BLOOD CELLS URINE: 2 /HPF
SODIUM SERPL-SCNC: 138 MMOL/L
SPECIFIC GRAVITY URINE: 1.02
SQUAMOUS EPITHELIAL CELLS: 0 /HPF
TACROLIMUS SERPL-MCNC: 4.9 NG/ML
URATE SERPL-MCNC: 5.7 MG/DL
UROBILINOGEN URINE: NORMAL
WBC # FLD AUTO: 8.39 K/UL
WHITE BLOOD CELLS URINE: 2 /HPF

## 2021-10-29 LAB
BKV DNA SPEC QL NAA+PROBE: NEGATIVE COPIES/ML
LOG 10 BK QUANTITATION PCR: NORMAL

## 2021-11-01 ENCOUNTER — APPOINTMENT (OUTPATIENT)
Dept: RADIOLOGY | Facility: IMAGING CENTER | Age: 61
End: 2021-11-01
Payer: COMMERCIAL

## 2021-11-01 ENCOUNTER — RESULT REVIEW (OUTPATIENT)
Age: 61
End: 2021-11-01

## 2021-11-01 ENCOUNTER — OUTPATIENT (OUTPATIENT)
Dept: OUTPATIENT SERVICES | Facility: HOSPITAL | Age: 61
LOS: 1 days | End: 2021-11-01
Payer: COMMERCIAL

## 2021-11-01 DIAGNOSIS — T85.611A BREAKDOWN (MECHANICAL) OF INTRAPERITONEAL DIALYSIS CATHETER, INITIAL ENCOUNTER: Chronic | ICD-10-CM

## 2021-11-01 DIAGNOSIS — M25.569 PAIN IN UNSPECIFIED KNEE: ICD-10-CM

## 2021-11-01 DIAGNOSIS — Z98.890 OTHER SPECIFIED POSTPROCEDURAL STATES: Chronic | ICD-10-CM

## 2021-11-01 DIAGNOSIS — Z94.0 KIDNEY TRANSPLANT STATUS: Chronic | ICD-10-CM

## 2021-11-01 PROCEDURE — 73562 X-RAY EXAM OF KNEE 3: CPT | Mod: 26,50

## 2021-11-01 PROCEDURE — 73562 X-RAY EXAM OF KNEE 3: CPT

## 2021-11-09 ENCOUNTER — APPOINTMENT (OUTPATIENT)
Dept: VASCULAR SURGERY | Facility: CLINIC | Age: 61
End: 2021-11-09

## 2021-11-11 ENCOUNTER — NON-APPOINTMENT (OUTPATIENT)
Age: 61
End: 2021-11-11

## 2021-11-11 ENCOUNTER — APPOINTMENT (OUTPATIENT)
Dept: TRANSPLANT | Facility: CLINIC | Age: 61
End: 2021-11-11
Payer: COMMERCIAL

## 2021-11-11 ENCOUNTER — APPOINTMENT (OUTPATIENT)
Dept: GASTROENTEROLOGY | Facility: CLINIC | Age: 61
End: 2021-11-11

## 2021-11-11 ENCOUNTER — APPOINTMENT (OUTPATIENT)
Dept: GASTROENTEROLOGY | Facility: CLINIC | Age: 61
End: 2021-11-11
Payer: COMMERCIAL

## 2021-11-11 VITALS
WEIGHT: 185 LBS | SYSTOLIC BLOOD PRESSURE: 149 MMHG | HEIGHT: 69 IN | HEART RATE: 93 BPM | OXYGEN SATURATION: 97 % | TEMPERATURE: 97.7 F | DIASTOLIC BLOOD PRESSURE: 81 MMHG | BODY MASS INDEX: 27.4 KG/M2

## 2021-11-11 VITALS
DIASTOLIC BLOOD PRESSURE: 79 MMHG | HEART RATE: 82 BPM | BODY MASS INDEX: 28.14 KG/M2 | RESPIRATION RATE: 18 BRPM | SYSTOLIC BLOOD PRESSURE: 148 MMHG | TEMPERATURE: 98 F | WEIGHT: 190 LBS | OXYGEN SATURATION: 100 % | HEIGHT: 69 IN

## 2021-11-11 DIAGNOSIS — Z87.19 PERSONAL HISTORY OF OTHER DISEASES OF THE DIGESTIVE SYSTEM: ICD-10-CM

## 2021-11-11 DIAGNOSIS — Z78.9 OTHER SPECIFIED HEALTH STATUS: ICD-10-CM

## 2021-11-11 DIAGNOSIS — E83.30 DISORDER OF PHOSPHORUS METABOLISM, UNSPECIFIED: ICD-10-CM

## 2021-11-11 DIAGNOSIS — K21.9 GASTRO-ESOPHAGEAL REFLUX DISEASE W/OUT ESOPHAGITIS: ICD-10-CM

## 2021-11-11 DIAGNOSIS — Z99.2 DEPENDENCE ON RENAL DIALYSIS: ICD-10-CM

## 2021-11-11 DIAGNOSIS — Z84.1 FAMILY HISTORY OF DISORDERS OF KIDNEY AND URETER: ICD-10-CM

## 2021-11-11 DIAGNOSIS — K64.4 RESIDUAL HEMORRHOIDAL SKIN TAGS: ICD-10-CM

## 2021-11-11 DIAGNOSIS — Z82.49 FAMILY HISTORY OF ISCHEMIC HEART DISEASE AND OTHER DISEASES OF THE CIRCULATORY SYSTEM: ICD-10-CM

## 2021-11-11 PROCEDURE — 99024 POSTOP FOLLOW-UP VISIT: CPT

## 2021-11-11 PROCEDURE — 99203 OFFICE O/P NEW LOW 30 MIN: CPT

## 2021-11-11 RX ORDER — HYDROCORTISONE ACETATE 25 MG/1
25 SUPPOSITORY RECTAL
Qty: 30 | Refills: 1 | Status: ACTIVE | COMMUNITY
Start: 2021-11-11 | End: 1900-01-01

## 2021-11-11 RX ORDER — HYDROCORTISONE ACETATE, PRAMOXINE HCL 2.5; 1 G/100G; G/100G
2.5-1 CREAM TOPICAL
Qty: 1 | Refills: 0 | Status: ACTIVE | COMMUNITY
Start: 2021-11-11 | End: 1900-01-01

## 2021-11-11 NOTE — HISTORY OF PRESENT ILLNESS
[ Donor] :  donor [Basiliximab] : basiliximab [Steroids] : steroids [] : Yes [Brain Death] : brain death [KDPI: ____] : Kidney Donor Profile Index: [unfilled] [TextBox_7] : September 9 2021 [TextBox_34] : immediate graft function\par discharged home on post day 5 with creatinine 1.7 [TextBox_52] : donor is 42 year old male with diabetes\par left kidney with 2 arteries; reconstructed together on back table [de-identified] : doing well\par last creatinine <1\par immunosuppression tacro, mmf and steroids\par no issues\par jj stent removed\par

## 2021-11-11 NOTE — REVIEW OF SYSTEMS
[Constipation] : constipation [Negative] : Neurological [Earache] : no earache [Loss Of Hearing] : no hearing loss [Sore Throat] : no sore throat [Hoarseness] : no hoarseness [Abdominal Pain] : no abdominal pain [Heartburn] : no heartburn [Dysuria] : no dysuria [Incontinence] : no incontinence [Hesitancy] : no urinary hesitancy [Sleep Disturbances] : no sleep disturbances [Anxiety] : no anxiety [Depression] : no depression [FreeTextEntry7] : Abd Bloating and fullness

## 2021-11-11 NOTE — PHYSICAL EXAM
[General Appearance - Alert] : alert [General Appearance - In No Acute Distress] : in no acute distress [Sclera] : the sclera and conjunctiva were normal [PERRL With Normal Accommodation] : pupils were equal in size, round, and reactive to light [Extraocular Movements] : extraocular movements were intact [Hearing Threshold Finger Rub Not [place]] : hearing was normal [Examination Of The Oral Cavity] : the lips and gums were normal [Oropharynx] : the oropharynx was normal [Neck Appearance] : the appearance of the neck was normal [Neck Cervical Mass (___cm)] : no neck mass was observed [Jugular Venous Distention Increased] : there was no jugular-venous distention [Thyroid Diffuse Enlargement] : the thyroid was not enlarged [Thyroid Nodule] : there were no palpable thyroid nodules [Auscultation Breath Sounds / Voice Sounds] : lungs were clear to auscultation bilaterally [Heart Rate And Rhythm] : heart rate was normal and rhythm regular [Heart Sounds] : normal S1 and S2 [Murmurs] : no murmurs [Heart Sounds Pericardial Friction Rub] : no pericardial rub [Bowel Sounds] : normal bowel sounds [Abdomen Soft] : soft [Abdomen Tenderness] : non-tender [] : no hepato-splenomegaly [Abdomen Mass (___ Cm)] : no abdominal mass palpated [External Hemorrhoid] : external hemorrhoids [Cervical Lymph Nodes Enlarged Posterior Bilaterally] : posterior cervical [Cervical Lymph Nodes Enlarged Anterior Bilaterally] : anterior cervical [Supraclavicular Lymph Nodes Enlarged Bilaterally] : supraclavicular [Axillary Lymph Nodes Enlarged Bilaterally] : axillary [Femoral Lymph Nodes Enlarged Bilaterally] : femoral [Inguinal Lymph Nodes Enlarged Bilaterally] : inguinal [No Spinal Tenderness] : no spinal tenderness [Abnormal Walk] : normal gait [Nail Clubbing] : no clubbing  or cyanosis of the fingernails [Involuntary Movements] : no involuntary movements were seen [Musculoskeletal - Swelling] : no joint swelling seen [Motor Tone] : muscle strength and tone were normal [No Focal Deficits] : no focal deficits [Oriented To Time, Place, And Person] : oriented to person, place, and time [Affect] : the affect was normal [Mood] : the mood was normal [FreeTextEntry1] : deferred

## 2021-11-11 NOTE — HISTORY OF PRESENT ILLNESS
[Hospitalization] : was hospitalized [Heartburn] : denies heartburn [Nausea] : denies nausea [Diarrhea] : denies diarrhea [Constipation] : stable constipation [Yellow Skin Or Eyes (Jaundice)] : jaundice [Abdominal Pain] : abdominal pain [Rectal Pain] : rectal pain [Kidney Stone] : kidney stone [Diverticulitis] : diverticulitis [Abdominal Surgery] : abdominal surgery [de-identified] : >10 yrs ago [de-identified] : Had  donor  Kidney Transplant in  at [facility], doing very well [de-identified] : C/o ext hemorrhoids causing discomfort.Denied any rectal pain.had one episode of bleeding but subsided for now.Used OTC hemorrhoidal cream for few days with partial resolution.\par Had last colonoscopy 10 yrs ago by me at [facility] office.Was seen in FU 3-4 times at [place] office.\par Got transplanted with  donor kidney in  and was able to go home on day 5 post op.Present creat is 0.9mg.LFTs are normal.Hb 11.6 and Plt 188K.\par Using Senna for ch constipation and getting better. [de-identified] : External hemorrhoids causing discomfort and one episode of BPR 2 days ago

## 2021-11-11 NOTE — ASSESSMENT
[FreeTextEntry1] : doing well post DDRT\par good graft function\par immunosuppression tacro, mmf and steroids\par follow up labs\par will check thyroid functions (feels cold and has history of thyroid issues)

## 2021-11-11 NOTE — ASSESSMENT
[FreeTextEntry1] : S/p Kidney transplant doing well.New issue of Ext hemorrhoids not resolved with OTC creams.

## 2021-11-17 LAB
ALBUMIN SERPL ELPH-MCNC: 4.6 G/DL
ALP BLD-CCNC: 101 U/L
ALT SERPL-CCNC: 14 U/L
ANION GAP SERPL CALC-SCNC: 12 MMOL/L
APPEARANCE: CLEAR
AST SERPL-CCNC: 14 U/L
BACTERIA: NEGATIVE
BASOPHILS # BLD AUTO: 0.06 K/UL
BASOPHILS NFR BLD AUTO: 0.8 %
BILIRUB SERPL-MCNC: 0.3 MG/DL
BILIRUBIN URINE: NEGATIVE
BKV DNA SPEC QL NAA+PROBE: NEGATIVE COPIES/ML
BLOOD URINE: NEGATIVE
BUN SERPL-MCNC: 19 MG/DL
CALCIUM SERPL-MCNC: 10 MG/DL
CALCIUM SERPL-MCNC: 10 MG/DL
CHLORIDE SERPL-SCNC: 105 MMOL/L
CO2 SERPL-SCNC: 22 MMOL/L
COLOR: YELLOW
COVID-19 SPIKE DOMAIN ANTIBODY INTERPRETATION: POSITIVE
CREAT SERPL-MCNC: 1.04 MG/DL
CREAT SPEC-SCNC: 128 MG/DL
CREAT/PROT UR: 0.1 RATIO
EOSINOPHIL # BLD AUTO: 0.11 K/UL
EOSINOPHIL NFR BLD AUTO: 1.5 %
GLUCOSE QUALITATIVE U: NEGATIVE
GLUCOSE SERPL-MCNC: 131 MG/DL
HCT VFR BLD CALC: 39 %
HGB BLD-MCNC: 12.3 G/DL
HYALINE CASTS: 1 /LPF
IMM GRANULOCYTES NFR BLD AUTO: 0.5 %
KETONES URINE: NEGATIVE
LEUKOCYTE ESTERASE URINE: NEGATIVE
LOG 10 BK QUANTITATION PCR: NORMAL
LYMPHOCYTES # BLD AUTO: 1.97 K/UL
LYMPHOCYTES NFR BLD AUTO: 26.4 %
MAGNESIUM SERPL-MCNC: 1.5 MG/DL
MAN DIFF?: NORMAL
MCHC RBC-ENTMCNC: 31.1 PG
MCHC RBC-ENTMCNC: 31.5 GM/DL
MCV RBC AUTO: 98.5 FL
MICROSCOPIC-UA: NORMAL
MONOCYTES # BLD AUTO: 0.56 K/UL
MONOCYTES NFR BLD AUTO: 7.5 %
NEUTROPHILS # BLD AUTO: 4.73 K/UL
NEUTROPHILS NFR BLD AUTO: 63.3 %
NITRITE URINE: NEGATIVE
PARATHYROID HORMONE INTACT: 129 PG/ML
PH URINE: 5.5
PHOSPHATE SERPL-MCNC: 2.6 MG/DL
PLATELET # BLD AUTO: 197 K/UL
POTASSIUM SERPL-SCNC: 5.4 MMOL/L
PROT SERPL-MCNC: 6.8 G/DL
PROT UR-MCNC: 11 MG/DL
PROTEIN URINE: NEGATIVE
RBC # BLD: 3.96 M/UL
RBC # FLD: 12.8 %
RED BLOOD CELLS URINE: 1 /HPF
SARS-COV-2 AB SERPL IA-ACNC: >250 U/ML
SODIUM SERPL-SCNC: 139 MMOL/L
SPECIFIC GRAVITY URINE: 1.02
SQUAMOUS EPITHELIAL CELLS: 0 /HPF
T3 SERPL-MCNC: 85 NG/DL
T4 SERPL-MCNC: 7.1 UG/DL
TACROLIMUS SERPL-MCNC: 13 NG/ML
TSH SERPL-ACNC: 0.68 UIU/ML
URATE SERPL-MCNC: 5.5 MG/DL
UROBILINOGEN URINE: NORMAL
WBC # FLD AUTO: 7.47 K/UL
WHITE BLOOD CELLS URINE: 1 /HPF

## 2021-11-17 RX ORDER — TACROLIMUS 1 MG/1
1 TABLET, EXTENDED RELEASE ORAL
Qty: 30 | Refills: 11 | Status: DISCONTINUED | COMMUNITY
Start: 2021-10-27 | End: 2021-11-17

## 2021-11-18 ENCOUNTER — NON-APPOINTMENT (OUTPATIENT)
Age: 61
End: 2021-11-18

## 2021-11-18 ENCOUNTER — APPOINTMENT (OUTPATIENT)
Dept: NEPHROLOGY | Facility: CLINIC | Age: 61
End: 2021-11-18
Payer: COMMERCIAL

## 2021-11-18 VITALS
SYSTOLIC BLOOD PRESSURE: 155 MMHG | TEMPERATURE: 97.7 F | DIASTOLIC BLOOD PRESSURE: 81 MMHG | OXYGEN SATURATION: 100 % | RESPIRATION RATE: 14 BRPM | HEART RATE: 71 BPM

## 2021-11-18 VITALS — SYSTOLIC BLOOD PRESSURE: 138 MMHG | DIASTOLIC BLOOD PRESSURE: 70 MMHG

## 2021-11-18 VITALS — WEIGHT: 187.39 LBS | BODY MASS INDEX: 27.67 KG/M2

## 2021-11-18 PROCEDURE — 99215 OFFICE O/P EST HI 40 MIN: CPT

## 2021-11-18 NOTE — ASSESSMENT
[FreeTextEntry1] : Renal Transplant recipient: Had immediate allograft function, normal creatinine at discharge. Has urinary frequency and nocturia. No dysuria/hematuria. No fever/chills. Tolerating medications.\par Immunosuppression: reviewed; simulect induction, on tac/MMF/prednisone, last Tac level noted; will recheck. Currently on Envarsus 12 mg daily.; full dose MMF and prednisone at 5 mg daily\par Gave flow sheets to maintain home charts of   blood pressure, temperature, weight and urine output.\par Hypertension: Suboptimally Controlled; Reviewed medications. Metoprolol increased to 25 mg in AM and 50 mg PM.\par Hyperlipidemia: Was on simvastatin, off for 2 years after being on dialysis, will check lipid panel in 3 months. Used to get severe muscle cramps\par Drain/Stent: Both have been removed.\par Gout: Has been on allopurinol.\par Cardiovascular risk reduction discussed. On aspirin.\par Elevated Uric acid level/Gout: Started on allopurinol.\par Infection prophylaxis: On Bactrim, Valcyte and nystatin as well as GI prophylaxis.\par Noted COVID antibodies\par Advised daily aerobic exercises for 45 -60 minutes\par \par

## 2021-11-18 NOTE — HISTORY OF PRESENT ILLNESS
[FreeTextEntry1] : He received  donor kidney transplant on 21 at [facility] (Surgeon-Dr. Akhtar)\par \par Currently\par Has no fever, no urinary symptoms except nocturia: 3- times/night at present.\par Home blood pressure, temp and weight flow sheet reviewed.\par Has elevated blood pressure at home\par Adherence and understanding: Good.\par \par Functional /Employment status: Reviewed. Employed on sick time at present.\par Cardiologist:Dr. Junior Bazan\par

## 2021-11-22 LAB
ALBUMIN SERPL ELPH-MCNC: 4.4 G/DL
ALP BLD-CCNC: 96 U/L
ALT SERPL-CCNC: 16 U/L
ANION GAP SERPL CALC-SCNC: 12 MMOL/L
APPEARANCE: CLEAR
AST SERPL-CCNC: 11 U/L
BACTERIA: NEGATIVE
BASOPHILS # BLD AUTO: 0.03 K/UL
BASOPHILS NFR BLD AUTO: 0.5 %
BILIRUB SERPL-MCNC: 0.6 MG/DL
BILIRUBIN URINE: NEGATIVE
BKV DNA SPEC QL NAA+PROBE: NOT DETECTED COPIES/ML
BLOOD URINE: NEGATIVE
BUN SERPL-MCNC: 18 MG/DL
CALCIUM SERPL-MCNC: 9.8 MG/DL
CALCIUM SERPL-MCNC: 9.8 MG/DL
CHLORIDE SERPL-SCNC: 105 MMOL/L
CO2 SERPL-SCNC: 22 MMOL/L
COLOR: YELLOW
CREAT SERPL-MCNC: 1.15 MG/DL
CREAT SPEC-SCNC: 172 MG/DL
CREAT/PROT UR: 0.1 RATIO
EOSINOPHIL # BLD AUTO: 0.09 K/UL
EOSINOPHIL NFR BLD AUTO: 1.6 %
GLUCOSE QUALITATIVE U: NEGATIVE
GLUCOSE SERPL-MCNC: 165 MG/DL
HCT VFR BLD CALC: 37.1 %
HGB BLD-MCNC: 12.1 G/DL
HYALINE CASTS: 0 /LPF
IMM GRANULOCYTES NFR BLD AUTO: 1.2 %
KETONES URINE: NEGATIVE
LDH SERPL-CCNC: 187 U/L
LEUKOCYTE ESTERASE URINE: NEGATIVE
LYMPHOCYTES # BLD AUTO: 1.3 K/UL
LYMPHOCYTES NFR BLD AUTO: 22.5 %
MAGNESIUM SERPL-MCNC: 1.5 MG/DL
MAN DIFF?: NORMAL
MCHC RBC-ENTMCNC: 32 PG
MCHC RBC-ENTMCNC: 32.6 GM/DL
MCV RBC AUTO: 98.1 FL
MICROSCOPIC-UA: NORMAL
MONOCYTES # BLD AUTO: 0.44 K/UL
MONOCYTES NFR BLD AUTO: 7.6 %
NEUTROPHILS # BLD AUTO: 3.84 K/UL
NEUTROPHILS NFR BLD AUTO: 66.6 %
NITRITE URINE: NEGATIVE
PARATHYROID HORMONE INTACT: 136 PG/ML
PH URINE: 5.5
PHOSPHATE SERPL-MCNC: 2.4 MG/DL
PLATELET # BLD AUTO: 175 K/UL
POTASSIUM SERPL-SCNC: 5.3 MMOL/L
PROT SERPL-MCNC: 6.4 G/DL
PROT UR-MCNC: 16 MG/DL
PROTEIN URINE: NORMAL
RBC # BLD: 3.78 M/UL
RBC # FLD: 12.4 %
RED BLOOD CELLS URINE: 2 /HPF
SODIUM SERPL-SCNC: 138 MMOL/L
SPECIFIC GRAVITY URINE: 1.02
SQUAMOUS EPITHELIAL CELLS: 0 /HPF
TACROLIMUS SERPL-MCNC: 16.9 NG/ML
URATE SERPL-MCNC: 5.9 MG/DL
UROBILINOGEN URINE: NORMAL
WBC # FLD AUTO: 5.77 K/UL
WHITE BLOOD CELLS URINE: 1 /HPF

## 2021-12-07 ENCOUNTER — APPOINTMENT (OUTPATIENT)
Dept: NEPHROLOGY | Facility: CLINIC | Age: 61
End: 2021-12-07
Payer: COMMERCIAL

## 2021-12-07 VITALS
RESPIRATION RATE: 14 BRPM | DIASTOLIC BLOOD PRESSURE: 74 MMHG | TEMPERATURE: 98.4 F | WEIGHT: 84.99 LBS | OXYGEN SATURATION: 100 % | HEIGHT: 69 IN | BODY MASS INDEX: 12.59 KG/M2 | HEART RATE: 73 BPM | SYSTOLIC BLOOD PRESSURE: 132 MMHG

## 2021-12-07 VITALS — BODY MASS INDEX: 27.67 KG/M2 | WEIGHT: 187.39 LBS

## 2021-12-07 DIAGNOSIS — N13.8 BENIGN PROSTATIC HYPERPLASIA WITH LOWER URINARY TRACT SYMPMS: ICD-10-CM

## 2021-12-07 DIAGNOSIS — N40.1 BENIGN PROSTATIC HYPERPLASIA WITH LOWER URINARY TRACT SYMPMS: ICD-10-CM

## 2021-12-07 PROCEDURE — 99214 OFFICE O/P EST MOD 30 MIN: CPT

## 2021-12-07 NOTE — ASSESSMENT
[FreeTextEntry1] : Renal Transplant recipient: Had immediate allograft function, normal creatinine at discharge. Has urinary frequency and nocturia. No dysuria/hematuria. No fever/chills. Tolerating medications.\par Immunosuppression: reviewed; simulect induction, on tac/MMF/prednisone, last Tac level noted; will recheck. Currently on Envarsus 10 mg daily.; full dose MMF and prednisone at 5 mg daily\par On Mg and Phos supplementation.\par Gave flow sheets to maintain home charts of   blood pressure, temperature, weight and urine output.\par Hypertension: Suboptimally Controlled; Reviewed medications. Metoprolol increased to 25 mg in AM and 50 mg PM.\par Hyperlipidemia: Was on simvastatin, off for 2 years after being on dialysis, will check lipid panel . Used to get severe muscle cramps\par Drain/Stent: Both have been removed.\par Gout: Has been on allopurinol.\par Cardiovascular risk reduction discussed. On aspirin.\par Elevated Uric acid level/Gout: Started on allopurinol.\par Infection prophylaxis: On Bactrim, Valcyte and nystatin as well as GI prophylaxis.\par Noted COVID antibodies. \par Advised daily aerobic exercises for 45 -60 minutes\par \par

## 2021-12-07 NOTE — PHYSICAL EXAM
[General Appearance - Alert] : alert [General Appearance - In No Acute Distress] : in no acute distress [Sclera] : the sclera and conjunctiva were normal [PERRL With Normal Accommodation] : pupils were equal in size, round, and reactive to light [Extraocular Movements] : extraocular movements were intact [Outer Ear] : the ears and nose were normal in appearance [Oropharynx] : the oropharynx was normal [Neck Appearance] : the appearance of the neck was normal [Neck Cervical Mass (___cm)] : no neck mass was observed [Jugular Venous Distention Increased] : there was no jugular-venous distention [Thyroid Diffuse Enlargement] : the thyroid was not enlarged [Thyroid Nodule] : there were no palpable thyroid nodules [Auscultation Breath Sounds / Voice Sounds] : lungs were clear to auscultation bilaterally [Heart Rate And Rhythm] : heart rate was normal and rhythm regular [Heart Sounds] : normal S1 and S2 [Heart Sounds Gallop] : no gallops [Murmurs] : no murmurs [Heart Sounds Pericardial Friction Rub] : no pericardial rub [Full Pulse] : the pedal pulses are present [Edema] : there was no peripheral edema [Bowel Sounds] : normal bowel sounds [Abdomen Soft] : soft [Abdomen Tenderness] : non-tender [Abdomen Mass (___ Cm)] : no abdominal mass palpated [Cervical Lymph Nodes Enlarged Posterior Bilaterally] : posterior cervical [Cervical Lymph Nodes Enlarged Anterior Bilaterally] : anterior cervical [Supraclavicular Lymph Nodes Enlarged Bilaterally] : supraclavicular [Axillary Lymph Nodes Enlarged Bilaterally] : axillary [Inguinal Lymph Nodes Enlarged Bilaterally] : inguinal [Abnormal Walk] : normal gait [Involuntary Movements] : no involuntary movements were seen [___ (cm) Fistula] : [unfilled] (cm) fistula [Bruit] : a bruit was present [Thrill] : a thrill was present [] : no rash [No Focal Deficits] : no focal deficits [Oriented To Time, Place, And Person] : oriented to person, place, and time [Impaired Insight] : insight and judgment were intact [Affect] : the affect was normal [FreeTextEntry1] : healed scar

## 2021-12-08 ENCOUNTER — NON-APPOINTMENT (OUTPATIENT)
Age: 61
End: 2021-12-08

## 2021-12-08 LAB
25(OH)D3 SERPL-MCNC: 16.1 NG/ML
ALBUMIN SERPL ELPH-MCNC: 4.5 G/DL
ALP BLD-CCNC: 107 U/L
ALT SERPL-CCNC: 9 U/L
ANION GAP SERPL CALC-SCNC: 9 MMOL/L
APPEARANCE: CLEAR
AST SERPL-CCNC: 14 U/L
BACTERIA: NEGATIVE
BASOPHILS # BLD AUTO: 0.03 K/UL
BASOPHILS NFR BLD AUTO: 0.6 %
BILIRUB SERPL-MCNC: 0.5 MG/DL
BILIRUBIN URINE: NEGATIVE
BLOOD URINE: NEGATIVE
BUN SERPL-MCNC: 22 MG/DL
CALCIUM SERPL-MCNC: 9.8 MG/DL
CALCIUM SERPL-MCNC: 9.8 MG/DL
CHLORIDE SERPL-SCNC: 104 MMOL/L
CHOLEST SERPL-MCNC: 203 MG/DL
CO2 SERPL-SCNC: 24 MMOL/L
COLOR: YELLOW
CREAT SERPL-MCNC: 1.15 MG/DL
CREAT SPEC-SCNC: 132 MG/DL
CREAT/PROT UR: 0.1 RATIO
EOSINOPHIL # BLD AUTO: 0.07 K/UL
EOSINOPHIL NFR BLD AUTO: 1.4 %
ESTIMATED AVERAGE GLUCOSE: 148 MG/DL
GLUCOSE QUALITATIVE U: NEGATIVE
GLUCOSE SERPL-MCNC: 164 MG/DL
HBA1C MFR BLD HPLC: 6.8 %
HCT VFR BLD CALC: 37.1 %
HDLC SERPL-MCNC: 52 MG/DL
HGB BLD-MCNC: 12 G/DL
HYALINE CASTS: 1 /LPF
IMM GRANULOCYTES NFR BLD AUTO: 1.6 %
KETONES URINE: NEGATIVE
LDH SERPL-CCNC: 244 U/L
LDLC SERPL CALC-MCNC: 129 MG/DL
LEUKOCYTE ESTERASE URINE: NEGATIVE
LYMPHOCYTES # BLD AUTO: 1.19 K/UL
LYMPHOCYTES NFR BLD AUTO: 23.1 %
MAGNESIUM SERPL-MCNC: 1.5 MG/DL
MAN DIFF?: NORMAL
MCHC RBC-ENTMCNC: 31.1 PG
MCHC RBC-ENTMCNC: 32.3 GM/DL
MCV RBC AUTO: 96.1 FL
MICROSCOPIC-UA: NORMAL
MONOCYTES # BLD AUTO: 0.4 K/UL
MONOCYTES NFR BLD AUTO: 7.8 %
NEUTROPHILS # BLD AUTO: 3.38 K/UL
NEUTROPHILS NFR BLD AUTO: 65.5 %
NITRITE URINE: NEGATIVE
NONHDLC SERPL-MCNC: 152 MG/DL
PARATHYROID HORMONE INTACT: 158 PG/ML
PH URINE: 5.5
PHOSPHATE SERPL-MCNC: 2.9 MG/DL
PLATELET # BLD AUTO: 155 K/UL
POTASSIUM SERPL-SCNC: 5.2 MMOL/L
PROT SERPL-MCNC: 6.6 G/DL
PROT UR-MCNC: 9 MG/DL
PROTEIN URINE: NEGATIVE
RBC # BLD: 3.86 M/UL
RBC # FLD: 12.6 %
RED BLOOD CELLS URINE: 1 /HPF
SODIUM SERPL-SCNC: 137 MMOL/L
SPECIFIC GRAVITY URINE: 1.02
SQUAMOUS EPITHELIAL CELLS: 0 /HPF
TACROLIMUS SERPL-MCNC: 10.6 NG/ML
TRIGL SERPL-MCNC: 112 MG/DL
URATE SERPL-MCNC: 6.7 MG/DL
UROBILINOGEN URINE: NORMAL
WBC # FLD AUTO: 5.15 K/UL
WHITE BLOOD CELLS URINE: 1 /HPF

## 2021-12-09 ENCOUNTER — APPOINTMENT (OUTPATIENT)
Dept: TRANSPLANT | Facility: CLINIC | Age: 61
End: 2021-12-09

## 2021-12-09 LAB
BKV DNA SPEC QL NAA+PROBE: NOT DETECTED COPIES/ML
CMV DNA SPEC QL NAA+PROBE: NOT DETECTED IU/ML

## 2021-12-23 ENCOUNTER — APPOINTMENT (OUTPATIENT)
Dept: TRANSPLANT | Facility: CLINIC | Age: 61
End: 2021-12-23

## 2021-12-23 ENCOUNTER — APPOINTMENT (OUTPATIENT)
Dept: NEPHROLOGY | Facility: CLINIC | Age: 61
End: 2021-12-23
Payer: COMMERCIAL

## 2021-12-23 VITALS
OXYGEN SATURATION: 99 % | DIASTOLIC BLOOD PRESSURE: 77 MMHG | RESPIRATION RATE: 14 BRPM | TEMPERATURE: 97.2 F | HEIGHT: 69 IN | BODY MASS INDEX: 27.55 KG/M2 | HEART RATE: 78 BPM | SYSTOLIC BLOOD PRESSURE: 164 MMHG | WEIGHT: 186 LBS

## 2021-12-23 VITALS — DIASTOLIC BLOOD PRESSURE: 70 MMHG | SYSTOLIC BLOOD PRESSURE: 132 MMHG

## 2021-12-23 LAB
ALBUMIN SERPL ELPH-MCNC: 4.6 G/DL
ALP BLD-CCNC: 97 U/L
ALT SERPL-CCNC: 9 U/L
ANION GAP SERPL CALC-SCNC: 10 MMOL/L
APPEARANCE: CLEAR
AST SERPL-CCNC: 13 U/L
BACTERIA: NEGATIVE
BASOPHILS # BLD AUTO: 0.03 K/UL
BASOPHILS NFR BLD AUTO: 0.4 %
BILIRUB SERPL-MCNC: 0.5 MG/DL
BILIRUBIN URINE: NEGATIVE
BLOOD URINE: NEGATIVE
BUN SERPL-MCNC: 23 MG/DL
CALCIUM SERPL-MCNC: 10.2 MG/DL
CHLORIDE SERPL-SCNC: 101 MMOL/L
CO2 SERPL-SCNC: 22 MMOL/L
COLOR: YELLOW
CREAT SERPL-MCNC: 1.31 MG/DL
CREAT SPEC-SCNC: 136 MG/DL
CREAT/PROT UR: 0.1 RATIO
EOSINOPHIL # BLD AUTO: 0.12 K/UL
EOSINOPHIL NFR BLD AUTO: 1.8 %
GLUCOSE QUALITATIVE U: NEGATIVE
GLUCOSE SERPL-MCNC: 134 MG/DL
HCT VFR BLD CALC: 38.9 %
HGB BLD-MCNC: 13 G/DL
HYALINE CASTS: 0 /LPF
IMM GRANULOCYTES NFR BLD AUTO: 0.7 %
KETONES URINE: NEGATIVE
LEUKOCYTE ESTERASE URINE: NEGATIVE
LYMPHOCYTES # BLD AUTO: 1.61 K/UL
LYMPHOCYTES NFR BLD AUTO: 23.5 %
MAGNESIUM SERPL-MCNC: 1.5 MG/DL
MAN DIFF?: NORMAL
MCHC RBC-ENTMCNC: 31.3 PG
MCHC RBC-ENTMCNC: 33.4 GM/DL
MCV RBC AUTO: 93.5 FL
MICROSCOPIC-UA: NORMAL
MONOCYTES # BLD AUTO: 0.48 K/UL
MONOCYTES NFR BLD AUTO: 7 %
NEUTROPHILS # BLD AUTO: 4.56 K/UL
NEUTROPHILS NFR BLD AUTO: 66.6 %
NITRITE URINE: NEGATIVE
PH URINE: 5.5
PHOSPHATE SERPL-MCNC: 2.4 MG/DL
PLATELET # BLD AUTO: 194 K/UL
POTASSIUM SERPL-SCNC: 4.8 MMOL/L
PROT SERPL-MCNC: 6.5 G/DL
PROT UR-MCNC: 10 MG/DL
PROTEIN URINE: NORMAL
RBC # BLD: 4.16 M/UL
RBC # FLD: 12.5 %
RED BLOOD CELLS URINE: 1 /HPF
SODIUM SERPL-SCNC: 133 MMOL/L
SPECIFIC GRAVITY URINE: 1.02
SQUAMOUS EPITHELIAL CELLS: 0 /HPF
TACROLIMUS SERPL-MCNC: 11.5 NG/ML
UROBILINOGEN URINE: NORMAL
WBC # FLD AUTO: 6.85 K/UL
WHITE BLOOD CELLS URINE: 0 /HPF

## 2021-12-23 PROCEDURE — 99214 OFFICE O/P EST MOD 30 MIN: CPT

## 2021-12-23 NOTE — REASON FOR VISIT
[Follow-Up] : a follow-up visit [FreeTextEntry1] : Post kidney transplant follow up, no acute symptoms

## 2021-12-23 NOTE — HISTORY OF PRESENT ILLNESS
[FreeTextEntry1] : He received  donor kidney transplant on 21 at [facility] (Surgeon-Dr. Akhtar)\par \par Currently\par Has no fever, no urinary symptoms  at present.\par Home blood pressure, temp and weight flow sheet reviewed.\par Has fairly controlled  blood pressure at home\par Adherence and understanding: Good.\par \par Functional /Employment status: Reviewed. Employed back at work now.\par Cardiologist:Dr. Junior Bazan\par

## 2021-12-23 NOTE — ASSESSMENT
[FreeTextEntry1] : Renal Transplant recipient: Had immediate allograft function, normal creatinine at discharge. Has urinary frequency and nocturia. No dysuria/hematuria. No fever/chills. Tolerating medications.\par Immunosuppression: reviewed; simulect induction, on tac/MMF/prednisone, last Tac level noted; will recheck. Currently on Envarsus 10 mg daily.; full dose MMF and prednisone at 5 mg daily\par On Mg  supplementation. Off phos supplement\par Gave flow sheets to maintain home charts of   blood pressure, temperature, weight and urine output.\par Hypertension: Failry Controlled; Reviewed medications.  \par Hyperlipidemia: Was on simvastatin . Used to get severe muscle cramps\par Drain/Stent: Both have been removed.\par Gout: Has been on allopurinol.\par Cardiovascular risk reduction discussed. On aspirin.\par Elevated Uric acid level/Gout: Started on allopurinol.\par Infection prophylaxis: On Bactrim, Valcyte and nystatin as well as GI prophylaxis.\par Noted COVID antibodies. \par Advised daily aerobic exercises for 45 -  minutes\par Follow up every 4 weeks if labs are stable\par \par

## 2021-12-28 LAB — BKV DNA SPEC QL NAA+PROBE: NOT DETECTED COPIES/ML

## 2022-01-01 ENCOUNTER — APPOINTMENT (OUTPATIENT)
Dept: PULMONOLOGY | Facility: CLINIC | Age: 62
End: 2022-01-01
Payer: COMMERCIAL

## 2022-01-01 ENCOUNTER — TRANSCRIPTION ENCOUNTER (OUTPATIENT)
Age: 62
End: 2022-01-01

## 2022-01-01 LAB — SARS-COV-2 N GENE NPH QL NAA+PROBE: DETECTED

## 2022-01-01 PROCEDURE — 99204 OFFICE O/P NEW MOD 45 MIN: CPT | Mod: 95

## 2022-01-01 PROCEDURE — 99214 OFFICE O/P EST MOD 30 MIN: CPT | Mod: 95

## 2022-01-01 NOTE — HISTORY OF PRESENT ILLNESS
[Home] : at home, [unfilled] , at the time of the visit. [Other Location: e.g. Home (Enter Location, City,State)___] : at [unfilled] [Verbal consent obtained from patient] : the patient, [unfilled] [FreeTextEntry1] : 62 yo male presents for TELE visit for COVID-19 infection. Day 5 of symptoms. \par Pmhx significant for ESRD s/p renal transplant 9/2021 on immunosuppressant therapy (Tacro and Prednisone 5mg daily), DM, HLD, Glaucoma, HTN, GERD. Had 2 doses of Pfizer not boosted. \par \par Date of COVID positive test: +12/30/21\par Date of onset of symptoms: 12/28/21\par Symptoms: + Dry cough, runny nose, fevers resolved today 97.9F without antipyretics since Thursday. Good po intake of fluids and food, urinating normally. No chils, sob, marx, n/v, wheeze, chest tightness, swelling. Does not have Pulse oximeter at home. \par Treatment to date: Tylenol\par \par \par \par

## 2022-01-01 NOTE — PLAN
[FreeTextEntry1] : COVID 19 Infection \par - Stay well hydrated \par - Monitor for fevers, monitor pulse oximetry at rest and with activity\par - CROWN bundle labs ordered \par - Home  nursing assessment sent with delivery of Pulse oximeter requested \par - Contact provider and seek medical attention if SPO2 <92% \par - Eligible for Mab ordered today \par - ER if symptoms worsen \par \par f/u 2 days sooner if nec

## 2022-01-02 ENCOUNTER — OUTPATIENT (OUTPATIENT)
Dept: INPATIENT UNIT | Facility: HOSPITAL | Age: 62
LOS: 1 days | End: 2022-01-02
Payer: COMMERCIAL

## 2022-01-02 ENCOUNTER — APPOINTMENT (OUTPATIENT)
Dept: DISASTER EMERGENCY | Facility: HOSPITAL | Age: 62
End: 2022-01-02

## 2022-01-02 VITALS
TEMPERATURE: 99 F | HEART RATE: 87 BPM | RESPIRATION RATE: 15 BRPM | WEIGHT: 188.05 LBS | SYSTOLIC BLOOD PRESSURE: 146 MMHG | OXYGEN SATURATION: 100 % | HEIGHT: 68 IN | DIASTOLIC BLOOD PRESSURE: 76 MMHG

## 2022-01-02 VITALS
TEMPERATURE: 97 F | DIASTOLIC BLOOD PRESSURE: 78 MMHG | SYSTOLIC BLOOD PRESSURE: 148 MMHG | OXYGEN SATURATION: 100 % | HEART RATE: 76 BPM | RESPIRATION RATE: 18 BRPM

## 2022-01-02 DIAGNOSIS — T85.611A BREAKDOWN (MECHANICAL) OF INTRAPERITONEAL DIALYSIS CATHETER, INITIAL ENCOUNTER: Chronic | ICD-10-CM

## 2022-01-02 DIAGNOSIS — Z98.890 OTHER SPECIFIED POSTPROCEDURAL STATES: Chronic | ICD-10-CM

## 2022-01-02 DIAGNOSIS — Z94.0 KIDNEY TRANSPLANT STATUS: Chronic | ICD-10-CM

## 2022-01-02 DIAGNOSIS — U07.1 COVID-19: ICD-10-CM

## 2022-01-02 PROCEDURE — M0247: CPT

## 2022-01-02 RX ORDER — SOTROVIMAB 62.5 MG/ML
500 INJECTION, SOLUTION, CONCENTRATE INTRAVENOUS ONCE
Refills: 0 | Status: COMPLETED | OUTPATIENT
Start: 2022-01-02 | End: 2022-01-02

## 2022-01-02 RX ORDER — SODIUM CHLORIDE 9 MG/ML
250 INJECTION INTRAMUSCULAR; INTRAVENOUS; SUBCUTANEOUS
Refills: 0 | Status: DISCONTINUED | OUTPATIENT
Start: 2022-01-02 | End: 2022-01-17

## 2022-01-02 RX ADMIN — SODIUM CHLORIDE 25 MILLILITER(S): 9 INJECTION INTRAMUSCULAR; INTRAVENOUS; SUBCUTANEOUS at 16:28

## 2022-01-02 RX ADMIN — SOTROVIMAB 116 MILLIGRAM(S): 62.5 INJECTION, SOLUTION, CONCENTRATE INTRAVENOUS at 16:30

## 2022-01-02 NOTE — CHART NOTE - NSCHARTNOTEFT_GEN_A_CORE
CC: Monoclonal Antibody Infusion/COVID 19 Positive  61y Male with kidney transplant, HTN, HLD, and recent dx of COVID 19+ who presents today for elective Sotrovimab infusion. Patient has been screened and was deemed to be a candidate.    Symptoms/ Criteria:   Date of Symptom Onset: 12/28/21  Symptoms: cough, runny nose   Date of Positive COVID PCR: 12/30/21  Risk Profile includes:   kidney transplant    Vaccination Status: Yes    PMHx:  Infection due to severe acute respiratory syndrome coronavirus 2 (SARS-CoV-2)  Hypertension  Hypercholesterolemia  Nephritis  CKD (chronic kidney disease), stage III  BPH (benign prostatic hypertrophy)  Gout  GERD (gastroesophageal reflux disease)  Lung abnormality  Vocal cord anomaly  History of lung surgery  Peritoneal dialysis catheter dysfunction  S/P kidney transplant      exam/findings:  T(C): --  HR: --  BP: --  RR: --  SpO2: --      PE:   Appearance: NAD	  HEENT:  NC/AT, anicteric  Cardiovascular: Normal S1 S2, No JVD, No murmurs, No edema  Respiratory: Lungs clear to auscultation	  Gastrointestinal:  Soft, Non-tender, + BS	  Skin: warm and dry, no rash or urticaria   Neurologic: Non-focal  Extremities: Normal range of motion,    ASSESSMENT:  Pt is COVID positive and symptomatic who was referred to the infusion center for elective Monoclonal antibody infusion ( Sotrovimab ).  PLAN:  - Infusion procedure explained to patient.  1. FDA has authorized emergency use Sotrovimab, which is not an FDA-approved biological product.  2. The patient or patient's caregiver has the option to accept or refuse administration of Sotrovimab.   3. The significant known and potential risks and benefits of Sotrovimab and the extent to which such risks and benefits are unknown.  4. Information on available alternative treatments and risks and benefits of those alternatives.  5.  Patient verbalized understanding of plan and agrees to treatment.  6.  All questions answered.  - Consent for monoclonal antibody infusion obtained.   - Infuse Sotrovimab 500mg IV over 30 minutes.  - Observe patient for one hour post infusion, and then if stable discharge home with oupt follow up as planned by PMD.      POST INFUSION ASSESSMENT:   Patient completed infusion, and monitored x 1 hour post-infusion with no adverse reactions noted, remained hemodynamically stable.    DISCHARGE at __________    - Patient tolerated infusion well; denies complaints of chest pain/SOB/dizziness/palpitations.   - VSS for discharge home.  - D/C instructions given/ fact sheet included.  - Patient advised to follow-up with PCP. CC: Monoclonal Antibody Infusion/COVID 19 Positive  61y Male with kidney transplant, HTN, HLD, and recent dx of COVID 19+ who presents today for elective Sotrovimab infusion. Patient has been screened and was deemed to be a candidate.    Symptoms/ Criteria:   Date of Symptom Onset: 12/28/21  Symptoms: cough, runny nose   Date of Positive COVID PCR: 12/30/21  Risk Profile includes:   kidney transplant    Vaccination Status: Yes    PMHx:  Infection due to severe acute respiratory syndrome coronavirus 2 (SARS-CoV-2)  Hypertension  Hypercholesterolemia  Nephritis  CKD (chronic kidney disease), stage III  BPH (benign prostatic hypertrophy)  Gout  GERD (gastroesophageal reflux disease)  Lung abnormality  Vocal cord anomaly  History of lung surgery  Peritoneal dialysis catheter dysfunction  S/P kidney transplant      exam/findings:  Vital Signs Last 24 Hrs  T(C): 36.3 (02 Jan 2022 18:00), Max: 37.1 (02 Jan 2022 16:05)  T(F): 97.4 (02 Jan 2022 18:00), Max: 98.8 (02 Jan 2022 16:05)  HR: 76 (02 Jan 2022 18:00) (74 - 87)  BP: 148/78 (02 Jan 2022 18:00) (132/74 - 148/78)  BP(mean): --  RR: 18 (02 Jan 2022 18:00) (15 - 18)  SpO2: 100% (02 Jan 2022 18:00) (100% - 100%)      PE:   Appearance: NAD	  HEENT:  NC/AT, anicteric  Cardiovascular: Normal S1 S2, No JVD, No murmurs, No edema  Respiratory: Lungs clear to auscultation	  Gastrointestinal:  Soft, Non-tender, + BS	  Skin: warm and dry, no rash or urticaria   Neurologic: Non-focal  Extremities: Normal range of motion,    ASSESSMENT:  Pt is COVID positive and symptomatic who was referred to the infusion center for elective Monoclonal antibody infusion ( Sotrovimab ).  PLAN:  - Infusion procedure explained to patient.  1. FDA has authorized emergency use Sotrovimab, which is not an FDA-approved biological product.  2. The patient or patient's caregiver has the option to accept or refuse administration of Sotrovimab.   3. The significant known and potential risks and benefits of Sotrovimab and the extent to which such risks and benefits are unknown.  4. Information on available alternative treatments and risks and benefits of those alternatives.  5.  Patient verbalized understanding of plan and agrees to treatment.  6.  All questions answered.  - Consent for monoclonal antibody infusion obtained.   - Infuse Sotrovimab 500mg IV over 30 minutes.  - Observe patient for one hour post infusion, and then if stable discharge home with oupt follow up as planned by PMD.      POST INFUSION ASSESSMENT:   Patient completed infusion, and monitored x 1 hour post-infusion with no adverse reactions noted, remained hemodynamically stable.    DISCHARGE at 6pm.     - Patient tolerated infusion well; denies complaints of chest pain/SOB/dizziness/palpitations.   - VSS for discharge home.  - D/C instructions given/ fact sheet included.  - Patient advised to follow-up with PCP.

## 2022-01-03 ENCOUNTER — APPOINTMENT (OUTPATIENT)
Dept: PULMONOLOGY | Facility: CLINIC | Age: 62
End: 2022-01-03

## 2022-01-06 ENCOUNTER — APPOINTMENT (OUTPATIENT)
Dept: TRANSPLANT | Facility: CLINIC | Age: 62
End: 2022-01-06

## 2022-01-06 ENCOUNTER — NON-APPOINTMENT (OUTPATIENT)
Age: 62
End: 2022-01-06

## 2022-01-06 LAB
ALBUMIN SERPL ELPH-MCNC: 4.6 G/DL
ALP BLD-CCNC: 96 U/L
ALT SERPL-CCNC: 15 U/L
ANION GAP SERPL CALC-SCNC: 12 MMOL/L
AST SERPL-CCNC: 16 U/L
BASOPHILS # BLD AUTO: 0.02 K/UL
BASOPHILS NFR BLD AUTO: 0.5 %
BILIRUB SERPL-MCNC: 0.5 MG/DL
BUN SERPL-MCNC: 18 MG/DL
CALCIUM SERPL-MCNC: 10 MG/DL
CHLORIDE SERPL-SCNC: 102 MMOL/L
CO2 SERPL-SCNC: 22 MMOL/L
CREAT SERPL-MCNC: 1.17 MG/DL
CRP SERPL-MCNC: <3 MG/L
DEPRECATED D DIMER PPP IA-ACNC: 221 NG/ML DDU
EOSINOPHIL # BLD AUTO: 0.03 K/UL
EOSINOPHIL NFR BLD AUTO: 0.8 %
FERRITIN SERPL-MCNC: 981 NG/ML
GLUCOSE SERPL-MCNC: 190 MG/DL
HCT VFR BLD CALC: 37.1 %
HGB BLD-MCNC: 12.2 G/DL
IMM GRANULOCYTES NFR BLD AUTO: 1.5 %
LYMPHOCYTES # BLD AUTO: 0.95 K/UL
LYMPHOCYTES NFR BLD AUTO: 23.9 %
MAN DIFF?: NORMAL
MCHC RBC-ENTMCNC: 31 PG
MCHC RBC-ENTMCNC: 32.9 GM/DL
MCV RBC AUTO: 94.2 FL
MONOCYTES # BLD AUTO: 0.38 K/UL
MONOCYTES NFR BLD AUTO: 9.5 %
NEUTROPHILS # BLD AUTO: 2.54 K/UL
NEUTROPHILS NFR BLD AUTO: 63.8 %
PLATELET # BLD AUTO: 181 K/UL
POTASSIUM SERPL-SCNC: 4.9 MMOL/L
PROCALCITONIN SERPL-MCNC: 0.04 NG/ML
PROT SERPL-MCNC: 6.7 G/DL
RBC # BLD: 3.94 M/UL
RBC # FLD: 12.5 %
SODIUM SERPL-SCNC: 135 MMOL/L
WBC # FLD AUTO: 3.98 K/UL

## 2022-01-11 ENCOUNTER — APPOINTMENT (OUTPATIENT)
Dept: TRANSPLANT | Facility: CLINIC | Age: 62
End: 2022-01-11

## 2022-01-11 ENCOUNTER — NON-APPOINTMENT (OUTPATIENT)
Age: 62
End: 2022-01-11

## 2022-01-11 LAB
ALBUMIN SERPL ELPH-MCNC: 4.5 G/DL
ALP BLD-CCNC: 99 U/L
ALT SERPL-CCNC: 10 U/L
ANION GAP SERPL CALC-SCNC: 10 MMOL/L
APPEARANCE: CLEAR
AST SERPL-CCNC: 13 U/L
BACTERIA: NEGATIVE
BASOPHILS # BLD AUTO: 0.03 K/UL
BASOPHILS NFR BLD AUTO: 0.7 %
BILIRUB SERPL-MCNC: 0.5 MG/DL
BILIRUBIN URINE: NEGATIVE
BLOOD URINE: NEGATIVE
BUN SERPL-MCNC: 21 MG/DL
CALCIUM SERPL-MCNC: 10.1 MG/DL
CHLORIDE SERPL-SCNC: 101 MMOL/L
CO2 SERPL-SCNC: 22 MMOL/L
COLOR: YELLOW
CREAT SERPL-MCNC: 1.2 MG/DL
CREAT SPEC-SCNC: 135 MG/DL
CREAT/PROT UR: 0.1 RATIO
EOSINOPHIL # BLD AUTO: 0.1 K/UL
EOSINOPHIL NFR BLD AUTO: 2.3 %
GLUCOSE QUALITATIVE U: NEGATIVE
GLUCOSE SERPL-MCNC: 186 MG/DL
HCT VFR BLD CALC: 37.6 %
HGB BLD-MCNC: 11.9 G/DL
HYALINE CASTS: 0 /LPF
IMM GRANULOCYTES NFR BLD AUTO: 1.4 %
KETONES URINE: NEGATIVE
LEUKOCYTE ESTERASE URINE: NEGATIVE
LYMPHOCYTES # BLD AUTO: 1.53 K/UL
LYMPHOCYTES NFR BLD AUTO: 35.1 %
MAGNESIUM SERPL-MCNC: 1.4 MG/DL
MAN DIFF?: NORMAL
MCHC RBC-ENTMCNC: 29.4 PG
MCHC RBC-ENTMCNC: 31.6 GM/DL
MCV RBC AUTO: 92.8 FL
MICROSCOPIC-UA: NORMAL
MONOCYTES # BLD AUTO: 0.59 K/UL
MONOCYTES NFR BLD AUTO: 13.5 %
NEUTROPHILS # BLD AUTO: 2.05 K/UL
NEUTROPHILS NFR BLD AUTO: 47 %
NITRITE URINE: NEGATIVE
PH URINE: 5.5
PHOSPHATE SERPL-MCNC: 2.5 MG/DL
PLATELET # BLD AUTO: 202 K/UL
POTASSIUM SERPL-SCNC: 5.2 MMOL/L
PROT SERPL-MCNC: 6.3 G/DL
PROT UR-MCNC: 8 MG/DL
PROTEIN URINE: NEGATIVE
RBC # BLD: 4.05 M/UL
RBC # FLD: 12.5 %
RED BLOOD CELLS URINE: 1 /HPF
SODIUM SERPL-SCNC: 133 MMOL/L
SPECIFIC GRAVITY URINE: 1.02
SQUAMOUS EPITHELIAL CELLS: 0 /HPF
TACROLIMUS SERPL-MCNC: 10.7 NG/ML
URATE SERPL-MCNC: 5.9 MG/DL
UROBILINOGEN URINE: NORMAL
WBC # FLD AUTO: 4.36 K/UL
WHITE BLOOD CELLS URINE: 1 /HPF

## 2022-01-12 LAB — BKV DNA SPEC QL NAA+PROBE: NOT DETECTED COPIES/ML

## 2022-01-13 ENCOUNTER — APPOINTMENT (OUTPATIENT)
Dept: NEPHROLOGY | Facility: CLINIC | Age: 62
End: 2022-01-13
Payer: COMMERCIAL

## 2022-01-13 PROCEDURE — 99214 OFFICE O/P EST MOD 30 MIN: CPT | Mod: 95

## 2022-01-13 NOTE — HISTORY OF PRESENT ILLNESS
[Home] : at home, [unfilled] , at the time of the visit. [Medical Office: ([facility])___] : at the medical office located in  [Verbal consent obtained from patient] : the patient, [unfilled] [FreeTextEntry1] : This visit is provided by telehealth using real time 2 way audio visual technology. This patient is located at home and the provider is located at the [facility] medical office at [address] [place] [address]. Patient   participated in this visit.\par Verbal consent for this visit was given by patient/accompanying family member\par Total duration of this visit which included conversation, lab review, medication review and clinical assessment and advice lasted approximately 25-30 minutes.\par \par He received  donor kidney transplant on 21 at [facility] (Surgeon-Dr. Akhtar)\par \par Currently\par Has no fever, no urinary symptoms  at present.\par Home blood pressure, temp and weight flow sheet reviewed.\par Blood pressure 130 systolic\par Has fairly controlled  blood pressure at home\par Adherence and understanding: Good.\par \par Functional /Employment status: Reviewed. Employed back at work now off work due to COVID 19. Will return on next Tuesday.\par Cardiologist:Dr. Junior Bazan\par

## 2022-01-13 NOTE — REASON FOR VISIT
[Follow-Up] : a follow-up visit [FreeTextEntry1] : Post kidney transplant follow up, recovered from COVID 19

## 2022-01-13 NOTE — ASSESSMENT
[FreeTextEntry1] : Renal Transplant recipient: Had immediate allograft function, normal creatinine at discharge. Has urinary frequency and nocturia. No dysuria/hematuria. No fever/chills. Tolerating medications.\par COVID 19: Mild illness with cough and night sweats, recovered. Recd monoclonal Ab.\par Plans to return to work on next Tuesday.\par Immunosuppression: reviewed; simulect induction, on tac/MMF/prednisone, last Tac level noted; will recheck. Currently on Envarsus decreased to 8 mg daily.; full dose MMF and prednisone at 5 mg daily\par On Mg  supplementation. Off phos supplement\par Gave flow sheets to maintain home charts of   blood pressure, temperature, weight and urine output.\par Hypertension: Well Controlled; Reviewed medications.  \par Hyperlipidemia: Was on simvastatin . Used to get severe muscle cramps\par Drain/Stent: Both have been removed.\par Gout: Has been on allopurinol.\par Cardiovascular risk reduction discussed. On aspirin.\par Elevated Uric acid level/Gout: Started on allopurinol.\par Infection prophylaxis: On Bactrim, Valcyte and nystatin as well as GI prophylaxis.\par Noted COVID antibodies. \par Advised daily aerobic exercises for 45 -  minutes daily\par Follow up every 4 weeks if labs are stable\par \par

## 2022-01-20 ENCOUNTER — APPOINTMENT (OUTPATIENT)
Dept: TRANSPLANT | Facility: CLINIC | Age: 62
End: 2022-01-20

## 2022-01-28 ENCOUNTER — APPOINTMENT (OUTPATIENT)
Dept: TRANSPLANT | Facility: CLINIC | Age: 62
End: 2022-01-28

## 2022-01-28 ENCOUNTER — NON-APPOINTMENT (OUTPATIENT)
Age: 62
End: 2022-01-28

## 2022-01-28 LAB
ALBUMIN SERPL ELPH-MCNC: 4.4 G/DL
ALP BLD-CCNC: 84 U/L
ALT SERPL-CCNC: 10 U/L
ANION GAP SERPL CALC-SCNC: 10 MMOL/L
APPEARANCE: CLEAR
AST SERPL-CCNC: 17 U/L
BACTERIA: NEGATIVE
BASOPHILS # BLD AUTO: 0.02 K/UL
BASOPHILS NFR BLD AUTO: 0.5 %
BILIRUB SERPL-MCNC: 0.7 MG/DL
BILIRUBIN URINE: NEGATIVE
BLOOD URINE: NEGATIVE
BUN SERPL-MCNC: 18 MG/DL
CALCIUM SERPL-MCNC: 9.8 MG/DL
CALCIUM SERPL-MCNC: 9.8 MG/DL
CHLORIDE SERPL-SCNC: 103 MMOL/L
CO2 SERPL-SCNC: 22 MMOL/L
COLOR: YELLOW
COVID-19 SPIKE DOMAIN ANTIBODY INTERPRETATION: POSITIVE
CREAT SERPL-MCNC: 1.22 MG/DL
CREAT SPEC-SCNC: 140 MG/DL
CREAT/PROT UR: 0.1 RATIO
EOSINOPHIL # BLD AUTO: 0.12 K/UL
EOSINOPHIL NFR BLD AUTO: 3.2 %
GLUCOSE QUALITATIVE U: NORMAL
GLUCOSE SERPL-MCNC: 181 MG/DL
HCT VFR BLD CALC: 34.7 %
HGB BLD-MCNC: 11.4 G/DL
HYALINE CASTS: 0 /LPF
IMM GRANULOCYTES NFR BLD AUTO: 1.9 %
KETONES URINE: NEGATIVE
LEUKOCYTE ESTERASE URINE: NEGATIVE
LYMPHOCYTES # BLD AUTO: 1.38 K/UL
LYMPHOCYTES NFR BLD AUTO: 36.6 %
MAGNESIUM SERPL-MCNC: 1.4 MG/DL
MAN DIFF?: NORMAL
MCHC RBC-ENTMCNC: 30.5 PG
MCHC RBC-ENTMCNC: 32.9 GM/DL
MCV RBC AUTO: 92.8 FL
MICROSCOPIC-UA: NORMAL
MONOCYTES # BLD AUTO: 0.45 K/UL
MONOCYTES NFR BLD AUTO: 11.9 %
NEUTROPHILS # BLD AUTO: 1.73 K/UL
NEUTROPHILS NFR BLD AUTO: 45.9 %
NITRITE URINE: NEGATIVE
PARATHYROID HORMONE INTACT: 115 PG/ML
PH URINE: 5.5
PHOSPHATE SERPL-MCNC: 2.3 MG/DL
PLATELET # BLD AUTO: 173 K/UL
POTASSIUM SERPL-SCNC: 5.3 MMOL/L
PROT SERPL-MCNC: 6.4 G/DL
PROT UR-MCNC: 8 MG/DL
PROTEIN URINE: NEGATIVE
RBC # BLD: 3.74 M/UL
RBC # FLD: 12.9 %
RED BLOOD CELLS URINE: 1 /HPF
SARS-COV-2 AB SERPL IA-ACNC: >250 U/ML
SODIUM SERPL-SCNC: 135 MMOL/L
SPECIFIC GRAVITY URINE: 1.02
SQUAMOUS EPITHELIAL CELLS: 0 /HPF
TACROLIMUS SERPL-MCNC: 8.4 NG/ML
URATE SERPL-MCNC: 6.5 MG/DL
UROBILINOGEN URINE: NORMAL
WBC # FLD AUTO: 3.77 K/UL
WHITE BLOOD CELLS URINE: 1 /HPF

## 2022-01-31 LAB
BKV DNA SPEC QL NAA+PROBE: NOT DETECTED COPIES/ML
CMV DNA SPEC QL NAA+PROBE: NOT DETECTED IU/ML

## 2022-02-01 ENCOUNTER — APPOINTMENT (OUTPATIENT)
Dept: NEPHROLOGY | Facility: CLINIC | Age: 62
End: 2022-02-01
Payer: COMMERCIAL

## 2022-02-01 VITALS
DIASTOLIC BLOOD PRESSURE: 71 MMHG | HEIGHT: 69 IN | RESPIRATION RATE: 14 BRPM | TEMPERATURE: 97.7 F | BODY MASS INDEX: 27.4 KG/M2 | SYSTOLIC BLOOD PRESSURE: 138 MMHG | HEART RATE: 74 BPM | OXYGEN SATURATION: 99 % | WEIGHT: 185 LBS

## 2022-02-01 PROCEDURE — 99215 OFFICE O/P EST HI 40 MIN: CPT

## 2022-02-01 NOTE — REASON FOR VISIT
[Follow-Up] : a follow-up visit [FreeTextEntry1] : Post kidney transplant follow up, recovered from COVID

## 2022-02-01 NOTE — ASSESSMENT
[FreeTextEntry1] : Renal Transplant recipient: Had immediate allograft function, normal creatinine at discharge. Has urinary frequency and nocturia. No dysuria/hematuria. No fever/chills. Tolerating medications.\par Immunosuppression: reviewed; simulect induction, on tac/MMF/prednisone, last Tac level noted; will recheck. Currently on Envarsus 8 mg daily.; full dose MMF and prednisone at 5 mg daily\par On Mg  supplementation. Off phos supplement\par DM: On Metformin. Fasting 180. Suggested to start prandin and to see endocrinologist, Has seen Dr. Chowdary previously.\par Gave flow sheets to maintain home charts of   blood pressure, temperature, weight and urine output.\par Hypertension: Fairly Controlled; Reviewed medications.  \par Hyperlipidemia: Was on simvastatin . Used to get severe muscle cramps, will start back low dose Rosuvastatin\par Gout: Has been on allopurinol.\par Cardiovascular risk reduction discussed. On aspirin.\par Elevated Uric acid level/Gout: Started on allopurinol.\par Infection prophylaxis: On Bactrim, Valcyte and nystatin as well as GI prophylaxis.\par Noted COVID antibodies. \par Advised daily aerobic exercises for 45 -  minutes\par Follow up every 4 weeks if labs are stable\par \par

## 2022-02-01 NOTE — HISTORY OF PRESENT ILLNESS
[FreeTextEntry1] : He received  donor kidney transplant on 21 at [facility] (Surgeon-Dr. Akhtar)\par \par Currently\par Recovered from COVID , tested pos at home on Dec 29 th. Recd monoclonal antibodies.\par Has no fever, no urinary symptoms  at present except nocturia\par Has fairly controlled  blood pressure at home\par Concerned about glucose levels\par \par Functional /Employment status: Reviewed. Employed back at work now.\par \par

## 2022-02-01 NOTE — PHYSICAL EXAM
[General Appearance - Alert] : alert [General Appearance - In No Acute Distress] : in no acute distress [Sclera] : the sclera and conjunctiva were normal [PERRL With Normal Accommodation] : pupils were equal in size, round, and reactive to light [Extraocular Movements] : extraocular movements were intact [Outer Ear] : the ears and nose were normal in appearance [Oropharynx] : the oropharynx was normal [Neck Appearance] : the appearance of the neck was normal [Neck Cervical Mass (___cm)] : no neck mass was observed [Jugular Venous Distention Increased] : there was no jugular-venous distention [Thyroid Diffuse Enlargement] : the thyroid was not enlarged [Thyroid Nodule] : there were no palpable thyroid nodules [Auscultation Breath Sounds / Voice Sounds] : lungs were clear to auscultation bilaterally [Heart Rate And Rhythm] : heart rate was normal and rhythm regular [Heart Sounds] : normal S1 and S2 [Heart Sounds Gallop] : no gallops [Murmurs] : no murmurs [Heart Sounds Pericardial Friction Rub] : no pericardial rub [Full Pulse] : the pedal pulses are present [Edema] : there was no peripheral edema [Bowel Sounds] : normal bowel sounds [Abdomen Soft] : soft [Abdomen Tenderness] : non-tender [Abdomen Mass (___ Cm)] : no abdominal mass palpated [FreeTextEntry1] : healed scar [Cervical Lymph Nodes Enlarged Posterior Bilaterally] : posterior cervical [Cervical Lymph Nodes Enlarged Anterior Bilaterally] : anterior cervical [Supraclavicular Lymph Nodes Enlarged Bilaterally] : supraclavicular [Axillary Lymph Nodes Enlarged Bilaterally] : axillary [Inguinal Lymph Nodes Enlarged Bilaterally] : inguinal [Abnormal Walk] : normal gait [Involuntary Movements] : no involuntary movements were seen [___ (cm) Fistula] : [unfilled] (cm) fistula [Bruit] : a bruit was present [Thrill] : a thrill was present [] : no rash [No Focal Deficits] : no focal deficits [Oriented To Time, Place, And Person] : oriented to person, place, and time [Impaired Insight] : insight and judgment were intact [Affect] : the affect was normal

## 2022-02-01 NOTE — END OF VISIT
Group Topic: BH Activity Group    Date: 1/6/2020  Start Time: 1415  End Time: 1515  Facilitators: USHA Mata    Focus: Social Skills  Number in attendance: 5    Method: Group   Attendance: Present  Participation: Active  Patient Response: Attentive and Interactive  Mood: Indifferent  Affect: Calm, Positive and Tense  Behavior/Socialization: Appropriate to group, Cooperative and Engaged  Thought Process: Focused  Task Performance: Follows directions   Patient was an active group member to the group.  Patient was able to follow though with the group task of making a collage.  And then explain to the group his collage without any difficultness.   Patient openly discussed with the group his jobs, education and experiences with the group at a superficial level. With the exception of, he  stated that he considers himself a self giving person.  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  Pleasant and cooperative group member.   [Time Spent: ___ minutes] : I have spent [unfilled] minutes of time on the encounter.

## 2022-02-12 ENCOUNTER — TRANSCRIPTION ENCOUNTER (OUTPATIENT)
Age: 62
End: 2022-02-12

## 2022-02-15 ENCOUNTER — APPOINTMENT (OUTPATIENT)
Dept: PULMONOLOGY | Facility: CLINIC | Age: 62
End: 2022-02-15
Payer: COMMERCIAL

## 2022-02-15 VITALS
WEIGHT: 185 LBS | HEART RATE: 83 BPM | HEIGHT: 68 IN | OXYGEN SATURATION: 99 % | TEMPERATURE: 97.2 F | BODY MASS INDEX: 28.04 KG/M2 | SYSTOLIC BLOOD PRESSURE: 140 MMHG | DIASTOLIC BLOOD PRESSURE: 60 MMHG

## 2022-02-15 VITALS — HEIGHT: 68 IN | RESPIRATION RATE: 16 BRPM | BODY MASS INDEX: 28.13 KG/M2

## 2022-02-15 PROCEDURE — 94010 BREATHING CAPACITY TEST: CPT

## 2022-02-15 PROCEDURE — ZZZZZ: CPT

## 2022-02-15 PROCEDURE — 99214 OFFICE O/P EST MOD 30 MIN: CPT | Mod: 25

## 2022-02-15 PROCEDURE — 94726 PLETHYSMOGRAPHY LUNG VOLUMES: CPT

## 2022-02-15 PROCEDURE — 94729 DIFFUSING CAPACITY: CPT

## 2022-02-15 NOTE — PHYSICAL EXAM
[No Acute Distress] : no acute distress [Normal Oropharynx] : normal oropharynx [Normal Appearance] : normal appearance [No Neck Mass] : no neck mass [Normal Rate/Rhythm] : normal rate/rhythm [Normal S1, S2] : normal s1, s2 [No Murmurs] : no murmurs [No Resp Distress] : no resp distress [Clear to Auscultation Bilaterally] : clear to auscultation bilaterally [No Abnormalities] : no abnormalities [Benign] : benign [Normal Gait] : normal gait [No Clubbing] : no clubbing [No Cyanosis] : no cyanosis [No Edema] : no edema [FROM] : FROM [Normal Color/ Pigmentation] : normal color/ pigmentation [No Focal Deficits] : no focal deficits [Oriented x3] : oriented x3 [Normal Affect] : normal affect [TextBox_89] : central obesity

## 2022-02-15 NOTE — HISTORY OF PRESENT ILLNESS
[TextBox_4] : Patient here for in person post Covid follow-up visit.\par \par 61-year-old male, history of a kidney transplant in September 2021.  Covid symptoms had started on December 28, 2021, symptoms were mild.  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  Walking is much is 45 minutes to an hour.\par Since COVID, however, he still has not gotten back to his prior energy levels.  He is also having some pains in his back, as well as arthritic knee pain.  He has become much more sedentary.  And does note when he walks he at first gets a bit winded, though this improves as he continues to walk further.\par

## 2022-02-15 NOTE — DISCUSSION/SUMMARY
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  Most of this likely secondary to his sedentary state since COVID, as well as his arthritic knee pain.  When he walks, though he initially may feel a bit winded, it actually improves as he continues to walk further.\par \par His pulmonary function test shows pattern consistent with his central obesity, but overall preserved normal FEV1 and lung volumes.  There is only minimal decrease in diffusion capacity.\par He had a CT chest in March 2021, which showed no parenchymal lung disease.\par \par I suspect this is just deconditioning and body habitus since his Covid infection.  I did discuss the option of a repeat noncontrast CT chest.  He will try to increase his activity level and lose a few pounds over the next month.  If this does not help, and these symptoms persist, will order a noncontrast CT chest at that time.  However I think it will get better.

## 2022-02-17 ENCOUNTER — APPOINTMENT (OUTPATIENT)
Dept: TRANSPLANT | Facility: CLINIC | Age: 62
End: 2022-02-17

## 2022-02-17 LAB
24R-OH-CALCIDIOL SERPL-MCNC: 57.5 PG/ML
ALBUMIN SERPL ELPH-MCNC: 4.8 G/DL
ALP BLD-CCNC: 83 U/L
ALT SERPL-CCNC: 14 U/L
ANION GAP SERPL CALC-SCNC: 12 MMOL/L
APPEARANCE: CLEAR
AST SERPL-CCNC: 19 U/L
BACTERIA: NEGATIVE
BASOPHILS # BLD AUTO: 0.06 K/UL
BASOPHILS NFR BLD AUTO: 1.5 %
BILIRUB SERPL-MCNC: 0.7 MG/DL
BILIRUBIN URINE: NEGATIVE
BKV DNA SPEC QL NAA+PROBE: NOT DETECTED COPIES/ML
BLOOD URINE: NEGATIVE
BUN SERPL-MCNC: 22 MG/DL
CALCIUM SERPL-MCNC: 10.1 MG/DL
CALCIUM SERPL-MCNC: 10.1 MG/DL
CHLORIDE SERPL-SCNC: 104 MMOL/L
CHOLEST SERPL-MCNC: 152 MG/DL
CMV DNA SPEC QL NAA+PROBE: NOT DETECTED IU/ML
CO2 SERPL-SCNC: 22 MMOL/L
COLOR: YELLOW
CREAT SERPL-MCNC: 1.34 MG/DL
CREAT SPEC-SCNC: 125 MG/DL
CREAT/PROT UR: 0.1 RATIO
EOSINOPHIL # BLD AUTO: 0.1 K/UL
EOSINOPHIL NFR BLD AUTO: 2.5 %
ESTIMATED AVERAGE GLUCOSE: 146 MG/DL
GLUCOSE QUALITATIVE U: NEGATIVE
GLUCOSE SERPL-MCNC: 136 MG/DL
HBA1C MFR BLD HPLC: 6.7 %
HCT VFR BLD CALC: 37.5 %
HDLC SERPL-MCNC: 55 MG/DL
HGB BLD-MCNC: 12.2 G/DL
HYALINE CASTS: 0 /LPF
IMM GRANULOCYTES NFR BLD AUTO: 2.2 %
KETONES URINE: NEGATIVE
LDH SERPL-CCNC: 347 U/L
LDLC SERPL CALC-MCNC: 70 MG/DL
LEUKOCYTE ESTERASE URINE: NEGATIVE
LYMPHOCYTES # BLD AUTO: 1.77 K/UL
LYMPHOCYTES NFR BLD AUTO: 43.6 %
MAGNESIUM SERPL-MCNC: 1.6 MG/DL
MAN DIFF?: NORMAL
MCHC RBC-ENTMCNC: 30.9 PG
MCHC RBC-ENTMCNC: 32.5 GM/DL
MCV RBC AUTO: 94.9 FL
MICROSCOPIC-UA: NORMAL
MONOCYTES # BLD AUTO: 0.49 K/UL
MONOCYTES NFR BLD AUTO: 12.1 %
NEUTROPHILS # BLD AUTO: 1.55 K/UL
NEUTROPHILS NFR BLD AUTO: 38.1 %
NITRITE URINE: NEGATIVE
NONHDLC SERPL-MCNC: 96 MG/DL
PARATHYROID HORMONE INTACT: 124 PG/ML
PH URINE: 5
PHOSPHATE SERPL-MCNC: 2.8 MG/DL
PLATELET # BLD AUTO: 189 K/UL
POTASSIUM SERPL-SCNC: 5.1 MMOL/L
PROT SERPL-MCNC: 6.8 G/DL
PROT UR-MCNC: 8 MG/DL
PROTEIN URINE: NEGATIVE
RBC # BLD: 3.95 M/UL
RBC # FLD: 13 %
RED BLOOD CELLS URINE: 0 /HPF
SODIUM SERPL-SCNC: 139 MMOL/L
SPECIFIC GRAVITY URINE: 1.01
SQUAMOUS EPITHELIAL CELLS: 0 /HPF
TACROLIMUS SERPL-MCNC: 10.5 NG/ML
TRIGL SERPL-MCNC: 131 MG/DL
URATE SERPL-MCNC: 6.2 MG/DL
UROBILINOGEN URINE: NORMAL
WBC # FLD AUTO: 4.06 K/UL
WHITE BLOOD CELLS URINE: 1 /HPF

## 2022-03-01 ENCOUNTER — APPOINTMENT (OUTPATIENT)
Dept: TRANSPLANT | Facility: CLINIC | Age: 62
End: 2022-03-01

## 2022-03-02 ENCOUNTER — APPOINTMENT (OUTPATIENT)
Dept: NEPHROLOGY | Facility: CLINIC | Age: 62
End: 2022-03-02
Payer: COMMERCIAL

## 2022-03-02 VITALS
DIASTOLIC BLOOD PRESSURE: 79 MMHG | WEIGHT: 185 LBS | HEART RATE: 76 BPM | HEIGHT: 68 IN | TEMPERATURE: 98.2 F | OXYGEN SATURATION: 98 % | SYSTOLIC BLOOD PRESSURE: 155 MMHG | RESPIRATION RATE: 16 BRPM | BODY MASS INDEX: 28.04 KG/M2

## 2022-03-02 VITALS — DIASTOLIC BLOOD PRESSURE: 68 MMHG | SYSTOLIC BLOOD PRESSURE: 128 MMHG

## 2022-03-02 LAB
ALBUMIN SERPL ELPH-MCNC: 4.7 G/DL
ALP BLD-CCNC: 80 U/L
ALT SERPL-CCNC: 13 U/L
ANION GAP SERPL CALC-SCNC: 11 MMOL/L
APPEARANCE: CLEAR
AST SERPL-CCNC: 20 U/L
BACTERIA: NEGATIVE
BASOPHILS # BLD AUTO: 0.05 K/UL
BASOPHILS NFR BLD AUTO: 1.4 %
BILIRUB SERPL-MCNC: 0.6 MG/DL
BILIRUBIN URINE: NEGATIVE
BLOOD URINE: NEGATIVE
BUN SERPL-MCNC: 18 MG/DL
CALCIUM SERPL-MCNC: 10.1 MG/DL
CHLORIDE SERPL-SCNC: 104 MMOL/L
CO2 SERPL-SCNC: 24 MMOL/L
COLOR: YELLOW
CREAT SERPL-MCNC: 1.24 MG/DL
CREAT SPEC-SCNC: 173 MG/DL
CREAT/PROT UR: 0.1 RATIO
EGFR: 67 ML/MIN/1.73M2
EOSINOPHIL # BLD AUTO: 0.12 K/UL
EOSINOPHIL NFR BLD AUTO: 3.4 %
GLUCOSE QUALITATIVE U: NEGATIVE
GLUCOSE SERPL-MCNC: 143 MG/DL
HCT VFR BLD CALC: 37.8 %
HGB BLD-MCNC: 12.2 G/DL
HYALINE CASTS: 0 /LPF
IMM GRANULOCYTES NFR BLD AUTO: 1.1 %
KETONES URINE: NEGATIVE
LEUKOCYTE ESTERASE URINE: NEGATIVE
LYMPHOCYTES # BLD AUTO: 1.56 K/UL
LYMPHOCYTES NFR BLD AUTO: 43.7 %
MAGNESIUM SERPL-MCNC: 1.5 MG/DL
MAN DIFF?: NORMAL
MCHC RBC-ENTMCNC: 31.5 PG
MCHC RBC-ENTMCNC: 32.3 GM/DL
MCV RBC AUTO: 97.7 FL
MICROSCOPIC-UA: NORMAL
MONOCYTES # BLD AUTO: 0.44 K/UL
MONOCYTES NFR BLD AUTO: 12.3 %
NEUTROPHILS # BLD AUTO: 1.36 K/UL
NEUTROPHILS NFR BLD AUTO: 38.1 %
NITRITE URINE: NEGATIVE
PH URINE: 5.5
PHOSPHATE SERPL-MCNC: 2.7 MG/DL
PLATELET # BLD AUTO: 196 K/UL
POTASSIUM SERPL-SCNC: 5.4 MMOL/L
PROT SERPL-MCNC: 6.5 G/DL
PROT UR-MCNC: 11 MG/DL
PROTEIN URINE: NORMAL
RBC # BLD: 3.87 M/UL
RBC # FLD: 13.2 %
RED BLOOD CELLS URINE: 1 /HPF
SODIUM SERPL-SCNC: 138 MMOL/L
SPECIFIC GRAVITY URINE: 1.02
SQUAMOUS EPITHELIAL CELLS: 0 /HPF
TACROLIMUS SERPL-MCNC: 9 NG/ML
URATE SERPL-MCNC: 6 MG/DL
UROBILINOGEN URINE: NORMAL
WBC # FLD AUTO: 3.57 K/UL
WHITE BLOOD CELLS URINE: 1 /HPF

## 2022-03-02 PROCEDURE — 99214 OFFICE O/P EST MOD 30 MIN: CPT

## 2022-03-02 NOTE — PHYSICAL EXAM
[General Appearance - Alert] : alert [General Appearance - In No Acute Distress] : in no acute distress [Sclera] : the sclera and conjunctiva were normal [PERRL With Normal Accommodation] : pupils were equal in size, round, and reactive to light [Outer Ear] : the ears and nose were normal in appearance [Extraocular Movements] : extraocular movements were intact [Oropharynx] : the oropharynx was normal [Neck Appearance] : the appearance of the neck was normal [Neck Cervical Mass (___cm)] : no neck mass was observed [Jugular Venous Distention Increased] : there was no jugular-venous distention [Thyroid Diffuse Enlargement] : the thyroid was not enlarged [Thyroid Nodule] : there were no palpable thyroid nodules [Auscultation Breath Sounds / Voice Sounds] : lungs were clear to auscultation bilaterally [Heart Rate And Rhythm] : heart rate was normal and rhythm regular [Heart Sounds] : normal S1 and S2 [Heart Sounds Gallop] : no gallops [Murmurs] : no murmurs [Heart Sounds Pericardial Friction Rub] : no pericardial rub [Full Pulse] : the pedal pulses are present [Edema] : there was no peripheral edema [Bowel Sounds] : normal bowel sounds [Abdomen Soft] : soft [Abdomen Tenderness] : non-tender [Abdomen Mass (___ Cm)] : no abdominal mass palpated [FreeTextEntry1] : healed scar [Cervical Lymph Nodes Enlarged Posterior Bilaterally] : posterior cervical [Cervical Lymph Nodes Enlarged Anterior Bilaterally] : anterior cervical [Supraclavicular Lymph Nodes Enlarged Bilaterally] : supraclavicular [Axillary Lymph Nodes Enlarged Bilaterally] : axillary [Inguinal Lymph Nodes Enlarged Bilaterally] : inguinal [Abnormal Walk] : normal gait [Involuntary Movements] : no involuntary movements were seen [___ (cm) Fistula] : [unfilled] (cm) fistula [Bruit] : a bruit was present [Thrill] : a thrill was present [] : no rash [No Focal Deficits] : no focal deficits [Oriented To Time, Place, And Person] : oriented to person, place, and time [Impaired Insight] : insight and judgment were intact [Affect] : the affect was normal

## 2022-03-02 NOTE — HISTORY OF PRESENT ILLNESS
[FreeTextEntry1] : He received  donor kidney transplant on 21 at [facility] (Surgeon-Dr. Akhtar)\par \par Currently\par Recovered from COVID ,  still has easy fatigability\par Has no fever, no urinary symptoms  at present except nocturia (twice)\par Has fairly controlled  blood pressure at home\par \par \par Functional /Employment status: Reviewed. Employed full time\par \par

## 2022-03-02 NOTE — ASSESSMENT
[FreeTextEntry1] : Renal Transplant recipient: Had immediate allograft function, normal creatinine at discharge. Has urinary frequency and nocturia. No dysuria/hematuria. No fever/chills. Tolerating medications.\par Immunosuppression: reviewed; simulect induction, on tac/MMF/prednisone, last Tac level noted; will recheck. Currently on Envarsus 7 mg daily.; full dose MMF and prednisone at 5 mg daily\par On Mg  supplementation. Off phos supplement\par DM: On Metformin.and Repaglinide.\par Gave flow sheets to maintain home charts of   blood pressure, temperature, weight and urine output.\par Hypertension: Fairly Controlled; Reviewed medications.  \par Hyperlipidemia: On low dose Rosuvastatin. Will monitor for muscle cramps\par Gout: Has been on allopurinol.\par Cardiovascular risk reduction discussed. On aspirin.\par Elevated Uric acid level/Gout: Started on allopurinol.\par Infection prophylaxis: On Bactrim, Valcyte  Will continue the same.\par Advised daily aerobic exercises for 45 -  minutes\par Follow up every 4 weeks \par \par

## 2022-03-03 NOTE — END OF VISIT
PT STATES HAS TO URINATE, INFORMED PT NEED TO USE URINAL, WHEN NURSE ATTEMPTING TO PLACE URINAL, PT AGAIN GRASPS NURSES HANDS,STATES YOU ARE TRYING TO KILL ME. NURSES REORIENTING PT FREQUENTLY & REASSURING PT [Time Spent: ___ minutes] : I have spent [unfilled] minutes of time on the encounter.

## 2022-04-01 ENCOUNTER — APPOINTMENT (OUTPATIENT)
Dept: TRANSPLANT | Facility: CLINIC | Age: 62
End: 2022-04-01

## 2022-04-04 ENCOUNTER — APPOINTMENT (OUTPATIENT)
Dept: NEPHROLOGY | Facility: CLINIC | Age: 62
End: 2022-04-04
Payer: MEDICARE

## 2022-04-04 VITALS
OXYGEN SATURATION: 100 % | DIASTOLIC BLOOD PRESSURE: 80 MMHG | BODY MASS INDEX: 29.1 KG/M2 | HEIGHT: 68 IN | TEMPERATURE: 97.4 F | SYSTOLIC BLOOD PRESSURE: 164 MMHG | HEART RATE: 87 BPM | WEIGHT: 192 LBS | RESPIRATION RATE: 18 BRPM

## 2022-04-04 VITALS — SYSTOLIC BLOOD PRESSURE: 140 MMHG | DIASTOLIC BLOOD PRESSURE: 70 MMHG

## 2022-04-04 LAB
24R-OH-CALCIDIOL SERPL-MCNC: 46 PG/ML
ALBUMIN SERPL ELPH-MCNC: 4.7 G/DL
ALP BLD-CCNC: 76 U/L
ALT SERPL-CCNC: 15 U/L
ANION GAP SERPL CALC-SCNC: 10 MMOL/L
APPEARANCE: CLEAR
AST SERPL-CCNC: 20 U/L
BACTERIA: NEGATIVE
BASOPHILS # BLD AUTO: 0.04 K/UL
BASOPHILS NFR BLD AUTO: 1.1 %
BILIRUB SERPL-MCNC: 0.7 MG/DL
BILIRUBIN URINE: NEGATIVE
BKV DNA SPEC QL NAA+PROBE: NOT DETECTED COPIES/ML
BLOOD URINE: NEGATIVE
BUN SERPL-MCNC: 25 MG/DL
CALCIUM SERPL-MCNC: 10.1 MG/DL
CALCIUM SERPL-MCNC: 10.1 MG/DL
CHLORIDE SERPL-SCNC: 105 MMOL/L
CHOLEST SERPL-MCNC: 144 MG/DL
CMV DNA SPEC QL NAA+PROBE: NOT DETECTED IU/ML
CO2 SERPL-SCNC: 23 MMOL/L
COLOR: YELLOW
COVID-19 SPIKE DOMAIN ANTIBODY INTERPRETATION: POSITIVE
CREAT SERPL-MCNC: 1.28 MG/DL
CREAT SPEC-SCNC: 149 MG/DL
CREAT/PROT UR: 0.1 RATIO
EGFR: 64 ML/MIN/1.73M2
EOSINOPHIL # BLD AUTO: 0.15 K/UL
EOSINOPHIL NFR BLD AUTO: 4.1 %
ESTIMATED AVERAGE GLUCOSE: 143 MG/DL
GLUCOSE QUALITATIVE U: NEGATIVE
GLUCOSE SERPL-MCNC: 151 MG/DL
HBA1C MFR BLD HPLC: 6.6 %
HCT VFR BLD CALC: 35.6 %
HDLC SERPL-MCNC: 56 MG/DL
HGB BLD-MCNC: 11.3 G/DL
HYALINE CASTS: 0 /LPF
IMM GRANULOCYTES NFR BLD AUTO: 4.1 %
KETONES URINE: NEGATIVE
LDH SERPL-CCNC: 318 U/L
LDLC SERPL CALC-MCNC: 66 MG/DL
LEUKOCYTE ESTERASE URINE: NEGATIVE
LYMPHOCYTES # BLD AUTO: 1.34 K/UL
LYMPHOCYTES NFR BLD AUTO: 36.4 %
MAGNESIUM SERPL-MCNC: 1.6 MG/DL
MAN DIFF?: NORMAL
MCHC RBC-ENTMCNC: 30.7 PG
MCHC RBC-ENTMCNC: 31.7 GM/DL
MCV RBC AUTO: 96.7 FL
MICROSCOPIC-UA: NORMAL
MONOCYTES # BLD AUTO: 0.48 K/UL
MONOCYTES NFR BLD AUTO: 13 %
NEUTROPHILS # BLD AUTO: 1.52 K/UL
NEUTROPHILS NFR BLD AUTO: 41.3 %
NITRITE URINE: NEGATIVE
NONHDLC SERPL-MCNC: 87 MG/DL
PARATHYROID HORMONE INTACT: 98 PG/ML
PH URINE: 5.5
PHOSPHATE SERPL-MCNC: 2.5 MG/DL
PLATELET # BLD AUTO: 166 K/UL
POTASSIUM SERPL-SCNC: 5.2 MMOL/L
PROT SERPL-MCNC: 6.6 G/DL
PROT UR-MCNC: 8 MG/DL
PROTEIN URINE: NEGATIVE
RBC # BLD: 3.68 M/UL
RBC # FLD: 12.6 %
RED BLOOD CELLS URINE: 1 /HPF
SARS-COV-2 AB SERPL IA-ACNC: >250 U/ML
SODIUM SERPL-SCNC: 138 MMOL/L
SPECIFIC GRAVITY URINE: 1.02
SQUAMOUS EPITHELIAL CELLS: 0 /HPF
TACROLIMUS SERPL-MCNC: 7.4 NG/ML
TRIGL SERPL-MCNC: 107 MG/DL
URATE SERPL-MCNC: 6.4 MG/DL
UROBILINOGEN URINE: NORMAL
WBC # FLD AUTO: 3.68 K/UL
WHITE BLOOD CELLS URINE: 0 /HPF

## 2022-04-04 PROCEDURE — 99214 OFFICE O/P EST MOD 30 MIN: CPT

## 2022-04-04 RX ORDER — FUROSEMIDE 20 MG/1
20 TABLET ORAL DAILY
Qty: 30 | Refills: 2 | Status: DISCONTINUED | COMMUNITY
Start: 2021-09-16 | End: 2022-04-04

## 2022-04-04 RX ORDER — DIBASIC SODIUM PHOSPHATE, MONOBASIC POTASSIUM PHOSPHATE AND MONOBASIC SODIUM PHOSPHATE 852; 155; 130 MG/1; MG/1; MG/1
155-852-130 TABLET ORAL
Qty: 60 | Refills: 2 | Status: DISCONTINUED | COMMUNITY
Start: 2021-09-16 | End: 2022-04-04

## 2022-04-04 NOTE — ASSESSMENT
[FreeTextEntry1] : Renal Transplant recipient: Had immediate allograft function, normal creatinine at discharge. Has urinary frequency and nocturia. No dysuria/hematuria. No fever/chills. Tolerating medications.\par Immunosuppression: reviewed; simulect induction, on tac/MMF/prednisone, last Tac level noted; will recheck. Currently on Envarsus 7 mg daily.; full dose MMF and prednisone at 5 mg daily\par On Mg  supplementation. Off phos supplement\par DM: On Metformin.and Repaglinide. (not yet taking Repaglinide)- advised to start it.\par Gave flow sheets to maintain home charts of   blood pressure, temperature, weight and urine output.\par Hypertension: Fairly Controlled; Reviewed medications.  \par Hyperlipidemia: On low dose Rosuvastatin. Will monitor for muscle cramps\par Gout: Has been on allopurinol.\par Cardiovascular risk reduction discussed. On aspirin.\par Elevated Uric acid level/Gout: Started on allopurinol.\par Infection prophylaxis: On Bactrim, Valcyte  Will continue the same.\par Advised daily aerobic exercises for 45 -  minutes Discussed weight loss strategies.\par Follow up every 4 weeks \par \par

## 2022-04-04 NOTE — HISTORY OF PRESENT ILLNESS
[FreeTextEntry1] : He received  donor kidney transplant on 21 at [facility] (Surgeon-Dr. Akhtar)\par \par Currently\par Recovered from COVID ,  still has easy fatigability\par Has no fever, no urinary symptoms  at present except nocturia (twice)\par Has fairly controlled  blood pressure at home\par \par \par Functional /Employment status: Reviewed. Employed full time\par \par

## 2022-04-26 ENCOUNTER — APPOINTMENT (OUTPATIENT)
Dept: TRANSPLANT | Facility: CLINIC | Age: 62
End: 2022-04-26

## 2022-04-27 ENCOUNTER — APPOINTMENT (OUTPATIENT)
Dept: TRANSPLANT | Facility: CLINIC | Age: 62
End: 2022-04-27

## 2022-04-28 ENCOUNTER — APPOINTMENT (OUTPATIENT)
Dept: NEPHROLOGY | Facility: CLINIC | Age: 62
End: 2022-04-28

## 2022-04-28 ENCOUNTER — APPOINTMENT (OUTPATIENT)
Dept: NEPHROLOGY | Facility: CLINIC | Age: 62
End: 2022-04-28
Payer: COMMERCIAL

## 2022-04-28 VITALS
OXYGEN SATURATION: 100 % | DIASTOLIC BLOOD PRESSURE: 66 MMHG | HEART RATE: 70 BPM | HEIGHT: 68 IN | WEIGHT: 186 LBS | TEMPERATURE: 98 F | SYSTOLIC BLOOD PRESSURE: 117 MMHG | RESPIRATION RATE: 18 BRPM | BODY MASS INDEX: 28.19 KG/M2

## 2022-04-28 LAB
ALBUMIN SERPL ELPH-MCNC: 4.8 G/DL
ALP BLD-CCNC: 76 U/L
ALT SERPL-CCNC: 14 U/L
ANION GAP SERPL CALC-SCNC: 12 MMOL/L
APPEARANCE: CLEAR
AST SERPL-CCNC: 15 U/L
BACTERIA: NEGATIVE
BASOPHILS # BLD AUTO: 0.05 K/UL
BASOPHILS NFR BLD AUTO: 0.7 %
BILIRUB SERPL-MCNC: 0.5 MG/DL
BILIRUBIN URINE: NEGATIVE
BLOOD URINE: NEGATIVE
BUN SERPL-MCNC: 26 MG/DL
CALCIUM SERPL-MCNC: 10.2 MG/DL
CALCIUM SERPL-MCNC: 10.2 MG/DL
CHLORIDE SERPL-SCNC: 103 MMOL/L
CMV DNA SPEC QL NAA+PROBE: NOT DETECTED IU/ML
CMVPCR LOG: NOT DETECTED LOG10IU/ML
CO2 SERPL-SCNC: 23 MMOL/L
COLOR: YELLOW
CREAT SERPL-MCNC: 1.29 MG/DL
CREAT SPEC-SCNC: 157 MG/DL
CREAT/PROT UR: 0.1 RATIO
EGFR: 63 ML/MIN/1.73M2
EOSINOPHIL # BLD AUTO: 0.12 K/UL
EOSINOPHIL NFR BLD AUTO: 1.6 %
GLUCOSE QUALITATIVE U: NEGATIVE
GLUCOSE SERPL-MCNC: 160 MG/DL
HCT VFR BLD CALC: 38.2 %
HGB BLD-MCNC: 12.5 G/DL
HYALINE CASTS: 1 /LPF
IMM GRANULOCYTES NFR BLD AUTO: 1.8 %
KETONES URINE: NEGATIVE
LDH SERPL-CCNC: 261 U/L
LEUKOCYTE ESTERASE URINE: NEGATIVE
LYMPHOCYTES # BLD AUTO: 1.56 K/UL
LYMPHOCYTES NFR BLD AUTO: 20.4 %
MAGNESIUM SERPL-MCNC: 1.5 MG/DL
MAN DIFF?: NORMAL
MCHC RBC-ENTMCNC: 31.3 PG
MCHC RBC-ENTMCNC: 32.7 GM/DL
MCV RBC AUTO: 95.7 FL
MICROSCOPIC-UA: NORMAL
MONOCYTES # BLD AUTO: 0.41 K/UL
MONOCYTES NFR BLD AUTO: 5.4 %
NEUTROPHILS # BLD AUTO: 5.38 K/UL
NEUTROPHILS NFR BLD AUTO: 70.1 %
NITRITE URINE: NEGATIVE
PARATHYROID HORMONE INTACT: 82 PG/ML
PH URINE: 5.5
PHOSPHATE SERPL-MCNC: 2.9 MG/DL
PLATELET # BLD AUTO: 161 K/UL
POTASSIUM SERPL-SCNC: 4.7 MMOL/L
PROT SERPL-MCNC: 6.7 G/DL
PROT UR-MCNC: 9 MG/DL
PROTEIN URINE: NORMAL
RBC # BLD: 3.99 M/UL
RBC # FLD: 12.5 %
RED BLOOD CELLS URINE: 1 /HPF
SODIUM SERPL-SCNC: 138 MMOL/L
SPECIFIC GRAVITY URINE: 1.02
SQUAMOUS EPITHELIAL CELLS: 0 /HPF
TACROLIMUS SERPL-MCNC: 9.8 NG/ML
URATE SERPL-MCNC: 6 MG/DL
UROBILINOGEN URINE: NORMAL
WBC # FLD AUTO: 7.66 K/UL
WHITE BLOOD CELLS URINE: 0 /HPF

## 2022-04-28 PROCEDURE — 99214 OFFICE O/P EST MOD 30 MIN: CPT

## 2022-04-28 NOTE — ASSESSMENT
[FreeTextEntry1] : Renal Transplant recipient: Had immediate allograft function, normal creatinine at discharge. Has urinary frequency and nocturia. No dysuria/hematuria. No fever/chills. Tolerating medications.\par Immunosuppression: reviewed; simulect induction, on tac/MMF/prednisone, last Tac level noted; will recheck. Currently on Envarsus 7 mg daily. Will decrease to 6 mg/d; full dose MMF and prednisone at 5 mg daily\par On Mg  supplementation. Off phos supplement\par DM: On Metformin.and Repaglinide. (not yet taking Repaglinide)- advised to start it.\par Gave flow sheets to maintain home charts of   blood pressure, temperature, weight and urine output.\par Hypertension:Well controlled; Reviewed medications.  \par Hyperlipidemia: On low dose Rosuvastatin. Will monitor for muscle cramps\par Gout: Has been on allopurinol. Quiscent\par Cardiovascular risk reduction discussed. On aspirin.\par Elevated Uric acid level/Gout: Started on allopurinol.\par Infection prophylaxis: On Bactrim, Valcyte  Will continue the same.\par Advised daily aerobic exercises for 45 -  minutes Discussed weight loss strategies.\par Follow up every 4 weeks \par \par

## 2022-04-28 NOTE — HISTORY OF PRESENT ILLNESS
[FreeTextEntry1] : He received  donor kidney transplant on 21 at [facility] (Surgeon-Dr. Akhtar)\par \par Currently\par Recovered from COVID ,  still has easy fatigability\par Has no fever, no urinary symptoms  at present except nocturia (twice)\par Has fairly controlled  blood pressure at home\par Functional /Employment status: Reviewed. Employed full time\par \par Reviewed lab work, Tac level noted (on 7 mg/d)

## 2022-04-29 LAB — BKV DNA SPEC QL NAA+PROBE: NOT DETECTED COPIES/ML

## 2022-04-30 NOTE — ASU DISCHARGE PLAN (ADULT/PEDIATRIC) - DISCHARGE TO
[General Appearance - Well Developed] : well developed [General Appearance - Well Nourished] : well nourished [Normal Appearance] : normal appearance [General Appearance - In No Acute Distress] : no acute distress [Well Groomed] : well groomed [Abdomen Soft] : soft [Abdomen Tenderness] : non-tender [Costovertebral Angle Tenderness] : no ~M costovertebral angle tenderness [Urinary Bladder Findings] : the bladder was normal on palpation [Urethral Meatus] : meatus normal [Scrotum] : the scrotum was normal [Testes Mass (___cm)] : there were no testicular masses [No Prostate Nodules] : no prostate nodules [FreeTextEntry1] : The gland is mildly asymmetric with a raised ridge on the left side and seems very firm [Edema] : no peripheral edema [Respiration, Rhythm And Depth] : normal respiratory rhythm and effort [] : no respiratory distress [Exaggerated Use Of Accessory Muscles For Inspiration] : no accessory muscle use [Oriented To Time, Place, And Person] : oriented to person, place, and time [Affect] : the affect was normal [Mood] : the mood was normal [Not Anxious] : not anxious [Normal Station and Gait] : the gait and station were normal for the patient's age [No Focal Deficits] : no focal deficits [No Palpable Adenopathy] : no palpable adenopathy Home

## 2022-05-20 ENCOUNTER — APPOINTMENT (OUTPATIENT)
Dept: TRANSPLANT | Facility: CLINIC | Age: 62
End: 2022-05-20

## 2022-05-23 ENCOUNTER — APPOINTMENT (OUTPATIENT)
Dept: NEPHROLOGY | Facility: CLINIC | Age: 62
End: 2022-05-23

## 2022-05-23 ENCOUNTER — APPOINTMENT (OUTPATIENT)
Dept: NEPHROLOGY | Facility: CLINIC | Age: 62
End: 2022-05-23
Payer: MEDICARE

## 2022-05-23 VITALS
HEIGHT: 68 IN | RESPIRATION RATE: 18 BRPM | WEIGHT: 186 LBS | SYSTOLIC BLOOD PRESSURE: 160 MMHG | HEART RATE: 87 BPM | DIASTOLIC BLOOD PRESSURE: 77 MMHG | TEMPERATURE: 97.6 F | OXYGEN SATURATION: 99 % | BODY MASS INDEX: 28.19 KG/M2

## 2022-05-23 DIAGNOSIS — M10.9 GOUT, UNSPECIFIED: ICD-10-CM

## 2022-05-23 LAB
ALBUMIN SERPL ELPH-MCNC: 4.6 G/DL
ALP BLD-CCNC: 67 U/L
ALT SERPL-CCNC: 14 U/L
ANION GAP SERPL CALC-SCNC: 13 MMOL/L
APPEARANCE: CLEAR
AST SERPL-CCNC: 18 U/L
BACTERIA: NEGATIVE
BASOPHILS # BLD AUTO: 0.03 K/UL
BASOPHILS NFR BLD AUTO: 0.4 %
BILIRUB SERPL-MCNC: 0.5 MG/DL
BILIRUBIN URINE: NEGATIVE
BLOOD URINE: NEGATIVE
BUN SERPL-MCNC: 24 MG/DL
CALCIUM SERPL-MCNC: 10.1 MG/DL
CHLORIDE SERPL-SCNC: 107 MMOL/L
CMV DNA SPEC QL NAA+PROBE: NOT DETECTED IU/ML
CMVPCR LOG: NOT DETECTED LOG10IU/ML
CO2 SERPL-SCNC: 21 MMOL/L
COLOR: NORMAL
CREAT SERPL-MCNC: 1.25 MG/DL
CREAT SPEC-SCNC: 145 MG/DL
CREAT/PROT UR: 0.1 RATIO
EGFR: 66 ML/MIN/1.73M2
EOSINOPHIL # BLD AUTO: 0.09 K/UL
EOSINOPHIL NFR BLD AUTO: 1.1 %
GLUCOSE QUALITATIVE U: NEGATIVE
GLUCOSE SERPL-MCNC: 104 MG/DL
HCT VFR BLD CALC: 36.6 %
HGB BLD-MCNC: 12 G/DL
HYALINE CASTS: 0 /LPF
IMM GRANULOCYTES NFR BLD AUTO: 0.5 %
KETONES URINE: NEGATIVE
LEUKOCYTE ESTERASE URINE: NEGATIVE
LYMPHOCYTES # BLD AUTO: 1.6 K/UL
LYMPHOCYTES NFR BLD AUTO: 19 %
MAGNESIUM SERPL-MCNC: 1.5 MG/DL
MAN DIFF?: NORMAL
MCHC RBC-ENTMCNC: 31 PG
MCHC RBC-ENTMCNC: 32.8 GM/DL
MCV RBC AUTO: 94.6 FL
MICROSCOPIC-UA: NORMAL
MONOCYTES # BLD AUTO: 0.36 K/UL
MONOCYTES NFR BLD AUTO: 4.3 %
NEUTROPHILS # BLD AUTO: 6.32 K/UL
NEUTROPHILS NFR BLD AUTO: 74.7 %
NITRITE URINE: NEGATIVE
PH URINE: 5.5
PHOSPHATE SERPL-MCNC: 2.5 MG/DL
PLATELET # BLD AUTO: 156 K/UL
POTASSIUM SERPL-SCNC: 4.6 MMOL/L
PROT SERPL-MCNC: 6.7 G/DL
PROT UR-MCNC: 7 MG/DL
PROTEIN URINE: NEGATIVE
RBC # BLD: 3.87 M/UL
RBC # FLD: 12.9 %
RED BLOOD CELLS URINE: 1 /HPF
SODIUM SERPL-SCNC: 141 MMOL/L
SPECIFIC GRAVITY URINE: 1.02
SQUAMOUS EPITHELIAL CELLS: 0 /HPF
TACROLIMUS SERPL-MCNC: 9.5 NG/ML
URATE SERPL-MCNC: 6.7 MG/DL
UROBILINOGEN URINE: NORMAL
WBC # FLD AUTO: 8.44 K/UL
WHITE BLOOD CELLS URINE: 0 /HPF

## 2022-05-23 PROCEDURE — 99214 OFFICE O/P EST MOD 30 MIN: CPT

## 2022-05-23 NOTE — HISTORY OF PRESENT ILLNESS
[FreeTextEntry1] : He received  donor kidney transplant on 21 at [facility] (Surgeon-Dr. Akhtar)\par \par Currently\par He reports easy fatigability\par Has no fever, no urinary symptoms  at present except nocturia (twice)\par Has fairly controlled  blood pressure at home\par Functional /Employment status: Reviewed. Employed full time\par \par Reviewed lab work, stable creatinine, therapeutic Tac level

## 2022-05-23 NOTE — ASSESSMENT
[FreeTextEntry1] : Renal Transplant recipient: Had immediate allograft function, normal creatinine at discharge. Has urinary frequency and nocturia. No dysuria/hematuria. No fever/chills. Tolerating medications.\par Immunosuppression: reviewed; simulect induction, on tac/MMF/prednisone, last Tac level noted; will recheck. Currently on Envarsus 6 mg daily.  full dose MMF and prednisone at 5 mg daily\par On Mg  supplementation. Off phos supplement\par DM: On Metformin.and Repaglinide. (not yet taking Repaglinide)- advised to start it.\par Gave flow sheets to maintain home charts of   blood pressure, temperature, weight and urine output.\par Hypertension:Well controlled; Reviewed medications.  \par Hyperlipidemia: On low dose Rosuvastatin. Will monitor for muscle cramps\par Gout: Has been on allopurinol. Quiscent\par Cardiovascular risk reduction discussed. On aspirin.\par Elevated Uric acid level/Gout: Started on allopurinol.\par Infection prophylaxis: On Bactrim, Valcyte  Will continue the same.\par Advised daily aerobic exercises for 45 -  minutes Discussed weight loss strategies.\par Follow up every 4 weeks \par \par

## 2022-05-24 LAB — BKV DNA SPEC QL NAA+PROBE: NOT DETECTED COPIES/ML

## 2022-05-31 LAB
ALBUMIN SERPL ELPH-MCNC: 4.6 G/DL
ALP BLD-CCNC: 72 U/L
ALT SERPL-CCNC: 15 U/L
ANION GAP SERPL CALC-SCNC: 13 MMOL/L
APPEARANCE: CLEAR
AST SERPL-CCNC: 18 U/L
BACTERIA UR CULT: NORMAL
BACTERIA: NEGATIVE
BASOPHILS # BLD AUTO: 0.06 K/UL
BASOPHILS NFR BLD AUTO: 0.7 %
BILIRUB SERPL-MCNC: 0.3 MG/DL
BILIRUBIN URINE: NEGATIVE
BLOOD URINE: NEGATIVE
BUN SERPL-MCNC: 25 MG/DL
CALCIUM SERPL-MCNC: 10.3 MG/DL
CHLORIDE SERPL-SCNC: 103 MMOL/L
CO2 SERPL-SCNC: 22 MMOL/L
COLOR: NORMAL
CREAT SERPL-MCNC: 1.3 MG/DL
EGFR: 63 ML/MIN/1.73M2
EOSINOPHIL # BLD AUTO: 0.07 K/UL
EOSINOPHIL NFR BLD AUTO: 0.8 %
GLUCOSE QUALITATIVE U: NEGATIVE
GLUCOSE SERPL-MCNC: 130 MG/DL
HCT VFR BLD CALC: 37.5 %
HGB BLD-MCNC: 11.8 G/DL
HYALINE CASTS: 0 /LPF
IMM GRANULOCYTES NFR BLD AUTO: 0.5 %
KETONES URINE: NEGATIVE
LEUKOCYTE ESTERASE URINE: NEGATIVE
LYMPHOCYTES # BLD AUTO: 1.22 K/UL
LYMPHOCYTES NFR BLD AUTO: 13.2 %
MAN DIFF?: NORMAL
MCHC RBC-ENTMCNC: 31 PG
MCHC RBC-ENTMCNC: 31.5 GM/DL
MCV RBC AUTO: 98.4 FL
MICROSCOPIC-UA: NORMAL
MONOCYTES # BLD AUTO: 0.31 K/UL
MONOCYTES NFR BLD AUTO: 3.4 %
NEUTROPHILS # BLD AUTO: 7.5 K/UL
NEUTROPHILS NFR BLD AUTO: 81.4 %
NITRITE URINE: NEGATIVE
PH URINE: 6
PLATELET # BLD AUTO: 172 K/UL
POTASSIUM SERPL-SCNC: 5 MMOL/L
PROT SERPL-MCNC: 6.6 G/DL
PROTEIN URINE: NEGATIVE
RBC # BLD: 3.81 M/UL
RBC # FLD: 13 %
RED BLOOD CELLS URINE: 0 /HPF
SODIUM SERPL-SCNC: 139 MMOL/L
SPECIFIC GRAVITY URINE: 1.01
SQUAMOUS EPITHELIAL CELLS: 0 /HPF
UROBILINOGEN URINE: NORMAL
WBC # FLD AUTO: 9.21 K/UL
WHITE BLOOD CELLS URINE: 0 /HPF

## 2022-06-10 ENCOUNTER — APPOINTMENT (OUTPATIENT)
Dept: TRANSPLANT | Facility: CLINIC | Age: 62
End: 2022-06-10

## 2022-06-13 ENCOUNTER — LABORATORY RESULT (OUTPATIENT)
Age: 62
End: 2022-06-13

## 2022-06-13 LAB
ALBUMIN SERPL ELPH-MCNC: 4.8 G/DL
ALP BLD-CCNC: 67 U/L
ALT SERPL-CCNC: 15 U/L
ANION GAP SERPL CALC-SCNC: 12 MMOL/L
APPEARANCE: CLEAR
AST SERPL-CCNC: 18 U/L
BACTERIA: NEGATIVE
BASOPHILS # BLD AUTO: 0.08 K/UL
BASOPHILS NFR BLD AUTO: 0.9 %
BILIRUB SERPL-MCNC: 0.7 MG/DL
BILIRUBIN URINE: NEGATIVE
BLOOD URINE: NEGATIVE
BUN SERPL-MCNC: 21 MG/DL
CALCIUM SERPL-MCNC: 10.3 MG/DL
CHLORIDE SERPL-SCNC: 105 MMOL/L
CO2 SERPL-SCNC: 23 MMOL/L
COLOR: YELLOW
CREAT SERPL-MCNC: 1.21 MG/DL
CREAT SPEC-SCNC: 174 MG/DL
CREAT/PROT UR: 0.1 RATIO
EGFR: 68 ML/MIN/1.73M2
EOSINOPHIL # BLD AUTO: 0.08 K/UL
EOSINOPHIL NFR BLD AUTO: 0.9 %
GLUCOSE QUALITATIVE U: NEGATIVE
GLUCOSE SERPL-MCNC: 163 MG/DL
HCT VFR BLD CALC: 38.2 %
HGB BLD-MCNC: 12.6 G/DL
HYALINE CASTS: 0 /LPF
KETONES URINE: NEGATIVE
LEUKOCYTE ESTERASE URINE: NEGATIVE
LYMPHOCYTES # BLD AUTO: 1.48 K/UL
LYMPHOCYTES NFR BLD AUTO: 17.4 %
MAGNESIUM SERPL-MCNC: 1.5 MG/DL
MAN DIFF?: NORMAL
MCHC RBC-ENTMCNC: 31.2 PG
MCHC RBC-ENTMCNC: 33 GM/DL
MCV RBC AUTO: 94.6 FL
MICROSCOPIC-UA: NORMAL
MONOCYTES # BLD AUTO: 0.3 K/UL
MONOCYTES NFR BLD AUTO: 3.5 %
NEUTROPHILS # BLD AUTO: 6.58 K/UL
NEUTROPHILS NFR BLD AUTO: 73 %
NITRITE URINE: NEGATIVE
PH URINE: 5.5
PHOSPHATE SERPL-MCNC: 2.6 MG/DL
PLATELET # BLD AUTO: 171 K/UL
POTASSIUM SERPL-SCNC: 4.8 MMOL/L
PROT SERPL-MCNC: 6.6 G/DL
PROT UR-MCNC: 8 MG/DL
PROTEIN URINE: NORMAL
RBC # BLD: 4.04 M/UL
RBC # FLD: 13.1 %
RED BLOOD CELLS URINE: 1 /HPF
SODIUM SERPL-SCNC: 140 MMOL/L
SPECIFIC GRAVITY URINE: 1.02
SQUAMOUS EPITHELIAL CELLS: 0 /HPF
TACROLIMUS SERPL-MCNC: 7.3 NG/ML
UROBILINOGEN URINE: NORMAL
WBC # FLD AUTO: 8.51 K/UL
WHITE BLOOD CELLS URINE: 1 /HPF

## 2022-06-14 LAB
CMV DNA SPEC QL NAA+PROBE: NOT DETECTED IU/ML
CMVPCR LOG: NOT DETECTED LOG10IU/ML

## 2022-06-15 LAB — BKV DNA SPEC QL NAA+PROBE: NOT DETECTED COPIES/ML

## 2022-06-29 ENCOUNTER — APPOINTMENT (OUTPATIENT)
Dept: TRANSPLANT | Facility: CLINIC | Age: 62
End: 2022-06-29

## 2022-06-29 ENCOUNTER — LABORATORY RESULT (OUTPATIENT)
Age: 62
End: 2022-06-29

## 2022-06-30 ENCOUNTER — APPOINTMENT (OUTPATIENT)
Dept: NEPHROLOGY | Facility: CLINIC | Age: 62
End: 2022-06-30

## 2022-06-30 VITALS
OXYGEN SATURATION: 99 % | HEART RATE: 71 BPM | WEIGHT: 185 LBS | RESPIRATION RATE: 16 BRPM | DIASTOLIC BLOOD PRESSURE: 75 MMHG | BODY MASS INDEX: 28.04 KG/M2 | HEIGHT: 68 IN | TEMPERATURE: 97.1 F | SYSTOLIC BLOOD PRESSURE: 144 MMHG

## 2022-06-30 LAB
ALBUMIN SERPL ELPH-MCNC: 4.8 G/DL
ALP BLD-CCNC: 76 U/L
ALT SERPL-CCNC: 12 U/L
ANION GAP SERPL CALC-SCNC: 12 MMOL/L
APPEARANCE: CLEAR
AST SERPL-CCNC: 18 U/L
BACTERIA: NEGATIVE
BASOPHILS # BLD AUTO: 0.05 K/UL
BASOPHILS NFR BLD AUTO: 0.6 %
BILIRUB SERPL-MCNC: 0.4 MG/DL
BILIRUBIN URINE: NEGATIVE
BKV DNA SPEC QL NAA+PROBE: NOT DETECTED IU/ML
BLOOD URINE: NEGATIVE
BUN SERPL-MCNC: 29 MG/DL
CALCIUM SERPL-MCNC: 10 MG/DL
CHLORIDE SERPL-SCNC: 100 MMOL/L
CMV DNA SPEC QL NAA+PROBE: NOT DETECTED IU/ML
CMVPCR LOG: NOT DETECTED LOG10IU/ML
CO2 SERPL-SCNC: 22 MMOL/L
COLOR: NORMAL
CREAT SERPL-MCNC: 1.31 MG/DL
CREAT SPEC-SCNC: 86 MG/DL
CREAT/PROT UR: 0.1 RATIO
EGFR: 62 ML/MIN/1.73M2
EOSINOPHIL # BLD AUTO: 0.15 K/UL
EOSINOPHIL NFR BLD AUTO: 1.9 %
GLUCOSE QUALITATIVE U: NEGATIVE
GLUCOSE SERPL-MCNC: 136 MG/DL
HCT VFR BLD CALC: 38.1 %
HGB BLD-MCNC: 12.4 G/DL
HYALINE CASTS: 0 /LPF
IMM GRANULOCYTES NFR BLD AUTO: 1.8 %
KETONES URINE: NEGATIVE
LEUKOCYTE ESTERASE URINE: NEGATIVE
LYMPHOCYTES # BLD AUTO: 1.71 K/UL
LYMPHOCYTES NFR BLD AUTO: 21.5 %
MAGNESIUM SERPL-MCNC: 1.6 MG/DL
MAN DIFF?: NORMAL
MCHC RBC-ENTMCNC: 30.7 PG
MCHC RBC-ENTMCNC: 32.5 GM/DL
MCV RBC AUTO: 94.3 FL
MICROSCOPIC-UA: NORMAL
MONOCYTES # BLD AUTO: 0.41 K/UL
MONOCYTES NFR BLD AUTO: 5.1 %
NEUTROPHILS # BLD AUTO: 5.51 K/UL
NEUTROPHILS NFR BLD AUTO: 69.1 %
NITRITE URINE: NEGATIVE
PH URINE: 6
PHOSPHATE SERPL-MCNC: 3.1 MG/DL
PLATELET # BLD AUTO: 183 K/UL
POTASSIUM SERPL-SCNC: 4.6 MMOL/L
PROT SERPL-MCNC: 6.9 G/DL
PROT UR-MCNC: 5 MG/DL
PROTEIN URINE: NEGATIVE
RBC # BLD: 4.04 M/UL
RBC # FLD: 12.7 %
RED BLOOD CELLS URINE: 1 /HPF
SODIUM SERPL-SCNC: 135 MMOL/L
SPECIFIC GRAVITY URINE: 1.02
SQUAMOUS EPITHELIAL CELLS: 0 /HPF
TACROLIMUS SERPL-MCNC: 7.8 NG/ML
URATE SERPL-MCNC: 6.8 MG/DL
UROBILINOGEN URINE: NORMAL
WBC # FLD AUTO: 7.97 K/UL
WHITE BLOOD CELLS URINE: 0 /HPF

## 2022-06-30 PROCEDURE — 99214 OFFICE O/P EST MOD 30 MIN: CPT

## 2022-06-30 NOTE — ASSESSMENT
[FreeTextEntry1] : Renal Transplant recipient: Had immediate allograft function, normal creatinine at discharge. Has urinary frequency and nocturia. No dysuria/hematuria. No fever/chills. Tolerating medications.\par Immunosuppression: reviewed; simulect induction, on tac/MMF/prednisone, last Tac level noted Currently on Envarsus 6 mg daily.  full dose MMF and prednisone at 5 mg daily\par On Mg  supplementation. Off phos supplement\par Abdominal discomfort: Advised to change MMF to 500 mg 4 times/day\par Has appointment to see Dr. Balderrama GI\par DM: On Metformin.and Repaglinide. (not yet taking Repaglinide)- advised to start it.\par Gave flow sheets to maintain home charts of   blood pressure,   weight  \par Hypertension: controlled; Reviewed medications.  \par Hyperlipidemia: On low dose Rosuvastatin. Will monitor for muscle cramps\par Gout: Has been on allopurinol. Quiscent\par Cardiovascular risk reduction discussed. On aspirin.\par Elevated Uric acid level/Gout: Started on allopurinol.\par Infection prophylaxis: On Bactrim, Valcyte  Will continue the same.\par Advised daily aerobic exercises for 45 -  minutes Discussed weight loss strategies.\par Follow up every 4 weeks \par \par

## 2022-06-30 NOTE — REASON FOR VISIT
[Follow-Up] : a follow-up visit [FreeTextEntry1] : Post kidney transplant follow up, has intermittent abdominal discomfort

## 2022-06-30 NOTE — HISTORY OF PRESENT ILLNESS
[FreeTextEntry1] : He received  donor kidney transplant on 21 at [facility] (Surgeon-Dr. Akhtar)\par \par Currently\par Has intermittent mid abdominal discomfort. No diarrhea/vomiting/constipation\par Has no fever, no urinary symptoms  at present except nocturia (twice)\par Has fairly controlled  blood pressure at home\par Functional /Employment status: Reviewed. Employed full time\par \par Reviewed lab work, stable creatinine, therapeutic Tac level\par \par Plans to go to [place] in mid July for a wedding.

## 2022-07-19 PROBLEM — H40.003 GLAUCOMA SUSPECT OF BOTH EYES: Status: ACTIVE | Noted: 2019-06-05

## 2022-07-20 ENCOUNTER — NON-APPOINTMENT (OUTPATIENT)
Age: 62
End: 2022-07-20

## 2022-07-29 ENCOUNTER — LABORATORY RESULT (OUTPATIENT)
Age: 62
End: 2022-07-29

## 2022-07-29 ENCOUNTER — APPOINTMENT (OUTPATIENT)
Dept: TRANSPLANT | Facility: CLINIC | Age: 62
End: 2022-07-29

## 2022-07-29 ENCOUNTER — NON-APPOINTMENT (OUTPATIENT)
Age: 62
End: 2022-07-29

## 2022-07-29 LAB
ALBUMIN SERPL ELPH-MCNC: 4.7 G/DL
ALP BLD-CCNC: 68 U/L
ALT SERPL-CCNC: 15 U/L
ANION GAP SERPL CALC-SCNC: 12 MMOL/L
APPEARANCE: CLEAR
AST SERPL-CCNC: 17 U/L
BACTERIA: NEGATIVE
BASOPHILS # BLD AUTO: 0.05 K/UL
BASOPHILS NFR BLD AUTO: 0.6 %
BILIRUB SERPL-MCNC: 0.6 MG/DL
BILIRUBIN URINE: NEGATIVE
BLOOD URINE: NEGATIVE
BUN SERPL-MCNC: 18 MG/DL
CALCIUM SERPL-MCNC: 10.2 MG/DL
CHLORIDE SERPL-SCNC: 104 MMOL/L
CO2 SERPL-SCNC: 23 MMOL/L
COLOR: NORMAL
CREAT SERPL-MCNC: 1.23 MG/DL
CREAT SPEC-SCNC: 131 MG/DL
CREAT/PROT UR: 0.1 RATIO
EGFR: 67 ML/MIN/1.73M2
EOSINOPHIL # BLD AUTO: 0.11 K/UL
EOSINOPHIL NFR BLD AUTO: 1.4 %
GLUCOSE QUALITATIVE U: NEGATIVE
GLUCOSE SERPL-MCNC: 105 MG/DL
HCT VFR BLD CALC: 37.3 %
HGB BLD-MCNC: 12 G/DL
HYALINE CASTS: 0 /LPF
IMM GRANULOCYTES NFR BLD AUTO: 1.1 %
KETONES URINE: NEGATIVE
LEUKOCYTE ESTERASE URINE: NEGATIVE
LYMPHOCYTES # BLD AUTO: 1.67 K/UL
LYMPHOCYTES NFR BLD AUTO: 21.1 %
MAGNESIUM SERPL-MCNC: 1.6 MG/DL
MAN DIFF?: NORMAL
MCHC RBC-ENTMCNC: 30.2 PG
MCHC RBC-ENTMCNC: 32.2 GM/DL
MCV RBC AUTO: 93.7 FL
MICROSCOPIC-UA: NORMAL
MONOCYTES # BLD AUTO: 0.42 K/UL
MONOCYTES NFR BLD AUTO: 5.3 %
NEUTROPHILS # BLD AUTO: 5.58 K/UL
NEUTROPHILS NFR BLD AUTO: 70.5 %
NITRITE URINE: NEGATIVE
PH URINE: 6
PHOSPHATE SERPL-MCNC: 2.6 MG/DL
PLATELET # BLD AUTO: 187 K/UL
POTASSIUM SERPL-SCNC: 5.1 MMOL/L
PROT SERPL-MCNC: 6.5 G/DL
PROT UR-MCNC: 7 MG/DL
PROTEIN URINE: NEGATIVE
RBC # BLD: 3.98 M/UL
RBC # FLD: 12.8 %
RED BLOOD CELLS URINE: 2 /HPF
SODIUM SERPL-SCNC: 138 MMOL/L
SPECIFIC GRAVITY URINE: 1.02
SQUAMOUS EPITHELIAL CELLS: 0 /HPF
TACROLIMUS SERPL-MCNC: 7.7 NG/ML
URATE SERPL-MCNC: 6.7 MG/DL
UROBILINOGEN URINE: NORMAL
WBC # FLD AUTO: 7.92 K/UL
WHITE BLOOD CELLS URINE: 1 /HPF

## 2022-07-31 LAB
BKV DNA SPEC QL NAA+PROBE: NOT DETECTED IU/ML
CMV DNA SPEC QL NAA+PROBE: NOT DETECTED IU/ML
CMVPCR LOG: NOT DETECTED LOG10IU/ML

## 2022-08-01 ENCOUNTER — APPOINTMENT (OUTPATIENT)
Dept: NEPHROLOGY | Facility: CLINIC | Age: 62
End: 2022-08-01

## 2022-08-01 VITALS
RESPIRATION RATE: 16 BRPM | TEMPERATURE: 97.3 F | SYSTOLIC BLOOD PRESSURE: 161 MMHG | BODY MASS INDEX: 27.98 KG/M2 | WEIGHT: 184 LBS | HEART RATE: 79 BPM | DIASTOLIC BLOOD PRESSURE: 78 MMHG | OXYGEN SATURATION: 100 %

## 2022-08-01 VITALS — SYSTOLIC BLOOD PRESSURE: 120 MMHG | DIASTOLIC BLOOD PRESSURE: 80 MMHG

## 2022-08-01 PROCEDURE — 99214 OFFICE O/P EST MOD 30 MIN: CPT

## 2022-08-01 NOTE — HISTORY OF PRESENT ILLNESS
[FreeTextEntry1] : He received  donor kidney transplant on 21 at [facility] (Surgeon-Dr. Akhtar)\par \par Currently\par Has muscle cramps intermittent, on alt day crestor\par Has no fever, no urinary symptoms  at present except nocturia (twice)\par Has fairly controlled  blood pressure at home\par Functional /Employment status: Reviewed. Employed full time\par \par Reviewed lab work, stable creatinine, therapeutic Tac level\par Travelled to [place] and returned.

## 2022-08-01 NOTE — PHYSICAL EXAM
[General Appearance - Alert] : alert [General Appearance - In No Acute Distress] : in no acute distress [Sclera] : the sclera and conjunctiva were normal [PERRL With Normal Accommodation] : pupils were equal in size, round, and reactive to light [Extraocular Movements] : extraocular movements were intact [Oropharynx] : the oropharynx was normal [Outer Ear] : the ears and nose were normal in appearance [Neck Appearance] : the appearance of the neck was normal [Neck Cervical Mass (___cm)] : no neck mass was observed [Jugular Venous Distention Increased] : there was no jugular-venous distention [Thyroid Diffuse Enlargement] : the thyroid was not enlarged [Thyroid Nodule] : there were no palpable thyroid nodules [Auscultation Breath Sounds / Voice Sounds] : lungs were clear to auscultation bilaterally [Heart Rate And Rhythm] : heart rate was normal and rhythm regular [Heart Sounds] : normal S1 and S2 [Heart Sounds Gallop] : no gallops [Murmurs] : no murmurs [Heart Sounds Pericardial Friction Rub] : no pericardial rub [Full Pulse] : the pedal pulses are present [Edema] : there was no peripheral edema [Bowel Sounds] : normal bowel sounds [Abdomen Soft] : soft [Abdomen Tenderness] : non-tender [Abdomen Mass (___ Cm)] : no abdominal mass palpated [FreeTextEntry1] : healed scar [Cervical Lymph Nodes Enlarged Posterior Bilaterally] : posterior cervical [Cervical Lymph Nodes Enlarged Anterior Bilaterally] : anterior cervical [Supraclavicular Lymph Nodes Enlarged Bilaterally] : supraclavicular [Axillary Lymph Nodes Enlarged Bilaterally] : axillary [Inguinal Lymph Nodes Enlarged Bilaterally] : inguinal [Abnormal Walk] : normal gait [Involuntary Movements] : no involuntary movements were seen [___ (cm) Fistula] : [unfilled] (cm) fistula [Bruit] : a bruit was present [Thrill] : a thrill was present [] : no rash [No Focal Deficits] : no focal deficits [Oriented To Time, Place, And Person] : oriented to person, place, and time [Impaired Insight] : insight and judgment were intact [Affect] : the affect was normal

## 2022-08-01 NOTE — ASSESSMENT
[FreeTextEntry1] : Renal Transplant recipient: Had immediate allograft function, normal creatinine at discharge. Has urinary frequency and nocturia. No dysuria/hematuria. No fever/chills. Tolerating medications.\par Immunosuppression: reviewed; simulect induction, on tac/MMF/prednisone, last Tac level noted Currently on Envarsus 6 mg daily.  full dose MMF and prednisone at 5 mg daily\par On Mg  supplementation. Off phos supplement\par DM: On Metformin.and Repaglinide. Also taking 'neena fruit powder)\par Gave flow sheets to maintain home charts of   blood pressure,   weight  \par Hypertension: controlled; Reviewed medications.  \par Hyperlipidemia: On low dose Rosuvastatin. Will monitor for muscle cramps\par Gout: Has been on allopurinol. Quiscent\par Cardiovascular risk reduction discussed. On aspirin.\par Elevated Uric acid level/Gout: Started on allopurinol.\par Infection prophylaxis: On Bactrim, Valcyte  Will continue the same.\par Advised daily aerobic exercises for 45 -  minutes Discussed weight loss strategies.\par Follow up every 4 weeks \par \par

## 2022-08-21 NOTE — PROGRESS NOTE ADULT - PROBLEM SELECTOR PLAN 5
New onset. With history of recent heparin exposure? vs dialysis membrane  - heme following  - trend plt counts  - Avoid heparin for now
oriented to person, place and time

## 2022-08-30 ENCOUNTER — APPOINTMENT (OUTPATIENT)
Dept: TRANSPLANT | Facility: CLINIC | Age: 62
End: 2022-08-30

## 2022-08-31 ENCOUNTER — LABORATORY RESULT (OUTPATIENT)
Age: 62
End: 2022-08-31

## 2022-09-01 ENCOUNTER — APPOINTMENT (OUTPATIENT)
Dept: NEPHROLOGY | Facility: CLINIC | Age: 62
End: 2022-09-01

## 2022-09-01 VITALS — DIASTOLIC BLOOD PRESSURE: 70 MMHG | SYSTOLIC BLOOD PRESSURE: 120 MMHG

## 2022-09-01 VITALS — WEIGHT: 190 LBS | BODY MASS INDEX: 28.89 KG/M2

## 2022-09-01 VITALS
WEIGHT: 315 LBS | OXYGEN SATURATION: 100 % | SYSTOLIC BLOOD PRESSURE: 150 MMHG | TEMPERATURE: 97.4 F | BODY MASS INDEX: 63.69 KG/M2 | HEART RATE: 78 BPM | DIASTOLIC BLOOD PRESSURE: 83 MMHG

## 2022-09-01 LAB
25(OH)D3 SERPL-MCNC: 51.8 NG/ML
ALBUMIN SERPL ELPH-MCNC: 4.7 G/DL
ALP BLD-CCNC: 61 U/L
ALT SERPL-CCNC: 14 U/L
ANION GAP SERPL CALC-SCNC: 13 MMOL/L
APPEARANCE: CLEAR
AST SERPL-CCNC: 18 U/L
BACTERIA: NEGATIVE
BASOPHILS # BLD AUTO: 0.04 K/UL
BASOPHILS NFR BLD AUTO: 0.5 %
BILIRUB SERPL-MCNC: 0.5 MG/DL
BILIRUBIN URINE: NEGATIVE
BLOOD URINE: NEGATIVE
BUN SERPL-MCNC: 26 MG/DL
CALCIUM SERPL-MCNC: 10.4 MG/DL
CALCIUM SERPL-MCNC: 10.4 MG/DL
CHLORIDE SERPL-SCNC: 102 MMOL/L
CHOLEST SERPL-MCNC: 228 MG/DL
CMV DNA SPEC QL NAA+PROBE: NOT DETECTED IU/ML
CMVPCR LOG: NOT DETECTED LOG10IU/ML
CO2 SERPL-SCNC: 24 MMOL/L
COLOR: NORMAL
CREAT SERPL-MCNC: 1.32 MG/DL
CREAT SPEC-SCNC: 152 MG/DL
CREAT/PROT UR: 0.1 RATIO
EGFR: 61 ML/MIN/1.73M2
EOSINOPHIL # BLD AUTO: 0.12 K/UL
EOSINOPHIL NFR BLD AUTO: 1.5 %
ESTIMATED AVERAGE GLUCOSE: 146 MG/DL
GLUCOSE QUALITATIVE U: NEGATIVE
GLUCOSE SERPL-MCNC: 132 MG/DL
HBA1C MFR BLD HPLC: 6.7 %
HCT VFR BLD CALC: 38.7 %
HDLC SERPL-MCNC: 55 MG/DL
HGB BLD-MCNC: 12.6 G/DL
HYALINE CASTS: 0 /LPF
IMM GRANULOCYTES NFR BLD AUTO: 0.9 %
KETONES URINE: NEGATIVE
LDH SERPL-CCNC: 230 U/L
LDLC SERPL CALC-MCNC: 137 MG/DL
LEUKOCYTE ESTERASE URINE: NEGATIVE
LYMPHOCYTES # BLD AUTO: 1.82 K/UL
LYMPHOCYTES NFR BLD AUTO: 22.8 %
MAGNESIUM SERPL-MCNC: 1.5 MG/DL
MAN DIFF?: NORMAL
MCHC RBC-ENTMCNC: 30.9 PG
MCHC RBC-ENTMCNC: 32.6 GM/DL
MCV RBC AUTO: 94.9 FL
MICROSCOPIC-UA: NORMAL
MONOCYTES # BLD AUTO: 0.39 K/UL
MONOCYTES NFR BLD AUTO: 4.9 %
NEUTROPHILS # BLD AUTO: 5.54 K/UL
NEUTROPHILS NFR BLD AUTO: 69.4 %
NITRITE URINE: NEGATIVE
NONHDLC SERPL-MCNC: 172 MG/DL
PARATHYROID HORMONE INTACT: 67 PG/ML
PH URINE: 5.5
PHOSPHATE SERPL-MCNC: 2.8 MG/DL
PLATELET # BLD AUTO: 187 K/UL
POTASSIUM SERPL-SCNC: 4.7 MMOL/L
PROT SERPL-MCNC: 6.6 G/DL
PROT UR-MCNC: 9 MG/DL
PROTEIN URINE: NEGATIVE
RBC # BLD: 4.08 M/UL
RBC # FLD: 12.6 %
RED BLOOD CELLS URINE: 1 /HPF
SODIUM SERPL-SCNC: 139 MMOL/L
SPECIFIC GRAVITY URINE: 1.02
SQUAMOUS EPITHELIAL CELLS: 0 /HPF
TACROLIMUS SERPL-MCNC: 8.5 NG/ML
TRIGL SERPL-MCNC: 177 MG/DL
URATE SERPL-MCNC: 7 MG/DL
UROBILINOGEN URINE: NORMAL
WBC # FLD AUTO: 7.98 K/UL
WHITE BLOOD CELLS URINE: 1 /HPF

## 2022-09-01 PROCEDURE — 99214 OFFICE O/P EST MOD 30 MIN: CPT

## 2022-09-01 NOTE — HISTORY OF PRESENT ILLNESS
[FreeTextEntry1] : He received  donor kidney transplant on 21 at [facility] (Surgeon-Dr. Akhtar)\par \par Currently\par Has muscle cramps/ hip pain. persisted while on  intermittent, on alt day crestor /now off for 2 weeks\par Has no fever, no urinary symptoms  at present except nocturia (twice)\par Has fairly controlled  blood pressure at home\par Functional /Employment status: Reviewed. Employed full time\par \par Reviewed lab work, relatively stable creatinine, therapeutic Tac level\par

## 2022-09-01 NOTE — ASSESSMENT
[FreeTextEntry1] : Renal Transplant recipient: Had immediate allograft function, normal creatinine at discharge. Has urinary frequency and nocturia. No dysuria/hematuria. No fever/chills. Tolerating medications.\par Immunosuppression: reviewed; Simulect induction, on tac/MMF/prednisone, last Tac level noted Currently on Envarsus 6 mg daily.  full dose MMF and prednisone at 5 mg daily\par On Mg  supplementation. Off phos supplement\par DM: On Metformin.and Repaglinide. Also taking 'neena fruit powder)\par Muscle cramps/hip pains: Advised Neurontin, discussed side effects\par Hypertension: controlled; Reviewed medications.  \par Hyperlipidemia: On low dose Rosuvastatin. Will monitor for muscle cramps\par Gout: Has been on allopurinol. Quiescent\par Cardiovascular risk reduction discussed. On aspirin.\par Elevated Uric acid level/Gout: Started on allopurinol.\par Infection prophylaxis: On Bactrim, Valcyte  Will continue the same.\par Follow up every 8 weeks \par \par

## 2022-09-06 LAB — BKV DNA SPEC QL NAA+PROBE: NOT DETECTED IU/ML

## 2022-10-26 ENCOUNTER — LABORATORY RESULT (OUTPATIENT)
Age: 62
End: 2022-10-26

## 2022-10-27 ENCOUNTER — APPOINTMENT (OUTPATIENT)
Dept: NEPHROLOGY | Facility: CLINIC | Age: 62
End: 2022-10-27

## 2022-10-27 VITALS
SYSTOLIC BLOOD PRESSURE: 130 MMHG | BODY MASS INDEX: 28.64 KG/M2 | WEIGHT: 189 LBS | DIASTOLIC BLOOD PRESSURE: 67 MMHG | TEMPERATURE: 97.8 F | HEIGHT: 68 IN | OXYGEN SATURATION: 99 % | HEART RATE: 69 BPM | RESPIRATION RATE: 16 BRPM

## 2022-10-27 VITALS — DIASTOLIC BLOOD PRESSURE: 66 MMHG | SYSTOLIC BLOOD PRESSURE: 120 MMHG

## 2022-10-27 LAB
ALBUMIN SERPL ELPH-MCNC: 4.7 G/DL
ALP BLD-CCNC: 67 U/L
ALT SERPL-CCNC: 17 U/L
ANION GAP SERPL CALC-SCNC: 14 MMOL/L
APPEARANCE: CLEAR
AST SERPL-CCNC: 18 U/L
BACTERIA: NEGATIVE
BASOPHILS # BLD AUTO: 0.07 K/UL
BASOPHILS NFR BLD AUTO: 0.9 %
BILIRUB SERPL-MCNC: 0.5 MG/DL
BILIRUBIN URINE: NEGATIVE
BLOOD URINE: NEGATIVE
BUN SERPL-MCNC: 25 MG/DL
CALCIUM SERPL-MCNC: 10.5 MG/DL
CHLORIDE SERPL-SCNC: 103 MMOL/L
CMV DNA SPEC QL NAA+PROBE: NOT DETECTED IU/ML
CMVPCR LOG: NOT DETECTED LOG10IU/ML
CO2 SERPL-SCNC: 23 MMOL/L
COLOR: YELLOW
CREAT SERPL-MCNC: 1.35 MG/DL
CREAT SPEC-SCNC: 166 MG/DL
CREAT/PROT UR: 0.1 RATIO
EGFR: 60 ML/MIN/1.73M2
EOSINOPHIL # BLD AUTO: 0.29 K/UL
EOSINOPHIL NFR BLD AUTO: 3.5 %
GLUCOSE QUALITATIVE U: NEGATIVE
GLUCOSE SERPL-MCNC: 149 MG/DL
HCT VFR BLD CALC: 35.2 %
HGB BLD-MCNC: 12 G/DL
HYALINE CASTS: 1 /LPF
KETONES URINE: NEGATIVE
LDH SERPL-CCNC: 225 U/L
LEUKOCYTE ESTERASE URINE: NEGATIVE
LYMPHOCYTES # BLD AUTO: 1.96 K/UL
LYMPHOCYTES NFR BLD AUTO: 23.7 %
MAGNESIUM SERPL-MCNC: 1.3 MG/DL
MAN DIFF?: NORMAL
MCHC RBC-ENTMCNC: 31.7 PG
MCHC RBC-ENTMCNC: 34.1 GM/DL
MCV RBC AUTO: 92.9 FL
MICROSCOPIC-UA: NORMAL
MONOCYTES # BLD AUTO: 0.14 K/UL
MONOCYTES NFR BLD AUTO: 1.7 %
NEUTROPHILS # BLD AUTO: 5.74 K/UL
NEUTROPHILS NFR BLD AUTO: 69.3 %
NITRITE URINE: NEGATIVE
PH URINE: 5.5
PHOSPHATE SERPL-MCNC: 3.3 MG/DL
PLATELET # BLD AUTO: 155 K/UL
POTASSIUM SERPL-SCNC: 4.7 MMOL/L
PROT SERPL-MCNC: 6.7 G/DL
PROT UR-MCNC: 9 MG/DL
PROTEIN URINE: NEGATIVE
RBC # BLD: 3.79 M/UL
RBC # FLD: 13 %
RED BLOOD CELLS URINE: 2 /HPF
SODIUM SERPL-SCNC: 140 MMOL/L
SPECIFIC GRAVITY URINE: 1.02
SQUAMOUS EPITHELIAL CELLS: 0 /HPF
TACROLIMUS SERPL-MCNC: 7.9 NG/ML
URATE SERPL-MCNC: 7.3 MG/DL
UROBILINOGEN URINE: NORMAL
WBC # FLD AUTO: 8.28 K/UL
WHITE BLOOD CELLS URINE: 1 /HPF

## 2022-10-27 PROCEDURE — 99214 OFFICE O/P EST MOD 30 MIN: CPT

## 2022-10-27 NOTE — REASON FOR VISIT
[Follow-Up] : a follow-up visit [FreeTextEntry1] : Post kidney transplant follow up, has hip pains, lower back and knee pains

## 2022-10-27 NOTE — ASSESSMENT
[FreeTextEntry1] : Renal Transplant recipient: Had immediate allograft function, normal creatinine at discharge. Has urinary frequency and nocturia. No dysuria/hematuria. No fever/chills. Tolerating medications.\par Immunosuppression: reviewed; Simulect induction, on tac/MMF/prednisone, last Tac level noted Currently on Envarsus 6 mg daily.  full dose MMF and prednisone at 5 mg daily. Discussed about decreasing dose to 5 mg daily if level remains persistently above 7.\par On Mg  supplementation. Off phos supplement\par DM: On Metformin.and Repaglinide.\par Muscle pains/hip pains: Advised to increase Neurontin to 200 mg twice daily, discussed side effects.\par Also has knee pain- referred to Dr. Timo Perez for follow up. Will consider neurlogy evaluation for painful neuropathy if symptoms persist.\par Hypertension: controlled; Reviewed medications.  \par Hyperlipidemia: On low dose Rosuvastatin. Had no relief in aches and pains after stopping Rosuvastatin, now back on it.\par Gout: Has been on allopurinol. Quiescent\par Cardiovascular risk reduction discussed. On aspirin.\par Elevated Uric acid level/Gout: He is on allopurinol.\par Infection prophylaxis: On Bactrim.\par Follow up every 8 weeks \par \par

## 2022-10-27 NOTE — HISTORY OF PRESENT ILLNESS
[FreeTextEntry1] : He received  donor kidney transplant on 21 at [facility] (Surgeon-Dr. Akhtar)\par \par Currently\par Has  intermittent back , hip and knee pains and activities are restricted.\par Has no fever, no urinary symptoms  at present except nocturia (twice)\par Has controlled  blood pressure  \par Functional /Employment status: Reviewed. Employed full time\par Limited physical activity.\par Reviewed lab work,   stable creatinine, therapeutic Tac level\par

## 2022-10-27 NOTE — PHYSICAL EXAM
[General Appearance - Alert] : alert [General Appearance - In No Acute Distress] : in no acute distress [PERRL With Normal Accommodation] : pupils were equal in size, round, and reactive to light [Sclera] : the sclera and conjunctiva were normal [Extraocular Movements] : extraocular movements were intact [Outer Ear] : the ears and nose were normal in appearance [Oropharynx] : the oropharynx was normal [Neck Appearance] : the appearance of the neck was normal [Neck Cervical Mass (___cm)] : no neck mass was observed [Jugular Venous Distention Increased] : there was no jugular-venous distention [Thyroid Diffuse Enlargement] : the thyroid was not enlarged [Thyroid Nodule] : there were no palpable thyroid nodules [Auscultation Breath Sounds / Voice Sounds] : lungs were clear to auscultation bilaterally [Heart Rate And Rhythm] : heart rate was normal and rhythm regular [Heart Sounds] : normal S1 and S2 [Heart Sounds Gallop] : no gallops [Murmurs] : no murmurs [Heart Sounds Pericardial Friction Rub] : no pericardial rub [Full Pulse] : the pedal pulses are present [Edema] : there was no peripheral edema [Bowel Sounds] : normal bowel sounds [Abdomen Soft] : soft [Abdomen Tenderness] : non-tender [Abdomen Mass (___ Cm)] : no abdominal mass palpated [Cervical Lymph Nodes Enlarged Posterior Bilaterally] : posterior cervical [Cervical Lymph Nodes Enlarged Anterior Bilaterally] : anterior cervical [Supraclavicular Lymph Nodes Enlarged Bilaterally] : supraclavicular [Axillary Lymph Nodes Enlarged Bilaterally] : axillary [Inguinal Lymph Nodes Enlarged Bilaterally] : inguinal [Abnormal Walk] : normal gait [Involuntary Movements] : no involuntary movements were seen [___ (cm) Fistula] : [unfilled] (cm) fistula [Bruit] : a bruit was present [Thrill] : a thrill was present [] : no rash [No Focal Deficits] : no focal deficits [Impaired Insight] : insight and judgment were intact [Oriented To Time, Place, And Person] : oriented to person, place, and time [Affect] : the affect was normal [FreeTextEntry1] : healed scar

## 2022-10-28 LAB — BKV DNA SPEC QL NAA+PROBE: NOT DETECTED IU/ML

## 2022-11-01 ENCOUNTER — APPOINTMENT (OUTPATIENT)
Dept: PHYSICAL MEDICINE AND REHAB | Facility: CLINIC | Age: 62
End: 2022-11-01

## 2022-11-01 VITALS
SYSTOLIC BLOOD PRESSURE: 160 MMHG | OXYGEN SATURATION: 100 % | HEART RATE: 77 BPM | TEMPERATURE: 97.16 F | DIASTOLIC BLOOD PRESSURE: 74 MMHG

## 2022-11-01 PROCEDURE — 99214 OFFICE O/P EST MOD 30 MIN: CPT

## 2022-11-01 NOTE — ASSESSMENT
[FreeTextEntry1] : - Referred for PT for core stabilization, modalities, stretching\par - Advised that he should increase Gabapentin to 200 TID\par - MRI of LS spine- patient w decreased L patellar reflex and pain radiating down LLE- evaluate for herniation\par - X-ray of L hip to r/o OA\par - Discussed with patient red flags such as bladder/bowel incontinence, weakness, significant increase in pain level in which case patient should immediately present to Emergency Room.\par

## 2022-11-01 NOTE — PHYSICAL EXAM
[Normal] : Oriented to person, place, and time, insight and judgement were intact and the affect was normal [de-identified] : decreased ROM L hip, tenderness w palpation lumbar paraspinals [de-identified] : Motor 5/5 bl LEs, DTRs - absent L patella, other LE 1+; Sensation intact, Neg SLR, No clonus

## 2022-11-01 NOTE — HISTORY OF PRESENT ILLNESS
[FreeTextEntry1] : Since last visit got kidney transplant (September 2021)\par Had knee x-ray \par 11/01/2021\par \par INTERPRETATION: CLINICAL INDICATION: atraumatic bilateral knee pain\par EXAM:\par AP lateral and sunrise views of both knees from 11/1/2021 at 1855. Compared to prior study from 4/13/2019.\par IMPRESSION:\par Questionable small left knee effusion. No gross right knee effusion.\par No fractures or dislocations.\par Right greater than left medial tibiofemoral joint space narrowing. Preserved remaining joint spaces\par and no joint margin erosions or intra-articular or periarticular calcifications.\par Unremarkable quadriceps and patellar tendon shadows.\par Fabellae again seen adjacent to both lateral femoral condyles.\par No discrete lytic or blastic lesions.\par \par \par Had Covid in January 2022\par Since that time has developed back pain and LLE pain- radiates to the L thigh. \par Takes Gabapentin 200 mg BID. Has not noted a difference. \par Has not had imaging of the low back. \par The pain starts in the L hip and radiates proximally up to the back.\par Gets knee pain particularly when he gets up and ambulates. \par Also gets muscle spasm- feels like "someone is pulling my flesh"\par \par

## 2022-11-02 ENCOUNTER — TRANSCRIPTION ENCOUNTER (OUTPATIENT)
Age: 62
End: 2022-11-02

## 2022-11-21 ENCOUNTER — LABORATORY RESULT (OUTPATIENT)
Age: 62
End: 2022-11-21

## 2022-11-21 ENCOUNTER — APPOINTMENT (OUTPATIENT)
Dept: TRANSPLANT | Facility: CLINIC | Age: 62
End: 2022-11-21

## 2022-11-22 ENCOUNTER — NON-APPOINTMENT (OUTPATIENT)
Age: 62
End: 2022-11-22

## 2022-11-22 LAB
ALBUMIN SERPL ELPH-MCNC: 4.3 G/DL
ALP BLD-CCNC: 56 U/L
ALT SERPL-CCNC: 14 U/L
ANION GAP SERPL CALC-SCNC: 10 MMOL/L
APPEARANCE: CLEAR
AST SERPL-CCNC: 16 U/L
BACTERIA: NEGATIVE
BASOPHILS # BLD AUTO: 0.07 K/UL
BASOPHILS NFR BLD AUTO: 0.9 %
BILIRUB SERPL-MCNC: 0.5 MG/DL
BILIRUBIN URINE: NEGATIVE
BLOOD URINE: NEGATIVE
BUN SERPL-MCNC: 17 MG/DL
CALCIUM SERPL-MCNC: 10.3 MG/DL
CHLORIDE SERPL-SCNC: 103 MMOL/L
CMV DNA SPEC QL NAA+PROBE: NOT DETECTED IU/ML
CMVPCR LOG: NOT DETECTED LOG10IU/ML
CO2 SERPL-SCNC: 25 MMOL/L
COLOR: NORMAL
CREAT SERPL-MCNC: 1.15 MG/DL
CREAT SPEC-SCNC: 81 MG/DL
CREAT/PROT UR: 0.1 RATIO
EGFR: 72 ML/MIN/1.73M2
EOSINOPHIL # BLD AUTO: 0.06 K/UL
EOSINOPHIL NFR BLD AUTO: 0.8 %
GLUCOSE QUALITATIVE U: NEGATIVE
GLUCOSE SERPL-MCNC: 160 MG/DL
HCT VFR BLD CALC: 35.3 %
HGB BLD-MCNC: 11.6 G/DL
HYALINE CASTS: 0 /LPF
KETONES URINE: NEGATIVE
LEUKOCYTE ESTERASE URINE: NEGATIVE
LYMPHOCYTES # BLD AUTO: 1.46 K/UL
LYMPHOCYTES NFR BLD AUTO: 18.1 %
MAGNESIUM SERPL-MCNC: 1.4 MG/DL
MAN DIFF?: NORMAL
MCHC RBC-ENTMCNC: 30.7 PG
MCHC RBC-ENTMCNC: 32.9 GM/DL
MCV RBC AUTO: 93.4 FL
MICROSCOPIC-UA: NORMAL
MONOCYTES # BLD AUTO: 0.21 K/UL
MONOCYTES NFR BLD AUTO: 2.6 %
NEUTROPHILS # BLD AUTO: 6.25 K/UL
NEUTROPHILS NFR BLD AUTO: 77.6 %
NITRITE URINE: NEGATIVE
PH URINE: 6
PHOSPHATE SERPL-MCNC: 2.6 MG/DL
PLATELET # BLD AUTO: 144 K/UL
POTASSIUM SERPL-SCNC: 5 MMOL/L
PROT SERPL-MCNC: 6.4 G/DL
PROT UR-MCNC: 5 MG/DL
PROTEIN URINE: NEGATIVE
RBC # BLD: 3.78 M/UL
RBC # FLD: 12.6 %
RED BLOOD CELLS URINE: 1 /HPF
SODIUM SERPL-SCNC: 138 MMOL/L
SPECIFIC GRAVITY URINE: 1.01
SQUAMOUS EPITHELIAL CELLS: 0 /HPF
TACROLIMUS SERPL-MCNC: 7.3 NG/ML
URATE SERPL-MCNC: 6.3 MG/DL
UROBILINOGEN URINE: NORMAL
WBC # FLD AUTO: 8.06 K/UL
WHITE BLOOD CELLS URINE: 1 /HPF

## 2022-11-23 LAB — BKV DNA SPEC QL NAA+PROBE: NOT DETECTED IU/ML

## 2022-11-27 ENCOUNTER — RESULT REVIEW (OUTPATIENT)
Age: 62
End: 2022-11-27

## 2022-11-27 ENCOUNTER — APPOINTMENT (OUTPATIENT)
Dept: RADIOLOGY | Facility: IMAGING CENTER | Age: 62
End: 2022-11-27

## 2022-11-27 ENCOUNTER — APPOINTMENT (OUTPATIENT)
Dept: MRI IMAGING | Facility: IMAGING CENTER | Age: 62
End: 2022-11-27

## 2022-11-27 ENCOUNTER — OUTPATIENT (OUTPATIENT)
Dept: OUTPATIENT SERVICES | Facility: HOSPITAL | Age: 62
LOS: 1 days | End: 2022-11-27
Payer: MEDICARE

## 2022-11-27 DIAGNOSIS — Z94.0 KIDNEY TRANSPLANT STATUS: Chronic | ICD-10-CM

## 2022-11-27 DIAGNOSIS — T85.611A BREAKDOWN (MECHANICAL) OF INTRAPERITONEAL DIALYSIS CATHETER, INITIAL ENCOUNTER: Chronic | ICD-10-CM

## 2022-11-27 DIAGNOSIS — M54.50 LOW BACK PAIN, UNSPECIFIED: ICD-10-CM

## 2022-11-27 DIAGNOSIS — Z98.890 OTHER SPECIFIED POSTPROCEDURAL STATES: Chronic | ICD-10-CM

## 2022-11-27 PROCEDURE — 73502 X-RAY EXAM HIP UNI 2-3 VIEWS: CPT

## 2022-11-27 PROCEDURE — 72148 MRI LUMBAR SPINE W/O DYE: CPT

## 2022-11-27 PROCEDURE — 73502 X-RAY EXAM HIP UNI 2-3 VIEWS: CPT | Mod: 26,LT

## 2022-11-27 PROCEDURE — 72148 MRI LUMBAR SPINE W/O DYE: CPT | Mod: 26,MH

## 2022-11-29 ENCOUNTER — APPOINTMENT (OUTPATIENT)
Dept: NEPHROLOGY | Facility: CLINIC | Age: 62
End: 2022-11-29

## 2022-11-29 VITALS
WEIGHT: 192 LBS | DIASTOLIC BLOOD PRESSURE: 75 MMHG | HEIGHT: 68 IN | TEMPERATURE: 97.3 F | BODY MASS INDEX: 29.1 KG/M2 | SYSTOLIC BLOOD PRESSURE: 128 MMHG | OXYGEN SATURATION: 100 % | RESPIRATION RATE: 16 BRPM | HEART RATE: 84 BPM

## 2022-11-29 VITALS — DIASTOLIC BLOOD PRESSURE: 76 MMHG | SYSTOLIC BLOOD PRESSURE: 120 MMHG

## 2022-11-29 DIAGNOSIS — E78.5 HYPERLIPIDEMIA, UNSPECIFIED: ICD-10-CM

## 2022-11-29 PROCEDURE — 99214 OFFICE O/P EST MOD 30 MIN: CPT

## 2022-11-29 RX ORDER — LANCETS 33 GAUGE
EACH MISCELLANEOUS
Qty: 2 | Refills: 3 | Status: ACTIVE | COMMUNITY
Start: 2022-11-29 | End: 1900-01-01

## 2022-11-29 RX ORDER — BLOOD SUGAR DIAGNOSTIC
STRIP MISCELLANEOUS 3 TIMES DAILY
Qty: 200 | Refills: 3 | Status: ACTIVE | COMMUNITY
Start: 2022-11-29 | End: 1900-01-01

## 2022-11-29 RX ORDER — BLOOD-GLUCOSE METER
W/DEVICE EACH MISCELLANEOUS
Qty: 1 | Refills: 0 | Status: ACTIVE | COMMUNITY
Start: 2022-11-29 | End: 1900-01-01

## 2022-11-29 NOTE — REASON FOR VISIT
[Follow-Up] : a follow-up visit [FreeTextEntry1] : Post kidney transplant follow up, fluctuating blood pressure

## 2022-11-29 NOTE — HISTORY OF PRESENT ILLNESS
[FreeTextEntry1] : He received  donor kidney transplant on 21 at [facility] (Surgeon-Dr. Akhtar)\par \par Currently\par Has had fluctuating blood pressure and palpitations\par Reviewed lab work,   stable creatinine, therapeutic Tac level\par

## 2022-11-29 NOTE — ASSESSMENT
[FreeTextEntry1] : Renal Transplant recipient: Had immediate allograft function, normal creatinine  .  No dysuria/hematuria. No fever/chills. Tolerating medications.\par Immunosuppression: reviewed; Simulect induction, on tac/MMF/prednisone, last Tac level noted Currently on Envarsus 6 mg daily.  full dose MMF and prednisone at 5 mg daily. \par Back pain: Has seen Dr. Perez\par DM: On Metformin.and Repaglinide.referred to see his endocrinologist, Dr. Chowdary.\par Hypertension: controlled; Reviewed medications.  Will continue Metoprolol at 25 mg twice daily\par Referred to Dr. Junior Bazan for evaluation of palpitations\par Hyperlipidemia: On low dose Rosuvastatin. Had no relief in aches and pains after stopping Rosuvastatin, now back on it.\par Gout: Has been on allopurinol. Quiescent\par Cardiovascular risk reduction discussed. On aspirin.\par Elevated Uric acid level/Gout: He is on allopurinol.\par Infection prophylaxis: On Bactrim.\par Discussed COVID booster\par Follow up every 8 weeks \par \par

## 2022-12-01 ENCOUNTER — NON-APPOINTMENT (OUTPATIENT)
Age: 62
End: 2022-12-01

## 2022-12-12 RX ORDER — REPAGLINIDE 1 MG/1
1 TABLET ORAL 3 TIMES DAILY
Qty: 270 | Refills: 3 | Status: ACTIVE | COMMUNITY
Start: 2022-02-01 | End: 1900-01-01

## 2022-12-20 ENCOUNTER — APPOINTMENT (OUTPATIENT)
Dept: PHYSICAL MEDICINE AND REHAB | Facility: CLINIC | Age: 62
End: 2022-12-20

## 2022-12-20 VITALS
OXYGEN SATURATION: 99 % | SYSTOLIC BLOOD PRESSURE: 145 MMHG | DIASTOLIC BLOOD PRESSURE: 65 MMHG | HEART RATE: 84 BPM | TEMPERATURE: 98.06 F

## 2022-12-20 PROCEDURE — 99214 OFFICE O/P EST MOD 30 MIN: CPT

## 2022-12-20 NOTE — HISTORY OF PRESENT ILLNESS
[FreeTextEntry1] : \par 12/20/22\par Patient had MRI\par T11/T12: Evaluated only the sagittal plane. No central canal or foraminal narrowing.\par T12-L1: No spinal canal or neural foraminal stenosis.\par L1-L2: Disc bulge without spinal canal or neural foraminal stenosis.\par L2-L3: Disc bulge with flattening of the thecal sac and mild narrowing of the bilateral neural foramina, left greater than right. No spinal canal stenosis.\par L3-L4: Mild posterior osseous ridging with minimal flattening of the thecal. No spinal canal or foraminal narrowing.\par L4-L5: Posterior osseous ridging and disc bulging. Moderate facet productive changes small facet effusions. Moderate narrowing of the central canal. Close approximation of the nerve roots with disc material in the foramen. Moderate narrowing of the bilateral recesses.\par L5-S1: Mild facet productive changes small bilateral facet effusions. Mild disc bulging contacting the thecal sac. Mild foraminal narrowing. No central canal or lateral recess narrowing.\par \par IMPRESSION:\par Lumbar spondylosis, most prominent at the L4-L5 level.\par \par \par Still w LBP- goes down LLE- worse when standing for a long time, when going from sitting to standing. \par

## 2022-12-20 NOTE — ASSESSMENT
[FreeTextEntry1] : - Continue PT for core stabilization, modalities, stretching\par - Continue Gabapentin - on 200 mg BID, advised to increase to 300 BID. \par - Refer to interventional spine doctor for possible ALEM\par - Discussed with patient red flags such as bladder/bowel incontinence, weakness, significant increase in pain level in which case patient should immediately present to Emergency Room.\par

## 2022-12-20 NOTE — PHYSICAL EXAM
[Normal] : Oriented to person, place, and time, insight and judgement were intact and the affect was normal [de-identified] : , tenderness w palpation lumbar paraspinals [de-identified] : Motor 5/5 bl LEs, DTRs -bl LE 1+; Sensation intact, Neg SLR, No clonus

## 2022-12-21 ENCOUNTER — TRANSCRIPTION ENCOUNTER (OUTPATIENT)
Age: 62
End: 2022-12-21

## 2022-12-27 ENCOUNTER — APPOINTMENT (OUTPATIENT)
Dept: OPHTHALMOLOGY | Facility: CLINIC | Age: 62
End: 2022-12-27

## 2023-01-04 ENCOUNTER — LABORATORY RESULT (OUTPATIENT)
Age: 63
End: 2023-01-04

## 2023-01-05 ENCOUNTER — APPOINTMENT (OUTPATIENT)
Dept: NEPHROLOGY | Facility: CLINIC | Age: 63
End: 2023-01-05
Payer: MEDICARE

## 2023-01-05 ENCOUNTER — NON-APPOINTMENT (OUTPATIENT)
Age: 63
End: 2023-01-05

## 2023-01-05 VITALS
RESPIRATION RATE: 18 BRPM | SYSTOLIC BLOOD PRESSURE: 152 MMHG | OXYGEN SATURATION: 100 % | BODY MASS INDEX: 29.7 KG/M2 | TEMPERATURE: 97.2 F | DIASTOLIC BLOOD PRESSURE: 80 MMHG | HEIGHT: 68 IN | HEART RATE: 87 BPM | WEIGHT: 196 LBS

## 2023-01-05 VITALS — SYSTOLIC BLOOD PRESSURE: 130 MMHG | DIASTOLIC BLOOD PRESSURE: 70 MMHG

## 2023-01-05 LAB
25(OH)D3 SERPL-MCNC: 58.2 NG/ML
ALBUMIN SERPL ELPH-MCNC: 4.5 G/DL
ALP BLD-CCNC: 62 U/L
ALT SERPL-CCNC: 19 U/L
ANION GAP SERPL CALC-SCNC: 14 MMOL/L
APPEARANCE: CLEAR
AST SERPL-CCNC: 18 U/L
BACTERIA: NEGATIVE
BASOPHILS # BLD AUTO: 0.05 K/UL
BASOPHILS NFR BLD AUTO: 0.6 %
BILIRUB SERPL-MCNC: 0.5 MG/DL
BILIRUBIN URINE: NEGATIVE
BLOOD URINE: NEGATIVE
BUN SERPL-MCNC: 25 MG/DL
CALCIUM SERPL-MCNC: 10.3 MG/DL
CALCIUM SERPL-MCNC: 10.3 MG/DL
CHLORIDE SERPL-SCNC: 102 MMOL/L
CHOLEST SERPL-MCNC: 166 MG/DL
CMV DNA SPEC QL NAA+PROBE: NOT DETECTED IU/ML
CMVPCR LOG: NOT DETECTED LOG10IU/ML
CO2 SERPL-SCNC: 22 MMOL/L
COLOR: YELLOW
CREAT SERPL-MCNC: 1.55 MG/DL
CREAT SPEC-SCNC: 173 MG/DL
CREAT/PROT UR: 0 RATIO
EGFR: 51 ML/MIN/1.73M2
EOSINOPHIL # BLD AUTO: 0.15 K/UL
EOSINOPHIL NFR BLD AUTO: 1.8 %
ESTIMATED AVERAGE GLUCOSE: 154 MG/DL
GLUCOSE QUALITATIVE U: NEGATIVE
GLUCOSE SERPL-MCNC: 201 MG/DL
HBA1C MFR BLD HPLC: 7 %
HCT VFR BLD CALC: 38.4 %
HDLC SERPL-MCNC: 59 MG/DL
HGB BLD-MCNC: 12.5 G/DL
HYALINE CASTS: 1 /LPF
IMM GRANULOCYTES NFR BLD AUTO: 1 %
KETONES URINE: NEGATIVE
LDLC SERPL CALC-MCNC: 79 MG/DL
LEUKOCYTE ESTERASE URINE: NEGATIVE
LYMPHOCYTES # BLD AUTO: 1.81 K/UL
LYMPHOCYTES NFR BLD AUTO: 21.8 %
MAGNESIUM SERPL-MCNC: 1.4 MG/DL
MAN DIFF?: NORMAL
MCHC RBC-ENTMCNC: 30.7 PG
MCHC RBC-ENTMCNC: 32.6 GM/DL
MCV RBC AUTO: 94.3 FL
MICROSCOPIC-UA: NORMAL
MONOCYTES # BLD AUTO: 0.41 K/UL
MONOCYTES NFR BLD AUTO: 4.9 %
NEUTROPHILS # BLD AUTO: 5.79 K/UL
NEUTROPHILS NFR BLD AUTO: 69.9 %
NITRITE URINE: NEGATIVE
NONHDLC SERPL-MCNC: 107 MG/DL
PARATHYROID HORMONE INTACT: 86 PG/ML
PH URINE: 5.5
PHOSPHATE SERPL-MCNC: 3.4 MG/DL
PLATELET # BLD AUTO: 167 K/UL
POTASSIUM SERPL-SCNC: 5.3 MMOL/L
PROT SERPL-MCNC: 6.6 G/DL
PROT UR-MCNC: 8 MG/DL
PROTEIN URINE: NORMAL
RBC # BLD: 4.07 M/UL
RBC # FLD: 12.7 %
RED BLOOD CELLS URINE: 2 /HPF
SODIUM SERPL-SCNC: 138 MMOL/L
SPECIFIC GRAVITY URINE: 1.02
SQUAMOUS EPITHELIAL CELLS: 0 /HPF
T3FREE SERPL-MCNC: 2.66 PG/ML
T3RU NFR SERPL: 0.9 TBI
T4 SERPL-MCNC: 8 UG/DL
TACROLIMUS SERPL-MCNC: 11.3 NG/ML
TRIGL SERPL-MCNC: 139 MG/DL
TSH SERPL-ACNC: 1.14 UIU/ML
URATE SERPL-MCNC: 6.4 MG/DL
UROBILINOGEN URINE: NORMAL
WBC # FLD AUTO: 8.29 K/UL
WHITE BLOOD CELLS URINE: 1 /HPF

## 2023-01-05 PROCEDURE — 99214 OFFICE O/P EST MOD 30 MIN: CPT

## 2023-01-05 RX ORDER — PANTOPRAZOLE 40 MG/1
40 TABLET, DELAYED RELEASE ORAL DAILY
Qty: 30 | Refills: 1 | Status: DISCONTINUED | COMMUNITY
Start: 2021-11-11 | End: 2023-01-05

## 2023-01-05 RX ORDER — NYSTATIN 100000 [USP'U]/ML
100000 SUSPENSION ORAL 4 TIMES DAILY
Qty: 4 | Refills: 2 | Status: DISCONTINUED | COMMUNITY
Start: 2021-09-10 | End: 2023-01-05

## 2023-01-05 NOTE — HISTORY OF PRESENT ILLNESS
[FreeTextEntry1] : He received  donor kidney transplant on 21 at [facility] (Surgeon-Dr. Akhtar)\par \par Currently\par Has had fluctuating blood pressure and palpitations\par Reviewed lab work,   stable creatinine, therapeutic Tac level\par

## 2023-01-05 NOTE — QUALITY MEASURES
Consent: The risks of atrophy were reviewed with the patient. Total Volume Injected (Ccs- Only Use Numbers And Decimals): .25 Include Z78.9 (Other Specified Conditions Influencing Health Status) As An Associated Diagnosis?: No Kenalog Preparation: Kenalog Concentration Of Solution Injected (Mg/Ml): 5.0 Medical Necessity Clause: This procedure was medically necessary because the lesions that were treated were: Administered By (Optional): Amber Fam, PAC X Size Of Lesion In Cm (Optional): 0 Validate Note Data When Using Inventory: Yes Detail Level: Detailed [No] : patient is not an appropriate candidate for kidney transplantation evaluation [Patient has started or completed the] : patient has started or completed the process of evaluation for renal transplant

## 2023-01-05 NOTE — ASSESSMENT
[FreeTextEntry1] : Renal Transplant recipient: Had immediate allograft function, uptrending creatinine  .  No dysuria/hematuria. No fever/chills. Tolerating medications.\par Decreased Tac dose by 1 mg/d and advised to repeat labs in 2 weeks- Renal panel and Tac level. Noted no proteinuria.\par Immunosuppression: reviewed; Simulect induction, on tac/MMF/prednisone, last Tac level noted Currently on Envarsus 6 mg daily.  full dose MMF and prednisone at 5 mg daily. \par Will decrease Envarsus to 5 mg daily.\par Back pain: Has seen Dr. Perez, awaiting spine injections\par DM: On Metformin.and Repaglinide. Has appointment to see his endocrinologist, Dr. Chowdary.\par Hypertension: controlled; Reviewed medications.  Will continue Metoprolol at 25 mg twice daily\par Referred to Dr. Junior Bazan for evaluation of palpitations\par Hyperlipidemia: On low dose Rosuvastatin. He takes on alternate days due to muscle cramps.\par GERD symptoms: On Famotidine twice daily. He will follow with Dr. Balderrama.\par Gout: Has been on allopurinol. Quiescent\par Cardiovascular risk reduction discussed. On aspirin.\par Elevated Uric acid level/Gout: He is on allopurinol.\par Infection prophylaxis: On Bactrim.\par Discussed COVID booster. Discussed recommendation for flu vaccine.\par Follow up every 8 weeks \par \par

## 2023-01-06 LAB — BKV DNA SPEC QL NAA+PROBE: NOT DETECTED IU/ML

## 2023-01-11 ENCOUNTER — APPOINTMENT (OUTPATIENT)
Dept: CARDIOLOGY | Facility: CLINIC | Age: 63
End: 2023-01-11
Payer: MEDICARE

## 2023-01-11 ENCOUNTER — NON-APPOINTMENT (OUTPATIENT)
Age: 63
End: 2023-01-11

## 2023-01-11 VITALS
BODY MASS INDEX: 29.7 KG/M2 | SYSTOLIC BLOOD PRESSURE: 156 MMHG | DIASTOLIC BLOOD PRESSURE: 83 MMHG | HEART RATE: 77 BPM | HEIGHT: 68 IN | WEIGHT: 196 LBS | OXYGEN SATURATION: 100 %

## 2023-01-11 VITALS — SYSTOLIC BLOOD PRESSURE: 131 MMHG | DIASTOLIC BLOOD PRESSURE: 79 MMHG

## 2023-01-11 DIAGNOSIS — T82.898A OTHER SPECIFIED COMPLICATION OF VASCULAR PROSTHETIC DEVICES, IMPLANTS AND GRAFTS, INITIAL ENCOUNTER: ICD-10-CM

## 2023-01-11 DIAGNOSIS — U07.1 COVID-19: ICD-10-CM

## 2023-01-11 DIAGNOSIS — Z76.82 AWAITING ORGAN TRANSPLANT STATUS: ICD-10-CM

## 2023-01-11 DIAGNOSIS — N18.5 CHRONIC KIDNEY DISEASE, STAGE 5: ICD-10-CM

## 2023-01-11 PROCEDURE — 93000 ELECTROCARDIOGRAM COMPLETE: CPT

## 2023-01-11 PROCEDURE — 99213 OFFICE O/P EST LOW 20 MIN: CPT

## 2023-01-15 PROBLEM — N18.5 CHRONIC KIDNEY DISEASE (CKD), STAGE V: Status: RESOLVED | Noted: 2018-02-05 | Resolved: 2023-01-15

## 2023-01-15 PROBLEM — T82.898A INADEQUATE FLOW OF DIALYSIS ARTERIOVENOUS FISTULA: Status: RESOLVED | Noted: 2020-06-04 | Resolved: 2023-01-15

## 2023-01-15 PROBLEM — U07.1 COVID-19: Status: RESOLVED | Noted: 2022-01-01 | Resolved: 2023-01-15

## 2023-01-15 NOTE — HISTORY OF PRESENT ILLNESS
[FreeTextEntry1] : Doing okay. Denies chest pain, shortness of breath or palpitations. Had  donor renal transplant in 2021.

## 2023-01-15 NOTE — CARDIOLOGY SUMMARY
[de-identified] : \par 01/11/23 - normal sinus rhythm, LAE, nonspecific ST abnormality\par  [de-identified] : \par 02/09/21 (exercise tetrofosmin) - 3 METS, 94% MPHR, 04:01 min, no evidence of infarction or inducible ischemia, LVEF 64%\par 040/8/18 (exercise MIBI) - 5 METS, 98% MPHR, 04:00 min, no evidence of infarction or inducible ischemia, LVEF >70%\par  [de-identified] : \par 02/09/21 - normal LA, normal LV and RV size and function, LVEF 67%\par 12/17/19 - normal LA, hyperdynamic LV, normal RV size and function, RVSP 21 mmHg, moderate pericardial effusion, LVEF 75-80%

## 2023-01-15 NOTE — DISCUSSION/SUMMARY
[Hypertension] : hypertension [Stable] : stable [Low Sodium Diet] : low sodium diet [FreeTextEntry1] : \par Currently stable from a cardiovascular standpoint. Normotensive. Euvolemic. Asymptomatic. No active cardiac issues. Patient with renal transplant on immunosuppressive therapy. Most recent labs reviewed (creatinine 1.55, eGFR 51). Continue current medications including aspirin and rosuvastatin. ECG completed today and reviewed. Follow up prn. [EKG obtained to assist in diagnosis and management of assessed problem(s)] : EKG obtained to assist in diagnosis and management of assessed problem(s)

## 2023-01-18 ENCOUNTER — APPOINTMENT (OUTPATIENT)
Dept: TRANSPLANT | Facility: CLINIC | Age: 63
End: 2023-01-18

## 2023-01-18 ENCOUNTER — APPOINTMENT (OUTPATIENT)
Dept: GASTROENTEROLOGY | Facility: CLINIC | Age: 63
End: 2023-01-18
Payer: MEDICARE

## 2023-01-18 VITALS
BODY MASS INDEX: 29.7 KG/M2 | HEART RATE: 75 BPM | TEMPERATURE: 97.9 F | HEIGHT: 68 IN | SYSTOLIC BLOOD PRESSURE: 129 MMHG | OXYGEN SATURATION: 98 % | WEIGHT: 196 LBS | DIASTOLIC BLOOD PRESSURE: 72 MMHG

## 2023-01-18 PROCEDURE — 99214 OFFICE O/P EST MOD 30 MIN: CPT

## 2023-01-18 NOTE — PHYSICAL EXAM
[Alert] : alert [Normal Voice/Communication] : normal voice/communication [Healthy Appearing] : healthy appearing [No Acute Distress] : no acute distress [Sclera] : the sclera and conjunctiva were normal [Hearing Threshold Finger Rub Not Smyth] : hearing was normal [Normal Lips/Gums] : the lips and gums were normal [Oropharynx] : the oropharynx was normal [Normal Appearance] : the appearance of the neck was normal [No Neck Mass] : no neck mass was observed [No Respiratory Distress] : no respiratory distress [No Acc Muscle Use] : no accessory muscle use [Respiration, Rhythm And Depth] : normal respiratory rhythm and effort [Auscultation Breath Sounds / Voice Sounds] : lungs were clear to auscultation bilaterally [Heart Rate And Rhythm] : heart rate was normal and rhythm regular [Normal S1, S2] : normal S1 and S2 [Murmurs] : no murmurs [None] : no edema [Bowel Sounds] : normal bowel sounds [Abdomen Tenderness] : non-tender [No Masses] : no abdominal mass palpated [Abdomen Soft] : soft [Cervical Lymph Nodes Enlarged Posterior Bilaterally] : no posterior cervical lymphadenopathy [Supraclavicular Lymph Nodes Enlarged Bilaterally] : no supraclavicular lymphadenopathy [Cervical Lymph Nodes Enlarged Anterior Bilaterally] : no anterior cervical lymphadenopathy [No CVA Tenderness] : no CVA  tenderness [No Spinal Tenderness] : no spinal tenderness [Abnormal Walk] : normal gait [No Clubbing, Cyanosis] : no clubbing or cyanosis of the fingernails [Normal Color / Pigmentation] : normal skin color and pigmentation [] : no rash [No Focal Deficits] : no focal deficits [Oriented To Time, Place, And Person] : oriented to person, place, and time [Ascites: ___] : no ascites [Rebound Tenderness] : no rebound tenderness [de-identified] : distended and tympnic

## 2023-01-18 NOTE — HISTORY OF PRESENT ILLNESS
[FreeTextEntry1] : ELVIA ACEVEDO 62 year M came for FU with c/o lower abdominal bloating,gas and fullness, especially after meals for the past few months.  He moves his bowels 2-3 times daily.  He was concerned about his increasing abdominal girth whether it is fat or gas\par Denied any NVCD or weight loss.\par H.Pyl status is not known.Patient does not recall and GI procedures done in the recent past .\par No hx of excess use of NSAIDS or ETOH.he takes Culturelle probiotic very frequently\par He had kidney transplant in September 2021 and taking CellCept and tacrolimus as immunosuppressives\par He also takes famotidine 20 mg twice daily for abdominal discomfort and gas without much relief\par His last colonoscopy was more than 10 years ago and he never had any upper endoscopy in the past

## 2023-01-18 NOTE — ASSESSMENT
[FreeTextEntry1] : Abdominal fullness with bloating and gas\par On immunosuppressive's status post kidney transplant

## 2023-01-23 ENCOUNTER — LABORATORY RESULT (OUTPATIENT)
Age: 63
End: 2023-01-23

## 2023-01-23 LAB
ALBUMIN SERPL ELPH-MCNC: 4.7 G/DL
ANION GAP SERPL CALC-SCNC: 10 MMOL/L
APPEARANCE: CLEAR
BACTERIA: NEGATIVE
BILIRUBIN URINE: NEGATIVE
BLOOD URINE: NEGATIVE
BUN SERPL-MCNC: 18 MG/DL
CALCIUM SERPL-MCNC: 10.3 MG/DL
CHLORIDE SERPL-SCNC: 103 MMOL/L
CO2 SERPL-SCNC: 24 MMOL/L
COLOR: NORMAL
CREAT SERPL-MCNC: 1.23 MG/DL
EGFR: 67 ML/MIN/1.73M2
GLUCOSE QUALITATIVE U: NEGATIVE
GLUCOSE SERPL-MCNC: 146 MG/DL
HYALINE CASTS: 0 /LPF
KETONES URINE: NEGATIVE
LEUKOCYTE ESTERASE URINE: NEGATIVE
MICROSCOPIC-UA: NORMAL
NITRITE URINE: NEGATIVE
PH URINE: 6
PHOSPHATE SERPL-MCNC: 2.9 MG/DL
POTASSIUM SERPL-SCNC: 5 MMOL/L
PROTEIN URINE: NEGATIVE
RED BLOOD CELLS URINE: 1 /HPF
SODIUM SERPL-SCNC: 137 MMOL/L
SPECIFIC GRAVITY URINE: 1.02
SQUAMOUS EPITHELIAL CELLS: 0 /HPF
TACROLIMUS SERPL-MCNC: 8.2 NG/ML
UROBILINOGEN URINE: NORMAL
WHITE BLOOD CELLS URINE: 0 /HPF

## 2023-02-28 ENCOUNTER — APPOINTMENT (OUTPATIENT)
Dept: TRANSPLANT | Facility: CLINIC | Age: 63
End: 2023-02-28

## 2023-02-28 LAB
ALBUMIN SERPL ELPH-MCNC: 4.5 G/DL
ALP BLD-CCNC: 57 U/L
ALT SERPL-CCNC: 16 U/L
ANION GAP SERPL CALC-SCNC: 12 MMOL/L
APPEARANCE: CLEAR
AST SERPL-CCNC: 18 U/L
BACTERIA: NEGATIVE
BASOPHILS # BLD AUTO: 0.05 K/UL
BASOPHILS NFR BLD AUTO: 0.6 %
BILIRUB SERPL-MCNC: 0.5 MG/DL
BILIRUBIN URINE: NEGATIVE
BLOOD URINE: NEGATIVE
BUN SERPL-MCNC: 21 MG/DL
CALCIUM SERPL-MCNC: 10.2 MG/DL
CHLORIDE SERPL-SCNC: 102 MMOL/L
CO2 SERPL-SCNC: 23 MMOL/L
COLOR: YELLOW
CREAT SERPL-MCNC: 1.24 MG/DL
CREAT SPEC-SCNC: 180 MG/DL
CREAT/PROT UR: 0.1 RATIO
EGFR: 66 ML/MIN/1.73M2
EOSINOPHIL # BLD AUTO: 0.09 K/UL
EOSINOPHIL NFR BLD AUTO: 1.1 %
GLUCOSE QUALITATIVE U: NEGATIVE
GLUCOSE SERPL-MCNC: 158 MG/DL
HCT VFR BLD CALC: 37.9 %
HGB BLD-MCNC: 12.3 G/DL
HYALINE CASTS: 0 /LPF
IMM GRANULOCYTES NFR BLD AUTO: 1 %
KETONES URINE: NEGATIVE
LDH SERPL-CCNC: 241 U/L
LEUKOCYTE ESTERASE URINE: NEGATIVE
LYMPHOCYTES # BLD AUTO: 1.57 K/UL
LYMPHOCYTES NFR BLD AUTO: 20 %
MAGNESIUM SERPL-MCNC: 1.3 MG/DL
MAN DIFF?: NORMAL
MCHC RBC-ENTMCNC: 30.8 PG
MCHC RBC-ENTMCNC: 32.5 GM/DL
MCV RBC AUTO: 94.8 FL
MICROSCOPIC-UA: NORMAL
MONOCYTES # BLD AUTO: 0.41 K/UL
MONOCYTES NFR BLD AUTO: 5.2 %
NEUTROPHILS # BLD AUTO: 5.65 K/UL
NEUTROPHILS NFR BLD AUTO: 72.1 %
NITRITE URINE: NEGATIVE
PH URINE: 5.5
PHOSPHATE SERPL-MCNC: 3 MG/DL
PLATELET # BLD AUTO: 171 K/UL
POTASSIUM SERPL-SCNC: 4.7 MMOL/L
PROT SERPL-MCNC: 6.6 G/DL
PROT UR-MCNC: 10 MG/DL
PROTEIN URINE: NORMAL
RBC # BLD: 4 M/UL
RBC # FLD: 12.9 %
RED BLOOD CELLS URINE: 2 /HPF
SODIUM SERPL-SCNC: 138 MMOL/L
SPECIFIC GRAVITY URINE: 1.02
SQUAMOUS EPITHELIAL CELLS: 0 /HPF
TACROLIMUS SERPL-MCNC: 7.3 NG/ML
URATE SERPL-MCNC: 6.3 MG/DL
UROBILINOGEN URINE: NORMAL
WBC # FLD AUTO: 7.85 K/UL
WHITE BLOOD CELLS URINE: 1 /HPF

## 2023-03-01 LAB — BKV DNA SPEC QL NAA+PROBE: NOT DETECTED IU/ML

## 2023-03-02 ENCOUNTER — APPOINTMENT (OUTPATIENT)
Dept: NEPHROLOGY | Facility: CLINIC | Age: 63
End: 2023-03-02
Payer: MEDICARE

## 2023-03-02 VITALS
TEMPERATURE: 98.1 F | WEIGHT: 194.6 LBS | BODY MASS INDEX: 29.59 KG/M2 | SYSTOLIC BLOOD PRESSURE: 112 MMHG | DIASTOLIC BLOOD PRESSURE: 72 MMHG | RESPIRATION RATE: 16 BRPM | HEART RATE: 77 BPM | OXYGEN SATURATION: 99 %

## 2023-03-02 LAB
CMV DNA SPEC QL NAA+PROBE: NOT DETECTED IU/ML
CMVPCR LOG: NOT DETECTED LOG10IU/ML

## 2023-03-02 PROCEDURE — 99214 OFFICE O/P EST MOD 30 MIN: CPT

## 2023-03-02 RX ORDER — VALGANCICLOVIR HYDROCHLORIDE 450 MG/1
450 TABLET ORAL DAILY
Qty: 90 | Refills: 3 | Status: DISCONTINUED | COMMUNITY
Start: 2021-09-10 | End: 2023-03-02

## 2023-03-02 NOTE — HISTORY OF PRESENT ILLNESS
[FreeTextEntry1] : He received  donor kidney transplant on 21 at [facility] (Surgeon-Dr. Akhtar)\par \par Currently\par Has no acute symptoms\par Reviewed lab work,   stable creatinine, therapeutic Tac level\par Has left arm paraesthesia. Will see Dr. Castillo for AVF closure.\par

## 2023-03-02 NOTE — ASSESSMENT
[FreeTextEntry1] : Renal Transplant recipient: Had immediate allograft function, uptrending creatinine  .  No dysuria/hematuria. No fever/chills. Tolerating medications.\par Suggested to close AVF.\par Immunosuppression: reviewed; Simulect induction, on tac/MMF/prednisone, last Tac level noted Currently on Envarsus 5 mg daily.  full dose MMF and prednisone at 5 mg daily. \par Will decrease Envarsus to 5 mg daily.\par Back pain: Has seen Dr. Perez, awaiting spine injections\par DM: On Metformin.and Repaglinide. Has appointment to see his endocrinologist, Dr. Chowdary.\par Hypertension: controlled; Reviewed medications.   \par Referred to Dr. Junior Bazan for evaluation of palpitations\par Hyperlipidemia: On low dose Rosuvastatin. He takes on alternate days due to muscle cramps.\par GERD symptoms: On Famotidine twice daily. He will follow with Dr. Balderrama.\par Gout: Has been on allopurinol. Quiescent\par Cardiovascular risk reduction discussed. On aspirin.\par Elevated Uric acid level/Gout: He is on allopurinol.\par Infection prophylaxis: On Bactrim.\par Follow up every 8 weeks \par \par

## 2023-03-14 ENCOUNTER — APPOINTMENT (OUTPATIENT)
Dept: VASCULAR SURGERY | Facility: CLINIC | Age: 63
End: 2023-03-14
Payer: MEDICARE

## 2023-03-14 VITALS
BODY MASS INDEX: 28.73 KG/M2 | DIASTOLIC BLOOD PRESSURE: 85 MMHG | HEART RATE: 78 BPM | HEIGHT: 69 IN | WEIGHT: 194 LBS | SYSTOLIC BLOOD PRESSURE: 159 MMHG

## 2023-03-14 PROCEDURE — 99213 OFFICE O/P EST LOW 20 MIN: CPT

## 2023-03-14 NOTE — HISTORY OF PRESENT ILLNESS
[FreeTextEntry1] : 62-year-old gentleman previously on hemodialysis via left radiocephalic fistula.  Approximately 18 months ago patient underwent renal transplantation.  Patient with an excellent result and a normal creatinine.  He now presents complaining of discomfort in the left arm.  His fistula is no longer needed for hemodialysis. [] : left radiocephalic fistula

## 2023-03-14 NOTE — PHYSICAL EXAM
[Thrill] : thrill [Pulsatile Thrill] : no pulsatile thrill [Aneurysm] : no aneurysm [Hand well perfused] : hand well perfused [Normal] : coordination grossly intact, no focal deficits

## 2023-03-21 ENCOUNTER — APPOINTMENT (OUTPATIENT)
Dept: PHYSICAL MEDICINE AND REHAB | Facility: CLINIC | Age: 63
End: 2023-03-21

## 2023-03-24 ENCOUNTER — APPOINTMENT (OUTPATIENT)
Dept: ENDOVASCULAR SURGERY | Facility: CLINIC | Age: 63
End: 2023-03-24
Payer: MEDICARE

## 2023-03-24 VITALS
DIASTOLIC BLOOD PRESSURE: 72 MMHG | HEART RATE: 78 BPM | OXYGEN SATURATION: 99 % | WEIGHT: 194 LBS | HEIGHT: 69 IN | BODY MASS INDEX: 28.73 KG/M2 | SYSTOLIC BLOOD PRESSURE: 143 MMHG | TEMPERATURE: 98.1 F | RESPIRATION RATE: 16 BRPM

## 2023-03-24 PROCEDURE — 36832 AV FISTULA REVISION OPEN: CPT

## 2023-03-24 NOTE — PROCEDURE
[D/C IV on discharge] : D/C IV on discharge [Resume diet] : resume diet [FreeTextEntry1] : Ligation left arm AV fistula [FreeTextEntry2] : 62-year-old gentleman currently with well-functioning kidney transplant.  Complaining of left arm pain.  Patient presents for ligation of left radiocephalic fistula [FreeTextEntry3] : The left arm was prepped and draped in usual sterile fashion.  A timeout was performed and care was discussed with anesthesia.  1% lidocaine was infiltrated over the proximal fistula.  A small incision was made.  Dissection continued bluntly and sharply.  The fistula was visualized.  Dissection continued along the size of the fistula.  A clamp was encircled around the fistula.  A 2-0 silk tie was placed x2.  There was no longer thrill in the fistula.  A 4-0 Biosyn suture was used for subcuticular stitch.  Steri-Strips and a dry sterile dressing was applied.  Patient tolerated procedure well.

## 2023-03-24 NOTE — HISTORY OF PRESENT ILLNESS
[] : left radiocephalic fistula [FreeTextEntry1] : Dr. Castillo [FreeTextEntry4] : Had kidney transplant [FreeTextEntry5] : Yesterday  8pm [FreeTextEntry6] : Dr. Lu

## 2023-05-17 NOTE — ED PROVIDER NOTE - NS_SCRIBENAMERES_ED_ALL_ED_FT
Patient: Beronica Jin Date of Service: 2023     : 1943 Attending: Kale Ferrer MD   79 year old female      HYPERBARIC OXYGEN THERAPY PROCEDURE NOTE    Hyperbaric Treatment Number: 20 - Hyperbaric treatment scheduled once a day.   Hyperbaric Indications: Other chronic osteomyelitis, left hand,  M86.642   Other chronic osteomyelitis, right hand, M86.641   Primary Hyperbaric Physician: Dr. Jamal Montilla   Consult/Update Date: 23        PROCEDURE:  Pre-Hyperbaric Oxygen Therapy Treatment timeout was completed.    Treatment Profile: 33 fsw X 90 min  Treatment Start Time: 1325  Treatment End Time: 1515  Descent Time (min): 10 min  Air break total time: 0 min  Ascent time (min): 10    Hyperbaric oxygen therapy was monitored during entire course of treatment by the supervising physician.    HISTORY:  This is a 79 year old female  with a past medical history of pulmonary hypertension, has a pacemaker and for complete heart block, has an aortic valve replacement currently taking warfarin, and is complaining of bilateral finger wounds going on since .  Patient states she saw her dermatologist and recommended mupirocin, clobetasol and Sildenafil without much improvement. The pt was on Moxifloxacin in early  and she had to have her coumadin held and the dose decreased while she was on the antibiotic.  The pt used to follow up with Dr. Ratliff (Endocrine), then Dr Whyte (Rheum) and then she transferred her care to Dr. Gotti at [facility]      The patient's last visit to [facility] wound care was recommended the patient just put Betadine on the areas.  Patient has a history of scleroderma and CREST syndrome dull mild 1-2/5 pain in her especially left ring finger worse with palpation and movements., but not having any fever or chills, no discoloration of the digits, + chronic redness noted, not having any chest or abdominal pain, no shortness of breath.  Patient denies any recent trauma but  pt states she is on Coumadin.  Positive drainage no foul odor, the left ring finger is most painful.        All wounds look better today, except the right and left index finger wounds:     4-4-23 RZ Wounds looking better, but the right index finger wound looks little bit worse.  All wounds debrided excisionally no bone exposed on examination.          Diagnoses M86.641 Chronic osteomyelitis of right hand  M86.642 Chronic osteomyelitis of left hand     Formal HBO consult on the chart..    4-17-23 RZ Pt going for picc line today.    4-.  Patient states she feels weak generally not sure if it is from the antibiotics, but she wants to continue treatment (HBO and IV abx)    5-15-23 RZ patient has upper respiratory congestion and cough, oxygen saturation normal, going to order outpatient COVID test and chest x-ray    5/16/2023 continues to have some dry cough, no fevers.    Risk Assessment at Consult:    Risk assessment was performed at time of consult/comprehensive evaluation on 4-11-23. Specifically noted risks include:    Risk for barotrauma     Interval History:     The patient reports no complaints.but the pt is coughing    Pertinent Reviewed:    Allergies, Medication, Medical History and Chest x-ray    PHYSICAL EXAM:  Daily Risk Assessment:  Finger Stick Glucose:    No results available in last 24 hours     Vital signs:    Temp: 97.9 °F (36.6 °C)  Temp src: Temporal  Heart Rate: 91  Heart Rate Source: Monitor  BP: 133/57     Resp: 16    General appearance:  alert  Eye:  Conjunctivae/sclerae normal. No erythema, edema or exudate.  Ears:   left ear TEED  scale 0 and right ear TEED  scale 0  Lungs:  rhonchi bilaterally  Heart:  regular rate and rhythm  Neurologic:   Mental status: Alert, oriented, thought content appropriate    Last Wound exam completed on 5-9-23 - pt improving    POST TREATMENT:    Patient denies complaints    Patient tolerated the hyperbaric treatment without problems or side effects      Treatment extras: None    Post-treatment Exam:  No change in appearance or examination from pre-treatment exam.      ASSESSMENT:  Other chronic osteomyelitis, left hand,  M86.642   Other chronic osteomyelitis, right hand, M86.641  MEDICAL DECISION MAKING:  Based on recent clinical evaluation, the patient is improving.    Indications of this are:  No evidence of advancing ischemia or tissue necrosis.     Therefore, we will continue treatment as the patient will likely benefit from further hyperbaric oxygen therapy.     PLAN:  This is hyperbaric Treatment Number: 20 of anticipated 40 treatments.       Plan discussed.  Patient stable at time of discharge.  All questions were answered prior to discharge.     Yosef Contreras

## 2023-05-25 NOTE — ASU PREOP CHECKLIST - BP NONINVASIVE DIASTOLIC (MM HG)
Nicotinamide Supplementation Recommendations: Take 500 mg of nicotinamide by mouth twice daily.
Detail Level: Detailed
Sunscreen Recommendations: Regularly apply at least an SPF 30 broad spectrum sunscreen while outdoors during the day.
69

## 2023-05-26 DIAGNOSIS — U07.1 COVID-19: ICD-10-CM

## 2023-06-14 ENCOUNTER — APPOINTMENT (OUTPATIENT)
Dept: TRANSPLANT | Facility: CLINIC | Age: 63
End: 2023-06-14

## 2023-06-14 LAB
ALBUMIN SERPL ELPH-MCNC: 4.5 G/DL
ALP BLD-CCNC: 65 U/L
ALT SERPL-CCNC: 19 U/L
ANION GAP SERPL CALC-SCNC: 13 MMOL/L
APPEARANCE: CLEAR
AST SERPL-CCNC: 21 U/L
BACTERIA: NEGATIVE /HPF
BILIRUB SERPL-MCNC: 0.4 MG/DL
BILIRUBIN URINE: NEGATIVE
BLOOD URINE: NEGATIVE
BUN SERPL-MCNC: 20 MG/DL
CALCIUM SERPL-MCNC: 10.1 MG/DL
CAST: 0 /LPF
CHLORIDE SERPL-SCNC: 102 MMOL/L
CO2 SERPL-SCNC: 24 MMOL/L
COLOR: YELLOW
CREAT SERPL-MCNC: 1.23 MG/DL
CREAT SPEC-SCNC: 93 MG/DL
CREAT/PROT UR: 0.1 RATIO
EGFR: 66 ML/MIN/1.73M2
EPITHELIAL CELLS: 0 /HPF
ESTIMATED AVERAGE GLUCOSE: 163 MG/DL
GLUCOSE QUALITATIVE U: NEGATIVE MG/DL
GLUCOSE SERPL-MCNC: 147 MG/DL
HBA1C MFR BLD HPLC: 7.3 %
KETONES URINE: NEGATIVE MG/DL
LEUKOCYTE ESTERASE URINE: NEGATIVE
MAGNESIUM SERPL-MCNC: 1.6 MG/DL
MICROSCOPIC-UA: NORMAL
NITRITE URINE: NEGATIVE
PH URINE: 6
PHOSPHATE SERPL-MCNC: 3.3 MG/DL
POTASSIUM SERPL-SCNC: 5.4 MMOL/L
PROT SERPL-MCNC: 6.8 G/DL
PROT UR-MCNC: 5 MG/DL
PROTEIN URINE: NEGATIVE MG/DL
RED BLOOD CELLS URINE: 0 /HPF
SODIUM SERPL-SCNC: 138 MMOL/L
SPECIFIC GRAVITY URINE: 1.01
TACROLIMUS SERPL-MCNC: 7.2 NG/ML
URATE SERPL-MCNC: 5.9 MG/DL
UROBILINOGEN URINE: 0.2 MG/DL
WHITE BLOOD CELLS URINE: 0 /HPF

## 2023-06-15 ENCOUNTER — APPOINTMENT (OUTPATIENT)
Dept: NEPHROLOGY | Facility: CLINIC | Age: 63
End: 2023-06-15
Payer: MEDICARE

## 2023-06-15 VITALS
RESPIRATION RATE: 16 BRPM | HEIGHT: 69 IN | TEMPERATURE: 98 F | BODY MASS INDEX: 27.99 KG/M2 | DIASTOLIC BLOOD PRESSURE: 72 MMHG | WEIGHT: 189 LBS | OXYGEN SATURATION: 99 % | HEART RATE: 88 BPM | SYSTOLIC BLOOD PRESSURE: 130 MMHG

## 2023-06-15 VITALS — SYSTOLIC BLOOD PRESSURE: 124 MMHG | DIASTOLIC BLOOD PRESSURE: 80 MMHG

## 2023-06-15 PROCEDURE — 99214 OFFICE O/P EST MOD 30 MIN: CPT

## 2023-06-15 RX ORDER — LINAGLIPTIN 5 MG/1
5 TABLET, FILM COATED ORAL DAILY
Qty: 90 | Refills: 3 | Status: ACTIVE | COMMUNITY
Start: 2023-06-15

## 2023-06-15 RX ORDER — MOLNUPIRAVIR 200 MG/1
200 CAPSULE ORAL
Qty: 40 | Refills: 0 | Status: DISCONTINUED | COMMUNITY
Start: 2023-05-26 | End: 2023-06-15

## 2023-06-16 NOTE — HISTORY OF PRESENT ILLNESS
[FreeTextEntry1] : He received  donor kidney transplant on 21 at [facility] (Surgeon-Dr. Akhtar)\par Recently recovered at home from mild COVID.\par Currently\par Has no acute symptoms. Has chronic aches and pains.\par Recovered from mild COVID.\par Reviewed lab work,   stable creatinine, therapeutic Tac level\par Had left AVF closed.\par Plans to go to [facility] in 2023.

## 2023-06-30 RX ORDER — ROSUVASTATIN CALCIUM 5 MG/1
5 TABLET, FILM COATED ORAL DAILY
Qty: 90 | Refills: 3 | Status: ACTIVE | COMMUNITY
Start: 2022-02-01 | End: 1900-01-01

## 2023-06-30 RX ORDER — ASPIRIN ENTERIC COATED TABLETS 81 MG 81 MG/1
81 TABLET, DELAYED RELEASE ORAL
Qty: 90 | Refills: 3 | Status: ACTIVE | COMMUNITY
Start: 2021-09-10 | End: 1900-01-01

## 2023-06-30 RX ORDER — FAMOTIDINE 20 MG/1
20 TABLET, FILM COATED ORAL
Qty: 180 | Refills: 3 | Status: ACTIVE | COMMUNITY
Start: 2021-09-10 | End: 1900-01-01

## 2023-07-03 ENCOUNTER — RX RENEWAL (OUTPATIENT)
Age: 63
End: 2023-07-03

## 2023-07-07 NOTE — DISCHARGE NOTE PROVIDER - CARE PROVIDERS DIRECT ADDRESSES
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 Please contact her to arrange.  thanks ,[email or web address]

## 2023-08-08 ENCOUNTER — APPOINTMENT (OUTPATIENT)
Dept: TRANSPLANT | Facility: CLINIC | Age: 63
End: 2023-08-08

## 2023-08-08 ENCOUNTER — NON-APPOINTMENT (OUTPATIENT)
Age: 63
End: 2023-08-08

## 2023-08-08 LAB
ALBUMIN SERPL ELPH-MCNC: 4.5 G/DL
ALP BLD-CCNC: 67 U/L
ALT SERPL-CCNC: 13 U/L
ANION GAP SERPL CALC-SCNC: 10 MMOL/L
APPEARANCE: CLEAR
AST SERPL-CCNC: 18 U/L
BACTERIA: NEGATIVE /HPF
BILIRUB SERPL-MCNC: 0.5 MG/DL
BILIRUBIN URINE: NEGATIVE
BLOOD URINE: NEGATIVE
BUN SERPL-MCNC: 19 MG/DL
CALCIUM SERPL-MCNC: 10.2 MG/DL
CALCIUM SERPL-MCNC: 10.2 MG/DL
CAST: 0 /LPF
CHLORIDE SERPL-SCNC: 104 MMOL/L
CHOLEST SERPL-MCNC: 150 MG/DL
CO2 SERPL-SCNC: 25 MMOL/L
COLOR: YELLOW
CREAT SERPL-MCNC: 1.24 MG/DL
CREAT SPEC-SCNC: 127 MG/DL
CREAT/PROT UR: 0.1 RATIO
EGFR: 66 ML/MIN/1.73M2
EPITHELIAL CELLS: 0 /HPF
GLUCOSE QUALITATIVE U: NEGATIVE MG/DL
GLUCOSE SERPL-MCNC: 136 MG/DL
HDLC SERPL-MCNC: 52 MG/DL
KETONES URINE: NEGATIVE MG/DL
LDLC SERPL CALC-MCNC: 80 MG/DL
LEUKOCYTE ESTERASE URINE: NEGATIVE
MICROSCOPIC-UA: NORMAL
NITRITE URINE: NEGATIVE
NONHDLC SERPL-MCNC: 98 MG/DL
PARATHYROID HORMONE INTACT: 63 PG/ML
PH URINE: 6
POTASSIUM SERPL-SCNC: 5 MMOL/L
PROT SERPL-MCNC: 6.8 G/DL
PROT UR-MCNC: 8 MG/DL
PROTEIN URINE: NEGATIVE MG/DL
RED BLOOD CELLS URINE: 1 /HPF
SODIUM SERPL-SCNC: 138 MMOL/L
SPECIFIC GRAVITY URINE: 1.02
TACROLIMUS SERPL-MCNC: 7.3 NG/ML
TRIGL SERPL-MCNC: 98 MG/DL
UROBILINOGEN URINE: 0.2 MG/DL
WHITE BLOOD CELLS URINE: 0 /HPF

## 2023-08-10 ENCOUNTER — NON-APPOINTMENT (OUTPATIENT)
Age: 63
End: 2023-08-10

## 2023-08-10 LAB
BKV DNA SPEC QL NAA+PROBE: NOT DETECTED IU/ML
CMV DNA SPEC QL NAA+PROBE: ABNORMAL IU/ML
CMVPCR LOG: ABNORMAL LOG10IU/ML

## 2023-09-11 ENCOUNTER — APPOINTMENT (OUTPATIENT)
Dept: TRANSPLANT | Facility: CLINIC | Age: 63
End: 2023-09-11

## 2023-09-19 ENCOUNTER — APPOINTMENT (OUTPATIENT)
Dept: NEPHROLOGY | Facility: CLINIC | Age: 63
End: 2023-09-19
Payer: MEDICARE

## 2023-09-19 VITALS
DIASTOLIC BLOOD PRESSURE: 79 MMHG | WEIGHT: 190 LBS | BODY MASS INDEX: 28.14 KG/M2 | HEART RATE: 89 BPM | OXYGEN SATURATION: 96 % | HEIGHT: 69 IN | SYSTOLIC BLOOD PRESSURE: 146 MMHG | RESPIRATION RATE: 16 BRPM | TEMPERATURE: 97.4 F

## 2023-09-19 VITALS — DIASTOLIC BLOOD PRESSURE: 78 MMHG | SYSTOLIC BLOOD PRESSURE: 120 MMHG

## 2023-09-19 LAB
25(OH)D3 SERPL-MCNC: 53.4 NG/ML
ALBUMIN SERPL ELPH-MCNC: 4.6 G/DL
ALP BLD-CCNC: 60 U/L
ALT SERPL-CCNC: 25 U/L
ANION GAP SERPL CALC-SCNC: 11 MMOL/L
APPEARANCE: CLEAR
AST SERPL-CCNC: 28 U/L
BACTERIA: NEGATIVE /HPF
BILIRUB SERPL-MCNC: 0.6 MG/DL
BILIRUBIN URINE: NEGATIVE
BKV DNA SPEC QL NAA+PROBE: NOT DETECTED IU/ML
BLOOD URINE: NEGATIVE
BUN SERPL-MCNC: 15 MG/DL
CALCIUM SERPL-MCNC: 10.1 MG/DL
CALCIUM SERPL-MCNC: 10.1 MG/DL
CAST: 0 /LPF
CHLORIDE SERPL-SCNC: 103 MMOL/L
CHOLEST SERPL-MCNC: 159 MG/DL
CMV DNA SPEC QL NAA+PROBE: NOT DETECTED IU/ML
CMVPCR LOG: NOT DETECTED LOG10IU/ML
CO2 SERPL-SCNC: 25 MMOL/L
COLOR: YELLOW
CREAT SERPL-MCNC: 1.24 MG/DL
CREAT SPEC-SCNC: 181 MG/DL
CREAT/PROT UR: 0.1 RATIO
EGFR: 66 ML/MIN/1.73M2
EPITHELIAL CELLS: 0 /HPF
ESTIMATED AVERAGE GLUCOSE: 166 MG/DL
GLUCOSE QUALITATIVE U: NEGATIVE MG/DL
GLUCOSE SERPL-MCNC: 157 MG/DL
HBA1C MFR BLD HPLC: 7.4 %
HCT VFR BLD CALC: 37.3 %
HDLC SERPL-MCNC: 63 MG/DL
HGB BLD-MCNC: 12.2 G/DL
KETONES URINE: NEGATIVE MG/DL
LDH SERPL-CCNC: 375 U/L
LDLC SERPL CALC-MCNC: 76 MG/DL
LEUKOCYTE ESTERASE URINE: NEGATIVE
MAGNESIUM SERPL-MCNC: 1.5 MG/DL
MCHC RBC-ENTMCNC: 29.8 PG
MCHC RBC-ENTMCNC: 32.7 GM/DL
MCV RBC AUTO: 91.2 FL
MICROSCOPIC-UA: NORMAL
NITRITE URINE: NEGATIVE
NONHDLC SERPL-MCNC: 96 MG/DL
PARATHYROID HORMONE INTACT: 74 PG/ML
PH URINE: 6.5
PHOSPHATE SERPL-MCNC: 2.9 MG/DL
PLATELET # BLD AUTO: 158 K/UL
POTASSIUM SERPL-SCNC: 4.9 MMOL/L
PROT SERPL-MCNC: 6.7 G/DL
PROT UR-MCNC: 8 MG/DL
PROTEIN URINE: NEGATIVE MG/DL
RBC # BLD: 4.09 M/UL
RBC # FLD: 13.1 %
RED BLOOD CELLS URINE: 1 /HPF
SODIUM SERPL-SCNC: 139 MMOL/L
SPECIFIC GRAVITY URINE: 1.02
TACROLIMUS SERPL-MCNC: 6.9 NG/ML
TRIGL SERPL-MCNC: 108 MG/DL
URATE SERPL-MCNC: 5.7 MG/DL
UROBILINOGEN URINE: 0.2 MG/DL
WBC # FLD AUTO: 5.29 K/UL
WHITE BLOOD CELLS URINE: 0 /HPF

## 2023-09-19 PROCEDURE — 99214 OFFICE O/P EST MOD 30 MIN: CPT

## 2023-09-26 ENCOUNTER — NON-APPOINTMENT (OUTPATIENT)
Age: 63
End: 2023-09-26

## 2023-09-26 ENCOUNTER — APPOINTMENT (OUTPATIENT)
Dept: OPHTHALMOLOGY | Facility: CLINIC | Age: 63
End: 2023-09-26
Payer: MEDICARE

## 2023-09-26 PROCEDURE — 76514 ECHO EXAM OF EYE THICKNESS: CPT

## 2023-09-26 PROCEDURE — 99204 OFFICE O/P NEW MOD 45 MIN: CPT

## 2023-10-05 RX ORDER — ICOSAPENT ETHYL 1 G/1
1 CAPSULE ORAL
Qty: 180 | Refills: 3 | Status: ACTIVE | COMMUNITY
Start: 2022-09-01 | End: 1900-01-01

## 2023-11-01 NOTE — H&P ADULT - NSICDXFAMILYHX_GEN_ALL_CORE_FT
Plan:   Urine cytology today sent  CT Urogram, call [phone] to schedule  BMP one week prior if needed  Cystoscopy w Urologist  Urine culture 2 weeks prior to cystoscopy, drop off at lab    Oxybutynin ER 5 mg daily, discussed possible side effect dry mouth or constipation  MiraLAX or Colace as needed  Nurse line [phone]    
FAMILY HISTORY:  No pertinent family history in first degree relatives    
FAMILY HISTORY:  No pertinent family history in first degree relatives

## 2023-12-11 RX ORDER — SENNOSIDES 8.6 MG TABLETS 8.6 MG/1
8.6 TABLET ORAL DAILY
Qty: 180 | Refills: 3 | Status: ACTIVE | COMMUNITY
Start: 2021-09-10 | End: 1900-01-01

## 2023-12-11 RX ORDER — SULFAMETHOXAZOLE AND TRIMETHOPRIM 400; 80 MG/1; MG/1
400-80 TABLET ORAL
Qty: 90 | Refills: 3 | Status: ACTIVE | COMMUNITY
Start: 2021-09-10 | End: 1900-01-01

## 2023-12-11 RX ORDER — TAMSULOSIN HYDROCHLORIDE 0.4 MG/1
0.4 CAPSULE ORAL
Qty: 180 | Refills: 3 | Status: ACTIVE | COMMUNITY
Start: 2021-09-10 | End: 1900-01-01

## 2023-12-11 RX ORDER — MYCOPHENOLATE MOFETIL 500 MG/1
500 TABLET ORAL TWICE DAILY
Qty: 360 | Refills: 3 | Status: ACTIVE | COMMUNITY
Start: 2021-09-10 | End: 1900-01-01

## 2023-12-11 RX ORDER — PREDNISONE 5 MG/1
5 TABLET ORAL
Qty: 90 | Refills: 3 | Status: ACTIVE | COMMUNITY
Start: 2021-09-10 | End: 1900-01-01

## 2023-12-11 RX ORDER — ALLOPURINOL 100 MG/1
100 TABLET ORAL DAILY
Qty: 90 | Refills: 3 | Status: ACTIVE | COMMUNITY
Start: 2021-09-20 | End: 1900-01-01

## 2023-12-13 ENCOUNTER — APPOINTMENT (OUTPATIENT)
Dept: TRANSPLANT | Facility: CLINIC | Age: 63
End: 2023-12-13

## 2023-12-13 LAB
ALBUMIN SERPL ELPH-MCNC: 4.6 G/DL
ALP BLD-CCNC: 59 U/L
ALT SERPL-CCNC: 19 U/L
ANION GAP SERPL CALC-SCNC: 11 MMOL/L
APPEARANCE: CLEAR
AST SERPL-CCNC: 26 U/L
BACTERIA: NEGATIVE /HPF
BASOPHILS # BLD AUTO: 0.04 K/UL
BASOPHILS NFR BLD AUTO: 0.7 %
BILIRUB SERPL-MCNC: 0.6 MG/DL
BILIRUBIN URINE: NEGATIVE
BLOOD URINE: NEGATIVE
BUN SERPL-MCNC: 21 MG/DL
CALCIUM SERPL-MCNC: 10.5 MG/DL
CAST: 0 /LPF
CHLORIDE SERPL-SCNC: 103 MMOL/L
CO2 SERPL-SCNC: 25 MMOL/L
COLOR: YELLOW
CREAT SERPL-MCNC: 1.26 MG/DL
CREAT SPEC-SCNC: 148 MG/DL
CREAT/PROT UR: 0.1 RATIO
EGFR: 64 ML/MIN/1.73M2
EOSINOPHIL # BLD AUTO: 0.23 K/UL
EOSINOPHIL NFR BLD AUTO: 4.3 %
EPITHELIAL CELLS: 0 /HPF
ESTIMATED AVERAGE GLUCOSE: 171 MG/DL
GLUCOSE QUALITATIVE U: NEGATIVE MG/DL
GLUCOSE SERPL-MCNC: 148 MG/DL
HBA1C MFR BLD HPLC: 7.6 %
HCT VFR BLD CALC: 38.4 %
HGB BLD-MCNC: 12.3 G/DL
IMM GRANULOCYTES NFR BLD AUTO: 3 %
KETONES URINE: NEGATIVE MG/DL
LEUKOCYTE ESTERASE URINE: NEGATIVE
LYMPHOCYTES # BLD AUTO: 1.69 K/UL
LYMPHOCYTES NFR BLD AUTO: 31.6 %
MAGNESIUM SERPL-MCNC: 1.7 MG/DL
MAN DIFF?: NORMAL
MCHC RBC-ENTMCNC: 29.3 PG
MCHC RBC-ENTMCNC: 32 GM/DL
MCV RBC AUTO: 91.4 FL
MICROSCOPIC-UA: NORMAL
MONOCYTES # BLD AUTO: 0.46 K/UL
MONOCYTES NFR BLD AUTO: 8.6 %
NEUTROPHILS # BLD AUTO: 2.76 K/UL
NEUTROPHILS NFR BLD AUTO: 51.8 %
NITRITE URINE: NEGATIVE
PH URINE: 6
PHOSPHATE SERPL-MCNC: 2.9 MG/DL
PLATELET # BLD AUTO: 170 K/UL
POTASSIUM SERPL-SCNC: 5.4 MMOL/L
PROT SERPL-MCNC: 6.6 G/DL
PROT UR-MCNC: 8 MG/DL
PROTEIN URINE: NEGATIVE MG/DL
RBC # BLD: 4.2 M/UL
RBC # FLD: 13 %
RED BLOOD CELLS URINE: 0 /HPF
SODIUM SERPL-SCNC: 139 MMOL/L
SPECIFIC GRAVITY URINE: 1.02
TACROLIMUS SERPL-MCNC: 6.7 NG/ML
URATE SERPL-MCNC: 6.4 MG/DL
UROBILINOGEN URINE: 0.2 MG/DL
WBC # FLD AUTO: 5.34 K/UL
WHITE BLOOD CELLS URINE: 0 /HPF

## 2023-12-14 ENCOUNTER — APPOINTMENT (OUTPATIENT)
Dept: OPHTHALMOLOGY | Facility: CLINIC | Age: 63
End: 2023-12-14

## 2023-12-21 ENCOUNTER — APPOINTMENT (OUTPATIENT)
Dept: NEPHROLOGY | Facility: CLINIC | Age: 63
End: 2023-12-21
Payer: MEDICARE

## 2023-12-21 VITALS
HEART RATE: 84 BPM | WEIGHT: 186 LBS | SYSTOLIC BLOOD PRESSURE: 130 MMHG | BODY MASS INDEX: 27.47 KG/M2 | DIASTOLIC BLOOD PRESSURE: 78 MMHG | RESPIRATION RATE: 16 BRPM | TEMPERATURE: 98.2 F

## 2023-12-21 PROCEDURE — 99215 OFFICE O/P EST HI 40 MIN: CPT

## 2023-12-21 RX ORDER — SODIUM BICARBONATE 650 MG/1
650 TABLET ORAL
Qty: 360 | Refills: 3 | Status: ACTIVE | COMMUNITY
Start: 2021-09-13 | End: 1900-01-01

## 2023-12-21 RX ORDER — METFORMIN HYDROCHLORIDE 1000 MG/1
1000 TABLET, COATED ORAL TWICE DAILY
Qty: 180 | Refills: 3 | Status: ACTIVE | COMMUNITY
Start: 2021-12-09 | End: 1900-01-01

## 2023-12-22 NOTE — ASSESSMENT
[FreeTextEntry1] : Renal Transplant recipient: Noted allograft function, creatinine is stable. No proteinuria. Tolerating medications. Lab data from last visit reviewed including allograft function, urinalysis, any viremia and trough level of medication. Immunosuppression: reviewed; Noted current regimen, maintenance regimen and reviewed target trough level. DM: Current regimen reviewed. Optimal target glucose levels reviewed for fasting and post prandial measurements. Increased Metformin to 1000 mg BID Will continue to monitor and adjust treatment; reviewed lifestyle modifications for glycemia control. Hypertension: controlled; Reviewed medications.  Reviewed target for blood pressure control. Hyperlipidemia: Reviewed medication regimen/lifestyle modification for maintaining target lipid levels. Cardiovascular risk reduction, primary/secondary prevention measures were discussed as appropriate.   Prophylaxis: Reviewed precautions to prevent infections. Low back and thigh pains: Referred to Dr. Angel and Dr. Mesa  ortho-spine for evaluation. Discussed Renal Preservation strategies including achieving optimal weight through calorie restriction and regular exercise, daily exercise, sleep hygiene, optimized control of glucose, blood pressure, lipids, avoidance of NSAIDs, Low Na and Low animal protein diet and medication adherence. Discussed ophthalmology and dermatology checks at least yearly and vaccinations. Flu vaccine yearly and Pneumonia vaccine every 5 years.

## 2023-12-22 NOTE — HISTORY OF PRESENT ILLNESS
[FreeTextEntry1] : He received  donor kidney transplant on 21 at [facility] (Surgeon-Dr. 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 Currently Has no acute symptoms. Has chronic aches and pains. Has knee and leg pains, has consulted Dr. Perez in the past. Recovered from mild COVID. Reviewed lab work,   stable creatinine, therapeutic Tac level Had left AVF closed. Returned from [place] on . He is working full time

## 2024-01-22 RX ORDER — METOPROLOL TARTRATE 25 MG/1
25 TABLET, FILM COATED ORAL
Qty: 180 | Refills: 3 | Status: ACTIVE | COMMUNITY
Start: 2021-10-07 | End: 1900-01-01

## 2024-03-11 RX ORDER — GABAPENTIN 100 MG/1
100 CAPSULE ORAL TWICE DAILY
Qty: 360 | Refills: 3 | Status: ACTIVE | COMMUNITY
Start: 2022-09-01 | End: 1900-01-01

## 2024-03-11 RX ORDER — GABAPENTIN 300 MG/1
300 CAPSULE ORAL
Qty: 270 | Refills: 3 | Status: ACTIVE | COMMUNITY
Start: 2022-12-20 | End: 1900-01-01

## 2024-03-14 ENCOUNTER — APPOINTMENT (OUTPATIENT)
Dept: TRANSPLANT | Facility: CLINIC | Age: 64
End: 2024-03-14

## 2024-03-15 LAB
ALBUMIN SERPL ELPH-MCNC: 4.5 G/DL
ALP BLD-CCNC: 67 U/L
ALT SERPL-CCNC: 15 U/L
ANION GAP SERPL CALC-SCNC: 11 MMOL/L
APPEARANCE: CLEAR
AST SERPL-CCNC: 15 U/L
BACTERIA: NEGATIVE /HPF
BILIRUB SERPL-MCNC: 0.5 MG/DL
BILIRUBIN URINE: NEGATIVE
BKV DNA SPEC QL NAA+PROBE: NOT DETECTED IU/ML
BLOOD URINE: NEGATIVE
BUN SERPL-MCNC: 17 MG/DL
CALCIUM SERPL-MCNC: 10.2 MG/DL
CAST: 0 /LPF
CHLORIDE SERPL-SCNC: 104 MMOL/L
CMV DNA SPEC QL NAA+PROBE: NOT DETECTED IU/ML
CMVPCR LOG: NOT DETECTED LOG10IU/ML
CO2 SERPL-SCNC: 23 MMOL/L
COLOR: YELLOW
CREAT SERPL-MCNC: 1.03 MG/DL
CREAT SPEC-SCNC: 96 MG/DL
CREAT/PROT UR: 0.1 RATIO
EGFR: 82 ML/MIN/1.73M2
EPITHELIAL CELLS: 0 /HPF
GLUCOSE QUALITATIVE U: NEGATIVE MG/DL
GLUCOSE SERPL-MCNC: 132 MG/DL
HCT VFR BLD CALC: 37.8 %
HGB BLD-MCNC: 12.2 G/DL
KETONES URINE: NEGATIVE MG/DL
LDH SERPL-CCNC: 164 U/L
LEUKOCYTE ESTERASE URINE: NEGATIVE
MAGNESIUM SERPL-MCNC: 1.6 MG/DL
MCHC RBC-ENTMCNC: 30.1 PG
MCHC RBC-ENTMCNC: 32.3 GM/DL
MCV RBC AUTO: 93.3 FL
MICROSCOPIC-UA: NORMAL
NITRITE URINE: NEGATIVE
PH URINE: 5.5
PHOSPHATE SERPL-MCNC: 2.6 MG/DL
PLATELET # BLD AUTO: 187 K/UL
POTASSIUM SERPL-SCNC: 5.2 MMOL/L
PROT SERPL-MCNC: 6.7 G/DL
PROT UR-MCNC: 6 MG/DL
PROTEIN URINE: NEGATIVE MG/DL
RBC # BLD: 4.05 M/UL
RBC # FLD: 12.8 %
RED BLOOD CELLS URINE: 0 /HPF
SODIUM SERPL-SCNC: 139 MMOL/L
SPECIFIC GRAVITY URINE: 1.02
TACROLIMUS SERPL-MCNC: 5.5 NG/ML
URATE SERPL-MCNC: 5.3 MG/DL
UROBILINOGEN URINE: 0.2 MG/DL
WBC # FLD AUTO: 9.19 K/UL
WHITE BLOOD CELLS URINE: 0 /HPF

## 2024-03-18 ENCOUNTER — APPOINTMENT (OUTPATIENT)
Dept: PHYSICAL MEDICINE AND REHAB | Facility: CLINIC | Age: 64
End: 2024-03-18
Payer: MEDICARE

## 2024-03-18 VITALS
SYSTOLIC BLOOD PRESSURE: 150 MMHG | RESPIRATION RATE: 14 BRPM | HEART RATE: 74 BPM | OXYGEN SATURATION: 98 % | TEMPERATURE: 98.1 F | DIASTOLIC BLOOD PRESSURE: 87 MMHG

## 2024-03-18 DIAGNOSIS — M54.50 LOW BACK PAIN, UNSPECIFIED: ICD-10-CM

## 2024-03-18 PROCEDURE — 99214 OFFICE O/P EST MOD 30 MIN: CPT

## 2024-03-18 RX ORDER — GABAPENTIN 300 MG/1
300 CAPSULE ORAL 3 TIMES DAILY
Qty: 90 | Refills: 1 | Status: ACTIVE | COMMUNITY
Start: 2024-03-18 | End: 1900-01-01

## 2024-03-18 NOTE — PHYSICAL EXAM
[Normal] : Oriented to person, place, and time, insight and judgement were intact and the affect was normal [de-identified] : , tenderness w palpation lumbar paraspinals [de-identified] : Motor 5/5 bl LEs, DTRs - LLE trace pat, other bl LE 1+; Sensation intact, Neg SLR, No clonus

## 2024-03-18 NOTE — HISTORY OF PRESENT ILLNESS
[FreeTextEntry1] : Patient w worsening low back pain radiating down the LLE Described as pinching pain that goes down the entire LLE. Non-traumatic onset.  No B/B incontinence.  On Gabapentin 300 BID Imaging on 11/28/22: Lumbar spondylosis, most prominent at the L4-L5 level.

## 2024-03-18 NOTE — ASSESSMENT
[FreeTextEntry1] : - Increase Gabapentin from 300 BID to 300 TID - MRI of LS spine - Refer to interventional spine. - Discussed with patient red flags such as bladder/bowel incontinence, weakness, significant increase in pain level in which case patient should immediately present to Emergency Room.

## 2024-03-22 ENCOUNTER — APPOINTMENT (OUTPATIENT)
Dept: TRANSPLANT | Facility: CLINIC | Age: 64
End: 2024-03-22

## 2024-03-25 ENCOUNTER — APPOINTMENT (OUTPATIENT)
Dept: NEPHROLOGY | Facility: CLINIC | Age: 64
End: 2024-03-25
Payer: MEDICARE

## 2024-03-25 VITALS
SYSTOLIC BLOOD PRESSURE: 153 MMHG | BODY MASS INDEX: 27.25 KG/M2 | HEART RATE: 79 BPM | RESPIRATION RATE: 14 BRPM | TEMPERATURE: 98 F | DIASTOLIC BLOOD PRESSURE: 78 MMHG | OXYGEN SATURATION: 99 % | HEIGHT: 69 IN | WEIGHT: 184 LBS

## 2024-03-25 VITALS — SYSTOLIC BLOOD PRESSURE: 124 MMHG | DIASTOLIC BLOOD PRESSURE: 72 MMHG

## 2024-03-25 PROCEDURE — 99214 OFFICE O/P EST MOD 30 MIN: CPT

## 2024-03-25 RX ORDER — OMEGA-3/DHA/EPA/FISH OIL 300-1000MG
400 CAPSULE ORAL
Qty: 120 | Refills: 2 | Status: ACTIVE | COMMUNITY
Start: 2021-09-20

## 2024-03-25 RX ORDER — VALGANCICLOVIR HYDROCHLORIDE 450 MG/1
450 TABLET ORAL
Qty: 30 | Refills: 0 | Status: DISCONTINUED | COMMUNITY
Start: 2023-08-10 | End: 2024-03-25

## 2024-03-25 NOTE — HISTORY OF PRESENT ILLNESS
[FreeTextEntry1] : He received  donor kidney transplant on 21 at [facility] (Surgeon-Dr. Akhtar) Has seen Dr. Perez for hip and knee pain Currently Has  chronic aches and pains. Has hip, knee and thigh pains, has also referral to Dr. Gonzalez, Physiatry. Recovered from mild COVID. Reviewed lab work,   stable creatinine, therapeutic Tac level Had left AVF closed. He is working full time

## 2024-03-25 NOTE — PHYSICAL EXAM
[General Appearance - Alert] : alert [General Appearance - In No Acute Distress] : in no acute distress [PERRL With Normal Accommodation] : pupils were equal in size, round, and reactive to light [Sclera] : the sclera and conjunctiva were normal [Extraocular Movements] : extraocular movements were intact [Outer Ear] : the ears and nose were normal in appearance [Oropharynx] : the oropharynx was normal [Neck Appearance] : the appearance of the neck was normal [Neck Cervical Mass (___cm)] : no neck mass was observed [Jugular Venous Distention Increased] : there was no jugular-venous distention [Thyroid Diffuse Enlargement] : the thyroid was not enlarged [Thyroid Nodule] : there were no palpable thyroid nodules [Auscultation Breath Sounds / Voice Sounds] : lungs were clear to auscultation bilaterally [Heart Rate And Rhythm] : heart rate was normal and rhythm regular [Heart Sounds] : normal S1 and S2 [Heart Sounds Gallop] : no gallops [Murmurs] : no murmurs [Heart Sounds Pericardial Friction Rub] : no pericardial rub [Full Pulse] : the pedal pulses are present [Edema] : there was no peripheral edema [Bowel Sounds] : normal bowel sounds [Abdomen Soft] : soft [Abdomen Tenderness] : non-tender [Abdomen Mass (___ Cm)] : no abdominal mass palpated [Cervical Lymph Nodes Enlarged Posterior Bilaterally] : posterior cervical [Cervical Lymph Nodes Enlarged Anterior Bilaterally] : anterior cervical [Supraclavicular Lymph Nodes Enlarged Bilaterally] : supraclavicular [Axillary Lymph Nodes Enlarged Bilaterally] : axillary [Inguinal Lymph Nodes Enlarged Bilaterally] : inguinal [Abnormal Walk] : normal gait [Involuntary Movements] : no involuntary movements were seen [] : no rash [No Focal Deficits] : no focal deficits [Oriented To Time, Place, And Person] : oriented to person, place, and time [Impaired Insight] : insight and judgment were intact [Affect] : the affect was normal [FreeTextEntry1] : left UE AVF is closed.

## 2024-03-25 NOTE — ASSESSMENT
Total time of today's visit was 35 minutes, counseling time was 25 minutes.      Hawk's parents returned today for a followup visit.  In the intervening time since our last visit, Hawk has been doing well.  He has been slipping a little bit school.  We talked about some difficulty maintaining his self-confidence in school.  Provided guidance with regard to building self-confidence.  His father noticed him interacting very positively with other kids at [employer].  His father is also noticing some rejection when he tries to interrupt his screens.      Provided some guidance with regard to avoiding competing with screens and finding volunteer opportunities for Hawk to get engaged with in order to support these kinds of positive developments with his own maturation.     ASSESSMENT:   1. ADHD, combined type.   2. Anxiety.   3. Constipation; in remission.   4. Nocturnal enuresis, persists.  5. Special education with current services under an IEP.        RECOMMENDATIONS:   1. Diagnostic:  No changes at this time.  2. Counseling and Education:  Substantial guidance, education and counseling today with regard to my interpretation and therapeutic recommendations as described above.   3. Diet:  No changes.   4. Sleep:  No changes.  5. Self-monitoring: No changes.   6. Self-regulation:  No changes.  7. Behavior modification: Continue with strategies.  8. Medication:  Continues on trial off Concerta at this time. Continue fluoxetine 10 mg daily. Continue guanfacine 1mg TID, morning and 1430, and pm.   9. Followup:  I would like to continue to follow up with Hawk and his parents monthly, flip-flopping sessions.    [FreeTextEntry1] : Renal Transplant recipient: Noted allograft function, creatinine is stable. No proteinuria. Tolerating medications. Lab data from last visit reviewed including allograft function, urinalysis, any viremia and trough level of medication. Immunosuppression: reviewed; Noted current regimen, maintenance regimen and reviewed target trough level. DM: Current regimen reviewed. Optimal target glucose levels reviewed for fasting and post prandial measurements. Increased Metformin to 1000 mg BID Will continue to monitor and adjust treatment; reviewed lifestyle modifications for glycemia control. Hypertension: controlled; Reviewed medications.  Reviewed target for blood pressure control. Hyperlipidemia: Reviewed medication regimen/lifestyle modification for maintaining target lipid levels. Cardiovascular risk reduction, primary/secondary prevention measures were discussed as appropriate.   Prophylaxis: Reviewed precautions to prevent infections. Low back and thigh pains: Referred to Dr. Angel and Dr. Mesa  ortho-spine for evaluation. Discussed Renal Preservation strategies including achieving optimal weight through calorie restriction and regular exercise, daily exercise, sleep hygiene, optimized control of glucose, blood pressure, lipids, avoidance of NSAIDs, Low Na and Low animal protein diet and medication adherence. Discussed ophthalmology and dermatology checks at least yearly and vaccinations. Flu vaccine yearly and Pneumonia vaccine every 5 years.

## 2024-04-14 ENCOUNTER — OUTPATIENT (OUTPATIENT)
Dept: OUTPATIENT SERVICES | Facility: HOSPITAL | Age: 64
LOS: 1 days | End: 2024-04-14
Payer: MEDICARE

## 2024-04-14 ENCOUNTER — APPOINTMENT (OUTPATIENT)
Dept: MRI IMAGING | Facility: IMAGING CENTER | Age: 64
End: 2024-04-14
Payer: MEDICARE

## 2024-04-14 DIAGNOSIS — M54.50 LOW BACK PAIN, UNSPECIFIED: ICD-10-CM

## 2024-04-14 DIAGNOSIS — T85.611A BREAKDOWN (MECHANICAL) OF INTRAPERITONEAL DIALYSIS CATHETER, INITIAL ENCOUNTER: Chronic | ICD-10-CM

## 2024-04-14 DIAGNOSIS — Z98.890 OTHER SPECIFIED POSTPROCEDURAL STATES: Chronic | ICD-10-CM

## 2024-04-14 DIAGNOSIS — Z94.0 KIDNEY TRANSPLANT STATUS: Chronic | ICD-10-CM

## 2024-04-14 PROCEDURE — 72148 MRI LUMBAR SPINE W/O DYE: CPT

## 2024-04-14 PROCEDURE — 72148 MRI LUMBAR SPINE W/O DYE: CPT | Mod: 26,MH

## 2024-04-19 ENCOUNTER — NON-APPOINTMENT (OUTPATIENT)
Age: 64
End: 2024-04-19

## 2024-05-14 NOTE — PATIENT PROFILE ADULT - HAS THE PATIENT RECEIVED THE INFLUENZA VACCINE THIS SEASON?
-- DO NOT REPLY / DO NOT REPLY ALL --  -- This inbox is not monitored  -- Message is from Engagement Center Operations (ECO) --      Message Type:  Change or Request for New Medication   Reason:  Quantity change request:  Request to change medication 70 quantity of 90 to 30/60/90 90.  Preferred pharmacy verified and selected.     [id number] - [facility] [address] [facility] [address]  Phone: [phone] Fax: [phone]      Patient has been advised the message will be addressed within 2-3 business days.               yes...

## 2024-06-11 ENCOUNTER — OUTPATIENT (OUTPATIENT)
Dept: OUTPATIENT SERVICES | Facility: HOSPITAL | Age: 64
LOS: 1 days | End: 2024-06-11
Payer: MEDICARE

## 2024-06-11 ENCOUNTER — APPOINTMENT (OUTPATIENT)
Dept: RADIOLOGY | Facility: IMAGING CENTER | Age: 64
End: 2024-06-11
Payer: MEDICARE

## 2024-06-11 ENCOUNTER — RX RENEWAL (OUTPATIENT)
Age: 64
End: 2024-06-11

## 2024-06-11 DIAGNOSIS — Z00.8 ENCOUNTER FOR OTHER GENERAL EXAMINATION: ICD-10-CM

## 2024-06-11 DIAGNOSIS — T85.611A BREAKDOWN (MECHANICAL) OF INTRAPERITONEAL DIALYSIS CATHETER, INITIAL ENCOUNTER: Chronic | ICD-10-CM

## 2024-06-11 DIAGNOSIS — Z94.0 KIDNEY TRANSPLANT STATUS: Chronic | ICD-10-CM

## 2024-06-11 DIAGNOSIS — Z98.890 OTHER SPECIFIED POSTPROCEDURAL STATES: Chronic | ICD-10-CM

## 2024-06-11 PROCEDURE — 73560 X-RAY EXAM OF KNEE 1 OR 2: CPT

## 2024-06-11 PROCEDURE — 73560 X-RAY EXAM OF KNEE 1 OR 2: CPT | Mod: 26,50,59

## 2024-06-24 NOTE — PATIENT PROFILE ADULT - FALL HARM RISK CONCLUSION
-- DO NOT REPLY / DO NOT REPLY ALL --  -- This inbox is not monitored  -- Message is from Engagement Center Operations (ECO) --    General Patient Message: The patient couldn't have his Pet scan done because he is claustrophobic  and he wants to know what else can he do, can you call the patient to advise  Caller Information         Type Contact Phone/Fax    06/24/2024 01:46 PM CDT Phone (Incoming) Brenden Clark (Self) [phone] (M)            Alternative phone number: n/a    Can a detailed message be left? Yes - LiveWell Message  and Yes - Voicemail      Patient has been advised the message will be addressed within 2-3 business days.             Universal Safety Interventions

## 2024-06-25 ENCOUNTER — OUTPATIENT (OUTPATIENT)
Dept: OUTPATIENT SERVICES | Facility: HOSPITAL | Age: 64
LOS: 1 days | End: 2024-06-25
Payer: MEDICARE

## 2024-06-25 ENCOUNTER — APPOINTMENT (OUTPATIENT)
Dept: TRANSPLANT | Facility: CLINIC | Age: 64
End: 2024-06-25

## 2024-06-25 ENCOUNTER — APPOINTMENT (OUTPATIENT)
Dept: MRI IMAGING | Facility: IMAGING CENTER | Age: 64
End: 2024-06-25
Payer: MEDICARE

## 2024-06-25 DIAGNOSIS — Z94.0 KIDNEY TRANSPLANT STATUS: Chronic | ICD-10-CM

## 2024-06-25 DIAGNOSIS — T85.611A BREAKDOWN (MECHANICAL) OF INTRAPERITONEAL DIALYSIS CATHETER, INITIAL ENCOUNTER: Chronic | ICD-10-CM

## 2024-06-25 DIAGNOSIS — Z00.8 ENCOUNTER FOR OTHER GENERAL EXAMINATION: ICD-10-CM

## 2024-06-25 DIAGNOSIS — Z98.890 OTHER SPECIFIED POSTPROCEDURAL STATES: Chronic | ICD-10-CM

## 2024-06-25 PROCEDURE — 72141 MRI NECK SPINE W/O DYE: CPT | Mod: MH

## 2024-06-25 PROCEDURE — 72141 MRI NECK SPINE W/O DYE: CPT | Mod: 26,MH

## 2024-06-27 ENCOUNTER — APPOINTMENT (OUTPATIENT)
Dept: NEPHROLOGY | Facility: CLINIC | Age: 64
End: 2024-06-27
Payer: MEDICARE

## 2024-06-27 VITALS
WEIGHT: 191 LBS | HEART RATE: 79 BPM | SYSTOLIC BLOOD PRESSURE: 156 MMHG | BODY MASS INDEX: 28.29 KG/M2 | OXYGEN SATURATION: 99 % | DIASTOLIC BLOOD PRESSURE: 83 MMHG | TEMPERATURE: 98 F | HEIGHT: 69 IN | RESPIRATION RATE: 14 BRPM

## 2024-06-27 VITALS — SYSTOLIC BLOOD PRESSURE: 120 MMHG | DIASTOLIC BLOOD PRESSURE: 60 MMHG

## 2024-06-27 DIAGNOSIS — E11.9 TYPE 2 DIABETES MELLITUS W/OUT COMPLICATIONS: ICD-10-CM

## 2024-06-27 DIAGNOSIS — I10 ESSENTIAL (PRIMARY) HYPERTENSION: ICD-10-CM

## 2024-06-27 DIAGNOSIS — Z94.0 KIDNEY TRANSPLANT STATUS: ICD-10-CM

## 2024-06-27 DIAGNOSIS — Z79.899 OTHER LONG TERM (CURRENT) DRUG THERAPY: ICD-10-CM

## 2024-06-27 LAB
ALBUMIN SERPL ELPH-MCNC: 4.4 G/DL
ALP BLD-CCNC: 63 U/L
ALT SERPL-CCNC: 16 U/L
ANION GAP SERPL CALC-SCNC: 11 MMOL/L
APPEARANCE: CLEAR
AST SERPL-CCNC: 14 U/L
BILIRUB SERPL-MCNC: 0.6 MG/DL
BILIRUBIN URINE: NEGATIVE
BKV DNA SPEC QL NAA+PROBE: NOT DETECTED IU/ML
BLOOD URINE: NEGATIVE
BUN SERPL-MCNC: 13 MG/DL
CALCIUM SERPL-MCNC: 10.1 MG/DL
CHLORIDE SERPL-SCNC: 99 MMOL/L
CHOLEST SERPL-MCNC: 151 MG/DL
CO2 SERPL-SCNC: 25 MMOL/L
COLOR: YELLOW
CREAT SERPL-MCNC: 1.08 MG/DL
CREAT SPEC-SCNC: 37 MG/DL
CREAT SPEC-SCNC: 37 MG/DL
CREAT/PROT UR: 0.1 RATIO
EGFR: 77 ML/MIN/1.73M2
ESTIMATED AVERAGE GLUCOSE: 163 MG/DL
GLUCOSE QUALITATIVE U: NEGATIVE MG/DL
GLUCOSE SERPL-MCNC: 108 MG/DL
HBA1C MFR BLD HPLC: 7.3 %
HCT VFR BLD CALC: 39.7 %
HDLC SERPL-MCNC: 61 MG/DL
HGB BLD-MCNC: 12.5 G/DL
KETONES URINE: NEGATIVE MG/DL
LDH SERPL-CCNC: 166 U/L
LDLC SERPL CALC-MCNC: 73 MG/DL
LEUKOCYTE ESTERASE URINE: NEGATIVE
MAGNESIUM SERPL-MCNC: 1.5 MG/DL
MCHC RBC-ENTMCNC: 28.4 PG
MCHC RBC-ENTMCNC: 31.5 GM/DL
MCV RBC AUTO: 90.2 FL
MICROALBUMIN 24H UR DL<=1MG/L-MCNC: <1.2 MG/DL
MICROALBUMIN/CREAT 24H UR-RTO: NORMAL MG/G
NITRITE URINE: NEGATIVE
NONHDLC SERPL-MCNC: 90 MG/DL
PH URINE: 7
PHOSPHATE SERPL-MCNC: 3.1 MG/DL
PLATELET # BLD AUTO: 181 K/UL
POTASSIUM SERPL-SCNC: 5 MMOL/L
PROT SERPL-MCNC: 6.3 G/DL
PROT UR-MCNC: 4 MG/DL
PROTEIN URINE: NEGATIVE MG/DL
RBC # BLD: 4.4 M/UL
RBC # FLD: 12.6 %
SODIUM SERPL-SCNC: 134 MMOL/L
SPECIFIC GRAVITY URINE: 1.01
TACROLIMUS SERPL-MCNC: 8.2 NG/ML
TRIGL SERPL-MCNC: 92 MG/DL
URATE SERPL-MCNC: 5.3 MG/DL
UROBILINOGEN URINE: 0.2 MG/DL
WBC # FLD AUTO: 8.88 K/UL

## 2024-06-27 PROCEDURE — 99215 OFFICE O/P EST HI 40 MIN: CPT

## 2024-07-19 DIAGNOSIS — Z94.0 KIDNEY TRANSPLANT STATUS: ICD-10-CM

## 2024-09-23 NOTE — ED PROVIDER NOTE - PROGRESS NOTE3
[FreeTextEntry1] :  Lab were reviewed in detail with the patient.  upadated COVID vaccine recommended in the fall. shingrx#2 given today. All preventative measures were reviewed with the patient and the patient is due for and agrees to the following as outlined  in the plan  below. Stable.

## 2024-10-01 NOTE — PLAN
[] : Left Pt states he woke up with right sided abdominal pain starting this morning. Denies fever, N/V/D. Denies trauma.

## 2024-10-18 DIAGNOSIS — Z94.0 KIDNEY TRANSPLANT STATUS: ICD-10-CM

## 2024-11-02 NOTE — DISCHARGE NOTE NURSING/CASE MANAGEMENT/SOCIAL WORK - BRAND OF COVID-19 VACCINATION
[place] ED to IP Report (EDIP)      Mental Status     Alert and oriented    Safety     ED to IP Report Safety: Impulsive    Mobility    ED to IP Report Mobility: Independent    Protocols  ED to IP Report Protocols: None    ISAR / Screening   ISAR  ISAR Score: 1  Age Greater than 65 Yes Head CT No    Isolation  ED to IP Report Isolation: None    Telemetry  Telemetry: NSR    Oxygen   Ventilation: room air  and on room air    LDAs:    Urinary Catheter: No    Central LIne No    Capped PIV left forearm    Additional Information: none  
Pfizer dose 1 and 2

## 2024-11-05 ENCOUNTER — APPOINTMENT (OUTPATIENT)
Dept: TRANSPLANT | Facility: CLINIC | Age: 64
End: 2024-11-05

## 2024-11-06 ENCOUNTER — APPOINTMENT (OUTPATIENT)
Dept: NEPHROLOGY | Facility: CLINIC | Age: 64
End: 2024-11-06
Payer: MEDICARE

## 2024-11-06 VITALS
WEIGHT: 189 LBS | BODY MASS INDEX: 27.99 KG/M2 | HEIGHT: 69 IN | OXYGEN SATURATION: 97 % | TEMPERATURE: 97.7 F | SYSTOLIC BLOOD PRESSURE: 148 MMHG | DIASTOLIC BLOOD PRESSURE: 82 MMHG | RESPIRATION RATE: 14 BRPM | HEART RATE: 85 BPM

## 2024-11-06 DIAGNOSIS — Z94.0 KIDNEY TRANSPLANT STATUS: ICD-10-CM

## 2024-11-06 DIAGNOSIS — Z79.899 OTHER LONG TERM (CURRENT) DRUG THERAPY: ICD-10-CM

## 2024-11-06 DIAGNOSIS — I10 ESSENTIAL (PRIMARY) HYPERTENSION: ICD-10-CM

## 2024-11-06 DIAGNOSIS — E11.9 TYPE 2 DIABETES MELLITUS W/OUT COMPLICATIONS: ICD-10-CM

## 2024-11-06 DIAGNOSIS — E11.42 TYPE 2 DIABETES MELLITUS WITH DIABETIC POLYNEUROPATHY: ICD-10-CM

## 2024-11-06 LAB
ALBUMIN SERPL ELPH-MCNC: 4.2 G/DL
ALP BLD-CCNC: 71 U/L
ALT SERPL-CCNC: 11 U/L
ANION GAP SERPL CALC-SCNC: 11 MMOL/L
APPEARANCE: CLEAR
AST SERPL-CCNC: 13 U/L
BACTERIA: NEGATIVE /HPF
BILIRUB SERPL-MCNC: 0.5 MG/DL
BILIRUBIN URINE: NEGATIVE
BLOOD URINE: NEGATIVE
BUN SERPL-MCNC: 15 MG/DL
CALCIUM SERPL-MCNC: 9.9 MG/DL
CAST: 0 /LPF
CHLORIDE SERPL-SCNC: 98 MMOL/L
CMV DNA SPEC QL NAA+PROBE: NOT DETECTED IU/ML
CMVPCR LOG: NOT DETECTED LOG10IU/ML
CO2 SERPL-SCNC: 25 MMOL/L
COLOR: YELLOW
CREAT SERPL-MCNC: 0.99 MG/DL
CREAT SPEC-SCNC: 102 MG/DL
CREAT/PROT UR: 0.1 RATIO
EGFR: 86 ML/MIN/1.73M2
EPITHELIAL CELLS: 0 /HPF
GLUCOSE QUALITATIVE U: NEGATIVE MG/DL
GLUCOSE SERPL-MCNC: 137 MG/DL
HCT VFR BLD CALC: 37.5 %
HGB BLD-MCNC: 12 G/DL
KETONES URINE: NEGATIVE MG/DL
LDH SERPL-CCNC: 141 U/L
LEUKOCYTE ESTERASE URINE: NEGATIVE
MAGNESIUM SERPL-MCNC: 1.4 MG/DL
MCHC RBC-ENTMCNC: 28.7 PG
MCHC RBC-ENTMCNC: 32 G/DL
MCV RBC AUTO: 89.7 FL
MICROSCOPIC-UA: NORMAL
NITRITE URINE: NEGATIVE
PH URINE: 7
PHOSPHATE SERPL-MCNC: 3 MG/DL
PLATELET # BLD AUTO: 189 K/UL
POTASSIUM SERPL-SCNC: 5.2 MMOL/L
PROT SERPL-MCNC: 6.3 G/DL
PROT UR-MCNC: 7 MG/DL
PROTEIN URINE: NEGATIVE MG/DL
RBC # BLD: 4.18 M/UL
RBC # FLD: 12.5 %
RED BLOOD CELLS URINE: 1 /HPF
SODIUM SERPL-SCNC: 134 MMOL/L
SPECIFIC GRAVITY URINE: 1.01
TACROLIMUS SERPL-MCNC: 7.4 NG/ML
URATE SERPL-MCNC: 5.4 MG/DL
UROBILINOGEN URINE: 0.2 MG/DL
WBC # FLD AUTO: 9.51 K/UL
WHITE BLOOD CELLS URINE: 0 /HPF

## 2024-11-06 PROCEDURE — 99215 OFFICE O/P EST HI 40 MIN: CPT

## 2024-11-06 RX ORDER — DULOXETINE HYDROCHLORIDE 30 MG/1
30 CAPSULE, DELAYED RELEASE PELLETS ORAL DAILY
Qty: 60 | Refills: 0 | Status: ACTIVE | COMMUNITY
Start: 2024-11-06 | End: 1900-01-01

## 2024-11-07 LAB — BKV DNA SPEC QL NAA+PROBE: NOT DETECTED IU/ML

## 2024-11-12 NOTE — ED PROVIDER NOTE - PR
No care due was identified.  [facility] Embedded Care Due Messages. Reference number: [id number].   11/12/2024 9:23:16 AM CST   160

## 2024-12-03 ENCOUNTER — APPOINTMENT (OUTPATIENT)
Dept: OPHTHALMOLOGY | Facility: CLINIC | Age: 64
End: 2024-12-03

## 2025-02-03 DIAGNOSIS — Z94.0 KIDNEY TRANSPLANT STATUS: ICD-10-CM

## 2025-03-06 ENCOUNTER — APPOINTMENT (OUTPATIENT)
Dept: NEPHROLOGY | Facility: CLINIC | Age: 65
End: 2025-03-06
Payer: MEDICARE

## 2025-03-06 VITALS — SYSTOLIC BLOOD PRESSURE: 120 MMHG | DIASTOLIC BLOOD PRESSURE: 70 MMHG

## 2025-03-06 DIAGNOSIS — I10 ESSENTIAL (PRIMARY) HYPERTENSION: ICD-10-CM

## 2025-03-06 DIAGNOSIS — E11.9 TYPE 2 DIABETES MELLITUS W/OUT COMPLICATIONS: ICD-10-CM

## 2025-03-06 DIAGNOSIS — Z79.60 LONG TERM (CURRENT) USE OF UNSPECIFIED IMMUNOMODULATORS AND IMMUNOSUPPRESSANTS: ICD-10-CM

## 2025-03-06 DIAGNOSIS — Z94.0 KIDNEY TRANSPLANT STATUS: ICD-10-CM

## 2025-03-06 LAB
ALBUMIN SERPL ELPH-MCNC: 4.5 G/DL
ALP BLD-CCNC: 81 U/L
ALT SERPL-CCNC: 12 U/L
ANION GAP SERPL CALC-SCNC: 11 MMOL/L
APPEARANCE: CLEAR
AST SERPL-CCNC: 15 U/L
BILIRUB SERPL-MCNC: 0.4 MG/DL
BILIRUBIN URINE: NEGATIVE
BKV DNA SPEC QL NAA+PROBE: NOT DETECTED IU/ML
BLOOD URINE: NEGATIVE
BUN SERPL-MCNC: 15 MG/DL
CALCIUM SERPL-MCNC: 10.1 MG/DL
CHLORIDE SERPL-SCNC: 101 MMOL/L
CHOLEST SERPL-MCNC: 222 MG/DL
CMV DNA SPEC QL NAA+PROBE: NOT DETECTED IU/ML
CMVPCR LOG: NOT DETECTED LOG10IU/ML
CO2 SERPL-SCNC: 25 MMOL/L
COLOR: YELLOW
CREAT SERPL-MCNC: 1.02 MG/DL
CREAT SPEC-SCNC: 163 MG/DL
CREAT SPEC-SCNC: 163 MG/DL
CREAT/PROT UR: 0.1 RATIO
EGFRCR SERPLBLD CKD-EPI 2021: 83 ML/MIN/1.73M2
ESTIMATED AVERAGE GLUCOSE: 166 MG/DL
GLUCOSE QUALITATIVE U: NEGATIVE MG/DL
GLUCOSE SERPL-MCNC: 175 MG/DL
HBA1C MFR BLD HPLC: 7.4 %
HCT VFR BLD CALC: 37.8 %
HDLC SERPL-MCNC: 62 MG/DL
HGB BLD-MCNC: 12.3 G/DL
KETONES URINE: NEGATIVE MG/DL
LDH SERPL-CCNC: 142 U/L
LDLC SERPL CALC-MCNC: 143 MG/DL
LEUKOCYTE ESTERASE URINE: NEGATIVE
MAGNESIUM SERPL-MCNC: 1.4 MG/DL
MCHC RBC-ENTMCNC: 28.6 PG
MCHC RBC-ENTMCNC: 32.5 G/DL
MCV RBC AUTO: 87.9 FL
MICROALBUMIN 24H UR DL<=1MG/L-MCNC: 1.3 MG/DL
MICROALBUMIN/CREAT 24H UR-RTO: 8 MG/G
NITRITE URINE: NEGATIVE
NONHDLC SERPL-MCNC: 160 MG/DL
PH URINE: 6.5
PHOSPHATE SERPL-MCNC: 3.4 MG/DL
PLATELET # BLD AUTO: 198 K/UL
POTASSIUM SERPL-SCNC: 5.7 MMOL/L
PROT SERPL-MCNC: 6.4 G/DL
PROT UR-MCNC: 12 MG/DL
PROTEIN URINE: NEGATIVE MG/DL
RBC # BLD: 4.3 M/UL
RBC # FLD: 12.7 %
SODIUM SERPL-SCNC: 137 MMOL/L
SPECIFIC GRAVITY URINE: 1.02
TACROLIMUS SERPL-MCNC: 7.6 NG/ML
TRIGL SERPL-MCNC: 96 MG/DL
TSH SERPL-ACNC: 1.06 UIU/ML
URATE SERPL-MCNC: 5 MG/DL
UROBILINOGEN URINE: 0.2 MG/DL
WBC # FLD AUTO: 8.74 K/UL

## 2025-03-06 PROCEDURE — 99214 OFFICE O/P EST MOD 30 MIN: CPT

## 2025-03-24 ENCOUNTER — EMERGENCY (EMERGENCY)
Facility: HOSPITAL | Age: 65
LOS: 1 days | Discharge: HOME | End: 2025-03-24
Attending: EMERGENCY MEDICINE
Payer: COMMERCIAL

## 2025-03-24 VITALS
HEIGHT: 69 IN | HEART RATE: 89 BPM | SYSTOLIC BLOOD PRESSURE: 160 MMHG | RESPIRATION RATE: 16 BRPM | DIASTOLIC BLOOD PRESSURE: 90 MMHG | WEIGHT: 190.04 LBS | TEMPERATURE: 98 F | OXYGEN SATURATION: 99 %

## 2025-03-24 DIAGNOSIS — T85.611A BREAKDOWN (MECHANICAL) OF INTRAPERITONEAL DIALYSIS CATHETER, INITIAL ENCOUNTER: Chronic | ICD-10-CM

## 2025-03-24 DIAGNOSIS — Z94.0 KIDNEY TRANSPLANT STATUS: Chronic | ICD-10-CM

## 2025-03-24 DIAGNOSIS — Z98.890 OTHER SPECIFIED POSTPROCEDURAL STATES: Chronic | ICD-10-CM

## 2025-03-24 PROCEDURE — 99282 EMERGENCY DEPT VISIT SF MDM: CPT

## 2025-03-24 PROCEDURE — 99283 EMERGENCY DEPT VISIT LOW MDM: CPT

## 2025-03-24 RX ORDER — ACETAMINOPHEN 500 MG/5ML
975 LIQUID (ML) ORAL ONCE
Refills: 0 | Status: COMPLETED | OUTPATIENT
Start: 2025-03-24 | End: 2025-03-24

## 2025-03-24 RX ADMIN — Medication 975 MILLIGRAM(S): at 15:59
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
## 2025-03-24 NOTE — ED PROVIDER NOTE - NSFOLLOWUPINSTRUCTIONS_ED_ALL_ED_FT
You will need further medical care and evaluation.     You received Tylenol for pain and xrays of you hip.    Please follow-up with your Primary Care in the next few days for further care and evaluation.    You can take tylenol for pain.    Follow up with your medical doctor in 2-3 days or call our clinic at [phone] and state you were seen in the Emergency Department and would like to be seen in clinic. You may also call [phone] to speak with a representative to assist follow up care with medicine, surgery, or specialists.    If you develop dizziness, loss of consciousness, weakness (i.e. difficulty lifting objects), unable to walk, blood in the urine, worsening pain. please return to the emergency department.    Return for any persistent, worsening symptoms, or ANY concerns at all.

## 2025-03-24 NOTE — ED PROVIDER NOTE - PROGRESS NOTE DETAILS
James, PGY1: Patient would like to leave without the xray as he is feeling better and his pain is well controlled. As patient is able to ambulate without difficulty, will dispo with return precautions.

## 2025-03-24 NOTE — ED PROVIDER NOTE - PHYSICAL EXAMINATION
Const: Awake, alert, no acute distress.  Well appearing.  Moving comfortably on stretcher.  HEENT: NC/AT.  Moist mucous membranes.  Eyes: Extraocular movements intact b/l.  No scleral icterus.  Neck: Full ROM without pain.  Cardiac: Extremities well perfused, normal coloration, no peripheral cyanosis.  Negative seatbelt sign.  Resp: Speaking in full sentences.  No evidence of respiratory distress.  Abd: Non-distended. Non-tender. No overlying skin changes  Skin: Normal coloration.  No rashes, abrasions or lacerations.  Neuro: Awake, alert & oriented x 3.  Moves all extremities symmetrically.  No obvious focal deficits. CN2-12 intact. Able to ambulate comfortably. No midspinal TTP. Some paraspinal TTP in neck. No deformities or step off.
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
## 2025-03-24 NOTE — ED PROVIDER NOTE - PATIENT PORTAL LINK FT
You can access the FollowMyHealth Patient Portal offered by [facility] by registering at the following website: [email or web address]. By joining [facility]’s FollowMyHealth portal, you will also be able to view your health information using other applications (apps) compatible with our system.

## 2025-03-24 NOTE — ED ADULT NURSE NOTE - NSFALLUNIVINTERV_ED_ALL_ED
Bed/Stretcher in lowest position, wheels locked, appropriate side rails in place/Call bell, personal items and telephone in reach/Instruct patient to call for assistance before getting out of bed/chair/stretcher/Non-slip footwear applied when patient is off stretcher/[place] to call system/Physically safe environment - no spills, clutter or unnecessary equipment/Purposeful proactive rounding/Room/bathroom lighting operational, light cord in reach

## 2025-06-04 ENCOUNTER — RX RENEWAL (OUTPATIENT)
Age: 65
End: 2025-06-04

## 2025-06-16 ENCOUNTER — APPOINTMENT (OUTPATIENT)
Dept: TRANSPLANT | Facility: CLINIC | Age: 65
End: 2025-06-16

## 2025-06-17 ENCOUNTER — APPOINTMENT (OUTPATIENT)
Dept: NEPHROLOGY | Facility: CLINIC | Age: 65
End: 2025-06-17
Payer: MEDICARE

## 2025-06-17 VITALS
HEIGHT: 69 IN | SYSTOLIC BLOOD PRESSURE: 163 MMHG | OXYGEN SATURATION: 97 % | BODY MASS INDEX: 27.7 KG/M2 | WEIGHT: 187 LBS | DIASTOLIC BLOOD PRESSURE: 89 MMHG | HEART RATE: 91 BPM | RESPIRATION RATE: 14 BRPM | TEMPERATURE: 97.7 F

## 2025-06-17 LAB
ALBUMIN SERPL ELPH-MCNC: 4.5 G/DL
ALP BLD-CCNC: 75 U/L
ALT SERPL-CCNC: 18 U/L
ANION GAP SERPL CALC-SCNC: 13 MMOL/L
APPEARANCE: CLEAR
AST SERPL-CCNC: 18 U/L
BACTERIA: NEGATIVE /HPF
BILIRUB SERPL-MCNC: 0.5 MG/DL
BILIRUBIN URINE: NEGATIVE
BLOOD URINE: NEGATIVE
BUN SERPL-MCNC: 19 MG/DL
CALCIUM SERPL-MCNC: 10.4 MG/DL
CAST: 2 /LPF
CHLORIDE SERPL-SCNC: 100 MMOL/L
CO2 SERPL-SCNC: 22 MMOL/L
COLOR: YELLOW
CREAT SERPL-MCNC: 1.22 MG/DL
CREAT SPEC-SCNC: 176 MG/DL
CREAT/PROT UR: 0.1 RATIO
EGFRCR SERPLBLD CKD-EPI 2021: 66 ML/MIN/1.73M2
EPITHELIAL CELLS: 0 /HPF
GLUCOSE QUALITATIVE U: NEGATIVE MG/DL
GLUCOSE SERPL-MCNC: 185 MG/DL
HCT VFR BLD CALC: 38.8 %
HGB BLD-MCNC: 12.4 G/DL
KETONES URINE: ABNORMAL MG/DL
LDH SERPL-CCNC: 148 U/L
LEUKOCYTE ESTERASE URINE: NEGATIVE
MAGNESIUM SERPL-MCNC: 1.3 MG/DL
MCHC RBC-ENTMCNC: 28.1 PG
MCHC RBC-ENTMCNC: 32 G/DL
MCV RBC AUTO: 88 FL
MICROSCOPIC-UA: NORMAL
NITRITE URINE: NEGATIVE
PH URINE: 6
PHOSPHATE SERPL-MCNC: 2.8 MG/DL
PLATELET # BLD AUTO: 196 K/UL
POTASSIUM SERPL-SCNC: 5.3 MMOL/L
PROT SERPL-MCNC: 6.5 G/DL
PROT UR-MCNC: 12 MG/DL
PROTEIN URINE: NEGATIVE MG/DL
RBC # BLD: 4.41 M/UL
RBC # FLD: 12.6 %
RED BLOOD CELLS URINE: 0 /HPF
SODIUM SERPL-SCNC: 135 MMOL/L
SPECIFIC GRAVITY URINE: 1.02
TACROLIMUS SERPL-MCNC: 7.1 NG/ML
URATE SERPL-MCNC: 4.7 MG/DL
UROBILINOGEN URINE: 0.2 MG/DL
WBC # FLD AUTO: 11.36 K/UL
WHITE BLOOD CELLS URINE: 0 /HPF

## 2025-06-17 PROCEDURE — 99214 OFFICE O/P EST MOD 30 MIN: CPT

## 2025-06-19 LAB
CYSTATIN C SERPL-MCNC: 1.07 MG/L
GFR/BSA.PRED SERPLBLD CYS-BASED-ARV: 70 ML/MIN/1.73M2

## 2025-06-20 ENCOUNTER — APPOINTMENT (OUTPATIENT)
Dept: GASTROENTEROLOGY | Facility: CLINIC | Age: 65
End: 2025-06-20